# Patient Record
Sex: MALE | Race: WHITE | NOT HISPANIC OR LATINO | Employment: OTHER | URBAN - METROPOLITAN AREA
[De-identification: names, ages, dates, MRNs, and addresses within clinical notes are randomized per-mention and may not be internally consistent; named-entity substitution may affect disease eponyms.]

---

## 2017-06-24 ENCOUNTER — COMMUNICATION - HEALTHEAST (OUTPATIENT)
Dept: FAMILY MEDICINE | Facility: CLINIC | Age: 57
End: 2017-06-24

## 2017-06-24 DIAGNOSIS — I10 HYPERTENSION: ICD-10-CM

## 2017-07-13 ENCOUNTER — COMMUNICATION - HEALTHEAST (OUTPATIENT)
Dept: FAMILY MEDICINE | Facility: CLINIC | Age: 57
End: 2017-07-13

## 2017-07-13 DIAGNOSIS — E78.5 HYPERLIPIDEMIA: ICD-10-CM

## 2017-08-16 ENCOUNTER — COMMUNICATION - HEALTHEAST (OUTPATIENT)
Dept: FAMILY MEDICINE | Facility: CLINIC | Age: 57
End: 2017-08-16

## 2017-08-16 ENCOUNTER — OFFICE VISIT - HEALTHEAST (OUTPATIENT)
Dept: FAMILY MEDICINE | Facility: CLINIC | Age: 57
End: 2017-08-16

## 2017-08-16 DIAGNOSIS — I10 HYPERTENSION: ICD-10-CM

## 2017-08-16 DIAGNOSIS — E55.9 VITAMIN D DEFICIENCY: ICD-10-CM

## 2017-08-16 DIAGNOSIS — Z00.00 PHYSICAL EXAM: ICD-10-CM

## 2017-08-16 DIAGNOSIS — E78.5 HYPERLIPIDEMIA: ICD-10-CM

## 2017-08-16 DIAGNOSIS — E11.9 DIABETES (H): ICD-10-CM

## 2017-08-16 LAB
CHOLEST SERPL-MCNC: 195 MG/DL
FASTING STATUS PATIENT QL REPORTED: YES
HBA1C MFR BLD: 7 % (ref 3.5–6)
HDLC SERPL-MCNC: 32 MG/DL
LDLC SERPL CALC-MCNC: 129 MG/DL
TRIGL SERPL-MCNC: 172 MG/DL

## 2017-08-16 ASSESSMENT — MIFFLIN-ST. JEOR: SCORE: 2138.26

## 2018-02-13 ENCOUNTER — RECORDS - HEALTHEAST (OUTPATIENT)
Dept: ADMINISTRATIVE | Facility: OTHER | Age: 58
End: 2018-02-13

## 2018-03-10 ENCOUNTER — RECORDS - HEALTHEAST (OUTPATIENT)
Dept: ADMINISTRATIVE | Facility: OTHER | Age: 58
End: 2018-03-10

## 2018-08-21 ENCOUNTER — COMMUNICATION - HEALTHEAST (OUTPATIENT)
Dept: FAMILY MEDICINE | Facility: CLINIC | Age: 58
End: 2018-08-21

## 2018-08-21 DIAGNOSIS — E11.9 DIABETES (H): ICD-10-CM

## 2018-10-02 ENCOUNTER — COMMUNICATION - HEALTHEAST (OUTPATIENT)
Dept: FAMILY MEDICINE | Facility: CLINIC | Age: 58
End: 2018-10-02

## 2018-10-02 DIAGNOSIS — E78.5 HYPERLIPIDEMIA: ICD-10-CM

## 2018-10-03 ENCOUNTER — COMMUNICATION - HEALTHEAST (OUTPATIENT)
Dept: FAMILY MEDICINE | Facility: CLINIC | Age: 58
End: 2018-10-03

## 2018-10-03 DIAGNOSIS — I10 HYPERTENSION: ICD-10-CM

## 2018-10-09 ENCOUNTER — OFFICE VISIT - HEALTHEAST (OUTPATIENT)
Dept: FAMILY MEDICINE | Facility: CLINIC | Age: 58
End: 2018-10-09

## 2018-10-09 DIAGNOSIS — Z00.00 ENCOUNTER FOR ROUTINE ADULT HEALTH EXAMINATION WITHOUT ABNORMAL FINDINGS: ICD-10-CM

## 2018-10-09 DIAGNOSIS — E11.9 DIABETES (H): ICD-10-CM

## 2018-10-09 DIAGNOSIS — E55.9 VITAMIN D DEFICIENCY: ICD-10-CM

## 2018-10-09 DIAGNOSIS — Z23 NEED FOR IMMUNIZATION AGAINST INFLUENZA: ICD-10-CM

## 2018-10-09 LAB
ALBUMIN SERPL-MCNC: 4.3 G/DL (ref 3.5–5)
ALP SERPL-CCNC: 74 U/L (ref 45–120)
ALT SERPL W P-5'-P-CCNC: 26 U/L (ref 0–45)
ANION GAP SERPL CALCULATED.3IONS-SCNC: 12 MMOL/L (ref 5–18)
AST SERPL W P-5'-P-CCNC: 17 U/L (ref 0–40)
BILIRUB SERPL-MCNC: 0.5 MG/DL (ref 0–1)
BUN SERPL-MCNC: 15 MG/DL (ref 8–22)
CALCIUM SERPL-MCNC: 10.1 MG/DL (ref 8.5–10.5)
CHLORIDE BLD-SCNC: 104 MMOL/L (ref 98–107)
CHOLEST SERPL-MCNC: 200 MG/DL
CO2 SERPL-SCNC: 26 MMOL/L (ref 22–31)
CREAT SERPL-MCNC: 0.94 MG/DL (ref 0.7–1.3)
CREAT UR-MCNC: 179 MG/DL
FASTING STATUS PATIENT QL REPORTED: YES
GFR SERPL CREATININE-BSD FRML MDRD: >60 ML/MIN/1.73M2
GLUCOSE BLD-MCNC: 159 MG/DL (ref 70–125)
HBA1C MFR BLD: 7.9 % (ref 3.5–6)
HDLC SERPL-MCNC: 38 MG/DL
LDLC SERPL CALC-MCNC: 118 MG/DL
MICROALBUMIN UR-MCNC: 3.07 MG/DL (ref 0–1.99)
MICROALBUMIN/CREAT UR: 17.2 MG/G
POTASSIUM BLD-SCNC: 4.9 MMOL/L (ref 3.5–5)
PROT SERPL-MCNC: 7.2 G/DL (ref 6–8)
SODIUM SERPL-SCNC: 142 MMOL/L (ref 136–145)
TRIGL SERPL-MCNC: 222 MG/DL

## 2018-10-09 ASSESSMENT — MIFFLIN-ST. JEOR: SCORE: 2176.81

## 2018-10-10 ENCOUNTER — AMBULATORY - HEALTHEAST (OUTPATIENT)
Dept: FAMILY MEDICINE | Facility: CLINIC | Age: 58
End: 2018-10-10

## 2018-10-10 LAB — 25(OH)D3 SERPL-MCNC: 43.7 NG/ML (ref 30–80)

## 2018-10-29 ENCOUNTER — RECORDS - HEALTHEAST (OUTPATIENT)
Dept: ADMINISTRATIVE | Facility: OTHER | Age: 58
End: 2018-10-29

## 2019-02-06 ENCOUNTER — COMMUNICATION - HEALTHEAST (OUTPATIENT)
Dept: FAMILY MEDICINE | Facility: CLINIC | Age: 59
End: 2019-02-06

## 2019-02-06 DIAGNOSIS — R73.09 OTHER ABNORMAL GLUCOSE: ICD-10-CM

## 2019-02-11 ENCOUNTER — COMMUNICATION - HEALTHEAST (OUTPATIENT)
Dept: FAMILY MEDICINE | Facility: CLINIC | Age: 59
End: 2019-02-11

## 2019-05-06 ENCOUNTER — COMMUNICATION - HEALTHEAST (OUTPATIENT)
Dept: FAMILY MEDICINE | Facility: CLINIC | Age: 59
End: 2019-05-06

## 2019-06-13 ENCOUNTER — OFFICE VISIT - HEALTHEAST (OUTPATIENT)
Dept: FAMILY MEDICINE | Facility: CLINIC | Age: 59
End: 2019-06-13

## 2019-06-13 ENCOUNTER — COMMUNICATION - HEALTHEAST (OUTPATIENT)
Dept: FAMILY MEDICINE | Facility: CLINIC | Age: 59
End: 2019-06-13

## 2019-06-13 DIAGNOSIS — E11.9 TYPE 2 DIABETES MELLITUS WITHOUT COMPLICATION, WITHOUT LONG-TERM CURRENT USE OF INSULIN (H): ICD-10-CM

## 2019-06-13 DIAGNOSIS — I10 ESSENTIAL HYPERTENSION: ICD-10-CM

## 2019-06-13 DIAGNOSIS — E66.01 MORBID OBESITY (H): ICD-10-CM

## 2019-06-13 DIAGNOSIS — E78.00 HYPERCHOLESTEREMIA: ICD-10-CM

## 2019-06-13 LAB — HBA1C MFR BLD: 7.3 % (ref 3.5–6)

## 2019-06-13 ASSESSMENT — MIFFLIN-ST. JEOR: SCORE: 2146.2

## 2019-06-18 ENCOUNTER — COMMUNICATION - HEALTHEAST (OUTPATIENT)
Dept: FAMILY MEDICINE | Facility: CLINIC | Age: 59
End: 2019-06-18

## 2019-07-30 ENCOUNTER — RECORDS - HEALTHEAST (OUTPATIENT)
Dept: ADMINISTRATIVE | Facility: OTHER | Age: 59
End: 2019-07-30

## 2019-07-31 ENCOUNTER — RECORDS - HEALTHEAST (OUTPATIENT)
Dept: HEALTH INFORMATION MANAGEMENT | Facility: CLINIC | Age: 59
End: 2019-07-31

## 2019-09-20 ENCOUNTER — COMMUNICATION - HEALTHEAST (OUTPATIENT)
Dept: FAMILY MEDICINE | Facility: CLINIC | Age: 59
End: 2019-09-20

## 2019-09-20 DIAGNOSIS — I10 HYPERTENSION: ICD-10-CM

## 2019-10-24 ENCOUNTER — COMMUNICATION - HEALTHEAST (OUTPATIENT)
Dept: FAMILY MEDICINE | Facility: CLINIC | Age: 59
End: 2019-10-24

## 2019-10-24 DIAGNOSIS — R73.09 OTHER ABNORMAL GLUCOSE: ICD-10-CM

## 2019-11-04 ENCOUNTER — OFFICE VISIT - HEALTHEAST (OUTPATIENT)
Dept: FAMILY MEDICINE | Facility: CLINIC | Age: 59
End: 2019-11-04

## 2019-11-04 DIAGNOSIS — E78.00 HYPERCHOLESTEREMIA: ICD-10-CM

## 2019-11-04 DIAGNOSIS — Z00.00 ROUTINE GENERAL MEDICAL EXAMINATION AT A HEALTH CARE FACILITY: ICD-10-CM

## 2019-11-04 DIAGNOSIS — E11.9 DIABETES MELLITUS, TYPE 2 (H): ICD-10-CM

## 2019-11-04 LAB
ALT SERPL W P-5'-P-CCNC: 22 U/L (ref 0–45)
HBA1C MFR BLD: 7.2 % (ref 3.5–6)
HIV 1+2 AB+HIV1 P24 AG SERPL QL IA: NEGATIVE
LDLC SERPL CALC-MCNC: 121 MG/DL
PSA SERPL-MCNC: 1.3 NG/ML (ref 0–3.5)

## 2019-11-04 ASSESSMENT — MIFFLIN-ST. JEOR: SCORE: 2128.27

## 2019-11-05 ENCOUNTER — COMMUNICATION - HEALTHEAST (OUTPATIENT)
Dept: FAMILY MEDICINE | Facility: CLINIC | Age: 59
End: 2019-11-05

## 2019-11-05 LAB — HCV AB SERPL QL IA: NEGATIVE

## 2020-01-08 ENCOUNTER — COMMUNICATION - HEALTHEAST (OUTPATIENT)
Dept: FAMILY MEDICINE | Facility: CLINIC | Age: 60
End: 2020-01-08

## 2020-01-12 ENCOUNTER — COMMUNICATION - HEALTHEAST (OUTPATIENT)
Dept: FAMILY MEDICINE | Facility: CLINIC | Age: 60
End: 2020-01-12

## 2020-01-12 DIAGNOSIS — E11.9 DIABETES MELLITUS, TYPE 2 (H): ICD-10-CM

## 2020-01-15 ENCOUNTER — AMBULATORY - HEALTHEAST (OUTPATIENT)
Dept: NURSING | Facility: CLINIC | Age: 60
End: 2020-01-15

## 2020-01-15 ENCOUNTER — COMMUNICATION - HEALTHEAST (OUTPATIENT)
Dept: FAMILY MEDICINE | Facility: CLINIC | Age: 60
End: 2020-01-15

## 2020-01-28 ENCOUNTER — COMMUNICATION - HEALTHEAST (OUTPATIENT)
Dept: FAMILY MEDICINE | Facility: CLINIC | Age: 60
End: 2020-01-28

## 2020-01-28 DIAGNOSIS — E11.9 DIABETES MELLITUS, TYPE 2 (H): ICD-10-CM

## 2020-01-28 DIAGNOSIS — E66.01 MORBID OBESITY (H): ICD-10-CM

## 2020-03-07 ENCOUNTER — COMMUNICATION - HEALTHEAST (OUTPATIENT)
Dept: FAMILY MEDICINE | Facility: CLINIC | Age: 60
End: 2020-03-07

## 2020-03-07 DIAGNOSIS — E78.00 HYPERCHOLESTEREMIA: ICD-10-CM

## 2020-05-11 ENCOUNTER — OFFICE VISIT - HEALTHEAST (OUTPATIENT)
Dept: FAMILY MEDICINE | Facility: CLINIC | Age: 60
End: 2020-05-11

## 2020-05-11 DIAGNOSIS — E11.9 TYPE 2 DIABETES MELLITUS WITHOUT COMPLICATION, WITHOUT LONG-TERM CURRENT USE OF INSULIN (H): ICD-10-CM

## 2020-05-22 ENCOUNTER — COMMUNICATION - HEALTHEAST (OUTPATIENT)
Dept: FAMILY MEDICINE | Facility: CLINIC | Age: 60
End: 2020-05-22

## 2020-05-22 DIAGNOSIS — I10 HYPERTENSION: ICD-10-CM

## 2020-06-19 ENCOUNTER — RECORDS - HEALTHEAST (OUTPATIENT)
Dept: ADMINISTRATIVE | Facility: OTHER | Age: 60
End: 2020-06-19

## 2020-06-19 LAB — RETINOPATHY: NEGATIVE

## 2020-06-24 ENCOUNTER — RECORDS - HEALTHEAST (OUTPATIENT)
Dept: HEALTH INFORMATION MANAGEMENT | Facility: CLINIC | Age: 60
End: 2020-06-24

## 2020-09-10 ENCOUNTER — COMMUNICATION - HEALTHEAST (OUTPATIENT)
Dept: FAMILY MEDICINE | Facility: CLINIC | Age: 60
End: 2020-09-10

## 2020-09-10 DIAGNOSIS — E11.9 DIABETES MELLITUS, TYPE 2 (H): ICD-10-CM

## 2020-10-20 ENCOUNTER — COMMUNICATION - HEALTHEAST (OUTPATIENT)
Dept: FAMILY MEDICINE | Facility: CLINIC | Age: 60
End: 2020-10-20

## 2020-10-20 DIAGNOSIS — R73.09 OTHER ABNORMAL GLUCOSE: ICD-10-CM

## 2020-11-05 ENCOUNTER — COMMUNICATION - HEALTHEAST (OUTPATIENT)
Dept: ADMINISTRATIVE | Facility: CLINIC | Age: 60
End: 2020-11-05

## 2020-11-05 ENCOUNTER — OFFICE VISIT - HEALTHEAST (OUTPATIENT)
Dept: FAMILY MEDICINE | Facility: CLINIC | Age: 60
End: 2020-11-05

## 2020-11-05 DIAGNOSIS — Z00.00 ROUTINE GENERAL MEDICAL EXAMINATION AT A HEALTH CARE FACILITY: ICD-10-CM

## 2020-11-05 DIAGNOSIS — Z12.11 SCREEN FOR COLON CANCER: ICD-10-CM

## 2020-11-05 DIAGNOSIS — E11.9 TYPE 2 DIABETES MELLITUS WITHOUT COMPLICATION, WITHOUT LONG-TERM CURRENT USE OF INSULIN (H): ICD-10-CM

## 2020-11-05 LAB
ANION GAP SERPL CALCULATED.3IONS-SCNC: 11 MMOL/L (ref 5–18)
BUN SERPL-MCNC: 17 MG/DL (ref 8–22)
CALCIUM SERPL-MCNC: 9.9 MG/DL (ref 8.5–10.5)
CHLORIDE BLD-SCNC: 104 MMOL/L (ref 98–107)
CO2 SERPL-SCNC: 25 MMOL/L (ref 22–31)
CREAT SERPL-MCNC: 1.12 MG/DL (ref 0.7–1.3)
GFR SERPL CREATININE-BSD FRML MDRD: >60 ML/MIN/1.73M2
GLUCOSE BLD-MCNC: 217 MG/DL (ref 70–125)
HBA1C MFR BLD: 8.2 %
LDLC SERPL CALC-MCNC: 128 MG/DL
POTASSIUM BLD-SCNC: 5.1 MMOL/L (ref 3.5–5)
PSA SERPL-MCNC: 1.1 NG/ML (ref 0–4.5)
SODIUM SERPL-SCNC: 140 MMOL/L (ref 136–145)

## 2020-11-05 RX ORDER — OXYMETAZOLINE HYDROCHLORIDE 1 G/100G
CREAM TOPICAL DAILY
Status: SHIPPED | COMMUNITY
Start: 2020-11-05

## 2020-11-05 ASSESSMENT — MIFFLIN-ST. JEOR: SCORE: 2118.64

## 2020-11-06 ENCOUNTER — COMMUNICATION - HEALTHEAST (OUTPATIENT)
Dept: FAMILY MEDICINE | Facility: CLINIC | Age: 60
End: 2020-11-06

## 2020-11-06 DIAGNOSIS — E78.00 HYPERCHOLESTEREMIA: ICD-10-CM

## 2020-11-09 ENCOUNTER — AMBULATORY - HEALTHEAST (OUTPATIENT)
Dept: FAMILY MEDICINE | Facility: CLINIC | Age: 60
End: 2020-11-09

## 2020-11-09 DIAGNOSIS — E11.9 TYPE 2 DIABETES MELLITUS WITHOUT COMPLICATION, WITHOUT LONG-TERM CURRENT USE OF INSULIN (H): ICD-10-CM

## 2020-11-09 RX ORDER — ATORVASTATIN CALCIUM 40 MG/1
TABLET, FILM COATED ORAL
Qty: 90 TABLET | Refills: 3 | Status: SHIPPED | OUTPATIENT
Start: 2020-11-09 | End: 2021-10-22

## 2020-11-23 ENCOUNTER — COMMUNICATION - HEALTHEAST (OUTPATIENT)
Dept: FAMILY MEDICINE | Facility: CLINIC | Age: 60
End: 2020-11-23

## 2020-11-23 DIAGNOSIS — E11.9 DIABETES MELLITUS, TYPE 2 (H): ICD-10-CM

## 2020-11-24 RX ORDER — BLOOD SUGAR DIAGNOSTIC
STRIP MISCELLANEOUS
Qty: 200 STRIP | Refills: 3 | Status: SHIPPED | OUTPATIENT
Start: 2020-11-24 | End: 2021-11-15

## 2020-11-24 RX ORDER — LANCETS
EACH MISCELLANEOUS
Qty: 204 EACH | Refills: 3 | Status: SHIPPED | OUTPATIENT
Start: 2020-11-24 | End: 2021-10-31

## 2020-12-08 ENCOUNTER — RECORDS - HEALTHEAST (OUTPATIENT)
Dept: ADMINISTRATIVE | Facility: OTHER | Age: 60
End: 2020-12-08

## 2021-02-03 ENCOUNTER — COMMUNICATION - HEALTHEAST (OUTPATIENT)
Dept: FAMILY MEDICINE | Facility: CLINIC | Age: 61
End: 2021-02-03

## 2021-02-03 DIAGNOSIS — I10 HYPERTENSION: ICD-10-CM

## 2021-02-03 RX ORDER — LISINOPRIL 10 MG/1
TABLET ORAL
Qty: 90 TABLET | Refills: 2 | Status: SHIPPED | OUTPATIENT
Start: 2021-02-03 | End: 2021-10-20

## 2021-02-24 ENCOUNTER — COMMUNICATION - HEALTHEAST (OUTPATIENT)
Dept: FAMILY MEDICINE | Facility: CLINIC | Age: 61
End: 2021-02-24

## 2021-02-24 DIAGNOSIS — E11.9 DIABETES MELLITUS, TYPE 2 (H): ICD-10-CM

## 2021-05-03 ENCOUNTER — OFFICE VISIT - HEALTHEAST (OUTPATIENT)
Dept: FAMILY MEDICINE | Facility: CLINIC | Age: 61
End: 2021-05-03

## 2021-05-03 DIAGNOSIS — R73.09 OTHER ABNORMAL GLUCOSE: ICD-10-CM

## 2021-05-03 DIAGNOSIS — E11.9 DIABETES MELLITUS, TYPE 2 (H): ICD-10-CM

## 2021-05-03 LAB — HBA1C MFR BLD: 8.4 %

## 2021-05-03 ASSESSMENT — MIFFLIN-ST. JEOR: SCORE: 2109.56

## 2021-05-28 NOTE — TELEPHONE ENCOUNTER
Called and reminded pt for Diabetes check. Pt also had not establish care with New doctor Dr. Nuñez yet. He agreed to be seen and schedule for next opening appt with Dr. Nuñez.

## 2021-05-29 NOTE — TELEPHONE ENCOUNTER
Patient overdue for diabetic eye exam. Last on record 05/24/16.    Left voice message asking patient to call clinic.  Included clinic phone number and date of call in message.    When call is returned, please inquire about any recent or scheduled eye exams.    Thank you.

## 2021-05-29 NOTE — TELEPHONE ENCOUNTER
Patient Returning Call  Reason for call:  Returning call  Information relayed to patient:  Relayed below information to patient.  Patient has additional questions:  Yes  If YES, what are your questions/concerns:  Patient states he will make an appointment with [facility] very soon.  Patient states he usually goes to the [facility] or [facility].  Okay to leave a detailed message?: Yes

## 2021-05-29 NOTE — PROGRESS NOTES
ASSESMENT AND PLAN:  Diagnoses and all orders for this visit:    Hypercholesteremia  Reviewed previous LDL trend with the patient and discussed goals given his underlying multiple cardiac risk factors.  We reviewed the risks and benefits of the medication as prescribed below, we decided to discontinue simvastatin and replace with atorvastatin as detailed below.  Patient will return for routine follow-up in 6 months at which time we can plan to check LDL and ALT.  -     atorvastatin (LIPITOR) 40 MG tablet; Take 1 tablet (40 mg total) by mouth daily.  Dispense: 90 tablet; Refill: 3    Type 2 diabetes mellitus without complication, without long-term current use of insulin (H)  -     Glycosylated Hemoglobin A1c done today comes back at 7.3, improved from 7.9 prior to initiation of metformin.  He is going to continue metformin twice daily along with his diet and exercise lifestyle changes.    Essential hypertension  Well-controlled on recheck.  Patient had stressful times prior to coming into the clinic which I think explain the initially elevated blood pressure.  Continue current treatment plan recheck again in 6 months.    Morbid obesity (H)  The patient on diet, exercise, lifestyle.  Was congratulated on the overall long-term progress that he is made reducing his weight from 300 pounds to his current weight of 280.        SUBJECTIVE: 59-year-old male new to me, previously a patient of my recently retired partner Dr. Chacon.  Patient has a history of obesity, hypertension, type 2 diabetes, hypercholesterolemia.  Patient does not have a regular exercise program.  He does do a lot of walking at work.  He is been working on healthy eating and has been very conscious about his carbs intake.  Last visit his A1c had gone up to 7.9 so Dr. Chacon started him on metformin twice daily.  Patient acknowledges that he does not always remember his metformin twice per day.  He reports that it gives him some mild upset stomach.   However, it does not bother him enough that he wants to try to switching medications or switching to extended release format metformin.  Patient does not get any exertional chest pain.  He has noticed that his exercise tolerance is slowly decreased over the last few years.  No shortness of breath at rest.    No past medical history on file.  Patient Active Problem List   Diagnosis     Nephrolithiasis     Obesity     Allergic Rhinitis     Hyperglycemia     Hyperlipidemia     Essential Hypertension     Obesity (BMI 35.0-39.9) with comorbidity (H)     Current Outpatient Medications   Medication Sig Dispense Refill     ACCU-CHEK FASTCLIX Misc TEST TWICE DAILY 100 each 0     blood glucose test strips Use 1 each As Directed 2 (two) times a day at 7:30am and 4:30pm. Dispense brand per patient's insurance at pharmacy discretion. 200 strip 3     blood-glucose meter Misc Use 1 Device As Directed 2 (two) times a day at 9am and 6pm. 1 each 0     generic lancets (ACCU-CHEK SOFTCLIX LANCETS) Use 1 application As Directed 2 (two) times a day at 7:30am and 4:30pm. Dispense brand per patient's insurance at pharmacy discretion. 100 each 3     imiquimod (ALDARA) 5 % cream APPLY TOPICALLY D TO WARTS  3     lancets (ONETOUCH DELICA LANCETS) 33 gauge Misc Test once daily 100 each 3     lisinopril (PRINIVIL,ZESTRIL) 10 MG tablet TAKE 1 TABLET (10 MG TOTAL) BY MOUTH DAILY. 90 tablet 3     metFORMIN (GLUCOPHAGE) 500 MG tablet Take 1 tablet (500 mg total) by mouth 2 (two) times a day with meals. 180 tablet 2     metroNIDAZOLE (METROGEL) 0.75 % gel APPLY TO FACE D  10     MIRVASO 0.33 % GlwP APPLY 1 APPLICATION TO FACE D TOPICALLY  8     ONETOUCH ULTRA BLUE TEST STRIP strips TEST DAILY AS DIRECTED 100 strip 2     atorvastatin (LIPITOR) 40 MG tablet Take 1 tablet (40 mg total) by mouth daily. 90 tablet 3     No current facility-administered medications for this visit.      Social History     Tobacco Use   Smoking Status Never Smoker  "  Smokeless Tobacco Never Used       OBJECTICE: /86   Pulse 83   Temp 97.7  F (36.5  C) (Oral)   Resp 20   Ht 6' 1.7\" (1.872 m)   Wt (!) 280 lb 4 oz (127.1 kg)   SpO2 95%   BMI 36.28 kg/m       Recent Results (from the past 24 hour(s))   Glycosylated Hemoglobin A1c    Collection Time: 06/13/19  9:02 AM   Result Value Ref Range    Hemoglobin A1c 7.3 (H) 3.5 - 6.0 %        GEN-alert, appropriate, in no apparent distress   CV-regular rate and rhythm with no murmur   RESP-lungs clear to auscultation   ABDOMINAL-soft, nontender, no palpable hepatomegaly.   EXTREM-warm with no ankle edema   Foot exam-normal.  No foot ulcers.  Palpable pedal pulses bilaterally.      Catalino Nuñez          "

## 2021-05-31 VITALS — BODY MASS INDEX: 35.74 KG/M2 | WEIGHT: 278.5 LBS | HEIGHT: 74 IN

## 2021-06-01 ENCOUNTER — RECORDS - HEALTHEAST (OUTPATIENT)
Dept: ADMINISTRATIVE | Facility: CLINIC | Age: 61
End: 2021-06-01

## 2021-06-01 NOTE — TELEPHONE ENCOUNTER
Refill Approved    Rx renewed per Medication Renewal Policy. Medication was last renewed on 10/3/18.    Ksenia Arvizu, [facility] Triage/Med Refill 9/20/2019     Requested Prescriptions   Pending Prescriptions Disp Refills     lisinopril (PRINIVIL,ZESTRIL) 10 MG tablet 90 tablet 3     Sig: Take 1 tablet (10 mg total) by mouth daily.       Ace Inhibitors Refill Protocol Passed - 9/20/2019  5:10 PM        Passed - PCP or prescribing provider visit in past 12 months       Last office visit with prescriber/PCP: 6/13/2019 Catalino Nuñez MD OR same dept: 6/13/2019 Catalino Nuñez MD OR same specialty: 6/13/2019 Catalino Nuñez MD  Last physical: Visit date not found Last MTM visit: Visit date not found   Next visit within 3 mo: Visit date not found  Next physical within 3 mo: Visit date not found  Prescriber OR PCP: Catalino Nuñez MD  Last diagnosis associated with med order: 1. Hypertension  - lisinopril (PRINIVIL,ZESTRIL) 10 MG tablet; Take 1 tablet (10 mg total) by mouth daily.  Dispense: 90 tablet; Refill: 3    If protocol passes may refill for 12 months if within 3 months of last provider visit (or a total of 15 months).             Passed - Serum Potassium in past 12 months     Lab Results   Component Value Date    Potassium 4.9 10/09/2018             Passed - Blood pressure filed in past 12 months     BP Readings from Last 1 Encounters:   06/13/19 130/86             Passed - Serum Creatinine in past 12 months     Creatinine   Date Value Ref Range Status   10/09/2018 0.94 0.70 - 1.30 mg/dL Final

## 2021-06-02 VITALS — HEIGHT: 74 IN | WEIGHT: 287 LBS | BODY MASS INDEX: 36.83 KG/M2

## 2021-06-02 NOTE — TELEPHONE ENCOUNTER
RN cannot approve Refill Request    RN can NOT refill this medication Protocol failed and NO refill given.         Crys Joseph, [facility] Triage/Med Refill 10/24/2019    Requested Prescriptions   Pending Prescriptions Disp Refills     metFORMIN (GLUCOPHAGE) 500 MG tablet [Pharmacy Med Name: METFORMIN  MG TABLET] 180 tablet 3     Sig: TAKE 1 TABLET BY MOUTH TWICE A DAY WITH MEALS       Metformin Refill Protocol Failed - 10/24/2019  2:09 AM        Failed - LFT or AST or ALT in last 12 months     Albumin   Date Value Ref Range Status   10/09/2018 4.3 3.5 - 5.0 g/dL Final     Bilirubin, Total   Date Value Ref Range Status   10/09/2018 0.5 0.0 - 1.0 mg/dL Final     Bilirubin, Direct   Date Value Ref Range Status   01/14/2014 0.2 <0.6 mg/dL Final     Alkaline Phosphatase   Date Value Ref Range Status   10/09/2018 74 45 - 120 U/L Final     AST   Date Value Ref Range Status   10/09/2018 17 0 - 40 U/L Final     ALT   Date Value Ref Range Status   10/09/2018 26 0 - 45 U/L Final     Protein, Total   Date Value Ref Range Status   10/09/2018 7.2 6.0 - 8.0 g/dL Final                Failed - GFR or Serum Creatinine in last 6 months     GFR MDRD Non Af Amer   Date Value Ref Range Status   10/09/2018 >60 >60 mL/min/1.73m2 Final     GFR MDRD Af Amer   Date Value Ref Range Status   10/09/2018 >60 >60 mL/min/1.73m2 Final             Failed - Microalbumin in last year      Microalbumin, Random Urine   Date Value Ref Range Status   10/09/2018 3.07 (H) 0.00 - 1.99 mg/dL Final                  Passed - Blood pressure in last 12 months     BP Readings from Last 1 Encounters:   06/13/19 130/86             Passed - Visit with PCP or prescribing provider visit in last 6 months or next 3 months     Last office visit with prescriber/PCP: 6/13/2019 OR same dept: 6/13/2019 Catalino Nuñez MD OR same specialty: 6/13/2019 Catalino Nuñez MD Last physical: Visit date not found Last [facility] visit: Visit date not found         Next appt  within 3 mo: Visit date not found  Next physical within 3 mo: Visit date not found  Prescriber OR PCP: Catalino Nuñez MD  Last diagnosis associated with med order: 1. Hyperglycemia  - metFORMIN (GLUCOPHAGE) 500 MG tablet [Pharmacy Med Name: METFORMIN  MG TABLET]; TAKE 1 TABLET BY MOUTH TWICE A DAY WITH MEALS  Dispense: 180 tablet; Refill: 2     If protocol passes may refill for 12 months if within 3 months of last provider visit (or a total of 15 months).           Passed - A1C in last 6 months     Hemoglobin A1c   Date Value Ref Range Status   06/13/2019 7.3 (H) 3.5 - 6.0 % Final

## 2021-06-03 VITALS
RESPIRATION RATE: 20 BRPM | TEMPERATURE: 98.7 F | OXYGEN SATURATION: 98 % | SYSTOLIC BLOOD PRESSURE: 130 MMHG | HEIGHT: 74 IN | WEIGHT: 277 LBS | BODY MASS INDEX: 35.55 KG/M2 | HEART RATE: 72 BPM | DIASTOLIC BLOOD PRESSURE: 82 MMHG

## 2021-06-03 VITALS — WEIGHT: 280.25 LBS | BODY MASS INDEX: 35.97 KG/M2 | HEIGHT: 74 IN

## 2021-06-03 NOTE — PROGRESS NOTES
ASSESMENT AND PLAN:    Physical, Health Maitenence -   Reviewed healthy lifestyle, diet, exercise, vitamins, and follow-up plan today with patient.  Discussed indications for routine and emergent evaluation.  Reviewed age appropriate cancer and other screening recommendations.  Immunization review and update done.  Reviewed indicated lab tests, see lab orders.     -     Varicella Zoster, Recombinant Vaccine IM  -     Pneumococcal polysaccharide vaccine 23-valent 3 yo or older, subq/IM  -     HIV Antigen/Antibody Screening Cascade  -     Hepatitis C Antibody (Anti-HCV)  -     PSA (Prostatic-Specific Antigen), Annual Screen    Diabetes mellitus, type 2 (H)  Patient not currently on aspirin.  No history of GI bleeding.  We reviewed the risks and benefits of daily aspirin and added as prescribed below.  Follow-up if problems.  Otherwise routine diabetic follow-up in 6 months.  -     Glycosylated Hemoglobin A1c  -     Cancel: Microalbumin, Random Urine  -     [facility] RED  -     Pneumococcal polysaccharide vaccine 23-valent 3 yo or older, subq/IM  -     aspirin 81 MG EC tablet; Take 1 tablet (81 mg total) by mouth daily.  Dispense: 90 tablet; Refill: 3    Hypercholesteremia  -     ALT (SGPT)  -     LDL Cholesterol, Direct          HPI: 59-year-old male here for his physical.  He does not have any other main concerns.  He is due for follow-up on his cholesterol and diabetes, cholesterol medication had been changed recently from simvastatin to atorvastatin.  He has tolerated that medication change well, he has not noticed any side effects or problems.    ROS: No chest pain, he does get some shortness of breath with exertion but has not noticed any decreased exercise tolerance.  No blood in the urine or blood in the stool.  No skin lesions that have been changing.  Remainder of review of systems is as above or negative.    No past medical history on file.    Current Outpatient Medications   Medication Sig Dispense Refill      atorvastatin (LIPITOR) 40 MG tablet Take 1 tablet (40 mg total) by mouth daily. 90 tablet 3     lisinopril (PRINIVIL,ZESTRIL) 10 MG tablet Take 1 tablet (10 mg total) by mouth daily. 90 tablet 2     metFORMIN (GLUCOPHAGE) 500 MG tablet TAKE 1 TABLET BY MOUTH TWICE A DAY WITH MEALS 180 tablet 3     ACCU-CHEK FASTCLIX Misc TEST TWICE DAILY 100 each 0     aspirin 81 MG EC tablet Take 1 tablet (81 mg total) by mouth daily. 90 tablet 3     blood glucose test strips Use 1 each As Directed 2 (two) times a day at 7:30am and 4:30pm. Dispense brand per patient's insurance at pharmacy discretion. 200 strip 3     blood-glucose meter Misc Use 1 Device As Directed 2 (two) times a day at 9am and 6pm. 1 each 0     generic lancets (ACCU-CHEK SOFTCLIX LANCETS) Use 1 application As Directed 2 (two) times a day at 7:30am and 4:30pm. Dispense brand per patient's insurance at pharmacy discretion. 100 each 3     imiquimod (ALDARA) 5 % cream APPLY TOPICALLY D TO WARTS  3     lancets (ONETOUCH DELICA LANCETS) 33 gauge Misc Test once daily 100 each 3     metroNIDAZOLE (METROGEL) 0.75 % gel APPLY TO FACE D  10     MIRVASO 0.33 % GlwP APPLY 1 APPLICATION TO FACE D TOPICALLY  8     ONETOUCH ULTRA BLUE TEST STRIP strips TEST DAILY AS DIRECTED 100 strip 2     No current facility-administered medications for this visit.        Patient Active Problem List   Diagnosis     Nephrolithiasis     Obesity     Allergic Rhinitis     Hyperglycemia     Hyperlipidemia     Essential Hypertension     Obesity (BMI 35.0-39.9) with comorbidity (H)       Social History     Socioeconomic History     Marital status:      Spouse name: None     Number of children: None     Years of education: None     Highest education level: None   Occupational History     None   Social Needs     Financial resource strain: None     Food insecurity:     Worry: None     Inability: None     Transportation needs:     Medical: None     Non-medical: None   Tobacco Use     Smoking  "status: Never Smoker     Smokeless tobacco: Never Used   Substance and Sexual Activity     Alcohol use: No     Drug use: No     Sexual activity: Yes     Partners: Female   Lifestyle     Physical activity:     Days per week: None     Minutes per session: None     Stress: None   Relationships     Social connections:     Talks on phone: None     Gets together: None     Attends [employer] service: None     Active member of club or organization: None     Attends meetings of clubs or organizations: None     Relationship status: None     Intimate partner violence:     Fear of current or ex partner: None     Emotionally abused: None     Physically abused: None     Forced sexual activity: None   Other Topics Concern     None   Social History Narrative     None       Social History     Tobacco Use   Smoking Status Never Smoker   Smokeless Tobacco Never Used       OBJECTICE: /82   Pulse 72   Temp 98.7  F (37.1  C) (Oral)   Resp 20   Ht 6' 1.5\" (1.867 m)   Wt (!) 277 lb (125.6 kg)   SpO2 98%   BMI 36.05 kg/m        Gen - alert, orientated, NAD  Eyes - fundascopic exam limited by the undialated pupil but looks symmetric  ENT - oropharynx clear, TMs clear  Neck - supple, no palpable mass or lymphadenopathy  CV - RRR, no murmur  Resp - lungs CTA  Ab - soft, nontender, no palpable mass or organomegaly   - normal appearance to the external genetalia, normal testicular exam bilaterally, no hernia  Rectal - normal tone, no mass, enlarged prostate with no palpable nodules  Extrem - warm, no edema  Neuro - CN II-XII intact, strength, sensation, reflexes intact and symmetric  Skin - no rash, no atypical appearing lesions seen.       Catalino Nuñez   12:58 PM 11/4/2019               "

## 2021-06-05 VITALS
HEART RATE: 99 BPM | RESPIRATION RATE: 12 BRPM | WEIGHT: 274 LBS | HEIGHT: 74 IN | BODY MASS INDEX: 35.16 KG/M2 | OXYGEN SATURATION: 96 % | DIASTOLIC BLOOD PRESSURE: 76 MMHG | SYSTOLIC BLOOD PRESSURE: 126 MMHG | TEMPERATURE: 99 F

## 2021-06-05 VITALS
TEMPERATURE: 98.9 F | BODY MASS INDEX: 34.91 KG/M2 | OXYGEN SATURATION: 97 % | HEART RATE: 81 BPM | DIASTOLIC BLOOD PRESSURE: 86 MMHG | SYSTOLIC BLOOD PRESSURE: 138 MMHG | HEIGHT: 74 IN | WEIGHT: 272 LBS

## 2021-06-05 NOTE — TELEPHONE ENCOUNTER
Refill Approved    Rx renewed per Medication Renewal Policy. Medication was last renewed on 2/11/19.    Crys Joseph, [facility] Triage/Med Refill 1/28/2020     Requested Prescriptions   Pending Prescriptions Disp Refills     ACCU-CHEK GUIDE strips [Pharmacy Med Name: ACCU-CHEK GUIDE TEST STRIP] 200 strip 3     Sig: USE 1 EACH AS DIRECTED 2 (TWO) TIMES A DAY AT 7:30AM AND 4:30PM.       Diabetic Supplies Refill Protocol Passed - 1/28/2020  1:52 AM        Passed - Visit with PCP or prescribing provider visit in last 6 months     Last office visit with prescriber/PCP: 6/13/2019 Catalino Nuñez MD OR same dept: 6/13/2019 Catalino Nuñez MD OR same specialty: 6/13/2019 Catalino Nuñez MD  Last physical: 11/4/2019 Last MTM visit: Visit date not found   Next visit within 3 mo: Visit date not found  Next physical within 3 mo: Visit date not found  Prescriber OR PCP: Catalino Nuñez MD  Last diagnosis associated with med order: There are no diagnoses linked to this encounter.  If protocol passes may refill for 12 months if within 3 months of last provider visit (or a total of 15 months).             Passed - A1C in last 6 months     Hemoglobin A1c   Date Value Ref Range Status   11/04/2019 7.2 (H) 3.5 - 6.0 % Final

## 2021-06-05 NOTE — TELEPHONE ENCOUNTER
Patient scheduled to receive Shingrix #2 at 01/15/20 [facility] appointment.    #1 administered 11/04/19.    Will Provider place order if indicated?  Thank you.

## 2021-06-06 NOTE — TELEPHONE ENCOUNTER
Refill Approved    Rx renewed per Medication Renewal Policy. Medication was last renewed on 6/13/19.    Crys Joseph, [facility] Triage/Med Refill 3/10/2020     Requested Prescriptions   Pending Prescriptions Disp Refills     atorvastatin (LIPITOR) 40 MG tablet [Pharmacy Med Name: ATORVASTATIN 40 MG TABLET] 90 tablet 3     Sig: TAKE 1 TABLET BY MOUTH EVERY DAY       Statins Refill Protocol (Hmg CoA Reductase Inhibitors) Passed - 3/7/2020 10:49 AM        Passed - PCP or prescribing provider visit in past 12 months      Last office visit with prescriber/PCP: 6/13/2019 Catalino Nuñez MD OR same dept: 6/13/2019 Catalino Nuñez MD OR same specialty: 6/13/2019 Catalino Nuñez MD  Last physical: 11/4/2019 Last MTM visit: Visit date not found   Next visit within 3 mo: Visit date not found  Next physical within 3 mo: Visit date not found  Prescriber OR PCP: Catalino Nuñez MD  Last diagnosis associated with med order: 1. Hypercholesteremia  - atorvastatin (LIPITOR) 40 MG tablet [Pharmacy Med Name: ATORVASTATIN 40 MG TABLET]; TAKE 1 TABLET BY MOUTH EVERY DAY  Dispense: 90 tablet; Refill: 3    If protocol passes may refill for 12 months if within 3 months of last provider visit (or a total of 15 months).

## 2021-06-08 NOTE — PROGRESS NOTES
Kash Tavarez is a 59 y.o. male who is being evaluated via a billable video visit.      ASSESMENT AND PLAN:  Diagnoses and all orders for this visit:    Type 2 diabetes mellitus without complication, without long-term current use of insulin (H)  Medication review and counseling done with the patient, continue current medications, he has tolerated the addition of aspirin well.  Counseled on maintaining an exercise routine during the lockdown.  We discussed lab work, he is due for routine A1c, last A1c was 7.2.  He declines this, he prefers to err on the side of caution with COVID-19 exposure and will defer labs until his annual physical in November.      Reviewed the risks and benefits of the treatment plan with the patient and/or caregiver and we discussed indications for routine and emergent follow-up.        SUBJECTIVE: 59-year-old male with history of type 2 diabetes, last A1c 7.2.  He has been checking regular blood sugar monitoring and there has been no change, most of his readings are in the 140s.  No hypoglycemia.  Patient has been doing some physical activity but no regular exercise program.  He is taking all of his medications, and including the recently added aspirin, with no difficulties or side effects that he has noticed.  Patient has been very good about following the recommendations for precautions against exposure to COVID-19.  No new issues or concerns.    No past medical history on file.  Patient Active Problem List   Diagnosis     Nephrolithiasis     Obesity     Allergic Rhinitis     Hyperlipidemia     Essential Hypertension     Obesity (BMI 35.0-39.9) with comorbidity (H)     Type 2 diabetes mellitus without complication, without long-term current use of insulin (H)     Current Outpatient Medications   Medication Sig Dispense Refill     aspirin 81 MG EC tablet Take 1 tablet (81 mg total) by mouth daily. 90 tablet 3     atorvastatin (LIPITOR) 40 MG tablet TAKE 1 TABLET BY MOUTH EVERY DAY 90  "tablet 2     imiquimod (ALDARA) 5 % cream APPLY TOPICALLY D TO WARTS  3     lisinopril (PRINIVIL,ZESTRIL) 10 MG tablet Take 1 tablet (10 mg total) by mouth daily. 90 tablet 2     metFORMIN (GLUCOPHAGE) 500 MG tablet TAKE 1 TABLET BY MOUTH TWICE A DAY WITH MEALS 180 tablet 3     metroNIDAZOLE (METROGEL) 0.75 % gel APPLY TO FACE D  10     MIRVASO 0.33 % GlwP APPLY 1 APPLICATION TO FACE D TOPICALLY  8     ACCU-CHEK FASTCLIX LANCET DRUM USE 1 APPLICATION AS DIRECTED 2 (TWO) TIMES A DAY AT 7:30AM AND 4:30PM. 204 each 3     ACCU-CHEK FASTCLIX Misc TEST TWICE DAILY 100 each 0     ACCU-CHEK GUIDE strips USE 1 EACH AS DIRECTED 2 (TWO) TIMES A DAY AT 7:30AM AND 4:30PM. 200 strip 3     blood-glucose meter Misc Use 1 Device As Directed 2 (two) times a day at 9am and 6pm. 1 each 0     lancets (ONETOUCH DELICA LANCETS) 33 gauge Misc Test once daily 100 each 3     ONETOUCH ULTRA BLUE TEST STRIP strips TEST DAILY AS DIRECTED 100 strip 2     No current facility-administered medications for this visit.      Social History     Tobacco Use   Smoking Status Never Smoker   Smokeless Tobacco Never Used       OBJECTICE: There were no vitals taken for this visit.     No results found for this or any previous visit (from the past 24 hour(s)).     GEN-alert, appropriate, in no acute distress        Catalino Nuñez                      The patient has been notified of following:     \"This video visit will be conducted via a call between you and your physician/provider. We have found that certain health care needs can be provided without the need for an in-person physical exam.  This service lets us provide the care you need with a video conversation.  If a prescription is necessary we can send it directly to your pharmacy.  If lab work is needed we can place an order for that and you can then stop by our lab to have the test done at a later time.    Video visits are billed at different rates depending on your insurance coverage. Please reach out " "to your insurance provider with any questions.    If during the course of the call the physician/provider feels a video visit is not appropriate, you will not be charged for this service.\"    Patient has given verbal consent to a Video visit? Yes    Patient would like to receive their AVS by AVS Preference: Lakshmi.    Patient would like the video invitation sent by: Send to e-mail at: [email or web address]     Will anyone else be joining your video visit? No        Video Start Time: 8:27        Video-Visit Details    Type of service:  Video Visit    Video End Time (time video stopped): 8:40  Originating Location (pt. Location): Home    Distant Location (provider location):  [facility]      Platform used for Video Visit: Josué Nuñez MD  "

## 2021-06-08 NOTE — TELEPHONE ENCOUNTER
RN cannot approve Refill Request    RN can NOT refill this medication PCP messaged that patient is overdue for Labs. Last office visit: 6/13/2019 Catalino Nuñez MD Last Physical: 11/4/2019 Last MTM visit: Visit date not found Last visit same specialty: 6/13/2019 Catalino Nuñez MD.  Next visit within 3 mo: Visit date not found  Next physical within 3 mo: Visit date not found      Aggie Montemayor, [facility] Triage/Med Refill 5/24/2020    Requested Prescriptions   Pending Prescriptions Disp Refills     lisinopriL (PRINIVIL,ZESTRIL) 10 MG tablet [Pharmacy Med Name: LISINOPRIL 10 MG TABLET] 90 tablet 2     Sig: TAKE 1 TABLET BY MOUTH EVERY DAY       Ace Inhibitors Refill Protocol Failed - 5/22/2020 11:30 AM        Failed - Serum Potassium in past 12 months     No results found for: LN-POTASSIUM          Failed - Serum Creatinine in past 12 months     Creatinine   Date Value Ref Range Status   10/09/2018 0.94 0.70 - 1.30 mg/dL Final             Passed - PCP or prescribing provider visit in past 12 months       Last office visit with prescriber/PCP: 6/13/2019 Catalino Nuñez MD OR same dept: 6/13/2019 Catalino Nuñez MD OR same specialty: 6/13/2019 Catalino Nuñez MD  Last physical: 11/4/2019 Last MTM visit: Visit date not found   Next visit within 3 mo: Visit date not found  Next physical within 3 mo: Visit date not found  Prescriber OR PCP: Catalino Nuñez MD  Last diagnosis associated with med order: 1. Hypertension  - lisinopriL (PRINIVIL,ZESTRIL) 10 MG tablet [Pharmacy Med Name: LISINOPRIL 10 MG TABLET]; TAKE 1 TABLET BY MOUTH EVERY DAY  Dispense: 90 tablet; Refill: 2    If protocol passes may refill for 12 months if within 3 months of last provider visit (or a total of 15 months).             Passed - Blood pressure filed in past 12 months     BP Readings from Last 1 Encounters:   11/04/19 130/82

## 2021-06-11 NOTE — TELEPHONE ENCOUNTER
Refill Approved    Rx renewed per Medication Renewal Policy. Medication was last renewed on 11/4/19.    Crys Joseph, [facility] Connection Triage/Med Refill 9/12/2020     Requested Prescriptions   Pending Prescriptions Disp Refills     aspirin 81 MG EC tablet [Pharmacy Med Name: [facility] ASPIRIN EC 81 MG TABLET] 90 tablet 3     Sig: TAKE 1 TABLET BY MOUTH EVERY DAY       Aspirin/Dipyridamole Refill Protocol Passed - 9/10/2020 12:02 PM        Passed - PCP or prescribing provider visit in past 12 months       Last office visit with prescriber/PCP: 6/13/2019 Catalino Nuñez MD OR same dept: Visit date not found OR same specialty: 6/13/2019 Catalino Nuñez MD  Last physical: 11/4/2019 Last MTM visit: Visit date not found    Next appt within 3 mo: Visit date not found Next physical within 3 mo: Visit date not found  Prescriber OR PCP: Catalino Nuñez MD  Last diagnosis associated with med order: 1. Diabetes mellitus, type 2 (H)  - aspirin 81 MG EC tablet [Pharmacy Med Name: [facility] ASPIRIN EC 81 MG TABLET]; TAKE 1 TABLET BY MOUTH EVERY DAY  Dispense: 90 tablet; Refill: 3    If protocol passes may refill for 6 months if within 3 months of last provider visit (or a total of 9 months).

## 2021-06-12 NOTE — PROGRESS NOTES
ASSESMENT AND PLAN:    Physical, Health Maitenence -   Reviewed healthy lifestyle, diet, exercise, vitamins, and follow-up plan today with patient.  Reviewed age appropriate cancer and other screening recommendations.  Immunization review and update done.  Reviewed indicated lab tests, see lab orders.     -     PSA, Annual Screen (Prostatic-Specific Antigen)    Type 2 diabetes mellitus without complication, without long-term current use of insulin (H)  -     Basic Metabolic Panel  -     Glycosylated Hemoglobin A1c  -     LDL Cholesterol, Direct  -     Pneumococcal conjugate vaccine 13-valent 6wks-17yrs; >50yrs    Screen for colon cancer  -     Ambulatory referral for Colonoscopy              HPI: 60-year-old male here for his annual checkup and physical.  No other concerns, reports overall has been doing very well.  He did recently have a severance from his long-term employer and he is not sure if he is going to work again or retire at this point.  He feels like he is adjusting to it very well and seeing some of the benefits of not working as well but he may start to casually look for something in his field.    ROS:No exertional chest pain, no shortness of breath, no blood in the urine, no blood in the stool, no skin lesions that have been changing, remainder of review of systems is as above or negative.    No past medical history on file.    Current Outpatient Medications   Medication Sig Dispense Refill     ACCU-CHEK FASTCLIX LANCET DRUM USE 1 APPLICATION AS DIRECTED 2 (TWO) TIMES A DAY AT 7:30AM AND 4:30PM. 204 each 3     ACCU-CHEK FASTCLIX Misc TEST TWICE DAILY 100 each 0     ACCU-CHEK GUIDE strips USE 1 EACH AS DIRECTED 2 (TWO) TIMES A DAY AT 7:30AM AND 4:30PM. 200 strip 3     aspirin 81 MG EC tablet TAKE 1 TABLET BY MOUTH EVERY DAY 90 tablet 1     atorvastatin (LIPITOR) 40 MG tablet TAKE 1 TABLET BY MOUTH EVERY DAY 90 tablet 2     blood-glucose meter Misc Use 1 Device As Directed 2 (two) times a day at 9am  and 6pm. 1 each 0     imiquimod (ALDARA) 5 % cream APPLY TOPICALLY D TO WARTS  3     lancets (ONETOUCH DELICA LANCETS) 33 gauge Misc Test once daily 100 each 3     lisinopriL (PRINIVIL,ZESTRIL) 10 MG tablet TAKE 1 TABLET BY MOUTH EVERY DAY 90 tablet 2     metFORMIN (GLUCOPHAGE) 500 MG tablet TAKE 1 TABLET BY MOUTH TWICE A DAY WITH MEALS 180 tablet 3     metroNIDAZOLE (METROGEL) 0.75 % gel APPLY TO FACE D  10     ONETOUCH ULTRA BLUE TEST STRIP strips TEST DAILY AS DIRECTED 100 strip 2     oxymetazoline (RHOFADE) 1 % Crea Apply topically daily.       MIRVASO 0.33 % GlwP APPLY 1 APPLICATION TO FACE D TOPICALLY  8     No current facility-administered medications for this visit.        Patient Active Problem List   Diagnosis     Nephrolithiasis     Obesity     Allergic Rhinitis     Hyperlipidemia     Essential Hypertension     Obesity (BMI 35.0-39.9) with comorbidity (H)     Type 2 diabetes mellitus without complication, without long-term current use of insulin (H)       Social History     Socioeconomic History     Marital status:      Spouse name: None     Number of children: None     Years of education: None     Highest education level: None   Occupational History     None   Social Needs     Financial resource strain: None     Food insecurity     Worry: None     Inability: None     Transportation needs     Medical: None     Non-medical: None   Tobacco Use     Smoking status: Never Smoker     Smokeless tobacco: Never Used   Substance and Sexual Activity     Alcohol use: No     Drug use: No     Sexual activity: Yes     Partners: Female   Lifestyle     Physical activity     Days per week: None     Minutes per session: None     Stress: None   Relationships     Social connections     Talks on phone: None     Gets together: None     Attends Christianity service: None     Active member of club or organization: None     Attends meetings of clubs or organizations: None     Relationship status: None     Intimate partner  "violence     Fear of current or ex partner: None     Emotionally abused: None     Physically abused: None     Forced sexual activity: None   Other Topics Concern     None   Social History Narrative     None       Social History     Tobacco Use   Smoking Status Never Smoker   Smokeless Tobacco Never Used       OBJECTICE: /76 (Patient Site: Left Arm, Patient Position: Sitting, Cuff Size: Adult Large)   Pulse 99   Temp 99  F (37.2  C) (Oral)   Resp 12   Ht 6' 1.75\" (1.873 m)   Wt (!) 274 lb (124.3 kg)   SpO2 96%   BMI 35.42 kg/m        Gen - alert, orientated, NAD  Eyes - fundascopic exam limited by the undialated pupil but looks symmetric  ENT - oropharynx clear, TMs clear  Neck - supple, no palpable mass or lymphadenopathy  CV - RRR, no murmur  Resp - lungs CTA  Ab - soft, nontender, no palpable mass or organomegaly   - normal appearance to the external genetalia, normal testicular exam bilaterally, no hernia  Rectal -normal tone, no mass, prostate is enlarged but no palpable nodules.  Extrem - warm, no edema  Neuro - CN II-XII intact, strength, sensation, reflexes intact and symmetric  Skin - no rash, no atypical appearing lesions seen.         Catalino Nuñez   1:59 PM 11/5/2020             "

## 2021-06-12 NOTE — PROGRESS NOTES
Assessment:      Healthy male exam.   Diabetes mellitus type 2  Hyperlipidemia  Hypertension  History of nephrolithiasis  Obesity  History of allergic rhinitis     Plan:       Routine lab work will be done today and the patient will be called with abnormalities.  His diabetic labs and microalbumin will be updated as well.  He will stay aerobically active.  He will start a weight loss type diet.  He has gained about 20 pounds over the last year or so.  Colonoscopy is up-to-date.     Subjective:      Kash Tavarez is a 57 y.o. male who presents for an annual exam. The patient reports that there is not domestic violence in his life.  Overall, he is feeling well.  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  He stays fairly active with his children.  He recently saw a dermatologist and had a biopsy on his lip.  The biopsy came negative for any malignancy.  He has gained some weight and would like to start a weight loss type diet.  He is due to have his diabetes checked.  He denies any polyuria or polydipsia.    Healthy Habits:   Regular Exercise: Yes  Sunscreen Use: Yes  Healthy Diet: Yes  Dental Visits Regularly: Yes  Seat Belt: Yes  Sexually active: Yes  Monthly Self Testicular Exams:  No  Hemoccults: No  Flex Sig: No  Colonoscopy: Yes  Lipid Profile: Yes  Glucose Screen: Yes      Immunization History   Administered Date(s) Administered     Hep A, historic 03/16/2010, 03/21/2011     Influenza [id number], 01/19/2010     Td, historic 04/01/2005     Tdap 05/22/2013     ZOSTER 05/13/2015     Immunization status: up to date and documented.    No exam data present     Current Outpatient Prescriptions   Medication Sig Dispense Refill     ACCU-CHEK FASTCLIX Misc TEST TWICE DAILY 100 each 0     blood glucose test (ONETOUCH ULTRA TEST) strips TEST DAILY AS DIRECTED 100 strip 3     imiquimod (ALDARA) 5 % cream APPLY TOPICALLY D TO WARTS  3     lancets (ONETOUCH DELICA LANCETS) 33 gauge Misc Test once daily 100 each 3     lisinopril  (PRINIVIL,ZESTRIL) 10 MG tablet TAKE 1 TABLET (10 MG TOTAL) BY MOUTH DAILY. 90 tablet 3     metroNIDAZOLE (METROGEL) 0.75 % gel APPLY TO FACE D  10     MIRVASO 0.33 % GlwP APPLY 1 APPLICATION TO FACE D TOPICALLY  8     simvastatin (ZOCOR) 40 MG tablet TAKE 1 TABLET BY MOUTH AT BEDTIME 90 tablet 3     No current facility-administered medications for this visit.      No past medical history on file.  Past Surgical History:   Procedure Laterality Date     OH APPENDECTOMY      Description: Appendectomy;  Recorded: 03/04/2008;     Review of patient's allergies indicates no known allergies.  No family history on file.  Social History     Social History     Marital status:      Spouse name: N/A     Number of children: N/A     Years of education: N/A     Occupational History     Not on file.     Social History Main Topics     Smoking status: Never Smoker     Smokeless tobacco: Never Used     Alcohol use No     Drug use: No     Sexual activity: Yes     Partners: Female     Other Topics Concern     Not on file     Social History Narrative       Review of Systems  Review of Systems   Constitutional: Negative.  Negative for fatigue and fever.   HENT: Negative.  Negative for congestion.    Eyes: Negative.    Respiratory: Negative.  Negative for cough and shortness of breath.    Cardiovascular: Negative.  Negative for chest pain.   Gastrointestinal: Negative.  Negative for constipation and diarrhea.   Endocrine: Negative.  Negative for polydipsia and polyuria.   Genitourinary: Negative.  Negative for frequency.   Musculoskeletal: Negative.    Skin:        Recently had a lesion cut off of his lip that was pigmented.  The dermatologist perform the procedure.   Allergic/Immunologic: Negative.    Neurological: Negative.    Hematological: Negative.    Psychiatric/Behavioral: Negative.              Objective:     Vitals:    08/16/17 0827   BP: 142/90   Pulse: 62   Temp: 97.7  F (36.5  C)   TempSrc: Oral   SpO2: 99%   Weight: (!)  "278 lb 8 oz (126.3 kg)   Height: 6' 1.7\" (1.872 m)     Body mass index is 36.05 kg/(m^2).    Physical  Physical Exam   Constitutional: He is oriented to person, place, and time. He appears well-developed and well-nourished. No distress.   HENT:   Right Ear: External ear normal.   Left Ear: External ear normal.   Mouth/Throat: Oropharynx is clear and moist.   There is an ulceration noted on the left lower lip from recent skin biopsy.   Eyes: Conjunctivae and EOM are normal. Pupils are equal, round, and reactive to light.   Neck: Normal range of motion. Neck supple. No JVD present. No thyromegaly present.   Cardiovascular: Normal rate, regular rhythm and normal heart sounds.    No murmur heard.  Pulmonary/Chest: Effort normal and breath sounds normal. No respiratory distress.   Abdominal: Soft. Bowel sounds are normal. He exhibits no mass. There is no tenderness.   Genitourinary: Penis normal.   Genitourinary Comments: Testicles palpable low in scrotum.  Prostate is enlarged but symmetrically sized.  No tenderness noted.  No nodules noted.   Musculoskeletal: Normal range of motion. He exhibits no tenderness.   1+ edema noted in both pretibial areas and feet.  No skin breakdown present.  Sensory exam of the feet is normal.  Pulses are strong   Lymphadenopathy:     He has no cervical adenopathy.   Neurological: He is alert and oriented to person, place, and time. No cranial nerve deficit.   Skin: Skin is warm and dry.   Psychiatric: He has a normal mood and affect.              "

## 2021-06-12 NOTE — PROGRESS NOTES
Discussed lab results with the patient over the telephone, he declines an increase in his Metformin, he believes that he will be able to get the A1c back down with a increased effort on his dietary compliance.  He will come in for a lab only A1c in 3 months.

## 2021-06-12 NOTE — TELEPHONE ENCOUNTER
RN cannot approve Refill Request    RN can NOT refill this medication PCP messaged that patient is overdue for Labs. Last office visit: 6/13/2019 Catalino Nuñez MD Last Physical: 11/4/2019 Last MTM visit: Visit date not found Last visit same specialty: 6/13/2019 Catalino Nuñez MD.  Next visit within 3 mo: Visit date not found  Next physical within 3 mo: Visit date not found      Myrtle Garnett, [facility] Triage/Med Refill 10/22/2020    Requested Prescriptions   Pending Prescriptions Disp Refills     metFORMIN (GLUCOPHAGE) 500 MG tablet [Pharmacy Med Name: METFORMIN  MG TABLET] 180 tablet 3     Sig: TAKE 1 TABLET BY MOUTH TWICE A DAY WITH MEALS       Metformin Refill Protocol Failed - 10/20/2020 12:29 AM        Failed - GFR or Serum Creatinine in last 6 months     GFR MDRD Non Af Amer   Date Value Ref Range Status   10/09/2018 >60 >60 mL/min/1.73m2 Final     GFR MDRD Af Amer   Date Value Ref Range Status   10/09/2018 >60 >60 mL/min/1.73m2 Final             Failed - A1C in last 6 months     Hemoglobin A1c   Date Value Ref Range Status   11/04/2019 7.2 (H) 3.5 - 6.0 % Final               Failed - Microalbumin in last year      Microalbumin, Random Urine   Date Value Ref Range Status   10/09/2018 3.07 (H) 0.00 - 1.99 mg/dL Final                  Passed - Blood pressure in last 12 months     BP Readings from Last 1 Encounters:   11/04/19 130/82             Passed - LFT or AST or ALT in last 12 months     Albumin   Date Value Ref Range Status   10/09/2018 4.3 3.5 - 5.0 g/dL Final     Bilirubin, Total   Date Value Ref Range Status   10/09/2018 0.5 0.0 - 1.0 mg/dL Final     Bilirubin, Direct   Date Value Ref Range Status   01/14/2014 0.2 <0.6 mg/dL Final     Alkaline Phosphatase   Date Value Ref Range Status   10/09/2018 74 45 - 120 U/L Final     AST   Date Value Ref Range Status   10/09/2018 17 0 - 40 U/L Final     ALT   Date Value Ref Range Status   11/04/2019 22 0 - 45 U/L Final     Protein, Total    Date Value Ref Range Status   10/09/2018 7.2 6.0 - 8.0 g/dL Final                Passed - Visit with PCP or prescribing provider visit in last 6 months or next 3 months     Last office visit with prescriber/PCP: Visit date not found OR same dept: Visit date not found OR same specialty: 6/13/2019 Catalino Nuñez MD Last physical: Visit date not found Last MTM visit: Visit date not found         Next appt within 3 mo: Visit date not found  Next physical within 3 mo: Visit date not found  Prescriber OR PCP: Catalino Nuñez MD  Last diagnosis associated with med order: 1. Hyperglycemia  - metFORMIN (GLUCOPHAGE) 500 MG tablet [Pharmacy Med Name: METFORMIN  MG TABLET]; TAKE 1 TABLET BY MOUTH TWICE A DAY WITH MEALS  Dispense: 180 tablet; Refill: 3     If protocol passes may refill for 12 months if within 3 months of last provider visit (or a total of 15 months).

## 2021-06-12 NOTE — TELEPHONE ENCOUNTER
Refill Approved    Rx renewed per Medication Renewal Policy. Medication was last renewed on 3/10/20.    Crys Joseph, [facility] Connection Triage/Med Refill 11/9/2020     Requested Prescriptions   Pending Prescriptions Disp Refills     atorvastatin (LIPITOR) 40 MG tablet [Pharmacy Med Name: ATORVASTATIN 40 MG TABLET] 90 tablet 2     Sig: TAKE 1 TABLET BY MOUTH EVERY DAY       Statins Refill Protocol (Hmg CoA Reductase Inhibitors) Passed - 11/6/2020 12:50 AM        Passed - PCP or prescribing provider visit in past 12 months      Last office visit with prescriber/PCP: 6/13/2019 Catalino Nuñez MD OR same dept: Visit date not found OR same specialty: 6/13/2019 Catalino Nuñez MD  Last physical: 11/5/2020 Last MTM visit: Visit date not found   Next visit within 3 mo: Visit date not found  Next physical within 3 mo: Visit date not found  Prescriber OR PCP: Catalino Nuñez MD  Last diagnosis associated with med order: 1. Hypercholesteremia  - atorvastatin (LIPITOR) 40 MG tablet [Pharmacy Med Name: ATORVASTATIN 40 MG TABLET]; TAKE 1 TABLET BY MOUTH EVERY DAY  Dispense: 90 tablet; Refill: 2    If protocol passes may refill for 12 months if within 3 months of last provider visit (or a total of 15 months).

## 2021-06-13 NOTE — TELEPHONE ENCOUNTER
Refill Approved    Rx renewed per Medication Renewal Policy. Medication was last renewed on 1/14/20.    Crys Joseph, [facility] Triage/Med Refill 11/24/2020     Requested Prescriptions   Pending Prescriptions Disp Refills     ACCU-CHEK FASTCLIX LANCET DRUM 204 each 3     Sig: USE TWICE A DAY (AT 7:30 AM & 4:30 PM) AS DIRECTED       Diabetic Supplies Refill Protocol Passed - 11/23/2020 12:51 PM        Passed - Visit with PCP or prescribing provider visit in last 6 months     Last office visit with prescriber/PCP: 6/13/2019 Catalino Nuñez MD OR same dept: Visit date not found OR same specialty: 6/13/2019 Catalino Nuñez MD  Last physical: 11/5/2020 Last MTM visit: Visit date not found   Next visit within 3 mo: Visit date not found  Next physical within 3 mo: Visit date not found  Prescriber OR PCP: Catalino Nuñez MD  Last diagnosis associated with med order: 1. Diabetes mellitus, type 2 (H)  - ACCU-CHEK FASTCLIX LANCET DRUM; USE TWICE A DAY (AT 7:30 AM & 4:30 PM) AS DIRECTED  Dispense: 204 each; Refill: 3  - ACCU-CHEK GUIDE TEST STRIPS strips [Pharmacy Med Name: ACCU-CHEK GUIDE TEST STRIP]; USE 1 EACH AS DIRECTED 2 (TWO) TIMES A DAY AT 7:30AM AND 4:30PM.  Dispense: 200 strip; Refill: 3    If protocol passes may refill for 12 months if within 3 months of last provider visit (or a total of 15 months).             Passed - A1C in last 6 months     Hemoglobin A1c   Date Value Ref Range Status   11/05/2020 8.2 (H) <=5.6 % Final     Comment:     Normal <5.7% Prediabete 5.7-6.4% Diabletes 6.5% or higher - adopted from ADA consensus guidelines                  ACCU-CHEK GUIDE TEST STRIPS strips [Pharmacy Med Name: ACCU-CHEK GUIDE TEST STRIP] 200 strip 3     Sig: USE 1 EACH AS DIRECTED 2 (TWO) TIMES A DAY AT 7:30AM AND 4:30PM.       Diabetic Supplies Refill Protocol Passed - 11/23/2020 12:51 PM        Passed - Visit with PCP or prescribing provider visit in last 6 months     Last office visit with prescriber/PCP:  6/13/2019 Catalino Nuñez MD OR same dept: Visit date not found OR same specialty: 6/13/2019 Catalino Nuñez MD  Last physical: 11/5/2020 Last MTM visit: Visit date not found   Next visit within 3 mo: Visit date not found  Next physical within 3 mo: Visit date not found  Prescriber OR PCP: Catalino Nuñez MD  Last diagnosis associated with med order: 1. Diabetes mellitus, type 2 (H)  - ACCU-CHEK FASTCLIX LANCET DRUM; USE TWICE A DAY (AT 7:30 AM & 4:30 PM) AS DIRECTED  Dispense: 204 each; Refill: 3  - ACCU-CHEK GUIDE TEST STRIPS strips [Pharmacy Med Name: ACCU-CHEK GUIDE TEST STRIP]; USE 1 EACH AS DIRECTED 2 (TWO) TIMES A DAY AT 7:30AM AND 4:30PM.  Dispense: 200 strip; Refill: 3    If protocol passes may refill for 12 months if within 3 months of last provider visit (or a total of 15 months).             Passed - A1C in last 6 months     Hemoglobin A1c   Date Value Ref Range Status   11/05/2020 8.2 (H) <=5.6 % Final     Comment:     Normal <5.7% Prediabete 5.7-6.4% Diabletes 6.5% or higher - adopted from ADA consensus guidelines

## 2021-06-14 NOTE — TELEPHONE ENCOUNTER
Refill Approved    Rx renewed per Medication Renewal Policy. Medication was last renewed on 5/26/20.    Crys Joseph, [facility] Triage/Med Refill 2/3/2021     Requested Prescriptions   Pending Prescriptions Disp Refills     lisinopriL (PRINIVIL,ZESTRIL) 10 MG tablet [Pharmacy Med Name: LISINOPRIL 10 MG TABLET] 90 tablet 2     Sig: TAKE 1 TABLET BY MOUTH EVERY DAY       Ace Inhibitors Refill Protocol Passed - 2/3/2021 12:27 AM        Passed - PCP or prescribing provider visit in past 12 months       Last office visit with prescriber/PCP: 6/13/2019 Catalino Nuñez MD OR same dept: Visit date not found OR same specialty: 6/13/2019 Catalino Nuñez MD  Last physical: 11/5/2020 Last MTM visit: Visit date not found   Next visit within 3 mo: Visit date not found  Next physical within 3 mo: Visit date not found  Prescriber OR PCP: Catalino Nuñez MD  Last diagnosis associated with med order: 1. Hypertension  - lisinopriL (PRINIVIL,ZESTRIL) 10 MG tablet [Pharmacy Med Name: LISINOPRIL 10 MG TABLET]; TAKE 1 TABLET BY MOUTH EVERY DAY  Dispense: 90 tablet; Refill: 2    If protocol passes may refill for 12 months if within 3 months of last provider visit (or a total of 15 months).             Passed - Serum Potassium in past 12 months     Lab Results   Component Value Date    Potassium 5.1 (H) 11/05/2020             Passed - Blood pressure filed in past 12 months     BP Readings from Last 1 Encounters:   11/05/20 126/76             Passed - Serum Creatinine in past 12 months     Creatinine   Date Value Ref Range Status   11/05/2020 1.12 0.70 - 1.30 mg/dL Final

## 2021-06-15 NOTE — TELEPHONE ENCOUNTER
Refill Approved    Rx renewed per Medication Renewal Policy. Medication was last renewed on 9/12/20.    Rd Cole, [facility] Connection Triage/Med Refill 2/25/2021     Requested Prescriptions   Pending Prescriptions Disp Refills     aspirin 81 MG EC tablet [Pharmacy Med Name: [facility] ASPIRIN EC 81 MG TABLET] 90 tablet 1     Sig: TAKE 1 TABLET BY MOUTH EVERY DAY       Aspirin/Dipyridamole Refill Protocol Passed - 2/24/2021  5:38 PM        Passed - PCP or prescribing provider visit in past 12 months       Last office visit with prescriber/PCP: 6/13/2019 Catalino Nuñez MD OR same dept: Visit date not found OR same specialty: 6/13/2019 Catalino Nuñez MD  Last physical: 11/5/2020 Last MTM visit: Visit date not found    Next appt within 3 mo: Visit date not found Next physical within 3 mo: Visit date not found  Prescriber OR PCP: Catalino Nuñez MD  Last diagnosis associated with med order: 1. Diabetes mellitus, type 2 (H)  - aspirin 81 MG EC tablet [Pharmacy Med Name: [facility] ASPIRIN EC 81 MG TABLET]; TAKE 1 TABLET BY MOUTH EVERY DAY  Dispense: 90 tablet; Refill: 1    If protocol passes may refill for 6 months if within 3 months of last provider visit (or a total of 9 months).

## 2021-06-16 PROBLEM — E66.01 MORBID OBESITY (H): Status: ACTIVE | Noted: 2019-06-13

## 2021-06-16 PROBLEM — E11.9 TYPE 2 DIABETES MELLITUS WITHOUT COMPLICATION, WITHOUT LONG-TERM CURRENT USE OF INSULIN (H): Status: ACTIVE | Noted: 2020-05-11

## 2021-06-17 NOTE — PROGRESS NOTES
ASSESMENT AND PLAN:  Diagnoses and all orders for this visit:    Diabetes mellitus, type 2 (H)  -     Glycosylated Hemoglobin A1c done today shows a slight worsening trend to 8.4.  Discussed options with the patient.  He is going to initiate a walking program and will increase Metformin as prescribed below.  -     JIC RED  -     metFORMIN (GLUCOPHAGE) 500 MG tablet; Take 1 tablet (500 mg total) by mouth 3 (three) times a day.  Dispense: 270 tablet; Refill: 3          Reviewed the risks and benefits of the treatment plan with the patient and/or caregiver and we discussed indications for routine and emergent follow-up.        SUBJECTIVE: 60-year-old male with history of type 2 diabetes.  Acknowledges he has not been doing much for daily physical activity but has been working hard on his healthy eating.  He currently just takes Metformin 500 mg twice daily and tolerates it well.  No other issues or concerns.    No past medical history on file.  Patient Active Problem List   Diagnosis     Nephrolithiasis     Obesity     Allergic Rhinitis     Hyperlipidemia     Essential Hypertension     Obesity (BMI 35.0-39.9) with comorbidity (H)     Type 2 diabetes mellitus without complication, without long-term current use of insulin (H)     Current Outpatient Medications   Medication Sig Dispense Refill     ACCU-CHEK FASTCLIX LANCET DRUM USE TWICE A DAY (AT 7:30 AM & 4:30 PM) AS DIRECTED 204 each 3     ACCU-CHEK FASTCLIX Misc TEST TWICE DAILY 100 each 0     ACCU-CHEK GUIDE TEST STRIPS strips USE 1 EACH AS DIRECTED 2 (TWO) TIMES A DAY AT 7:30AM AND 4:30PM. 200 strip 3     aspirin 81 MG EC tablet TAKE 1 TABLET BY MOUTH EVERY DAY 90 tablet 1     atorvastatin (LIPITOR) 40 MG tablet TAKE 1 TABLET BY MOUTH EVERY DAY 90 tablet 3     blood-glucose meter Misc Use 1 Device As Directed 2 (two) times a day at 9am and 6pm. 1 each 0     imiquimod (ALDARA) 5 % cream APPLY TOPICALLY D TO WARTS  3     lisinopriL (PRINIVIL,ZESTRIL) 10 MG tablet TAKE  "1 TABLET BY MOUTH EVERY DAY 90 tablet 2     metFORMIN (GLUCOPHAGE) 500 MG tablet Take 1 tablet (500 mg total) by mouth 3 (three) times a day. 270 tablet 3     metroNIDAZOLE (METROGEL) 0.75 % gel APPLY TO FACE D  10     oxymetazoline (RHOFADE) 1 % Crea Apply topically daily.       lancets (ONETOUCH DELICA LANCETS) 33 gauge Misc Test once daily 100 each 3     MIRVASO 0.33 % GlwP APPLY 1 APPLICATION TO FACE D TOPICALLY  8     ONETOUCH ULTRA BLUE TEST STRIP strips TEST DAILY AS DIRECTED 100 strip 2     No current facility-administered medications for this visit.      Social History     Tobacco Use   Smoking Status Never Smoker   Smokeless Tobacco Never Used       OBJECTICE: /86 (Patient Site: Right Arm, Patient Position: Sitting, Cuff Size: Adult Large)   Pulse 81   Temp 98.9  F (37.2  C) (Oral)   Ht 6' 1.75\" (1.873 m)   Wt (!) 272 lb (123.4 kg)   SpO2 97%   BMI 35.16 kg/m       Recent Results (from the past 24 hour(s))   Glycosylated Hemoglobin A1c    Collection Time: 05/03/21 10:06 AM   Result Value Ref Range    Hemoglobin A1c 8.4 (H) <=5.6 %        CV-regular rate and rhythm with no murmur   RESP-lungs clear to auscultation   Foot exam-normal.  Palpable pedal pulses bilaterally.  No ulcers.  Monofilament sensation is intact at all points tested on both feet.    Catalino Nuñez          "

## 2021-06-19 NOTE — LETTER
Letter by Catalino Nuñez MD at      Author: Catalino Nuñez MD Service: -- Author Type: --    Filed:  Encounter Date: 11/5/2019 Status: Signed               [address]  [place] [address]   PHONE [phone]  [phone]     November 5, 2019  Kash Tavarez  [address] [facility] [address]    Dear Kash:    Below are the results from your recent visit.  Your results are normal.    Resulted Orders   Glycosylated Hemoglobin A1c   Result Value Ref Range    Hemoglobin A1c 7.2 (H) 3.5 - 6.0 %   ALT (SGPT)   Result Value Ref Range    ALT 22 0 - 45 U/L   LDL Cholesterol, Direct   Result Value Ref Range    Direct  <=129 mg/dl   HIV Antigen/Antibody Screening Cascade   Result Value Ref Range    HIV Antigen / Antibody Negative Negative    Narrative    Method is Abbott HIV Ag/Ab for the detection of HIV p24 antigen, HIV-1 antibodies and HIV-2 antibodies.   Hepatitis C Antibody (Anti-HCV)   Result Value Ref Range    Hepatitis C Ab Negative Negative   PSA (Prostatic-Specific Antigen), Annual Screen   Result Value Ref Range    PSA 1.3 0.0 - 3.5 ng/mL    Narrative    Method is Abbott Prostate-Specific Antigen (PSA)  Standard-WHO 1st International (90:10)     If you have any questions or concerns, please do not hesitate to call.    Sincerely,  Catalino Nuñez MD  November 5, 2019

## 2021-06-19 NOTE — LETTER
Letter by Catalino Nuñez MD at      Author: Catalino Nuñez MD Service: -- Author Type: --    Filed:  Encounter Date: 6/13/2019 Status: (Other)               [address]  [place] [address]   PHONE [phone]  [phone]     June 13, 2019  Kash Tavarez  [address] [facility] [address]    Dear Kash:    Below are the results from your recent visit.  Your A1c shows a good improvement trend compared to 7.9 last time.  Let me know if there are any problems in the meantime, otherwise I will see you at your physical in November.    Resulted Orders   Glycosylated Hemoglobin A1c   Result Value Ref Range    Hemoglobin A1c 7.3 (H) 3.5 - 6.0 %         If you have any questions or concerns, please do not hesitate to call.    Sincerely,      Catalino Nuñez MD  June 13, 2019

## 2021-06-20 NOTE — PROGRESS NOTES
Assessment:      Healthy male exam.   Hypertension  Hyperlipidemia  Type 2 diabetes mellitus diet controlled  History of nephrolithiasis  Obesity     Plan:       Routine lab work will be done today.  Patient will be called with abnormalities.  Influenza vaccination is updated today.  He will try to become more aerobically active and continue on a weight loss diet.  He will continue diet therapy for his diabetes if his glycosylated hemoglobin continues to be controlled.  Diabetic labs are updated today.  Patient will continue to drink a lot of fluids to prevent nephrolithiasis recurrence.  Continue lisinopril for blood pressure control.  Colonoscopy is up-to-date.     Subjective:      Kash Tavarez is a 58 y.o. male who presents for an annual exam. The patient reports that there is not domestic violence in his life.  Overall, he is feeling well.  He denies any new health issues or concerns.  He continues to control his diabetes with diet therapy only.  He takes lisinopril for blood pressure control and simvastatin for cholesterol control.  He is due to have his diabetes checked.  He denies any polyuria or polydipsia.    Healthy Habits:   Regular Exercise: Yes  Sunscreen Use: Yes  Healthy Diet: Yes  Dental Visits Regularly: Yes  Seat Belt: Yes  Sexually active: Yes  Monthly Self Testicular Exams:  Yes  Hemoccults: No  Flex Sig: No  Colonoscopy: Yes  Lipid Profile: Yes  Glucose Screen: Yes      Immunization History   Administered Date(s) Administered     Hep A, historic 03/16/2010, 03/21/2011     Influenza [id number], 01/19/2010     Td,adult,historic,unspecified 04/01/2005     Tdap 05/22/2013     ZOSTER, LIVE 05/13/2015     Immunization status: up to date and documented.    No exam data present     Current Outpatient Prescriptions   Medication Sig Dispense Refill     ACCU-CHEK FASTCLIX Misc TEST TWICE DAILY 100 each 0     imiquimod (ALDARA) 5 % cream APPLY TOPICALLY D TO WARTS  3     lancets (ONETOUCH DELICA LANCETS)  33 gauge Misc Test once daily 100 each 3     lisinopril (PRINIVIL,ZESTRIL) 10 MG tablet TAKE 1 TABLET (10 MG TOTAL) BY MOUTH DAILY. 90 tablet 3     metroNIDAZOLE (METROGEL) 0.75 % gel APPLY TO FACE D  10     MIRVASO 0.33 % GlwP APPLY 1 APPLICATION TO FACE D TOPICALLY  8     ONETOUCH ULTRA BLUE TEST STRIP strips TEST DAILY AS DIRECTED 100 strip 2     simvastatin (ZOCOR) 40 MG tablet TAKE 1 TABLET BY MOUTH AT BEDTIME 90 tablet 3     No current facility-administered medications for this visit.      No past medical history on file.  Past Surgical History:   Procedure Laterality Date     MS APPENDECTOMY      Description: Appendectomy;  Recorded: 03/04/2008;     Review of patient's allergies indicates no known allergies.  No family history on file.  Social History     Social History     Marital status:      Spouse name: N/A     Number of children: N/A     Years of education: N/A     Occupational History     Not on file.     Social History Main Topics     Smoking status: Never Smoker     Smokeless tobacco: Never Used     Alcohol use No     Drug use: No     Sexual activity: Yes     Partners: Female     Other Topics Concern     Not on file     Social History Narrative       Review of Systems  Review of Systems   Constitutional: Negative.  Negative for fatigue and fever.   HENT: Negative.  Negative for congestion.    Eyes: Negative.    Respiratory: Negative.  Negative for cough and shortness of breath.    Cardiovascular: Negative.  Negative for chest pain.   Gastrointestinal: Negative.  Negative for constipation and diarrhea.   Endocrine: Negative.  Negative for polydipsia and polyuria.   Genitourinary: Negative.    Musculoskeletal: Negative.    Skin: Negative.    Allergic/Immunologic: Negative.    Hematological: Negative.    Psychiatric/Behavioral: Negative.              Objective:     Vitals:    10/09/18 0840   BP: 130/72   Pulse: 83   Resp: 16   Temp: 98.5  F (36.9  C)   TempSrc: Oral   SpO2: 98%   Weight: (!) 287  "lb (130.2 kg)   Height: 6' 1.7\" (1.872 m)     Body mass index is 37.15 kg/(m^2).    Physical  Physical Exam   Constitutional: He is oriented to person, place, and time. He appears well-developed and well-nourished. No distress.   HENT:   Right Ear: External ear normal.   Mouth/Throat: Oropharynx is clear and moist.   Eyes: Conjunctivae are normal. Pupils are equal, round, and reactive to light.   Neck: Normal range of motion. Neck supple. No JVD present. No thyromegaly present.   Cardiovascular: Normal rate, regular rhythm and normal heart sounds.    No murmur heard.  Pulmonary/Chest: Effort normal and breath sounds normal. No respiratory distress.   Abdominal: Bowel sounds are normal. There is no tenderness.   Genitourinary: Penis normal.   Genitourinary Comments: Prostate is enlarged.  It is symmetrically sized.  No nodularity or tenderness noted.   Musculoskeletal: Normal range of motion. He exhibits no edema or tenderness.   Lymphadenopathy:     He has no cervical adenopathy.   Neurological: He is alert and oriented to person, place, and time. He has normal reflexes. No cranial nerve deficit.   Skin: Skin is warm and dry. No rash noted.   Psychiatric: He has a normal mood and affect.              "

## 2021-06-23 NOTE — TELEPHONE ENCOUNTER
Refill Request  Did you contact pharmacy: Yes  Medication name:   Requested Prescriptions     Pending Prescriptions Disp Refills     metFORMIN (GLUCOPHAGE) 500 MG tablet 60 tablet 11     Sig: Take 1 tablet (500 mg total) by mouth 2 (two) times a day with meals.     Pharmacy Name and Location: [facility] .      Patient states needs this to be a 90 day supply so insurance will pay for them.        Is patient out of medication: No.  1 days left  Patient notified refills processed in 72 hours:  yes  Okay to leave a detailed message: yes

## 2021-06-23 NOTE — TELEPHONE ENCOUNTER
Medication Question or Clarification  Who is calling: Pharmacy: [facility]  What medication are you calling about?: One Touch Ultra Blue test strip  What dose do you take?: 1 each  How often are you taking the medication?: daily as directed  Who prescribed the medication?: Catalino Nuñez MD    What is your question/concern?: Fax received from pharmacy, the above brand of diabetic supplies are not covered by the insurance plan.    Please send new RX for Meter, Test Strips, and Lancets for one of the covered brands    C[facility] Br[facility]: [facility]      Pharmacy: [facility]  Okay to leave a detailed message?: No-speak to pharmacy staff  Site CMT - Please call the pharmacy to obtain any additional needed information.

## 2021-06-23 NOTE — TELEPHONE ENCOUNTER
Please call the pharmacy and give him the verbal orders to switch to the recovery to brand with the same instructions.  Thank you.

## 2021-06-24 ENCOUNTER — RECORDS - HEALTHEAST (OUTPATIENT)
Dept: ADMINISTRATIVE | Facility: OTHER | Age: 61
End: 2021-06-24

## 2021-06-24 LAB — RETINOPATHY: NEGATIVE

## 2021-06-27 ENCOUNTER — HEALTH MAINTENANCE LETTER (OUTPATIENT)
Age: 61
End: 2021-06-27

## 2021-06-29 ENCOUNTER — RECORDS - HEALTHEAST (OUTPATIENT)
Dept: HEALTH INFORMATION MANAGEMENT | Facility: CLINIC | Age: 61
End: 2021-06-29

## 2021-09-16 ENCOUNTER — TRANSFERRED RECORDS (OUTPATIENT)
Dept: HEALTH INFORMATION MANAGEMENT | Facility: CLINIC | Age: 61
End: 2021-09-16

## 2021-10-17 ENCOUNTER — HEALTH MAINTENANCE LETTER (OUTPATIENT)
Age: 61
End: 2021-10-17

## 2021-10-20 DIAGNOSIS — E11.9 DIABETES MELLITUS, TYPE 2 (H): ICD-10-CM

## 2021-10-20 DIAGNOSIS — I10 HYPERTENSION: ICD-10-CM

## 2021-10-20 DIAGNOSIS — Z76.0 ENCOUNTER FOR MEDICATION REFILL: Primary | ICD-10-CM

## 2021-10-20 RX ORDER — LISINOPRIL 10 MG/1
TABLET ORAL
Qty: 90 TABLET | Refills: 2 | Status: SHIPPED | OUTPATIENT
Start: 2021-10-20 | End: 2022-05-05

## 2021-10-22 DIAGNOSIS — E78.00 HYPERCHOLESTEREMIA: ICD-10-CM

## 2021-10-22 DIAGNOSIS — Z76.0 ENCOUNTER FOR MEDICATION REFILL: Primary | ICD-10-CM

## 2021-10-22 RX ORDER — ATORVASTATIN CALCIUM 40 MG/1
TABLET, FILM COATED ORAL
Qty: 90 TABLET | Refills: 3 | Status: SHIPPED | OUTPATIENT
Start: 2021-10-22 | End: 2022-10-06

## 2021-10-29 DIAGNOSIS — Z76.0 ENCOUNTER FOR MEDICATION REFILL: Primary | ICD-10-CM

## 2021-10-29 DIAGNOSIS — E11.9 DIABETES MELLITUS, TYPE 2 (H): ICD-10-CM

## 2021-10-31 RX ORDER — LANCETS
EACH MISCELLANEOUS
Qty: 204 EACH | Refills: 3 | Status: SHIPPED | OUTPATIENT
Start: 2021-10-31

## 2021-11-11 ENCOUNTER — OFFICE VISIT (OUTPATIENT)
Dept: FAMILY MEDICINE | Facility: CLINIC | Age: 61
End: 2021-11-11
Payer: COMMERCIAL

## 2021-11-11 VITALS
HEIGHT: 73 IN | SYSTOLIC BLOOD PRESSURE: 132 MMHG | BODY MASS INDEX: 34.19 KG/M2 | OXYGEN SATURATION: 97 % | DIASTOLIC BLOOD PRESSURE: 82 MMHG | TEMPERATURE: 98.5 F | HEART RATE: 78 BPM | WEIGHT: 258 LBS

## 2021-11-11 DIAGNOSIS — Z13.220 SCREENING FOR HYPERLIPIDEMIA: ICD-10-CM

## 2021-11-11 DIAGNOSIS — L30.9 DERMATITIS: ICD-10-CM

## 2021-11-11 DIAGNOSIS — Z76.0 ENCOUNTER FOR MEDICATION REFILL: Primary | ICD-10-CM

## 2021-11-11 DIAGNOSIS — E66.01 MORBID OBESITY (H): ICD-10-CM

## 2021-11-11 DIAGNOSIS — Z00.00 ROUTINE GENERAL MEDICAL EXAMINATION AT HEALTH CARE FACILITY: Primary | ICD-10-CM

## 2021-11-11 DIAGNOSIS — E11.9 DIABETES MELLITUS, TYPE 2 (H): ICD-10-CM

## 2021-11-11 DIAGNOSIS — E11.9 TYPE 2 DIABETES MELLITUS WITHOUT COMPLICATION, WITHOUT LONG-TERM CURRENT USE OF INSULIN (H): ICD-10-CM

## 2021-11-11 LAB
ANION GAP SERPL CALCULATED.3IONS-SCNC: 9 MMOL/L (ref 5–18)
BUN SERPL-MCNC: 15 MG/DL (ref 8–22)
CALCIUM SERPL-MCNC: 10.2 MG/DL (ref 8.5–10.5)
CHLORIDE BLD-SCNC: 101 MMOL/L (ref 98–107)
CHOLEST SERPL-MCNC: 175 MG/DL
CO2 SERPL-SCNC: 26 MMOL/L (ref 22–31)
CREAT SERPL-MCNC: 0.97 MG/DL (ref 0.7–1.3)
FASTING STATUS PATIENT QL REPORTED: YES
GFR SERPL CREATININE-BSD FRML MDRD: 84 ML/MIN/1.73M2
GLUCOSE BLD-MCNC: 184 MG/DL (ref 70–125)
HBA1C MFR BLD: 7.9 % (ref 0–5.6)
HDLC SERPL-MCNC: 33 MG/DL
HOLD SPECIMEN: NORMAL
HOLD SPECIMEN: NORMAL
LDLC SERPL CALC-MCNC: 110 MG/DL
POTASSIUM BLD-SCNC: 4.8 MMOL/L (ref 3.5–5)
PSA SERPL-MCNC: 3.11 UG/L (ref 0–4.5)
SODIUM SERPL-SCNC: 136 MMOL/L (ref 136–145)
TRIGL SERPL-MCNC: 159 MG/DL

## 2021-11-11 PROCEDURE — 83036 HEMOGLOBIN GLYCOSYLATED A1C: CPT | Performed by: FAMILY MEDICINE

## 2021-11-11 PROCEDURE — 80048 BASIC METABOLIC PNL TOTAL CA: CPT | Performed by: FAMILY MEDICINE

## 2021-11-11 PROCEDURE — G0103 PSA SCREENING: HCPCS | Performed by: FAMILY MEDICINE

## 2021-11-11 PROCEDURE — 80061 LIPID PANEL: CPT | Performed by: FAMILY MEDICINE

## 2021-11-11 PROCEDURE — 36415 COLL VENOUS BLD VENIPUNCTURE: CPT | Performed by: FAMILY MEDICINE

## 2021-11-11 PROCEDURE — 99396 PREV VISIT EST AGE 40-64: CPT | Performed by: FAMILY MEDICINE

## 2021-11-11 RX ORDER — TRIAMCINOLONE ACETONIDE 1 MG/G
CREAM TOPICAL 2 TIMES DAILY
Qty: 80 G | Refills: 0
Start: 2021-11-11

## 2021-11-11 ASSESSMENT — MIFFLIN-ST. JEOR: SCORE: 2026.54

## 2021-11-11 NOTE — LETTER
November 15, 2021      Kash Tavarez  [address] [facility] [address]        Dear ,    We are writing to inform you of your test results.  Things look good except for a larger than expected increase in the PSA prostate cancer screening test.  It is still within the normal range, but has increased significantly compared to last time.  Therefore, I would like to have it rechecked sooner than the usual 1 year follow-up.  Lets plan to recheck it in 6 months when you are here in May for your diabetes follow-up.    Resulted Orders   Hemoglobin A1c   Result Value Ref Range    Hemoglobin A1C 7.9 (H) 0.0 - 5.6 %      Comment:      Normal <5.7%   Prediabetes 5.7-6.4%    Diabetes 6.5% or higher     Note: Adopted from ADA consensus guidelines.   Lipid panel reflex to direct LDL Fasting   Result Value Ref Range    Cholesterol 175 <=199 mg/dL    Triglycerides 159 (H) <=149 mg/dL    Direct Measure HDL 33 (L) >=40 mg/dL      Comment:      HDL Cholesterol Reference Range:     0-2 years:   No reference ranges established for patients under 2 years old  at [facility] for lipid analytes.    2-8 years:  Greater than 45 mg/dL     18 years and older:   Female: Greater than or equal to 50 mg/dL   Male:   Greater than or equal to 40 mg/dL    LDL Cholesterol Calculated 110 <=129 mg/dL    Patient Fasting > 8hrs? Yes    BASIC METABOLIC PANEL   Result Value Ref Range    Sodium 136 136 - 145 mmol/L    Potassium 4.8 3.5 - 5.0 mmol/L    Chloride 101 98 - 107 mmol/L    Carbon Dioxide (CO2) 26 22 - 31 mmol/L    Anion Gap 9 5 - 18 mmol/L    Urea Nitrogen 15 8 - 22 mg/dL    Creatinine 0.97 0.70 - 1.30 mg/dL    Calcium 10.2 8.5 - 10.5 mg/dL    Glucose 184 (H) 70 - 125 mg/dL    GFR Estimate 84 >60 mL/min/1.73m2      Comment:      As of July 11, 2021, eGFR is calculated by the CKD-EPI creatinine equation, without race adjustment. eGFR can be influenced by muscle mass, exercise, and diet. The reported eGFR is an  estimation only and is only applicable if the renal function is stable.   PSA, screen   Result Value Ref Range    Prostate Specific Antigen Screen 3.11 0.00 - 4.50 ug/L    Narrative    Assay Method is Abbott Prostate-Specific Antigen (PSA)  Standard-WHO 1st International (90:10)       If you have any questions or concerns, please call the clinic at the number listed above.       Sincerely,      Catalino Nuñez MD

## 2021-11-11 NOTE — PROGRESS NOTES
ASSESMENT AND PLAN:    Physical, Health Maitenence -   Reviewed healthy lifestyle, diet, exercise, vitamins, and follow-up plan today with patient.  Reviewed age appropriate cancer and other screening recommendations.  Immunization review and update done.  Reviewed indicated lab tests, see lab orders.   -     PSA, screen; Future  Type 2 diabetes mellitus without complication, without long-term current use of insulin (H)  -     Extra Tube; Future  -     Hemoglobin A1c  -     Lipid panel reflex to direct LDL Fasting; Future  -     BASIC METABOLIC PANEL; Future  Morbid obesity (H)  Congratulated on his weight loss.  Continue current plan with increased walking and healthy eating.  Dermatitis  Updated medication list prescribed by dermatology  -     triamcinolone (KENALOG) 0.1 % external cream; Apply topically 2 times daily        HPI: 61-year-old male here for his physical.  He also has a history of type 2 diabetes.  Since last visit he has been taking his increased dose of Metformin and has been working harder on healthy eating and regular walking.  The patient's younger brother recently passed away, perhaps of complications of diabetes and sleep apnea but it is unclear.  The patient himself does not have any apnea spells noticed by his spouse and does not have problems with excessive fatigue or tiredness or nonrestorative sleep.    ROS:No exertional chest pain.  He does get some shortness of breath with heavier exertion but expects this.  No blood in the urine or blood in the stool.  Patient reports that his skin is monitored regularly by his dermatologist.  Remainder of review of systems is as above or negative.    No past medical history on file.    Current Outpatient Medications   Medication Sig Dispense Refill     ACCU-CHEK FASTCLIX Misc [ACCU-CHEK FASTCLIX MISC] TEST TWICE DAILY 100 each 0     ACCU-CHEK GUIDE TEST STRIPS strips [ACCU-CHEK GUIDE TEST STRIPS STRIPS] USE 1 EACH AS DIRECTED 2 (TWO) TIMES A DAY AT  7:30AM AND 4:30PM. 200 strip 3     aspirin (ASA) 81 MG EC tablet TAKE 1 TABLET BY MOUTH EVERY DAY 90 tablet 1     atorvastatin (LIPITOR) 40 MG tablet TAKE 1 TABLET BY MOUTH EVERY DAY 90 tablet 3     blood glucose monitoring (ACCU-CHEK FASTCLIX) lancets USE TWICE A DAY (AT 7:30 AM & 4:30 PM) AS DIRECTED 204 each 3     blood-glucose meter Misc [BLOOD-GLUCOSE METER MISC] Use 1 Device As Directed 2 (two) times a day at 9am and 6pm. 1 each 0     imiquimod (ALDARA) 5 % cream [IMIQUIMOD (ALDARA) 5 % CREAM] APPLY TOPICALLY D TO WARTS  3     lisinopril (ZESTRIL) 10 MG tablet TAKE 1 TABLET BY MOUTH EVERY DAY 90 tablet 2     metFORMIN (GLUCOPHAGE) 500 MG tablet TAKE 1 TABLET BY MOUTH TWICE A DAY WITH MEALS 180 tablet 3     metroNIDAZOLE (METROGEL) 0.75 % gel [METRONIDAZOLE (METROGEL) 0.75 % GEL] APPLY TO FACE D  10     oxymetazoline (RHOFADE) 1 % Crea [OXYMETAZOLINE (RHOFADE) 1 % CREA] Apply topically daily.       triamcinolone (KENALOG) 0.1 % external cream Apply topically 2 times daily 80 g 0       Patient Active Problem List   Diagnosis     Nephrolithiasis     Obesity     Allergic Rhinitis     Hyperlipidemia     Essential Hypertension     Obesity (BMI 35.0-39.9) with comorbidity (H)     Type 2 diabetes mellitus without complication, without long-term current use of insulin (H)       Social History     Socioeconomic History     Marital status:      Spouse name: Not on file     Number of children: Not on file     Years of education: Not on file     Highest education level: Not on file   Occupational History     Not on file   Tobacco Use     Smoking status: Never Smoker     Smokeless tobacco: Never Used   Substance and Sexual Activity     Alcohol use: No     Drug use: No     Sexual activity: Yes     Partners: Female   Other Topics Concern     Not on file   Social History Narrative     Not on file     Social Determinants of Health     Financial Resource Strain: Not on file   Food Insecurity: Not on file   Transportation  "Needs: Not on file   Physical Activity: Not on file   Stress: Not on file   Social Connections: Not on file   Intimate Partner Violence: Not on file   Housing Stability: Not on file       History   Smoking Status     Never Smoker   Smokeless Tobacco     Never Used       OBJECTICE: /82 (BP Location: Right arm, Cuff Size: Adult Regular)   Pulse 78   Temp 98.5  F (36.9  C) (Oral)   Ht 1.85 m (6' 0.84\")   Wt 117 kg (258 lb)   SpO2 97%   BMI 34.19 kg/m        Gen - alert, orientated, NAD  Eyes - fundascopic exam limited by the undialated pupil but looks symmetric  ENT - oropharynx clear, TMs clear  Neck - supple, no palpable mass or lymphadenopathy  CV - RRR, no murmur  Resp - lungs CTA  Ab - soft, nontender, no palpable mass or organomegaly  Extrem - warm, no edema  Neuro - CN II-XII intact, strength, sensation, reflexes intact and symmetric  Skin - no rash, no atypical appearing lesions seen.   Foot exam-normal.  Palpable pedal pulses bilaterally.  No ulcers.        Catalino Nuñez MD   9:10 AM 11/11/2021               "

## 2021-11-11 NOTE — PROGRESS NOTES
Today's PHQ-2 Score:   PHQ-2 ( 1999 Pfizer) 11/11/2021   Q1: Little interest or pleasure in doing things 0   Q2: Feeling down, depressed or hopeless 0   PHQ-2 Score 0   Q1: Little interest or pleasure in doing things Not at all   Q2: Feeling down, depressed or hopeless Not at all   PHQ-2 Score 0

## 2021-11-15 RX ORDER — BLOOD SUGAR DIAGNOSTIC
STRIP MISCELLANEOUS
Qty: 200 STRIP | Refills: 3 | Status: SHIPPED | OUTPATIENT
Start: 2021-11-15 | End: 2022-11-09

## 2022-05-05 ENCOUNTER — OFFICE VISIT (OUTPATIENT)
Dept: FAMILY MEDICINE | Facility: CLINIC | Age: 62
End: 2022-05-05
Payer: COMMERCIAL

## 2022-05-05 VITALS
OXYGEN SATURATION: 97 % | BODY MASS INDEX: 34.66 KG/M2 | DIASTOLIC BLOOD PRESSURE: 84 MMHG | WEIGHT: 261.5 LBS | SYSTOLIC BLOOD PRESSURE: 144 MMHG | HEART RATE: 80 BPM | TEMPERATURE: 98.8 F

## 2022-05-05 DIAGNOSIS — Z23 IMMUNIZATION DUE: ICD-10-CM

## 2022-05-05 DIAGNOSIS — I10 ESSENTIAL HYPERTENSION: ICD-10-CM

## 2022-05-05 DIAGNOSIS — Z23 HIGH PRIORITY FOR 2019 NOVEL CORONAVIRUS VACCINATION: ICD-10-CM

## 2022-05-05 DIAGNOSIS — Z76.0 ENCOUNTER FOR MEDICATION REFILL: ICD-10-CM

## 2022-05-05 DIAGNOSIS — E11.9 TYPE 2 DIABETES MELLITUS WITHOUT COMPLICATION, WITHOUT LONG-TERM CURRENT USE OF INSULIN (H): Primary | ICD-10-CM

## 2022-05-05 LAB — HBA1C MFR BLD: 7.8 % (ref 0–5.6)

## 2022-05-05 PROCEDURE — 0054A COVID-19,PF,PFIZER (12+ YRS): CPT | Performed by: FAMILY MEDICINE

## 2022-05-05 PROCEDURE — 99214 OFFICE O/P EST MOD 30 MIN: CPT | Mod: 25 | Performed by: FAMILY MEDICINE

## 2022-05-05 PROCEDURE — 91305 COVID-19,PF,PFIZER (12+ YRS): CPT | Performed by: FAMILY MEDICINE

## 2022-05-05 PROCEDURE — 83036 HEMOGLOBIN GLYCOSYLATED A1C: CPT | Performed by: FAMILY MEDICINE

## 2022-05-05 PROCEDURE — 36415 COLL VENOUS BLD VENIPUNCTURE: CPT | Performed by: FAMILY MEDICINE

## 2022-05-05 RX ORDER — LISINOPRIL 20 MG/1
20 TABLET ORAL DAILY
Qty: 90 TABLET | Refills: 3 | Status: ON HOLD | OUTPATIENT
Start: 2022-05-05 | End: 2023-01-17

## 2022-05-05 NOTE — PROGRESS NOTES
ASSESMENT AND PLAN:  Diagnoses and all orders for this visit:  Type 2 diabetes mellitus without complication, without long-term current use of insulin (H)  -     HEMOGLOBIN A1C; Future  -     metFORMIN (GLUCOPHAGE) 500 MG tablet; Take 1 tablet (500 mg) by mouth 3 times daily (with meals)  High priority for 2019 novel coronavirus vaccination  -     COVID-19,PF,PFIZER (12+ Yrs GRAY LABEL)  Essential hypertension  Not under ideal control.  Were going to increase lisinopril to 20 mg/day.  -     lisinopril (ZESTRIL) 20 MG tablet; Take 1 tablet (20 mg) by mouth daily      Reviewed the risks and benefits of the treatment plan with the patient and/or caregiver and we discussed indications for routine and emergent follow-up.        SUBJECTIVE: 61-year-old male in for follow-up on his diabetes and hypertension.  He does not have any new problems or concerns.  No chest pain or shortness of breath.    No past medical history on file.  Patient Active Problem List   Diagnosis     Nephrolithiasis     Obesity     Allergic Rhinitis     Hyperlipidemia     Essential Hypertension     Obesity (BMI 35.0-39.9) with comorbidity (H)     Type 2 diabetes mellitus without complication, without long-term current use of insulin (H)     Current Outpatient Medications   Medication Sig Dispense Refill     ACCU-CHEK FASTCLIX Misc [ACCU-CHEK FASTCLIX MISC] TEST TWICE DAILY 100 each 0     ACCU-CHEK GUIDE test strip USE 1 EACH AS DIRECTED 2 (TWO) TIMES A DAY AT 7:30AM AND 4:30PM. 200 strip 3     aspirin (ASA) 81 MG EC tablet TAKE 1 TABLET BY MOUTH EVERY DAY 90 tablet 3     atorvastatin (LIPITOR) 40 MG tablet TAKE 1 TABLET BY MOUTH EVERY DAY 90 tablet 3     blood glucose monitoring (ACCU-CHEK FASTCLIX) lancets USE TWICE A DAY (AT 7:30 AM & 4:30 PM) AS DIRECTED 204 each 3     blood-glucose meter Misc [BLOOD-GLUCOSE METER MISC] Use 1 Device As Directed 2 (two) times a day at 9am and 6pm. 1 each 0     imiquimod (ALDARA) 5 % cream [IMIQUIMOD (ALDARA) 5 %  CREAM] APPLY TOPICALLY D TO WARTS  3     lisinopril (ZESTRIL) 20 MG tablet Take 1 tablet (20 mg) by mouth daily 90 tablet 3     metFORMIN (GLUCOPHAGE) 500 MG tablet Take 1 tablet (500 mg) by mouth 3 times daily (with meals) 270 tablet 3     metroNIDAZOLE (METROGEL) 0.75 % gel [METRONIDAZOLE (METROGEL) 0.75 % GEL] APPLY TO FACE D  10     oxymetazoline (RHOFADE) 1 % Crea [OXYMETAZOLINE (RHOFADE) 1 % CREA] Apply topically daily.       triamcinolone (KENALOG) 0.1 % external cream Apply topically 2 times daily 80 g 0     History   Smoking Status     Never Smoker   Smokeless Tobacco     Never Used       OBJECTICE: BP (!) 144/84 (Cuff Size: Adult Regular)   Pulse 80   Temp 98.8  F (37.1  C) (Oral)   Wt 118.6 kg (261 lb 8 oz)   SpO2 97%   BMI 34.66 kg/m       Recent Results (from the past 24 hour(s))   HEMOGLOBIN A1C    Collection Time: 05/05/22  8:24 AM   Result Value Ref Range    Hemoglobin A1C 7.8 (H) 0.0 - 5.6 %        CV-regular rate and rhythm   RESP-lungs clear to auscultation   Foot exam-normal.  Palpable pedal pulses bilaterally.  No ulcers.    Catalino Nuñez MD

## 2022-05-09 ENCOUNTER — APPOINTMENT (OUTPATIENT)
Dept: CT IMAGING | Facility: HOSPITAL | Age: 62
End: 2022-05-09
Attending: PHYSICIAN ASSISTANT
Payer: COMMERCIAL

## 2022-05-09 ENCOUNTER — NURSE TRIAGE (OUTPATIENT)
Dept: NURSING | Facility: CLINIC | Age: 62
End: 2022-05-09
Payer: COMMERCIAL

## 2022-05-09 ENCOUNTER — HOSPITAL ENCOUNTER (EMERGENCY)
Facility: HOSPITAL | Age: 62
Discharge: HOME | End: 2022-05-09
Attending: EMERGENCY MEDICINE | Admitting: EMERGENCY MEDICINE
Payer: COMMERCIAL

## 2022-05-09 VITALS
OXYGEN SATURATION: 96 % | TEMPERATURE: 97.5 F | BODY MASS INDEX: 33.37 KG/M2 | HEIGHT: 74 IN | SYSTOLIC BLOOD PRESSURE: 198 MMHG | RESPIRATION RATE: 16 BRPM | DIASTOLIC BLOOD PRESSURE: 100 MMHG | WEIGHT: 260 LBS | HEART RATE: 70 BPM

## 2022-05-09 DIAGNOSIS — N20.1 URETEROLITHIASIS: ICD-10-CM

## 2022-05-09 DIAGNOSIS — N20.0 NEPHROLITHIASIS: ICD-10-CM

## 2022-05-09 LAB
ALBUMIN SERPL-MCNC: 3.8 G/DL (ref 3.5–5)
ALBUMIN UR-MCNC: 50 MG/DL
ALP SERPL-CCNC: 61 U/L (ref 45–120)
ALT SERPL W P-5'-P-CCNC: 28 U/L (ref 0–45)
AMORPH CRY #/AREA URNS HPF: ABNORMAL /HPF
ANION GAP SERPL CALCULATED.3IONS-SCNC: 9 MMOL/L (ref 5–18)
APPEARANCE UR: ABNORMAL
AST SERPL W P-5'-P-CCNC: 17 U/L (ref 0–40)
BASOPHILS # BLD AUTO: 0 10E3/UL (ref 0–0.2)
BASOPHILS NFR BLD AUTO: 0 %
BILIRUB DIRECT SERPL-MCNC: 0.2 MG/DL
BILIRUB SERPL-MCNC: 0.6 MG/DL (ref 0–1)
BILIRUB UR QL STRIP: NEGATIVE
BUN SERPL-MCNC: 19 MG/DL (ref 8–22)
C REACTIVE PROTEIN LHE: 1.6 MG/DL (ref 0–0.8)
CALCIUM SERPL-MCNC: 9 MG/DL (ref 8.5–10.5)
CHLORIDE BLD-SCNC: 104 MMOL/L (ref 98–107)
CO2 SERPL-SCNC: 23 MMOL/L (ref 22–31)
COLOR UR AUTO: YELLOW
CREAT SERPL-MCNC: 1.17 MG/DL (ref 0.7–1.3)
EOSINOPHIL # BLD AUTO: 0 10E3/UL (ref 0–0.7)
EOSINOPHIL NFR BLD AUTO: 0 %
ERYTHROCYTE [DISTWIDTH] IN BLOOD BY AUTOMATED COUNT: 12.4 % (ref 10–15)
GFR SERPL CREATININE-BSD FRML MDRD: 71 ML/MIN/1.73M2
GLUCOSE BLD-MCNC: 241 MG/DL (ref 70–125)
GLUCOSE UR STRIP-MCNC: 500 MG/DL
HCT VFR BLD AUTO: 39 % (ref 40–53)
HGB BLD-MCNC: 13.2 G/DL (ref 13.3–17.7)
HGB UR QL STRIP: ABNORMAL
IMM GRANULOCYTES # BLD: 0.1 10E3/UL
IMM GRANULOCYTES NFR BLD: 1 %
KETONES UR STRIP-MCNC: 40 MG/DL
LEUKOCYTE ESTERASE UR QL STRIP: NEGATIVE
LIPASE SERPL-CCNC: 32 U/L (ref 0–52)
LYMPHOCYTES # BLD AUTO: 0.5 10E3/UL (ref 0.8–5.3)
LYMPHOCYTES NFR BLD AUTO: 4 %
MCH RBC QN AUTO: 28.9 PG (ref 26.5–33)
MCHC RBC AUTO-ENTMCNC: 33.8 G/DL (ref 31.5–36.5)
MCV RBC AUTO: 85 FL (ref 78–100)
MONOCYTES # BLD AUTO: 0.5 10E3/UL (ref 0–1.3)
MONOCYTES NFR BLD AUTO: 5 %
MUCOUS THREADS #/AREA URNS LPF: PRESENT /LPF
NEUTROPHILS # BLD AUTO: 10.1 10E3/UL (ref 1.6–8.3)
NEUTROPHILS NFR BLD AUTO: 90 %
NITRATE UR QL: NEGATIVE
NRBC # BLD AUTO: 0 10E3/UL
NRBC BLD AUTO-RTO: 0 /100
PH UR STRIP: 5.5 [PH] (ref 5–7)
PLATELET # BLD AUTO: 164 10E3/UL (ref 150–450)
POTASSIUM BLD-SCNC: 4.6 MMOL/L (ref 3.5–5)
PROT SERPL-MCNC: 6.6 G/DL (ref 6–8)
RBC # BLD AUTO: 4.57 10E6/UL (ref 4.4–5.9)
RBC URINE: 0 /HPF
SODIUM SERPL-SCNC: 136 MMOL/L (ref 136–145)
SP GR UR STRIP: 1.03 (ref 1–1.03)
UROBILINOGEN UR STRIP-MCNC: <2 MG/DL
WBC # BLD AUTO: 11.1 10E3/UL (ref 4–11)
WBC URINE: 0 /HPF

## 2022-05-09 PROCEDURE — 74176 CT ABD & PELVIS W/O CONTRAST: CPT

## 2022-05-09 PROCEDURE — 96375 TX/PRO/DX INJ NEW DRUG ADDON: CPT

## 2022-05-09 PROCEDURE — 258N000003 HC RX IP 258 OP 636: Performed by: PHYSICIAN ASSISTANT

## 2022-05-09 PROCEDURE — 83690 ASSAY OF LIPASE: CPT | Performed by: PHYSICIAN ASSISTANT

## 2022-05-09 PROCEDURE — 36415 COLL VENOUS BLD VENIPUNCTURE: CPT | Performed by: EMERGENCY MEDICINE

## 2022-05-09 PROCEDURE — 82248 BILIRUBIN DIRECT: CPT | Performed by: PHYSICIAN ASSISTANT

## 2022-05-09 PROCEDURE — 99285 EMERGENCY DEPT VISIT HI MDM: CPT | Mod: 25

## 2022-05-09 PROCEDURE — 250N000013 HC RX MED GY IP 250 OP 250 PS 637: Performed by: EMERGENCY MEDICINE

## 2022-05-09 PROCEDURE — 85025 COMPLETE CBC W/AUTO DIFF WBC: CPT | Performed by: PHYSICIAN ASSISTANT

## 2022-05-09 PROCEDURE — 96361 HYDRATE IV INFUSION ADD-ON: CPT

## 2022-05-09 PROCEDURE — 250N000011 HC RX IP 250 OP 636: Performed by: PHYSICIAN ASSISTANT

## 2022-05-09 PROCEDURE — 36415 COLL VENOUS BLD VENIPUNCTURE: CPT | Performed by: PHYSICIAN ASSISTANT

## 2022-05-09 PROCEDURE — 250N000013 HC RX MED GY IP 250 OP 250 PS 637: Performed by: PHYSICIAN ASSISTANT

## 2022-05-09 PROCEDURE — 96376 TX/PRO/DX INJ SAME DRUG ADON: CPT

## 2022-05-09 PROCEDURE — 86140 C-REACTIVE PROTEIN: CPT | Performed by: EMERGENCY MEDICINE

## 2022-05-09 PROCEDURE — 81001 URINALYSIS AUTO W/SCOPE: CPT | Performed by: PHYSICIAN ASSISTANT

## 2022-05-09 PROCEDURE — 96374 THER/PROPH/DIAG INJ IV PUSH: CPT

## 2022-05-09 RX ORDER — ONDANSETRON 2 MG/ML
4 INJECTION INTRAMUSCULAR; INTRAVENOUS
Status: COMPLETED | OUTPATIENT
Start: 2022-05-09 | End: 2022-05-09

## 2022-05-09 RX ORDER — KETOROLAC TROMETHAMINE 30 MG/ML
15 INJECTION, SOLUTION INTRAMUSCULAR; INTRAVENOUS ONCE
Status: COMPLETED | OUTPATIENT
Start: 2022-05-09 | End: 2022-05-09

## 2022-05-09 RX ORDER — ONDANSETRON 4 MG/1
4 TABLET, ORALLY DISINTEGRATING ORAL EVERY 8 HOURS PRN
Qty: 12 TABLET | Refills: 0 | Status: SHIPPED | OUTPATIENT
Start: 2022-05-09 | End: 2022-05-12

## 2022-05-09 RX ORDER — DIMENHYDRINATE 50 MG
50 TABLET ORAL AT BEDTIME
Qty: 7 TABLET | Refills: 0 | Status: SHIPPED | OUTPATIENT
Start: 2022-05-09 | End: 2022-05-16

## 2022-05-09 RX ORDER — IBUPROFEN 200 MG
400 TABLET ORAL EVERY 6 HOURS
Qty: 56 TABLET | Refills: 0 | Status: SHIPPED | OUTPATIENT
Start: 2022-05-09 | End: 2022-05-16

## 2022-05-09 RX ORDER — OXYCODONE HYDROCHLORIDE 5 MG/1
5 TABLET ORAL EVERY 4 HOURS PRN
Qty: 12 TABLET | Refills: 0 | Status: SHIPPED | OUTPATIENT
Start: 2022-05-09 | End: 2022-05-13

## 2022-05-09 RX ORDER — ACETAMINOPHEN 500 MG
1000 TABLET ORAL EVERY 6 HOURS
Qty: 56 TABLET | Refills: 0 | Status: SHIPPED | OUTPATIENT
Start: 2022-05-09 | End: 2022-05-16

## 2022-05-09 RX ORDER — ONDANSETRON 2 MG/ML
4 INJECTION INTRAMUSCULAR; INTRAVENOUS ONCE
Status: COMPLETED | OUTPATIENT
Start: 2022-05-09 | End: 2022-05-09

## 2022-05-09 RX ORDER — MORPHINE SULFATE 2 MG/ML
2 INJECTION, SOLUTION INTRAMUSCULAR; INTRAVENOUS ONCE
Status: COMPLETED | OUTPATIENT
Start: 2022-05-09 | End: 2022-05-09

## 2022-05-09 RX ORDER — OXYCODONE HYDROCHLORIDE 5 MG/1
5 TABLET ORAL ONCE
Status: COMPLETED | OUTPATIENT
Start: 2022-05-09 | End: 2022-05-09

## 2022-05-09 RX ORDER — LISINOPRIL 20 MG/1
20 TABLET ORAL DAILY
Status: DISCONTINUED | OUTPATIENT
Start: 2022-05-09 | End: 2022-05-09 | Stop reason: HOSPADM

## 2022-05-09 RX ADMIN — OXYCODONE HYDROCHLORIDE 5 MG: 5 TABLET ORAL at 13:44

## 2022-05-09 RX ADMIN — MORPHINE SULFATE 2 MG: 2 INJECTION, SOLUTION INTRAMUSCULAR; INTRAVENOUS at 11:59

## 2022-05-09 RX ADMIN — ONDANSETRON 4 MG: 2 INJECTION INTRAMUSCULAR; INTRAVENOUS at 11:55

## 2022-05-09 RX ADMIN — SODIUM CHLORIDE 1000 ML: 9 INJECTION, SOLUTION INTRAVENOUS at 13:08

## 2022-05-09 RX ADMIN — ONDANSETRON 4 MG: 2 INJECTION INTRAMUSCULAR; INTRAVENOUS at 09:09

## 2022-05-09 RX ADMIN — LISINOPRIL 20 MG: 20 TABLET ORAL at 10:39

## 2022-05-09 RX ADMIN — KETOROLAC TROMETHAMINE 15 MG: 30 INJECTION, SOLUTION INTRAMUSCULAR at 09:03

## 2022-05-09 ASSESSMENT — ENCOUNTER SYMPTOMS
BLOOD IN STOOL: 0
DIARRHEA: 0
DYSURIA: 0
ABDOMINAL PAIN: 0
CONSTIPATION: 0
NAUSEA: 1
HEMATURIA: 0
VOMITING: 1
FEVER: 0
FLANK PAIN: 1

## 2022-05-09 NOTE — ED NOTES
Patient feeling better after fluids. Able to keep oral tablet down without emesis. Ready for discharge.

## 2022-05-09 NOTE — ED TRIAGE NOTES
Patient presents to ED for evaluation of right sided flank pain since this AM, pain 8/10. History of kidney stones. Has not taken BP meds this AM and BP is high in triage. Emesis also noted.     Triage Assessment     Row Name 05/09/22 0808       Triage Assessment (Adult)    Airway WDL WDL       Respiratory WDL    Respiratory WDL WDL       Skin Circulation/Temperature WDL    Skin Circulation/Temperature WDL WDL       Cardiac WDL    Cardiac WDL WDL       Peripheral/Neurovascular WDL    Peripheral Neurovascular WDL WDL       Cognitive/Neuro/Behavioral WDL    Cognitive/Neuro/Behavioral WDL WDL

## 2022-05-09 NOTE — DISCHARGE INSTRUCTIONS
You were seen here in the ED for evaluation of nephrolithiasis.  You do have some kidney stones that are located on her right side.  None of these are obstructing.  No evidence of urinary tract infection.  Remainder of your lab work, and your CT scan is unremarkable.  I have put in a consult for kidney stone [place], and should call you tomorrow to schedule an outpatient appointment.  Additionally we will send you home with some oxycodone tablets for pain, some Zofran, as well as ibuprofen.  Additionally there will be some Dramamine at bedtime for sleep aid, as well as some Tylenol.    Please continue to strain your urine, stay hydrated at home.  Please resume taking your at home medications, I believe that your hypertension today is due to not taking her medications, and partially due to pain.    Please return to the ED if develop worsening symptoms including fever, worsening pain with urination, inability to urinate.    Case I will contact you to schedule a follow-up appointment, please follow-up with them as an outpatient.

## 2022-05-09 NOTE — ED PROVIDER NOTES
EMERGENCY DEPARTMENT ENCOUNTER      NAME: Kash Tavarez  AGE: 61 year old male  YOB: 1960  MRN: [id number]  EVALUATION DATE & TIME: 5/9/2022  8:09 AM    PCP: Catalino Nuñez    ED PROVIDER: Leonard Brown PA-C      Chief Complaint   Patient presents with     Flank Pain         FINAL IMPRESSION:  1. Nephrolithiasis    2. Ureterolithiasis      ED COURSE & MEDICAL DECISION MAKING:    Pertinent Labs & Imaging studies reviewed. (See chart for details)  8:15 AM I met the patient and performed my initial interview and exam.   8:42 AM Staffed with Dr. Thomas.  10:55 AM Patient seen, updated on laboratory results and disposition plan. Increased pain and nausea, will give another dose of zofran and morphine, plan for discharge.   2:16 PM I rechecked and updated the patient with results and plan for discharge.     61 year old male presents to the Emergency Department for evaluation of right sided flank pain.     ED Course as of 05/09/22 1416   Mon May 09, 2022   0820 Patient is a 61-year-old male with past medical history of Nephrolithiasis, hypertension, obesity, type 2 diabetes who presents ED for evaluation of right-sided flank pain started approximately 2 AM this morning.  Patient notes he has a previous history of kidney stones.  Has not had one for approximately 10 years.  On examination patient has some right-sided CVA tenderness, no tenderness across the remainder of the abdomen.  Denies any fevers at home.  Denies any hematuria or dysuria.  Has not take anything at home for pain.  Denies any chest pain or shortness of breath.  Lung sounds are clear, heart sounds are within normal limits.  No swelling in the legs.  Does note that he feels somewhat nauseous, has had 1 episode of emesis.  Denies any blood in the emesis.  Remainder of the examination is grossly normal.  Differential includes gastritis, acute viral gastroenteritis, nephrolithiasis, acute cystitis.  Less likely to be bowel  obstruction, or any other intra-abdominal pathology at this time.  Plan will be to obtain basic laboratory studies, CT, patient will get some fluids, Toradol, and Zofran.   0944 Called lab, report that they currently do not have labs at this time. Will follow up with nursing.    0945 CT Abdomen Pelvis w/o Contrast  1.  Mild right hydronephrosis and hydroureter extending to the mid right ureter, where there is a 4 mm x 4 mm x 2 mm calculus.  2.  Nonobstructing right and left renal calculi.  3.  Mildly enlarged prostate gland.   4.  Minimal reticular interstitial opacities in the medial left lung base could be an infectious or inflammatory process.   5.  Hepatic steatosis.   0954 Patient notably hypertensive here in the ED, notes he has not taken his lisinopril today. No other hypertensive symptoms at this time.    1053 CRP mildly elevated, UA does not show evidence of infection. WBC mildly elevated, however this seems to be likely reactive due to inflammation. Remainder of labwork unremarkable.    1058 Discussed laboratory results with the patient who expressed understanding.  Patient will be put in to follow-up with [facility]. Patient has previously seen Dr. Mosley through [facility]. He expresses understanding of the plan. [facility] should call him in the next day to schedule outpatient follow up. Additionally, patient will continue to strain his urine at home. Diagnosis consistent with nephrolithiasis, no other abnormalities on his CT scan, or evidence of abnormalities on labs.  Negative for acute cystitis.   1304 Patient continuing to have some mild nausea, which I think is likely secondary to dehydration.  We will give him some fluids here in the ED.  If he is able to tolerate some p.o. intake following IV fluids, plan will be to proceed with the plan to discharge him home with follow-up with [facility].    Following IV fluids patient was able to tolerate some p.o. medicine, we will give him an additional dose of oxycodone for pain  management here, and that he can be discharged home.  Patient expresses understanding with this plan.  We will plan to follow-up with [facility].  Consult has been placed, they will call him in the morning to schedule appointment.  Diagnosis seems consistent with nonobstructing right-sided kidney stones, history of nephrolithiasis on the right side.  Patient will be discharged home with oxycodone, Dramamine, Zofran, and the recommendation that he take ibuprofen and Tylenol for pain.  Discussed return precautions, patient expresses understanding.  Plan for discharge at this time.          At the conclusion of the encounter I discussed the results of all of the tests and the disposition. The questions were answered. The patient or family acknowledged understanding and was agreeable with the care plan.     PPE worn: n95 mask, goggles      0 minutes of critical care time     MEDICATIONS GIVEN IN THE EMERGENCY:  Medications   lisinopril (ZESTRIL) tablet 20 mg (20 mg Oral Given 5/9/22 1039)   ketorolac (TORADOL) injection 15 mg (15 mg Intravenous Given 5/9/22 0903)   ondansetron (ZOFRAN) injection 4 mg (4 mg Intravenous Given 5/9/22 0909)   ondansetron (ZOFRAN) injection 4 mg (4 mg Intravenous Given 5/9/22 1155)   morphine (PF) injection 2 mg (2 mg Intravenous Given 5/9/22 1159)   0.9% sodium chloride BOLUS (1,000 mLs Intravenous New Bag 5/9/22 1308)   oxyCODONE (ROXICODONE) tablet 5 mg (5 mg Oral Given 5/9/22 1344)       NEW PRESCRIPTIONS STARTED AT TODAY'S ER VISIT  New Prescriptions    ACETAMINOPHEN (TYLENOL) 500 MG TABLET    Take 2 tablets (1,000 mg) by mouth every 6 hours for 7 days    DIMENHYDRINATE (DRAMAMINE) 50 MG TABLET    Take 1 tablet (50 mg) by mouth At Bedtime for 7 days    IBUPROFEN (ADVIL/MOTRIN) 200 MG TABLET    Take 2 tablets (400 mg) by mouth every 6 hours for 7 days    ONDANSETRON (ZOFRAN ODT) 4 MG ODT TAB    Take 1 tablet (4 mg) by mouth every 8 hours as needed for nausea    OXYCODONE (ROXICODONE) 5 MG  TABLET    Take 1 tablet (5 mg) by mouth every 4 hours as needed for severe pain If pain is not improved with acetaminophen and ibuprofen.     =================================================================    HPI    Patient information was obtained from: patient     Use of : N/A       Kash Tavarez is a 61 year old male with a pertinent history of nephrolithiasis, s/p appendectomy, HTN, HLD, DM2 who presents to this ED via walk-in for evaluation of flank pain.     Patient reports right sided flank pain which began around 2 AM today. His pain started out mild but woke him up early this morning. Currently rates his pain as an 8/10 in severity. His pain is constant and non-radiating. No abdominal pain. Patient has been drinking plenty of fluids recently but had an episode of vomiting right after drinking water this morning. Continues to feel nauseous. He did not take his morning meds today, including lisinopril, because of his nausea. Patient is still able to urinate. No dysuria or hematuria. Had a normal bowel movement this morning. No diarrhea. Denies bloody stools, fever, or leg swelling. Reports he had a kidney stone requiring surgical intervention with Dr. Mosley ~15 years ago. Patient states his wife was sick with the stomach flu ~2 weeks ago. He received his 4th Pfizer vaccine on 5/5. No other complaints or concerns expressed at this time.    REVIEW OF SYSTEMS   Review of Systems   Constitutional: Negative for fever.   Gastrointestinal: Positive for nausea and vomiting. Negative for abdominal pain, blood in stool, constipation and diarrhea.   Genitourinary: Positive for flank pain. Negative for dysuria and hematuria.   All other systems reviewed and are negative.     PAST MEDICAL HISTORY:  Past Medical History:   Diagnosis Date     Hypertension      Nephrolithiasis      Type 2 diabetes mellitus (H)        PAST SURGICAL HISTORY:  Past Surgical History:   Procedure Laterality Date     [facility]  "APPENDECTOMY      Description: Appendectomy;  Recorded: 03/04/2008;           CURRENT MEDICATIONS:    acetaminophen (TYLENOL) 500 MG tablet  dimenhyDRINATE (DRAMAMINE) 50 MG tablet  ibuprofen (ADVIL/MOTRIN) 200 MG tablet  ondansetron (ZOFRAN ODT) 4 MG ODT tab  oxyCODONE (ROXICODONE) 5 MG tablet  ACCU-CHEK FASTCLIX Misc  ACCU-CHEK GUIDE test strip  aspirin (ASA) 81 MG EC tablet  atorvastatin (LIPITOR) 40 MG tablet  blood glucose monitoring (ACCU-CHEK FASTCLIX) lancets  blood-glucose meter Misc  imiquimod (ALDARA) 5 % cream  lisinopril (ZESTRIL) 20 MG tablet  metFORMIN (GLUCOPHAGE) 500 MG tablet  metroNIDAZOLE (METROGEL) 0.75 % gel  oxymetazoline (RHOFADE) 1 % Crea  triamcinolone (KENALOG) 0.1 % external cream         ALLERGIES:  No Known Allergies    FAMILY HISTORY:  History reviewed. No pertinent family history.    SOCIAL HISTORY:   Social History     Socioeconomic History     Marital status:    Tobacco Use     Smoking status: Never Smoker     Smokeless tobacco: Never Used   Substance and Sexual Activity     Alcohol use: No     Drug use: No     Sexual activity: Yes     Partners: Female       VITALS:  BP (!) 196/95   Pulse 62   Temp 97.5  F (36.4  C) (Temporal)   Resp 16   Ht 1.88 m (6' 2\")   Wt 117.9 kg (260 lb)   SpO2 96%   BMI 33.38 kg/m      PHYSICAL EXAM    Physical Exam  Constitutional:       General: He is not in acute distress.     Appearance: He is obese. He is not toxic-appearing or diaphoretic.   HENT:      Nose: No congestion or rhinorrhea.      Mouth/Throat:      Mouth: Mucous membranes are moist.   Cardiovascular:      Rate and Rhythm: Normal rate.      Heart sounds: Normal heart sounds.   Pulmonary:      Effort: Pulmonary effort is normal.      Breath sounds: Normal breath sounds.   Abdominal:      General: Abdomen is flat. There is no distension.      Palpations: Abdomen is soft.      Tenderness: There is no abdominal tenderness. There is right CVA tenderness. There is no left CVA " tenderness or guarding.   Musculoskeletal:         General: No swelling or tenderness.   Neurological:      General: No focal deficit present.      Mental Status: He is alert.        LAB:  All pertinent labs reviewed and interpreted.  Labs Ordered and Resulted from Time of ED Arrival to Time of ED Departure   BASIC METABOLIC PANEL - Abnormal       Result Value    Sodium 136      Potassium 4.6      Chloride 104      Carbon Dioxide (CO2) 23      Anion Gap 9      Urea Nitrogen 19      Creatinine 1.17      Calcium 9.0      Glucose 241 (*)     GFR Estimate 71     ROUTINE UA WITH MICROSCOPIC REFLEX TO CULTURE - Abnormal    Color Urine Yellow      Appearance Urine Cloudy (*)     Glucose Urine 500  (*)     Bilirubin Urine Negative      Ketones Urine 40  (*)     Specific Gravity Urine 1.029      Blood Urine >1.0 mg/dL (*)     pH Urine 5.5      Protein Albumin Urine 50  (*)     Urobilinogen Urine <2.0      Nitrite Urine Negative      Leukocyte Esterase Urine Negative      Mucus Urine Present (*)     Amorphous Crystals Urine Moderate (*)     RBC Urine 0      WBC Urine 0     CBC WITH PLATELETS AND DIFFERENTIAL - Abnormal    WBC Count 11.1 (*)     RBC Count 4.57      Hemoglobin 13.2 (*)     Hematocrit 39.0 (*)     MCV 85      MCH 28.9      MCHC 33.8      RDW 12.4      Platelet Count 164      % Neutrophils 90      % Lymphocytes 4      % Monocytes 5      % Eosinophils 0      % Basophils 0      % Immature Granulocytes 1      NRBCs per 100 WBC 0      Absolute Neutrophils 10.1 (*)     Absolute Lymphocytes 0.5 (*)     Absolute Monocytes 0.5      Absolute Eosinophils 0.0      Absolute Basophils 0.0      Absolute Immature Granulocytes 0.1      Absolute NRBCs 0.0     CRP INFLAMMATION - Abnormal    CRP 1.6 (*)    HEPATIC FUNCTION PANEL - Normal    Bilirubin Total 0.6      Bilirubin Direct 0.2      Protein Total 6.6      Albumin 3.8      Alkaline Phosphatase 61      AST 17      ALT 28     LIPASE - Normal    Lipase 32          RADIOLOGY:  Reviewed all pertinent imaging. Please see official radiology report.  CT Abdomen Pelvis w/o Contrast   Final Result   IMPRESSION:    1.  Mild right hydronephrosis and hydroureter extending to the mid right ureter, where there is a 4 mm x 4 mm x 2 mm calculus.   2.  Nonobstructing right and left renal calculi.   3.  Mildly enlarged prostate gland.    4.  Minimal reticular interstitial opacities in the medial left lung base could be an infectious or inflammatory process.    5.  Hepatic steatosis.             I, Ceasar Walker, am serving as a scribe to document services personally performed by Leonard Brown PA-C, based on my observation and the provider's statements to me. I, Leonard Brown PA-C, attest that Ceasar Walker is acting in a scribe capacity, has observed my performance of the services and has documented them in accordance with my direction.    Leonard Brown PA-C  Emergency Medicine  [facility] EMERGENCY DEPARTMENT  [facility] [facility] [address]  [phone]  Dept: [phone]     Leonard Brown PA-C  05/09/22 1849

## 2022-05-09 NOTE — ED NOTES
Patient reports morphine as less effective than toradol. Zofran did help, but still feels some nausea. Also reporting some dizziness, and concern about this when going home. Provider notified and going to speak with patient.

## 2022-05-09 NOTE — ED NOTES
"Emergency Department Midlevel Supervisory Note     I personally saw the patient and performed a substantive portion of the visit including all aspects of the medical decision making.    ED Course:  8:42 AM Leonard Brown PA-C staffed patient with me. I agree with their assessment and plan of management, and I will see the patient.  9:03 AM I met with the patient to introduce myself, gather additional history, perform my initial exam, and discuss the plan.     Brief HPI:     Kash Tavarez is a 61 year old male who presents for evaluation of flank pain.    Patient reports right sided flank pain which began around 2 AM today. His pain started out mild but woke him up early this morning. Currently rates his pain as an 8/10 in severity. His pain is constant and non-radiating. No abdominal pain. Patient has been drinking plenty of fluids recently but had an episode of vomiting right after drinking water this morning. Continues to feel nauseous. He did not take his morning meds today, including lisinopril, because of his nausea. Patient is still able to urinate. No dysuria or hematuria. Had a normal bowel movement this morning. No diarrhea. Denies bloody stools, fever, or leg swelling. Reports he had a kidney stone requiring surgical intervention with Dr. Mosley ~15 years ago. Patient states his wife was sick with the stomach flu ~2 weeks ago. He received his 4th Pfizer vaccine on 5/5. No other complaints or concerns expressed at this time.    I, Benigno Garcia, am serving as a scribe to document services personally performed by April Thomas MD, based on my observations and the provider's statements to me.   I, April Thomas MD attest that Benigno Garcia was acting in a scribe capacity, has observed my performance of the services and has documented them in accordance with my direction.    Brief Physical Exam: BP (!) 206/100   Pulse 78   Temp 97.5  F (36.4  C) (Temporal)   Resp 16   Ht 1.88 m (6' 2\")  "  Wt 117.9 kg (260 lb)   SpO2 99%   BMI 33.38 kg/m    Constitutional:  Alert, in no acute distress  EYES: Conjunctivae clear  HENT:  Atraumatic, normocephalic  Respiratory:  Respirations even, unlabored, in no acute respiratory distress  Cardiovascular:  Regular rate and rhythm, good peripheral perfusion  GI: Soft, nondistended, nontender, no palpable masses, no rebound, no guarding   Musculoskeletal:  Right CVA tenderness. No edema. No cyanosis. Range of motion major extremities intact.    Integument: Warm, Dry, No erythema, No rash.   Neurologic:  Alert & oriented, no focal deficits noted  Psych: Normal mood and affect     MDM:    Patient with development of right-sided flank pain that is concerning for a right ureteral stone.  CT scan of the abdomen pelvis is consistent with a ureteral stone with some mild right hydronephrosis.  Plan for laboratory studies to further evaluate for any associated infection.  If this is unremarkable and the patient's pain is controlled, the patient will be discharged home with follow-up with [facility].    ED Course as of 06/14/22 1236   Mon May 09, 2022   0820 Patient is a 61-year-old male with past medical history of Nephrolithiasis, hypertension, obesity, type 2 diabetes who presents ED for evaluation of right-sided flank pain started approximately 2 AM this morning.  Patient notes he has a previous history of kidney stones.  Has not had one for approximately 10 years.  On examination patient has some right-sided CVA tenderness, no tenderness across the remainder of the abdomen.  Denies any fevers at home.  Denies any hematuria or dysuria.  Has not take anything at home for pain.  Denies any chest pain or shortness of breath.  Lung sounds are clear, heart sounds are within normal limits.  No swelling in the legs.  Does note that he feels somewhat nauseous, has had 1 episode of emesis.  Denies any blood in the emesis.  Remainder of the examination is grossly normal.  Differential  includes gastritis, acute viral gastroenteritis, nephrolithiasis, acute cystitis.  Less likely to be bowel obstruction, or any other intra-abdominal pathology at this time.  Plan will be to obtain basic laboratory studies, CT, patient will get some fluids, Toradol, and Zofran.   0944 Called lab, report that they currently do not have labs at this time. Will follow up with nursing.    0945 CT Abdomen Pelvis w/o Contrast  1.  Mild right hydronephrosis and hydroureter extending to the mid right ureter, where there is a 4 mm x 4 mm x 2 mm calculus.  2.  Nonobstructing right and left renal calculi.  3.  Mildly enlarged prostate gland.   4.  Minimal reticular interstitial opacities in the medial left lung base could be an infectious or inflammatory process.   5.  Hepatic steatosis.   0954 Patient notably hypertensive here in the ED, notes he has not taken his lisinopril today. No other hypertensive symptoms at this time.    1053 CRP mildly elevated, UA does not show evidence of infection. WBC mildly elevated, however this seems to be likely reactive due to inflammation. Remainder of labwork unremarkable.    1058 Discussed laboratory results with the patient who expressed understanding.  Patient will be put in to follow-up with [facility]. Patient has previously seen Dr. Mosley through [facility]. He expresses understanding of the plan. [facility] should call him in the next day to schedule outpatient follow up. Additionally, patient will continue to strain his urine at home. Diagnosis consistent with nephrolithiasis, no other abnormalities on his CT scan, or evidence of abnormalities on labs.  Negative for acute cystitis.   1304 Patient continuing to have some mild nausea, which I think is likely secondary to dehydration.  We will give him some fluids here in the ED.  If he is able to tolerate some p.o. intake following IV fluids, plan will be to proceed with the plan to discharge him home with follow-up with [facility].       1. Nephrolithiasis         Labs and Imaging:     I have reviewed the relevant laboratory and radiology studies    April Thomas MD  [facility] EMERGENCY DEPARTMENT  [facility] [facility] [id number] [phone]       April Thomas MD  05/09/22 120       April Thomas MD  06/14/22 1927

## 2022-05-09 NOTE — TELEPHONE ENCOUNTER
Pt calling with right sided flank pain.     He states that the pain started last night. He has had similar pain in the past with kidney stones. He tried drinking lots of water, but threw up. Was able to urinate, but not as much as usual. Took advil, but did not help.     This morning, rates his pain 8/10.    Protocol recommends going to the ER. Pt verbalized understanding and will have his wife drive him to either [facility] or [facility].    Linda Marcus RN, BSN  [facility]   Triage Nurse Advisor      Reason for Disposition    SEVERE pain (e.g., excruciating, scale 8-10) and present > 1 hour    Additional Information    Negative: Passed out (i.e., lost consciousness, collapsed and was not responding)    Negative: Shock suspected (e.g., cold/pale/clammy skin, too weak to stand, low BP, rapid pulse)    Negative: Sounds like a life-threatening emergency to the triager    Negative: Followed a major injury to the back (e.g., MVA, fall > 10 feet or 3 meters, penetrating injury, etc.)    Negative: Upper, mid or lower back pain that occurs mainly in the midline    Protocols used: FLANK PAIN-A-[place]

## 2022-05-10 ENCOUNTER — LAB (OUTPATIENT)
Dept: FAMILY MEDICINE | Facility: CLINIC | Age: 62
End: 2022-05-10
Attending: PHYSICIAN ASSISTANT
Payer: COMMERCIAL

## 2022-05-10 ENCOUNTER — VIRTUAL VISIT (OUTPATIENT)
Dept: UROLOGY | Facility: CLINIC | Age: 62
End: 2022-05-10
Payer: COMMERCIAL

## 2022-05-10 DIAGNOSIS — N20.1 URETEROLITHIASIS: ICD-10-CM

## 2022-05-10 DIAGNOSIS — N13.2 HYDRONEPHROSIS WITH URINARY OBSTRUCTION DUE TO URETERAL CALCULUS: ICD-10-CM

## 2022-05-10 DIAGNOSIS — R10.9 ACUTE RIGHT FLANK PAIN: ICD-10-CM

## 2022-05-10 DIAGNOSIS — N20.1 CALCULUS OF URETER: Primary | ICD-10-CM

## 2022-05-10 DIAGNOSIS — N20.1 CALCULUS OF URETER: ICD-10-CM

## 2022-05-10 DIAGNOSIS — Z11.59 ENCOUNTER FOR SCREENING FOR OTHER VIRAL DISEASES: Primary | ICD-10-CM

## 2022-05-10 DIAGNOSIS — Z11.59 ENCOUNTER FOR SCREENING FOR OTHER VIRAL DISEASES: ICD-10-CM

## 2022-05-10 DIAGNOSIS — N20.0 CALCULUS OF KIDNEY: ICD-10-CM

## 2022-05-10 PROCEDURE — U0003 INFECTIOUS AGENT DETECTION BY NUCLEIC ACID (DNA OR RNA); SEVERE ACUTE RESPIRATORY SYNDROME CORONAVIRUS 2 (SARS-COV-2) (CORONAVIRUS DISEASE [COVID-19]), AMPLIFIED PROBE TECHNIQUE, MAKING USE OF HIGH THROUGHPUT TECHNOLOGIES AS DESCRIBED BY CMS-2020-01-R: HCPCS

## 2022-05-10 PROCEDURE — U0005 INFEC AGEN DETEC AMPLI PROBE: HCPCS

## 2022-05-10 PROCEDURE — 99203 OFFICE O/P NEW LOW 30 MIN: CPT | Mod: 95 | Performed by: PHYSICIAN ASSISTANT

## 2022-05-10 RX ORDER — ACETAMINOPHEN 500 MG
1000 TABLET ORAL
Status: CANCELLED | OUTPATIENT
Start: 2022-05-10

## 2022-05-10 RX ORDER — KETOROLAC TROMETHAMINE 30 MG/ML
15 INJECTION, SOLUTION INTRAMUSCULAR; INTRAVENOUS
Status: CANCELLED | OUTPATIENT
Start: 2022-05-10

## 2022-05-10 RX ORDER — TAMSULOSIN HYDROCHLORIDE 0.4 MG/1
0.4 CAPSULE ORAL DAILY
Qty: 14 CAPSULE | Refills: 1 | Status: SHIPPED | OUTPATIENT
Start: 2022-05-10 | End: 2022-05-24

## 2022-05-10 RX ORDER — GABAPENTIN 100 MG/1
300 CAPSULE ORAL
Status: CANCELLED | OUTPATIENT
Start: 2022-05-10

## 2022-05-10 ASSESSMENT — PAIN SCALES - GENERAL: PAINLEVEL: NO PAIN (1)

## 2022-05-10 NOTE — PROGRESS NOTES
Patient states that they are in [place] at the time of their visit.  Patient is aware telehealth visit is billable and agrees to proceed.  Jenifer Garcia RN

## 2022-05-10 NOTE — PATIENT INSTRUCTIONS
Patient Stated Goal: Know what to expect after surgery  Ureteroscopy    Ureteroscopy is a procedure which is done for clearance of stones from the ureter, kidney or both. There are no incisions involved. The procedure involves your surgeon placing a small scope into your urethra. This is the opening where urine leaves your body.  The surgeon watches as they carefully guide the scope to the stone(s).  Modern flexible ureteroscopes can be used to reach virtually any location within the urinary tract.     The size, shape and location of the stone determines how best to treat the stone(s).  Whenever possible, stones are removed in one piece.  Larger stones need to be broken using a laser before removing in smaller pieces.  The goal is to remove all stones and stone fragments from that side of the body in a single treatment.  Complete stone clearance is an important step to prevent future kidney stone episodes.    Surgery:    Same day outpatient procedure    30-60 minutes    Procedure done in hospital surgical suite    General anesthesia (you will be asleep during the procedure)     Antibiotic prior to surgery to prevent infection    Physician will visit with you and respond to any questions or concerns and consent will be signed prior to going to the operating room    Risks:    Infection - Preoperative antibiotics should prevent new infections but it is possible that unanticipated bacteria may be introduced at time of surgery or that the stones were actually chronically infected before surgery      Injury - The ureter may be injured during this procedure.  This is most likely to happen if the ureter was very inflamed before surgery or if a stone is very tightly impacted.  The surgeon will not aggressively treat a stone if this creates a risk of injury.        Inaccessible Stones -A single procedure is effective in 95% of cases, but if your ureter is very narrow or your kidney stone is very impacted, a stent will be  placed and the procedure stopped.  In 1-2 weeks after the ureter has relaxed, the patient will be brought back to surgery and the procedure can be safely performed.      Incomplete stone clearance -Occasionally stone or stone fragments may not be completely cleared.  These may pass on their own, which may cause discomfort.  Our goal is to remove all possible stones and fragments.    Stent:      An internal soft tube will be placed between the kidney and the bladder while in surgery (after the stone is cleared). The stent will keep the kidney draining.    What should I expect?     It is common for a stent to cause some irritation and discomfort.   You may have:      The need to urinate suddenly     The need to urinate often     Pain during urination     A dull backache, which may get worse during urination     Blood stained urine (like fruit punch) and occasional small clots    It s important to remember the stent is necessary and only temporary. To feel more comfortable:      Drink more than you normally would but you do not have to constantly  flush your kidneys     Limiting your activity may decrease irritation or bleeding    Ibuprofen - 2 tablets every 6-8 hours     Use pain medications as directed.    When is the stent removed?    Most stents are removed within 5 days to 2 weeks after a procedure.     How is the stent removed?     Your stent will be removed in [facility] with a small telescope and a grasping tool.  It usually takes less than 1 minute to remove the stent.    What should I expect after the stent is removed?     You should feel normal by the next day    Some patients find:    An increase in back pain about an hour after the stent is removed as the kidney fills up with urine before it starts to empty.  It can be as uncomfortable as your initial stone episode.  Taking pain medications before stent removal may be helpful, but you would need someone else to drive you to and from your  appointment.    Bladder symptoms usually disappear by the next morning.    Small amounts of blood in the urine may be seen occasionally for up to a week.    Diet:      After surgery, there are no dietary restrictions - Drink to thirst, there is no need to increase intake of fluids, as this may increase nausea symptoms. Try to eat smaller, more frequent snacks, instead of large meals.    Activity:    Many people return to work within 1-2 days. Fatigue is normal for a couple of weeks following surgery. With increased activity you may experience more discomfort and you may notice more blood in your urine.      Post-Operative Symptom Control    While you recover from your procedure, you can take steps to ease your recovery.    Medications that prevent further episodes of severe pain and help stones pass: Take these as prescribed on a regular basis even if you are NOT in pain      Ibuprofen (Advil or Motrin) - Is available over the counter Take 2 (200mg) tablets every 6 hours until the stone passes.  o prevents spasm of the ureter.    o Decreases pain      Dramamine - (drowsy version, non-generic formulation) Is available over the counter and decreases spasm of the ureter.  Take 50mg at bedtime every night until the stone passes. In addition, take every 6 hours as needed.  Dramamine:  o Decreases nausea  o Decreases acute pain  o Decreases recurrence of pain for next 24 hours  o Will help you sleep        *This medication will cause increased drowsiness, do not drive or operate machinery for 6 hours      Flomax- Studies show that Flomax decreases irritation from stents.   o Take every day with food until stone passes even if you do not have pain  o Flomax does not relieve pain.        *This medication may cause nasal congestion or light-headedness      Detrol ( Tolterodine) - After surgery Detrol may decrease stent irritation and pelvic pain  o Take as prescribed     *This medication may cause dry mouth, constipation or  blurry vision. Stop medication if unable to urinate.    Medication that are taken as needed to manage break through symptoms: Take these ONLY as required and hopefully not at all      Narcotics (Percocet, Vicodin, Dilaudid)- take as prescribed for severe pain unrelieved by ibuprofen and dramamine  o Take as prescribed for severe pain  o Narcotics have significant side effects and only  cover-up  pain. They have no effect on cause of pain.  o Common side effects:  - Confusion, disorientation and sedation - DO NOT DRIVE OR OPERATE MACHINERY WITHIN 24 HOURS  - Nausea - take Dramamine or Zofran  or Haldol to help control  - Constipation  - Sleep disturbances      Ondansetron (Zofran)-  o Take as prescribed  o Reserve for severe nausea  o May cause constipation, start over the counter Miralax if needed to treat this    Haldol-  o Take as prescribed  o Reserve for severe nausea    Warning Signs/Symptoms - Please call the [facility] 24 hours a day at [phone] IMMEDIATELY if you experience any of these:    Fever greater than 100.1     Chills    Pain NOT CONTROLLED by pain medications    Heavy bleeding or large clots in urine (small clots can be normal)    Persistent nausea and/or vomiting    Post-Operative Follow up:    The stone(s) will be sent from surgery to a lab for composition analysis.  These results are usually available before a one month post-operative visit.  If you had laser treatment to break up your stone, you will usually be scheduled for a low dose CT scan prior to your one month appointment.  This scan allows your surgeon to confirm that all stone fragments were cleared at time of surgery and that there have been no complications.  These results along with possible labs and urine studies will help us develop an individualized plan to prevent new stones from forming and keep existing stones from enlarging.  This visit is usually scheduled about 1 month after your original surgery.    The  [facility] can respond to your questions or concerns 24 hours a day at [phone].

## 2022-05-10 NOTE — H&P (VIEW-ONLY)
Assessment/Plan:    Assessment & Plan   Kash was seen today for new patient.    Diagnoses and all orders for this visit:    Calculus of ureter  -     Patient Stated Goal: Know what to expect after surgery  -     Case Request: CYSTOURETEROSCOPY, RETROGRADE PYELOGRAM, LASER LITHOTRIPSY WITH CALCULUS REMOVAL AND URETERAL STENT INSERTION; Standing  -     Case Request: CYSTOURETEROSCOPY, RETROGRADE PYELOGRAM, LASER LITHOTRIPSY WITH CALCULUS REMOVAL AND URETERAL STENT INSERTION  -     Asymptomatic COVID-19 Virus (Coronavirus) by PCR; Future    Ureterolithiasis  -     Cancel: Adult Urology Referral  -     tamsulosin (FLOMAX) 0.4 MG capsule; Take 1 capsule (0.4 mg) by mouth daily for 14 days    Hydronephrosis with urinary obstruction due to ureteral calculus  -     Case Request: CYSTOURETEROSCOPY, RETROGRADE PYELOGRAM, LASER LITHOTRIPSY WITH CALCULUS REMOVAL AND URETERAL STENT INSERTION; Standing  -     Case Request: CYSTOURETEROSCOPY, RETROGRADE PYELOGRAM, LASER LITHOTRIPSY WITH CALCULUS REMOVAL AND URETERAL STENT INSERTION    Acute right flank pain    Calculus of kidney  -     Case Request: CYSTOURETEROSCOPY, RETROGRADE PYELOGRAM, LASER LITHOTRIPSY WITH CALCULUS REMOVAL AND URETERAL STENT INSERTION; Standing  -     Case Request: CYSTOURETEROSCOPY, RETROGRADE PYELOGRAM, LASER LITHOTRIPSY WITH CALCULUS REMOVAL AND URETERAL STENT INSERTION    Other orders  -     Notify Provider - Anticoagulants and Antiplatelets; Standing  -     Glucose monitor nursing POCT; Standing  -     NPO per Anesthesia Guidelines for Procedure/Surgery Except for: Meds; Standing  -     Apply Pneumatic Compression Device (PCD); Standing  -     Pneumatic Compression Device (PCD) (Equipment); Standing  -     ceFAZolin (ANCEF) 2 g in sodium chloride 0.9 % 100 mL intermittent infusion  -     ketorolac (TORADOL) injection 15 mg  -     acetaminophen (TYLENOL) tablet 1,000 mg  -     gabapentin (NEURONTIN) capsule 300 mg  -     gentamicin (GARAMYCIN) 80 mg in  sterile water irrigation (bag) 3,000 mL irrigation  -     XR Surgery NICOLAS Fluoro L/T 5 Min; Standing  -     XR Surgery NICOLAS Fluoro L/T 5 Min        Stone Management Plan  Stone Management 5/10/2022   Urinary Tract Infection No suspicion of infection   Renal Colic Well controlled symptoms   Renal Failure No suspicion of renal failure   Current CT date 5/9/2022   Right sided stones? Yes   R Number of ureteral stones 1   R GSD of ureteral stones 5   R Location of ureteral stone Mid   R Number of kidney stones  3   R GSD of kidney stones 4 - 10   R Hydronephrosis Mild   R Stone Event New event   Diagnosis date 5/9/2022   Initial location of primary symptomatic stone Mid   Initial GSD of primary symptomatic stone 5   R Current Plan Clear   Clear rationale Optimally managed electively   Left sided stones? Yes   L Number of ureteral stones No ureteral stones   L Number of kidney stones  4   L GSD of kidney stones < 2   L Hydronephrosis None   L Stone Event No current event   L Current Plan Observe   Observe rationale Limited stone burden with good prognosis for spontaneous passage         PLAN    62 yo pleasant DM male with remote history of kidney stone, previous surgical stone intervention. Recent ER visit for obstructing right mid ureteral stone, symptoms improved. Multiple, bilateral, nonobstructing renal stones, right > left.    Will proceed with ureterscopic stone clearance this week. Risks and benefits were detailed of ureteroscopic stone clearance including potential issues of urinary or systemic infection, ureteral injury, inaccessible stone, incomplete stone clearance, multiple surgeries, and stent related symptoms of urgency, frequency and hematuria Patient verbalized understanding. Patient agrees with plan as discussed. Preoperative evaluation with primary care has been requested.    For symptom control, he was prescribed oxycodone, ondansetron and Flomax. Over the counter symptom control medications of  ibuprofen, Dramamine and Tylenol were recommended.    Video call duration: 22 minutes  29 minutes spent on the date of the encounter doing chart review, history and exam, documentation and further activities per the note    Flor Elias PA-C  [facility]    Subjective:     HPI  Mr. Kash Tavarez is a 61 year old  male who is being evaluated via a billable video visit by [facility] following [facility] ER visit for urolithiasis.    He is a remotely recurrent unidentified composition stone former who has required stone clearance procedures. He has not previously participated in stone risk evaluation. He has identified modifiable stone risks including:  type II diabetes. He has identified non-modifiable stone risks including:  multiple stones at presentation and bilateral stones.    He was seen in ER yesterday for acute onset right flank pain, starting overnight. The pain was initially mild but reached 8/10 while in ER. It was constant, sharp and nonradiating. He had associated nausea and vomiting. He reported remote kidney stone that required surgery. Workup was notable for CT reporting an obstructing right ureteral stone and bilateral nonobstructing renal stones. Labs showed no overt signs of infection and he was sent home with zofran and oxycodone.    He is feeling improved with minimal pain. Pertinent negative current symptoms include:  fever, chills, left flank pain, nausea, vomiting, hematuria, urinary frequency and dysuria.     CT scan from 5/9/22 is personally reviewed and demonstrates a 5 mm right mid ureteral stone with mild hydronephrosis. Additional nonobstructing right renal stones, largest ~9 mm. There are few nonobstucting left renal stone, all < 2 mm.    Significant labs from presentation include mild hematuria, no pyuria, negative nitrite, slightly elevated WBC, slightly elevated C reactive protein, normal creatinine and normal  potassium.    ROS   A 12 point comprehensive review of systems is negative except for HPI    Past Medical History:   Diagnosis Date     Hypertension      Nephrolithiasis      Type 2 diabetes mellitus (H)      Past Surgical History:   Procedure Laterality Date     [facility] APPENDECTOMY      Description: Appendectomy;  Recorded: 03/04/2008;       Current Outpatient Medications   Medication Sig Dispense Refill     ACCU-CHEK FASTCLIX Misc [ACCU-CHEK FASTCLIX MISC] TEST TWICE DAILY 100 each 0     ACCU-CHEK GUIDE test strip USE 1 EACH AS DIRECTED 2 (TWO) TIMES A DAY AT 7:30AM AND 4:30PM. 200 strip 3     acetaminophen (TYLENOL) 500 MG tablet Take 2 tablets (1,000 mg) by mouth every 6 hours for 7 days 56 tablet 0     aspirin (ASA) 81 MG EC tablet TAKE 1 TABLET BY MOUTH EVERY DAY 90 tablet 3     atorvastatin (LIPITOR) 40 MG tablet TAKE 1 TABLET BY MOUTH EVERY DAY 90 tablet 3     blood glucose monitoring (ACCU-CHEK FASTCLIX) lancets USE TWICE A DAY (AT 7:30 AM & 4:30 PM) AS DIRECTED 204 each 3     blood-glucose meter Misc [BLOOD-GLUCOSE METER MISC] Use 1 Device As Directed 2 (two) times a day at 9am and 6pm. 1 each 0     dimenhyDRINATE (DRAMAMINE) 50 MG tablet Take 1 tablet (50 mg) by mouth At Bedtime for 7 days 7 tablet 0     ibuprofen (ADVIL/MOTRIN) 200 MG tablet Take 2 tablets (400 mg) by mouth every 6 hours for 7 days 56 tablet 0     imiquimod (ALDARA) 5 % cream [IMIQUIMOD (ALDARA) 5 % CREAM] APPLY TOPICALLY D TO WARTS  3     lisinopril (ZESTRIL) 20 MG tablet Take 1 tablet (20 mg) by mouth daily 90 tablet 3     metFORMIN (GLUCOPHAGE) 500 MG tablet Take 1 tablet (500 mg) by mouth 3 times daily (with meals) 270 tablet 3     metroNIDAZOLE (METROGEL) 0.75 % gel [METRONIDAZOLE (METROGEL) 0.75 % GEL] APPLY TO FACE D  10     ondansetron (ZOFRAN ODT) 4 MG ODT tab Take 1 tablet (4 mg) by mouth every 8 hours as needed for nausea 12 tablet 0     oxyCODONE (ROXICODONE) 5 MG tablet Take 1 tablet (5 mg) by mouth every 4 hours as needed for  severe pain If pain is not improved with acetaminophen and ibuprofen. 12 tablet 0     oxymetazoline (RHOFADE) 1 % Crea [OXYMETAZOLINE (RHOFADE) 1 % CREA] Apply topically daily.       tamsulosin (FLOMAX) 0.4 MG capsule Take 1 capsule (0.4 mg) by mouth daily for 14 days 14 capsule 1     triamcinolone (KENALOG) 0.1 % external cream Apply topically 2 times daily 80 g 0       No Known Allergies    Social History     Socioeconomic History     Marital status:      Spouse name: Not on file     Number of children: Not on file     Years of education: Not on file     Highest education level: Not on file   Occupational History     Not on file   Tobacco Use     Smoking status: Never Smoker     Smokeless tobacco: Never Used   Substance and Sexual Activity     Alcohol use: No     Drug use: No     Sexual activity: Yes     Partners: Female   Other Topics Concern     Not on file   Social History Narrative     Not on file     Social Determinants of Health     Financial Resource Strain: Not on file   Food Insecurity: Not on file   Transportation Needs: Not on file   Physical Activity: Not on file   Stress: Not on file   Social Connections: Not on file   Intimate Partner Violence: Not on file   Housing Stability: Not on file       No family history on file.    Objective:     No vitals or physical exam obtained due to virtual visit    LABS  Most Recent 3 CBC's:  Recent Labs   Lab Test 05/09/22  0900   WBC 11.1*   HGB 13.2*   MCV 85        Most Recent 3 BMP's:  Recent Labs   Lab Test 05/09/22  0900 11/11/21  0849 11/05/20  0855    136 140   POTASSIUM 4.6 4.8 5.1*   CHLORIDE 104 101 104   CO2 23 26 25   BUN 19 15 17   CR 1.17 0.97 1.12   ANIONGAP 9 9 11   RIOS 9.0 10.2 9.9   * 184* 217*     Most Recent Urinalysis:  Recent Labs   Lab Test 05/09/22  0906   COLOR Yellow   APPEARANCE Cloudy*   URINEGLC 500 *   URINEBILI Negative   URINEKETONE 40 *   SG 1.029   UBLD >1.0 mg/dL*   URINEPH 5.5   PROTEIN 50 *   NITRITE  Negative   LEUKEST Negative   RBCU 0   WBCU 0

## 2022-05-10 NOTE — PROGRESS NOTES
Assessment/Plan:    Assessment & Plan   Kash was seen today for new patient.    Diagnoses and all orders for this visit:    Calculus of ureter  -     Patient Stated Goal: Know what to expect after surgery  -     Case Request: CYSTOURETEROSCOPY, RETROGRADE PYELOGRAM, LASER LITHOTRIPSY WITH CALCULUS REMOVAL AND URETERAL STENT INSERTION; Standing  -     Case Request: CYSTOURETEROSCOPY, RETROGRADE PYELOGRAM, LASER LITHOTRIPSY WITH CALCULUS REMOVAL AND URETERAL STENT INSERTION  -     Asymptomatic COVID-19 Virus (Coronavirus) by PCR; Future    Ureterolithiasis  -     Cancel: Adult Urology Referral  -     tamsulosin (FLOMAX) 0.4 MG capsule; Take 1 capsule (0.4 mg) by mouth daily for 14 days    Hydronephrosis with urinary obstruction due to ureteral calculus  -     Case Request: CYSTOURETEROSCOPY, RETROGRADE PYELOGRAM, LASER LITHOTRIPSY WITH CALCULUS REMOVAL AND URETERAL STENT INSERTION; Standing  -     Case Request: CYSTOURETEROSCOPY, RETROGRADE PYELOGRAM, LASER LITHOTRIPSY WITH CALCULUS REMOVAL AND URETERAL STENT INSERTION    Acute right flank pain    Calculus of kidney  -     Case Request: CYSTOURETEROSCOPY, RETROGRADE PYELOGRAM, LASER LITHOTRIPSY WITH CALCULUS REMOVAL AND URETERAL STENT INSERTION; Standing  -     Case Request: CYSTOURETEROSCOPY, RETROGRADE PYELOGRAM, LASER LITHOTRIPSY WITH CALCULUS REMOVAL AND URETERAL STENT INSERTION    Other orders  -     Notify Provider - Anticoagulants and Antiplatelets; Standing  -     Glucose monitor nursing POCT; Standing  -     NPO per Anesthesia Guidelines for Procedure/Surgery Except for: Meds; Standing  -     Apply Pneumatic Compression Device (PCD); Standing  -     Pneumatic Compression Device (PCD) (Equipment); Standing  -     ceFAZolin (ANCEF) 2 g in sodium chloride 0.9 % 100 mL intermittent infusion  -     ketorolac (TORADOL) injection 15 mg  -     acetaminophen (TYLENOL) tablet 1,000 mg  -     gabapentin (NEURONTIN) capsule 300 mg  -     gentamicin (GARAMYCIN) 80 mg in  sterile water irrigation (bag) 3,000 mL irrigation  -     XR Surgery NICOLAS Fluoro L/T 5 Min; Standing  -     XR Surgery NICOLAS Fluoro L/T 5 Min        Stone Management Plan  Stone Management 5/10/2022   Urinary Tract Infection No suspicion of infection   Renal Colic Well controlled symptoms   Renal Failure No suspicion of renal failure   Current CT date 5/9/2022   Right sided stones? Yes   R Number of ureteral stones 1   R GSD of ureteral stones 5   R Location of ureteral stone Mid   R Number of kidney stones  3   R GSD of kidney stones 4 - 10   R Hydronephrosis Mild   R Stone Event New event   Diagnosis date 5/9/2022   Initial location of primary symptomatic stone Mid   Initial GSD of primary symptomatic stone 5   R Current Plan Clear   Clear rationale Optimally managed electively   Left sided stones? Yes   L Number of ureteral stones No ureteral stones   L Number of kidney stones  4   L GSD of kidney stones < 2   L Hydronephrosis None   L Stone Event No current event   L Current Plan Observe   Observe rationale Limited stone burden with good prognosis for spontaneous passage         PLAN    62 yo pleasant DM male with remote history of kidney stone, previous surgical stone intervention. Recent ER visit for obstructing right mid ureteral stone, symptoms improved. Multiple, bilateral, nonobstructing renal stones, right > left.    Will proceed with ureterscopic stone clearance this week. Risks and benefits were detailed of ureteroscopic stone clearance including potential issues of urinary or systemic infection, ureteral injury, inaccessible stone, incomplete stone clearance, multiple surgeries, and stent related symptoms of urgency, frequency and hematuria Patient verbalized understanding. Patient agrees with plan as discussed. Preoperative evaluation with primary care has been requested.    For symptom control, he was prescribed oxycodone, ondansetron and Flomax. Over the counter symptom control medications of  ibuprofen, Dramamine and Tylenol were recommended.    Video call duration: 22 minutes  29 minutes spent on the date of the encounter doing chart review, history and exam, documentation and further activities per the note    Flor Elias PA-C  [facility]    Subjective:     HPI  Mr. Kash Tavarez is a 61 year old  male who is being evaluated via a billable video visit by [facility] [place] following [facility] ER visit for urolithiasis.    He is a remotely recurrent unidentified composition stone former who has required stone clearance procedures. He has not previously participated in stone risk evaluation. He has identified modifiable stone risks including:  type II diabetes. He has identified non-modifiable stone risks including:  multiple stones at presentation and bilateral stones.    He was seen in ER yesterday for acute onset right flank pain, starting overnight. The pain was initially mild but reached 8/10 while in ER. It was constant, sharp and nonradiating. He had associated nausea and vomiting. He reported remote kidney stone that required surgery. Workup was notable for CT reporting an obstructing right ureteral stone and bilateral nonobstructing renal stones. Labs showed no overt signs of infection and he was sent home with zofran and oxycodone.    He is feeling improved with minimal pain. Pertinent negative current symptoms include:  fever, chills, left flank pain, nausea, vomiting, hematuria, urinary frequency and dysuria.     CT scan from 5/9/22 is personally reviewed and demonstrates a 5 mm right mid ureteral stone with mild hydronephrosis. Additional nonobstructing right renal stones, largest ~9 mm. There are few nonobstucting left renal stone, all < 2 mm.    Significant labs from presentation include mild hematuria, no pyuria, negative nitrite, slightly elevated WBC, slightly elevated C reactive protein, normal creatinine and normal  potassium.    ROS   A 12 point comprehensive review of systems is negative except for HPI    Past Medical History:   Diagnosis Date     Hypertension      Nephrolithiasis      Type 2 diabetes mellitus (H)      Past Surgical History:   Procedure Laterality Date     [facility] APPENDECTOMY      Description: Appendectomy;  Recorded: 03/04/2008;       Current Outpatient Medications   Medication Sig Dispense Refill     ACCU-CHEK FASTCLIX Misc [ACCU-CHEK FASTCLIX MISC] TEST TWICE DAILY 100 each 0     ACCU-CHEK GUIDE test strip USE 1 EACH AS DIRECTED 2 (TWO) TIMES A DAY AT 7:30AM AND 4:30PM. 200 strip 3     acetaminophen (TYLENOL) 500 MG tablet Take 2 tablets (1,000 mg) by mouth every 6 hours for 7 days 56 tablet 0     aspirin (ASA) 81 MG EC tablet TAKE 1 TABLET BY MOUTH EVERY DAY 90 tablet 3     atorvastatin (LIPITOR) 40 MG tablet TAKE 1 TABLET BY MOUTH EVERY DAY 90 tablet 3     blood glucose monitoring (ACCU-CHEK FASTCLIX) lancets USE TWICE A DAY (AT 7:30 AM & 4:30 PM) AS DIRECTED 204 each 3     blood-glucose meter Misc [BLOOD-GLUCOSE METER MISC] Use 1 Device As Directed 2 (two) times a day at 9am and 6pm. 1 each 0     dimenhyDRINATE (DRAMAMINE) 50 MG tablet Take 1 tablet (50 mg) by mouth At Bedtime for 7 days 7 tablet 0     ibuprofen (ADVIL/MOTRIN) 200 MG tablet Take 2 tablets (400 mg) by mouth every 6 hours for 7 days 56 tablet 0     imiquimod (ALDARA) 5 % cream [IMIQUIMOD (ALDARA) 5 % CREAM] APPLY TOPICALLY D TO WARTS  3     lisinopril (ZESTRIL) 20 MG tablet Take 1 tablet (20 mg) by mouth daily 90 tablet 3     metFORMIN (GLUCOPHAGE) 500 MG tablet Take 1 tablet (500 mg) by mouth 3 times daily (with meals) 270 tablet 3     metroNIDAZOLE (METROGEL) 0.75 % gel [METRONIDAZOLE (METROGEL) 0.75 % GEL] APPLY TO FACE D  10     ondansetron (ZOFRAN ODT) 4 MG ODT tab Take 1 tablet (4 mg) by mouth every 8 hours as needed for nausea 12 tablet 0     oxyCODONE (ROXICODONE) 5 MG tablet Take 1 tablet (5 mg) by mouth every 4 hours as needed for  severe pain If pain is not improved with acetaminophen and ibuprofen. 12 tablet 0     oxymetazoline (RHOFADE) 1 % Crea [OXYMETAZOLINE (RHOFADE) 1 % CREA] Apply topically daily.       tamsulosin (FLOMAX) 0.4 MG capsule Take 1 capsule (0.4 mg) by mouth daily for 14 days 14 capsule 1     triamcinolone (KENALOG) 0.1 % external cream Apply topically 2 times daily 80 g 0       No Known Allergies    Social History     Socioeconomic History     Marital status:      Spouse name: Not on file     Number of children: Not on file     Years of education: Not on file     Highest education level: Not on file   Occupational History     Not on file   Tobacco Use     Smoking status: Never Smoker     Smokeless tobacco: Never Used   Substance and Sexual Activity     Alcohol use: No     Drug use: No     Sexual activity: Yes     Partners: Female   Other Topics Concern     Not on file   Social History Narrative     Not on file     Social Determinants of Health     Financial Resource Strain: Not on file   Food Insecurity: Not on file   Transportation Needs: Not on file   Physical Activity: Not on file   Stress: Not on file   Social Connections: Not on file   Intimate Partner Violence: Not on file   Housing Stability: Not on file       No family history on file.    Objective:     No vitals or physical exam obtained due to virtual visit    LABS  Most Recent 3 CBC's:  Recent Labs   Lab Test 05/09/22  0900   WBC 11.1*   HGB 13.2*   MCV 85        Most Recent 3 BMP's:  Recent Labs   Lab Test 05/09/22  0900 11/11/21  0849 11/05/20  0855    136 140   POTASSIUM 4.6 4.8 5.1*   CHLORIDE 104 101 104   CO2 23 26 25   BUN 19 15 17   CR 1.17 0.97 1.12   ANIONGAP 9 9 11   RIOS 9.0 10.2 9.9   * 184* 217*     Most Recent Urinalysis:  Recent Labs   Lab Test 05/09/22  0906   COLOR Yellow   APPEARANCE Cloudy*   URINEGLC 500 *   URINEBILI Negative   URINEKETONE 40 *   SG 1.029   UBLD >1.0 mg/dL*   URINEPH 5.5   PROTEIN 50 *   NITRITE  Negative   LEUKEST Negative   RBCU 0   WBCU 0

## 2022-05-11 ENCOUNTER — ANESTHESIA EVENT (OUTPATIENT)
Dept: SURGERY | Facility: AMBULATORY SURGERY CENTER | Age: 62
End: 2022-05-11
Payer: COMMERCIAL

## 2022-05-11 LAB — SARS-COV-2 RNA RESP QL NAA+PROBE: NEGATIVE

## 2022-05-12 ENCOUNTER — HOSPITAL ENCOUNTER (OUTPATIENT)
Facility: AMBULATORY SURGERY CENTER | Age: 62
Discharge: HOME | End: 2022-05-12
Attending: UROLOGY
Payer: COMMERCIAL

## 2022-05-12 ENCOUNTER — ANESTHESIA (OUTPATIENT)
Dept: SURGERY | Facility: AMBULATORY SURGERY CENTER | Age: 62
End: 2022-05-12
Payer: COMMERCIAL

## 2022-05-12 VITALS
RESPIRATION RATE: 16 BRPM | HEIGHT: 74 IN | SYSTOLIC BLOOD PRESSURE: 160 MMHG | HEART RATE: 68 BPM | DIASTOLIC BLOOD PRESSURE: 88 MMHG | BODY MASS INDEX: 33.37 KG/M2 | WEIGHT: 260 LBS | OXYGEN SATURATION: 98 % | TEMPERATURE: 97 F

## 2022-05-12 DIAGNOSIS — N13.2 HYDRONEPHROSIS WITH URINARY OBSTRUCTION DUE TO URETERAL CALCULUS: ICD-10-CM

## 2022-05-12 DIAGNOSIS — N20.1 CALCULUS OF URETER: ICD-10-CM

## 2022-05-12 DIAGNOSIS — N20.0 CALCULUS OF KIDNEY: ICD-10-CM

## 2022-05-12 LAB
GLUCOSE POCT: 178 MG/DL (ref 70–99)
GLUCOSE POCT: 181 MG/DL (ref 70–99)

## 2022-05-12 PROCEDURE — 52356 CYSTO/URETERO W/LITHOTRIPSY: CPT | Mod: RT | Performed by: UROLOGY

## 2022-05-12 PROCEDURE — 82365 CALCULUS SPECTROSCOPY: CPT | Performed by: UROLOGY

## 2022-05-12 PROCEDURE — 99000 SPECIMEN HANDLING OFFICE-LAB: CPT | Performed by: UROLOGY

## 2022-05-12 DEVICE — IMPLANTABLE DEVICE: Type: IMPLANTABLE DEVICE | Site: URETER | Status: FUNCTIONAL

## 2022-05-12 RX ORDER — HYDROMORPHONE HCL IN WATER/PF 6 MG/30 ML
0.2 PATIENT CONTROLLED ANALGESIA SYRINGE INTRAVENOUS EVERY 5 MIN PRN
Status: DISCONTINUED | OUTPATIENT
Start: 2022-05-12 | End: 2022-05-13 | Stop reason: HOSPADM

## 2022-05-12 RX ORDER — MEPERIDINE HYDROCHLORIDE 25 MG/ML
12.5 INJECTION INTRAMUSCULAR; INTRAVENOUS; SUBCUTANEOUS
Status: DISCONTINUED | OUTPATIENT
Start: 2022-05-12 | End: 2022-05-13 | Stop reason: HOSPADM

## 2022-05-12 RX ORDER — ACETAMINOPHEN 325 MG/1
975 TABLET ORAL ONCE
Status: DISCONTINUED | OUTPATIENT
Start: 2022-05-12 | End: 2022-05-12

## 2022-05-12 RX ORDER — PROPOFOL 10 MG/ML
INJECTION, EMULSION INTRAVENOUS PRN
Status: DISCONTINUED | OUTPATIENT
Start: 2022-05-12 | End: 2022-05-12

## 2022-05-12 RX ORDER — LIDOCAINE HYDROCHLORIDE 20 MG/ML
INJECTION, SOLUTION INFILTRATION; PERINEURAL PRN
Status: DISCONTINUED | OUTPATIENT
Start: 2022-05-12 | End: 2022-05-12

## 2022-05-12 RX ORDER — SODIUM CHLORIDE, SODIUM LACTATE, POTASSIUM CHLORIDE, CALCIUM CHLORIDE 600; 310; 30; 20 MG/100ML; MG/100ML; MG/100ML; MG/100ML
INJECTION, SOLUTION INTRAVENOUS CONTINUOUS
Status: DISCONTINUED | OUTPATIENT
Start: 2022-05-12 | End: 2022-05-13 | Stop reason: HOSPADM

## 2022-05-12 RX ORDER — ACETAMINOPHEN 500 MG
1000 TABLET ORAL
Status: COMPLETED | OUTPATIENT
Start: 2022-05-12 | End: 2022-05-12

## 2022-05-12 RX ORDER — FENTANYL CITRATE 50 UG/ML
INJECTION, SOLUTION INTRAMUSCULAR; INTRAVENOUS PRN
Status: DISCONTINUED | OUTPATIENT
Start: 2022-05-12 | End: 2022-05-12

## 2022-05-12 RX ORDER — DEXAMETHASONE SODIUM PHOSPHATE 4 MG/ML
INJECTION, SOLUTION INTRA-ARTICULAR; INTRALESIONAL; INTRAMUSCULAR; INTRAVENOUS; SOFT TISSUE PRN
Status: DISCONTINUED | OUTPATIENT
Start: 2022-05-12 | End: 2022-05-12

## 2022-05-12 RX ORDER — FENTANYL CITRATE 0.05 MG/ML
25 INJECTION, SOLUTION INTRAMUSCULAR; INTRAVENOUS
Status: DISCONTINUED | OUTPATIENT
Start: 2022-05-12 | End: 2022-05-13 | Stop reason: HOSPADM

## 2022-05-12 RX ORDER — GABAPENTIN 300 MG/1
300 CAPSULE ORAL
Status: COMPLETED | OUTPATIENT
Start: 2022-05-12 | End: 2022-05-12

## 2022-05-12 RX ORDER — OXYCODONE HYDROCHLORIDE 5 MG/1
5 TABLET ORAL EVERY 4 HOURS PRN
Status: DISCONTINUED | OUTPATIENT
Start: 2022-05-12 | End: 2022-05-13 | Stop reason: HOSPADM

## 2022-05-12 RX ORDER — LIDOCAINE 40 MG/G
CREAM TOPICAL
Status: DISCONTINUED | OUTPATIENT
Start: 2022-05-12 | End: 2022-05-13 | Stop reason: HOSPADM

## 2022-05-12 RX ORDER — ONDANSETRON 2 MG/ML
INJECTION INTRAMUSCULAR; INTRAVENOUS PRN
Status: DISCONTINUED | OUTPATIENT
Start: 2022-05-12 | End: 2022-05-12

## 2022-05-12 RX ORDER — CEFAZOLIN SODIUM 2 G/100ML
2 INJECTION, SOLUTION INTRAVENOUS
Status: COMPLETED | OUTPATIENT
Start: 2022-05-12 | End: 2022-05-12

## 2022-05-12 RX ORDER — KETOROLAC TROMETHAMINE 15 MG/ML
15 INJECTION, SOLUTION INTRAMUSCULAR; INTRAVENOUS
Status: DISCONTINUED | OUTPATIENT
Start: 2022-05-12 | End: 2022-05-13 | Stop reason: HOSPADM

## 2022-05-12 RX ORDER — FENTANYL CITRATE 0.05 MG/ML
25 INJECTION, SOLUTION INTRAMUSCULAR; INTRAVENOUS EVERY 5 MIN PRN
Status: DISCONTINUED | OUTPATIENT
Start: 2022-05-12 | End: 2022-05-13 | Stop reason: HOSPADM

## 2022-05-12 RX ORDER — ONDANSETRON 2 MG/ML
4 INJECTION INTRAMUSCULAR; INTRAVENOUS EVERY 30 MIN PRN
Status: DISCONTINUED | OUTPATIENT
Start: 2022-05-12 | End: 2022-05-13 | Stop reason: HOSPADM

## 2022-05-12 RX ORDER — ONDANSETRON 4 MG/1
4 TABLET, ORALLY DISINTEGRATING ORAL EVERY 30 MIN PRN
Status: DISCONTINUED | OUTPATIENT
Start: 2022-05-12 | End: 2022-05-13 | Stop reason: HOSPADM

## 2022-05-12 RX ADMIN — PROPOFOL 200 MG: 10 INJECTION, EMULSION INTRAVENOUS at 07:52

## 2022-05-12 RX ADMIN — Medication 1000 MG: at 06:59

## 2022-05-12 RX ADMIN — CEFAZOLIN SODIUM 2 G: 2 INJECTION, SOLUTION INTRAVENOUS at 07:44

## 2022-05-12 RX ADMIN — KETOROLAC TROMETHAMINE 15 MG: 15 INJECTION, SOLUTION INTRAMUSCULAR; INTRAVENOUS at 07:02

## 2022-05-12 RX ADMIN — DEXAMETHASONE SODIUM PHOSPHATE 4 MG: 4 INJECTION, SOLUTION INTRA-ARTICULAR; INTRALESIONAL; INTRAMUSCULAR; INTRAVENOUS; SOFT TISSUE at 07:57

## 2022-05-12 RX ADMIN — LIDOCAINE HYDROCHLORIDE 3 ML: 20 INJECTION, SOLUTION INFILTRATION; PERINEURAL at 07:52

## 2022-05-12 RX ADMIN — FENTANYL CITRATE 50 MCG: 50 INJECTION, SOLUTION INTRAMUSCULAR; INTRAVENOUS at 07:52

## 2022-05-12 RX ADMIN — FENTANYL CITRATE 50 MCG: 50 INJECTION, SOLUTION INTRAMUSCULAR; INTRAVENOUS at 08:00

## 2022-05-12 RX ADMIN — ONDANSETRON 4 MG: 2 INJECTION INTRAMUSCULAR; INTRAVENOUS at 07:57

## 2022-05-12 RX ADMIN — SODIUM CHLORIDE, SODIUM LACTATE, POTASSIUM CHLORIDE, CALCIUM CHLORIDE: 600; 310; 30; 20 INJECTION, SOLUTION INTRAVENOUS at 06:59

## 2022-05-12 RX ADMIN — GABAPENTIN 300 MG: 300 CAPSULE ORAL at 06:59

## 2022-05-12 NOTE — ANESTHESIA PROCEDURE NOTES
Airway       Patient location during procedure: OR       Procedure Start/Stop Times: 5/12/2022 7:54 AM  Staff -        Anesthesiologist:  Suzy Gonsalez MD       CRNA: Sebastian Ross APRN CRNA       Performed By: CRNA  Consent for Airway        Urgency: elective  Indications and Patient Condition       Indications for airway management: mainor-procedural       Induction type:intravenous       Mask difficulty assessment: 0 - not attempted    Final Airway Details       Final airway type: supraglottic airway    Supraglottic Airway Details        Type: LMA       LMA size: 5    Post intubation assessment        Placement verified by: capnometry, equal breath sounds and chest rise        Number of attempts at approach: 1       Secured with: silk tape       Ease of procedure: easy       Dentition: Intact and Unchanged    Medication(s) Administered   Medication Administration Time: 5/12/2022 7:54 AM

## 2022-05-12 NOTE — OP NOTE
[facility]  [facility] [place] Operative Note    Kash Tavarez   May 12, 2022   5/12/2022   Catalino Nuñez     Procedure Performed  Procedure(s):  CYSTOURETEROSCOPY, RETROGRADE PYELOGRAM, LASER LITHOTRIPSY WITH CALCULUS REMOVAL AND URETERAL STENT INSERTION  1. Cystoscopy  2. Retrograde Pyelography - Right  3. Ureteroscopic Stone Extraction - Right Ureter Distal  4. Ureteroscopic Laser Lithotripsy - Right Kidney    5. Ureteral Stent Insertion - Right      Pre-operative Diagnosis  Calculus of ureter [N20.1]  Hydronephrosis with urinary obstruction due to ureteral calculus [N13.2]  Calculus of kidney [N20.0]    Post-operative Diagnosis  1. Stone Right Ureter Distal  2. Stone Right Kidney        Surgeon(s) and Role:     * James Mosley MD - Primary    Anesthesia Type  Not documented     Procedural Summary    Estimation of stone clearance: <2 mm residual  Subjective stone composition: calcium  Renal papillae assessment: plaques mild  Unanticipated event/findings: none  Post-operative plan: remove own stent in 6 days with extraction string return to clinic in 1 month without CT scan    Narrative    Successful clearance of high distal ureteral stone and ~4 renal calculi.    Procedural Details    Patient is brought to the surgical suite where anesthesia is induced. he is prepped and draped in standard fashion in combined Trendelenberg and lithotomy position.    Cystoscopy: Rigid cystoscopy is performed. Anterior and posterior urethra are normal. Prostate demonstrates moderate adenopathy. Bladder is normal..     Retrograde Pyelography:  imaging fails to demonstrate radio-opaque mid ureteric stone consistent with pre-operative imaging. Right retrograde pyelography demonstrates radiolucent distal ureteric stone with obstruction.     Ureteral Access: Right ureteral access is initiated with Sensor wire. Guide wire access to the kidney is assured. 8F dilator is inserted with minimal resistance. 10F dilator  is inserted with minimal resistance.      Rigid Ureteroscopy: Rigid ureteroscope is inserted in right ureter. Stone is mildly impacted. Distal ureteric stone is identified and removed intact.      Ureteral Access: 12F ureteral access sheath obturator is inserted with minimal resistance. 14F 36 cm ureteral access sheath is inserted with minimal resistance.      Flexible Ureteroscopy: Flexible ureteroscope is passed to kidney. Right renal pelvis, lower pole and mid pole stones are identified. Stones are mobile. Smaller stones are extracted intact. Larger stones are translocated to dependent renal upper pole calyx where laser lithotripsy is performed with mild generation of fine debris. All fragments are removed from the kidney with stone basket     Ureteral Stent Insertion: Right 7F 28 cm stent is inserted with good coil in kidney and bladder under fluoroscopic guidance. Stent extraction string left dangling from urethra.      The patient was then taken to the recovery room in good condition.     Past Medical History:   Diagnosis Date     Hypertension      Nephrolithiasis      Type 2 diabetes mellitus (H)         Patient Active Problem List   Diagnosis     Nephrolithiasis     Obesity     Allergic Rhinitis     Hyperlipidemia     Essential Hypertension     Obesity (BMI 35.0-39.9) with comorbidity (H)     Type 2 diabetes mellitus without complication, without long-term current use of insulin (H)     Calculus of ureter     Hydronephrosis with urinary obstruction due to ureteral calculus        Estimated Blood Loss  .* No values recorded between 5/12/2022  8:01 AM and 5/12/2022  8:26 AM *     ID Type Source Tests Collected by Time Destination   A :  Calculus/Stone Ureter, Right STONE ANALYSIS James Mosley MD 5/12/2022  8:15 AM

## 2022-05-12 NOTE — PROGRESS NOTES
Pt and family verbalize good understanding of discharge teach and follow up with MD.   VSS,Surgical incision CDI. D/C criteria met. Pt verbalizes readiness to go home.   Pt ambulated and voided prior to discharge.   Stent removal instructions reviewed with pt and wife.     @ME2@ 5/12/2022 9:27 AM

## 2022-05-12 NOTE — ANESTHESIA PREPROCEDURE EVALUATION
Anesthesia Pre-Procedure Evaluation    Patient: Kash Tavarez   MRN: [id number] : 1960        Procedure : Procedure(s):  CYSTOURETEROSCOPY, RETROGRADE PYELOGRAM, LASER LITHOTRIPSY WITH CALCULUS REMOVAL AND URETERAL STENT INSERTION          Past Medical History:   Diagnosis Date     Hypertension      Nephrolithiasis      Type 2 diabetes mellitus (H)       Past Surgical History:   Procedure Laterality Date     COLONOSCOPY       KIDNEY STONE SURGERY       [facility] APPENDECTOMY      Description: Appendectomy;  Recorded: 2008;      No Known Allergies   Social History     Tobacco Use     Smoking status: Never Smoker     Smokeless tobacco: Never Used   Substance Use Topics     Alcohol use: No      Wt Readings from Last 1 Encounters:   22 117.9 kg (260 lb)        Anesthesia Evaluation   Pt has had prior anesthetic.         ROS/MED HX  ENT/Pulmonary:       Neurologic:       Cardiovascular:     (+) hypertension-----    METS/Exercise Tolerance:     Hematologic:       Musculoskeletal:       GI/Hepatic:       Renal/Genitourinary:     (+) renal disease,     Endo:     (+) type I DM, Obesity,     Psychiatric/Substance Use:       Infectious Disease:       Malignancy:       Other:            Physical Exam    Airway        Mallampati: II    Neck ROM: full     Respiratory Devices and Support         Dental  no notable dental history         Cardiovascular   cardiovascular exam normal          Pulmonary   pulmonary exam normal                OUTSIDE LABS:  CBC:   Lab Results   Component Value Date    WBC 11.1 (H) 2022    HGB 13.2 (L) 2022    HCT 39.0 (L) 2022     2022     BMP:   Lab Results   Component Value Date     2022     2021    POTASSIUM 4.6 2022    POTASSIUM 4.8 2021    CHLORIDE 104 2022    CHLORIDE 101 2021    CO2 23 2022    CO2 26 2021    BUN 19 2022    BUN 15 2021    CR 1.17 2022    CR 0.97 2021      (H) 05/09/2022     (H) 11/11/2021     COAGS: No results found for: PTT, INR, FIBR  POC: No results found for: BGM, HCG, HCGS  HEPATIC:   Lab Results   Component Value Date    ALBUMIN 3.8 05/09/2022    PROTTOTAL 6.6 05/09/2022    ALT 28 05/09/2022    AST 17 05/09/2022    ALKPHOS 61 05/09/2022    BILITOTAL 0.6 05/09/2022     OTHER:   Lab Results   Component Value Date    A1C 7.8 (H) 05/05/2022    RIOS 9.0 05/09/2022    LIPASE 32 05/09/2022    CRP 1.6 (H) 05/09/2022       Anesthesia Plan    ASA Status:  2      Anesthesia Type: General.     - Airway: LMA              Consents    Anesthesia Plan(s) and associated risks, benefits, and realistic alternatives discussed. Questions answered and patient/representative(s) expressed understanding.     - Discussed: Risks, Benefits and Alternatives for BOTH SEDATION and the PROCEDURE were discussed     - Discussed with:  Patient      - Extended Intubation/Ventilatory Support Discussed: No.      - Patient is DNR/DNI Status: No    Use of blood products discussed: No .     Postoperative Care    Pain management: Multi-modal analgesia.        Comments:                Suzy Gonsalez MD

## 2022-05-12 NOTE — ANESTHESIA CARE TRANSFER NOTE
Patient: Kash Tavarez    Procedure: Procedure(s):  CYSTOURETEROSCOPY, RETROGRADE PYELOGRAM, LASER LITHOTRIPSY WITH CALCULUS REMOVAL AND URETERAL STENT INSERTION       Diagnosis: Calculus of ureter [N20.1]  Hydronephrosis with urinary obstruction due to ureteral calculus [N13.2]  Calculus of kidney [N20.0]  Diagnosis Additional Information: No value filed.    Anesthesia Type:   General     Note:    Oropharynx: oropharynx clear of all foreign objects and spontaneously breathing  Level of Consciousness: awake  Oxygen Supplementation: face mask  Level of Supplemental Oxygen (L/min / FiO2): 8  Independent Airway: airway patency satisfactory and stable  Dentition: dentition unchanged  Vital Signs Stable: post-procedure vital signs reviewed and stable  Report to RN Given: handoff report given  Patient transferred to: PACU    Handoff Report: Identifed the Patient, Identified the Reponsible Provider, Reviewed the pertinent medical history, Discussed the surgical course, Reviewed Intra-OP anesthesia mangement and issues during anesthesia, Set expectations for post-procedure period and Allowed opportunity for questions and acknowledgement of understanding      Vitals:  Vitals Value Taken Time   /83 05/12/22 0828   Temp 36.1  C (97  F) 05/12/22 0828   Pulse 71 05/12/22 0828   Resp 16 05/12/22 0828   SpO2 98 % 05/12/22 0828   Vitals shown include unvalidated device data.    Electronically Signed By: ADITI Peters CRNA  May 12, 2022  8:32 AM

## 2022-05-12 NOTE — ANESTHESIA POSTPROCEDURE EVALUATION
Patient: Kash Tavarez    Procedure: Procedure(s):  CYSTOURETEROSCOPY, RETROGRADE PYELOGRAM, LASER LITHOTRIPSY WITH CALCULUS REMOVAL AND URETERAL STENT INSERTION       Anesthesia Type:  General    Note:  Disposition: Outpatient   Postop Pain Control: Uneventful            Sign Out: Well controlled pain   PONV: No   Neuro/Psych: Uneventful            Sign Out: Acceptable/Baseline neuro status   Airway/Respiratory: Uneventful            Sign Out: Acceptable/Baseline resp. status   CV/Hemodynamics: Uneventful            Sign Out: Acceptable CV status; No obvious hypovolemia; No obvious fluid overload   Other NRE: NONE   DID A NON-ROUTINE EVENT OCCUR? No           Last vitals:  Vitals Value Taken Time   /83 05/12/22 0830   Temp 97  F (36.1  C) 05/12/22 0828   Pulse 74 05/12/22 0834   Resp 16 05/12/22 0828   SpO2 93 % 05/12/22 0834   Vitals shown include unvalidated device data.    Electronically Signed By: Suzy Gonsalez MD  May 12, 2022  12:00 PM

## 2022-05-12 NOTE — DISCHARGE INSTRUCTIONS
If you have any questions or concerns regarding your procedure, please contact Dr. Mosley, his office number is [phone].    You received 975 mg of acetaminophen (Tylenol) at 6:59 AM. Please do not take an additional dose of Tylenol until after 12:59 PM.    Do not exceed 4,000 mg of acetaminophen during a 24 hour period and keep in mind that acetaminophen can also be found in many over-the-counter cold medications as well as narcotics that may be given for pain.    You received a medication called Toradol (a stronger IV ibuprofen) at 7:02 AM. Do NOT take any Ibuprofen / Advil / Motrin / Aleve / Naproxen or products containing Ibuprofen until 1:02 PM or later.    Going Home With a Ureteral Stent      What is it?   A stent is a soft, plastic tube that helps urine (pee) drain into the bladder. During the surgery, it is placed in the ureter the tube that connects the kidney to the bladder. A thin curl at each end of the stent keeps one end in your kidney and the other in your bladder. The stent can not be seen from outside of the body.    Why Do I Have It?   Some sort of blockage is not letting pee drain into your bladder.  This could be from a stone, certain surgeries or kidney infection.    What Should I Expect?   Stents often cause some discomfort. You may have:   -The need to pee suddenly   -Pain when you pee   -A dull backache, which may get worse when you pee  -Blood in your pee (color of fruit punch) and some clots, which may increase with physical activity.     What Can I Do To Feel Better?  -Drink a little more fluids than usual.  You can eat your normal diet.  -Enjoy a warm bath.  -Decrease your activity, some people find bending or twisting movement cause discomfort or increased blood in the urine. Even so, you will not harm yourself.    Stent Removal With String    -Remove the stent in the morning on the specific day as directed by your doctor, usually 6 days (5/18/2022).  -Take Tylenol or Ibuprofen and  "drink fluids 1 hour prior to removing your stent at home.  -Gently pull (slow but purposeful) on the string in the shower while urinating until the stent is completely removed.   -Call the [facility] [place] office if you have questions or concerns at [phone].    What To Expect After Your Stent Is Removed    After stent removal, urine may be bloody and possibly have some bloody clots.  You may experience an \"achy\" pain due to urethral spasms.  This generally only lasts a few hours, but should resolve over the next 2-3 days.    When to call your healthcare provider    Call your healthcare provider if:    Fever of 101.5 F (38.6 C) or higher  Bleeding from the that doesn't stop with moderate pressure  Persistent pain or a sudden increase in pain  Nausea with vomiting that does not ease after a few hours.  Abdominal pain or bloating  Fainting  You still have intolerable pain an hour after taking medicine. The medicine may not be strong enough.   You feel too sleepy, dizzy, or groggy. The medicine may be too strong.   You have side effects such as nausea or vomiting, or skin changes such as rash, itching, or hives. Your healthcare provider may suggest other medicines to control side effects.  Rash, itching, or hives may mean you have an allergic reaction. Report this right away. If you have trouble breathing or facial swelling, call 911 right away.    Coping with pain    *Pain after surgery is normal and expected*    If you have pain after surgery, pain medicine will help you feel better. Take it as told, before pain becomes severe. Also, ask your healthcare provider or pharmacist about other ways to control pain. This might be with heat, ice, or relaxation. And follow any other instructions your surgeon or nurse gives you.    Tips for taking pain medicine    To get the best relief possible, remember these points:    Pain medicines can upset your stomach. Taking them with a little food may help.  Most pain " relievers taken by mouth need at least 20 to 30 minutes to start to work.  Don't wait till your pain becomes severe before you take your medicine. Try to time your medicine so that you can take it before starting an activity. This might be before you get dressed, go for a walk, or sit down for dinner.  Constipation is a common side effect of pain medicines. You can take medicines such as laxatives (Miralax) or stool softeners to help ease constipation. Drinking lots of fluids and eating foods such as fruits and vegetables that are high in fiber can also help.  Drinking alcohol and taking pain medicine can cause dizziness and slow your breathing. It can even be deadly. Don't drink alcohol while taking pain medicine.  Pain medicine can make you react more slowly to things. Don't drive or run machinery while taking pain medicine.  Your healthcare provider may tell you to take acetaminophen to help ease your pain. Ask him or her how much you are supposed to take each day. Acetaminophen or other pain relievers may interact with your prescription medicines or other over-the-counter (OTC) medicines. Some prescription medicines have acetaminophen and other ingredients. Using both prescription and OTC acetaminophen for pain can cause you to overdose. Read the labels on your OTC medicines with care. This will help you to clearly know the list of ingredients, how much to take, and any warnings. It may also help you not take too much acetaminophen. If you have questions or don't understand the information, ask your pharmacist or healthcare provider to explain it to you before you take the OTC medicine.    Discharge Instructions: After Your Surgery, regarding Anesthesia    You ve just had surgery. During surgery, you were given medicine called anesthesia to keep you relaxed and free of pain. After surgery, you may have some pain or nausea. This is common. Here are some tips for feeling better and getting well after  surgery.    Going home    Your healthcare provider will show you how to take care of yourself when you go home. He or she will also answer your questions. Have an adult family member or friend drive you home. For the first 24 hours after your surgery:    Don't drive or use heavy equipment.  Don't make important decisions or sign legal papers.  Don't drink alcohol.  Have someone stay with you for the next 24 hours. He or she can watch for problems and help keep you safe.  Be sure to go to all follow-up visits with your healthcare provider. And rest after your surgery for as long as your healthcare provider tells you to.    Managing nausea    Some people have an upset stomach after surgery. This is often because of anesthesia, pain, or pain medicine, or the stress of surgery. These tips will help you handle nausea and eat healthy foods as you get better. If you were on a special food plan before surgery, ask your healthcare provider if you should follow it while you get better. These tips may help:    Don't push yourself to eat. Your body will tell you when to eat and how much.  Start off with clear liquids and soup. They are easier to digest.  Next try semi-solid foods, such as mashed potatoes, applesauce, and gelatin, as you feel ready.  Slowly move to solid foods. Don t eat fatty, rich, or spicy foods at first.  Don't force yourself to have 3 large meals a day. Instead eat smaller amounts more often.  Take pain medicines with a small amount of solid food, such as crackers or toast, to prevent nausea.    If you have obstructive sleep apnea    You were given anesthesia medicine during surgery to keep you comfortable and free of pain. After surgery, you may have more apnea spells because of this medicine and other medicines you were given. The spells may last longer than usual.   At home:    Keep using the continuous positive airway pressure (CPAP) device when you sleep. Unless your healthcare provider tells you not  to, use it when you sleep, day or night. CPAP is a common device used to treat obstructive sleep apnea.  Talk with your provider before taking any pain medicine, muscle relaxants, or sedatives. Your provider will tell you about the possible dangers of taking these medicines.

## 2022-05-13 ENCOUNTER — TELEPHONE (OUTPATIENT)
Dept: UROLOGY | Facility: CLINIC | Age: 62
End: 2022-05-13
Payer: COMMERCIAL

## 2022-05-14 LAB
APPEARANCE STONE: NORMAL
COMPN STONE: NORMAL
SPECIMEN WT: 89 MG

## 2022-05-18 ENCOUNTER — ALLIED HEALTH/NURSE VISIT (OUTPATIENT)
Dept: UROLOGY | Facility: CLINIC | Age: 62
End: 2022-05-18
Payer: COMMERCIAL

## 2022-05-18 DIAGNOSIS — N20.1 CALCULUS OF URETER: Primary | ICD-10-CM

## 2022-05-18 PROCEDURE — 99207 PR NO CHARGE NURSE ONLY: CPT

## 2022-05-18 NOTE — PROGRESS NOTES
Patient her to have nurse remove his stent on a string.  Stent was removed without difficulty, patient tolerated procedure well.  He will call with any issues and is aware he may have some pain about an hour or two after stent removal.  Jenifer Garcia RN

## 2022-06-13 ENCOUNTER — TELEPHONE (OUTPATIENT)
Dept: UROLOGY | Facility: CLINIC | Age: 62
End: 2022-06-13

## 2022-06-13 ENCOUNTER — VIRTUAL VISIT (OUTPATIENT)
Dept: UROLOGY | Facility: CLINIC | Age: 62
End: 2022-06-13
Payer: COMMERCIAL

## 2022-06-13 DIAGNOSIS — N20.0 CALCULUS OF KIDNEY: Primary | ICD-10-CM

## 2022-06-13 PROCEDURE — 99213 OFFICE O/P EST LOW 20 MIN: CPT | Mod: 95 | Performed by: UROLOGY

## 2022-06-13 ASSESSMENT — PAIN SCALES - GENERAL: PAINLEVEL: NO PAIN (0)

## 2022-06-13 NOTE — PROGRESS NOTES
Patient is roomed via telephone for a virtual visit.  Patient confirmed he is in the [facility] at the time of this appointment.  Patient understands that this virtual visit is billable and agree to proceed with appointment.

## 2022-06-13 NOTE — PATIENT INSTRUCTIONS
Patient Stated Goal: Prevent further stones  Steps for collecting a 24 hour urine specimen    Please follow the directions carefully. All urine voided for a 24-hour period needs to be collected into the jug.  DO NOT change any of your  normal  daily habits when doing this test. Continue to follow your regular diet, intake of fluids, and usual activity level. Pick the most convenient day with your schedule, perhaps on a weekend or a day off.    Start your Diet Log the day before collection and continue on the day of urine collection.  You MUST bring Diet Log with you on follow up visit to discuss results.  One 24hr Urine Collection   Two 24hr Urine Collections  (do not collect on consecutive days)    PLEASE COMPLETE THE 2nd JUG WITHIN 1-2 WEEKS FROM THE 1st JUG    STEP 1  Empty your bladder completely into the toilet. This will be your start time. Write your full legal name, start date and time on the jug label.  Collection start and stop times need to match exactly!  For example:  6 am to 6 am.    STEP 2  The next time you urinate, empty your bladder directly into the jug or collection hat and pour urine into the jug.  Screw the lid back onto the jug.  Do not spill!    STEP 3  Place the jug in the refrigerator or a cooler with ice during the collection period.  Failure to keep it cool could cause inaccurate test results. DO NOT Freeze.    STEP 4  Continue collecting all urine into the jug for the rest of the day, for the full 24 hours.  DO NOT stop early or go over 24 hours!    STEP 5  Exactly 24 hours from start of collection, write your full legal name, stop date and time on the jug label.   Collection start and stop times need to match exactly!  For example:  6 am to 6 am.  Failure to label correctly will result in recollection of urine specimen.    STEP 6  Return each jug within 24 hours after final urination.     STEP 7  Drop off jug locations:     STEP 8  Please call [facility] after return of your final jug to  schedule your follow-up visit. [phone]

## 2022-06-13 NOTE — PROGRESS NOTES
Assessment/Plan:    Assessment & Plan   Kash was seen today for post op follow up.    Diagnoses and all orders for this visit:    Calculus of kidney  -     Renal Function Panel ([facility])  -     CALCIUM, IONIZED, SERUM  -     Stone formation, timed urine; Standing  -     Patient Stated Goal: Prevent further stones  -     Parathyroid Hormone Intact; Future        Stone Management Plan  Stone Management 5/10/2022 6/13/2022   Urinary Tract Infection No suspicion of infection No suspicion of infection   Renal Colic Well controlled symptoms Asymptomatic at this time   Renal Failure No suspicion of renal failure No suspicion of renal failure   Current CT date 5/9/2022 -   Right sided stones? Yes -   R Number of ureteral stones 1 -   R GSD of ureteral stones 5 -   R Location of ureteral stone Mid -   R Number of kidney stones  3 -   R GSD of kidney stones 4 - 10 -   R Hydronephrosis Mild -   R Stone Event New event Resolved event   Diagnosis date 5/9/2022 -   Initial location of primary symptomatic stone Mid -   Initial GSD of primary symptomatic stone 5 -   Resolved date - 6/13/2022   R Current Plan Clear -   Clear rationale Optimally managed electively -   Left sided stones? Yes -   L Number of ureteral stones No ureteral stones -   L Number of kidney stones  4 -   L GSD of kidney stones < 2 -   L Hydronephrosis None -   L Stone Event No current event No current event   L Current Plan Observe -   Observe rationale Limited stone burden with good prognosis for spontaneous passage -             PLAN    Video call duration: 10 minutes  15 minutes spent on the date of the encounter doing chart review, history and exam, documentation and further activities per the note    PRADIP MOYER MD  [facility]    HPI  Mr. Kash Tavarez is a 62 year old  male who is being evaluated via a billable video visit by [facility] [place] for late postoperative follow-up.      He returns status post Right ureteroscopic laser lithotripsy for renal and ureteral stone. He has had no unanticipated post-operative events.     He is asymptomatic at present. He denies symptoms of fever, chills, flank pain, nausea, vomiting, urinary frequency and dysuria.    Imaging was not performed today because confident stone clearance..     Stone composition was 100% calcium oxalate.     He is at risk for ongoing active stone disease and will initiate stone risk evaluation. Serum stone risk chemistries including parathyroid hormone and ionized calcium will be obtained before next visit. Two 24 hour urine collections and dietary journal will be obtained at earliest covenience..    ROS   Review of systems is negative except for HPI.    Past Medical History:   Diagnosis Date     Hypertension      Nephrolithiasis      Type 2 diabetes mellitus (H)        Past Surgical History:   Procedure Laterality Date     COLONOSCOPY       COMBINED CYSTOSCOPY, INSERT STENT URETER(S) Right 5/12/2022    Procedure: CYSTOURETEROSCOPY, RETROGRADE PYELOGRAM, LASER LITHOTRIPSY WITH CALCULUS REMOVAL AND URETERAL STENT INSERTION;  Surgeon: James Mosley MD;  Location: [facility] OR     KIDNEY STONE SURGERY       [facility] APPENDECTOMY      Description: Appendectomy;  Recorded: 03/04/2008;       Current Outpatient Medications   Medication Sig Dispense Refill     ACCU-CHEK FASTCLIX Misc [ACCU-CHEK FASTCLIX MISC] TEST TWICE DAILY 100 each 0     ACCU-CHEK GUIDE test strip USE 1 EACH AS DIRECTED 2 (TWO) TIMES A DAY AT 7:30AM AND 4:30PM. 200 strip 3     aspirin (ASA) 81 MG EC tablet TAKE 1 TABLET BY MOUTH EVERY DAY 90 tablet 3     atorvastatin (LIPITOR) 40 MG tablet TAKE 1 TABLET BY MOUTH EVERY DAY 90 tablet 3     blood glucose monitoring (ACCU-CHEK FASTCLIX) lancets USE TWICE A DAY (AT 7:30 AM & 4:30 PM) AS DIRECTED 204 each 3     blood-glucose meter Misc [BLOOD-GLUCOSE METER MISC] Use 1 Device As Directed 2 (two) times a day at 9am  and 6pm. 1 each 0     imiquimod (ALDARA) 5 % cream [IMIQUIMOD (ALDARA) 5 % CREAM] APPLY TOPICALLY D TO WARTS  3     lisinopril (ZESTRIL) 20 MG tablet Take 1 tablet (20 mg) by mouth daily 90 tablet 3     metFORMIN (GLUCOPHAGE) 500 MG tablet Take 1 tablet (500 mg) by mouth 3 times daily (with meals) 270 tablet 3     metroNIDAZOLE (METROGEL) 0.75 % gel [METRONIDAZOLE (METROGEL) 0.75 % GEL] APPLY TO FACE D  10     oxymetazoline (RHOFADE) 1 % Crea [OXYMETAZOLINE (RHOFADE) 1 % CREA] Apply topically daily.       triamcinolone (KENALOG) 0.1 % external cream Apply topically 2 times daily 80 g 0       No Known Allergies    Social History     Socioeconomic History     Marital status:      Spouse name: Not on file     Number of children: Not on file     Years of education: Not on file     Highest education level: Not on file   Occupational History     Not on file   Tobacco Use     Smoking status: Never Smoker     Smokeless tobacco: Never Used   Substance and Sexual Activity     Alcohol use: No     Drug use: No     Sexual activity: Yes     Partners: Female   Other Topics Concern     Not on file   Social History Narrative     Not on file     Social Determinants of Health     Financial Resource Strain: Not on file   Food Insecurity: Not on file   Transportation Needs: Not on file   Physical Activity: Not on file   Stress: Not on file   Social Connections: Not on file   Intimate Partner Violence: Not on file   Housing Stability: Not on file       No family history on file.    Objective:     Appears AAO x 3  No vitals obtained due to virtual visit    Labs   Most Recent 3 CBC's:Recent Labs   Lab Test 05/09/22  0900   WBC 11.1*   HGB 13.2*   MCV 85        Most Recent 3 BMP's:Recent Labs   Lab Test 05/09/22  0900 11/11/21  0849 11/05/20  0855    136 140   POTASSIUM 4.6 4.8 5.1*   CHLORIDE 104 101 104   CO2 23 26 25   BUN 19 15 17   CR 1.17 0.97 1.12   ANIONGAP 9 9 11   RIOS 9.0 10.2 9.9   * 184* 217*     Most  Recent Urinalysis:Recent Labs   Lab Test 05/09/22  0906   COLOR Yellow   APPEARANCE Cloudy*   URINEGLC 500 *   URINEBILI Negative   URINEKETONE 40 *   SG 1.029   UBLD >1.0 mg/dL*   URINEPH 5.5   PROTEIN 50 *   NITRITE Negative   LEUKEST Negative   RBCU 0   WBCU 0     Acute Labs Urine Culture  No results found for: CULTURE

## 2022-06-13 NOTE — Clinical Note
Full risk eval; insurance changes at end of month so he wants to do as much as possible by then -- maybe drop off urines directly to lb as opposed to LL if that makes a difference

## 2022-06-23 ENCOUNTER — TRANSFERRED RECORDS (OUTPATIENT)
Dept: HEALTH INFORMATION MANAGEMENT | Facility: CLINIC | Age: 62
End: 2022-06-23

## 2022-07-19 ENCOUNTER — LAB (OUTPATIENT)
Dept: LAB | Facility: CLINIC | Age: 62
End: 2022-07-19
Payer: COMMERCIAL

## 2022-07-19 DIAGNOSIS — N20.0 CALCULUS OF KIDNEY: ICD-10-CM

## 2022-07-19 LAB — PTH-INTACT SERPL-MCNC: 26 PG/ML (ref 15–65)

## 2022-07-19 PROCEDURE — 36415 COLL VENOUS BLD VENIPUNCTURE: CPT

## 2022-07-19 PROCEDURE — 83970 ASSAY OF PARATHORMONE: CPT

## 2022-07-28 ENCOUNTER — VIRTUAL VISIT (OUTPATIENT)
Dept: UROLOGY | Facility: CLINIC | Age: 62
End: 2022-07-28
Payer: COMMERCIAL

## 2022-07-28 ENCOUNTER — TELEPHONE (OUTPATIENT)
Dept: UROLOGY | Facility: CLINIC | Age: 62
End: 2022-07-28

## 2022-07-28 DIAGNOSIS — N20.0 CALCULUS OF KIDNEY: Primary | ICD-10-CM

## 2022-07-28 PROCEDURE — 99213 OFFICE O/P EST LOW 20 MIN: CPT | Mod: 95 | Performed by: UROLOGY

## 2022-07-28 ASSESSMENT — PAIN SCALES - GENERAL: PAINLEVEL: NO PAIN (0)

## 2022-07-28 NOTE — PROGRESS NOTES
Assessment/Plan:    Assessment & Plan   Kash was seen today for prevention.    Diagnoses and all orders for this visit:    Calculus of kidney  -     Patient Stated Goal: Prevent further stones  -     CT Abdomen Pelvis w/o Contrast; Future        Stone Management Plan  Stone Management 5/10/2022 6/13/2022 7/28/2022   Urinary Tract Infection No suspicion of infection No suspicion of infection No suspicion of infection   Renal Colic Well controlled symptoms Asymptomatic at this time Asymptomatic at this time   Renal Failure No suspicion of renal failure No suspicion of renal failure No suspicion of renal failure   Current CT date 5/9/2022 - -   Right sided stones? Yes - -   R Number of ureteral stones 1 - -   R GSD of ureteral stones 5 - -   R Location of ureteral stone Mid - -   R Number of kidney stones  3 - -   R GSD of kidney stones 4 - 10 - -   R Hydronephrosis Mild - -   R Stone Event New event Resolved event No current event   Diagnosis date 5/9/2022 - -   Initial location of primary symptomatic stone Mid - -   Initial GSD of primary symptomatic stone 5 - -   Resolved date - 6/13/2022 -   R Current Plan Clear - -   Clear rationale Optimally managed electively - -   Left sided stones? Yes - -   L Number of ureteral stones No ureteral stones - -   L Number of kidney stones  4 - -   L GSD of kidney stones < 2 - -   L Hydronephrosis None - -   L Stone Event No current event No current event No current event   L Current Plan Observe - -   Observe rationale Limited stone burden with good prognosis for spontaneous passage - -           PLAN      Phone call duration: 10 minutes  15 minutes spent on the date of the encounter doing chart review, history and exam, documentation and further activities per the note    PRADIP MOYER MD  [facility]    Subjective:     HPI  Mr. Kash Tavarez is a 62 year old  male who is being evaluated via a billable telephone visit by [facility] [place] for follow upof his stone disease.    H has done well in the interim. Completed stone ris evaluation for his stone disease which was quiet for about 10 years before returning in large volume.    Risk evaluation demonstrate  has dietary hyperoxaluria mitigated by excellent urine volume. He has regularly been consuming salted cashews.    Will cut out the nuts and watch the salt.    Follow up CT in one year.         ROS   Review of systems is negative except for HPI.    Past Medical History:   Diagnosis Date     Hypertension      Nephrolithiasis      Type 2 diabetes mellitus (H)        Past Surgical History:   Procedure Laterality Date     COLONOSCOPY       COMBINED CYSTOSCOPY, INSERT STENT URETER(S) Right 5/12/2022    Procedure: CYSTOURETEROSCOPY, RETROGRADE PYELOGRAM, LASER LITHOTRIPSY WITH CALCULUS REMOVAL AND URETERAL STENT INSERTION;  Surgeon: James Mosley MD;  Location: [facility] OR     KIDNEY STONE SURGERY       [facility] APPENDECTOMY      Description: Appendectomy;  Recorded: 03/04/2008;       Current Outpatient Medications   Medication Sig Dispense Refill     ACCU-CHEK FASTCLIX Misc [ACCU-CHEK FASTCLIX MISC] TEST TWICE DAILY 100 each 0     ACCU-CHEK GUIDE test strip USE 1 EACH AS DIRECTED 2 (TWO) TIMES A DAY AT 7:30AM AND 4:30PM. 200 strip 3     aspirin (ASA) 81 MG EC tablet TAKE 1 TABLET BY MOUTH EVERY DAY 90 tablet 3     atorvastatin (LIPITOR) 40 MG tablet TAKE 1 TABLET BY MOUTH EVERY DAY 90 tablet 3     blood glucose monitoring (ACCU-CHEK FASTCLIX) lancets USE TWICE A DAY (AT 7:30 AM & 4:30 PM) AS DIRECTED 204 each 3     blood-glucose meter Misc [BLOOD-GLUCOSE METER MISC] Use 1 Device As Directed 2 (two) times a day at 9am and 6pm. 1 each 0     imiquimod (ALDARA) 5 % cream [IMIQUIMOD (ALDARA) 5 % CREAM] APPLY TOPICALLY D TO WARTS  3     lisinopril (ZESTRIL) 20 MG tablet Take 1 tablet (20 mg) by mouth daily 90 tablet 3     metFORMIN (GLUCOPHAGE) 500 MG tablet Take 1  tablet (500 mg) by mouth 3 times daily (with meals) 270 tablet 3     metroNIDAZOLE (METROGEL) 0.75 % gel [METRONIDAZOLE (METROGEL) 0.75 % GEL] APPLY TO FACE D  10     oxymetazoline (RHOFADE) 1 % Crea [OXYMETAZOLINE (RHOFADE) 1 % CREA] Apply topically daily.       triamcinolone (KENALOG) 0.1 % external cream Apply topically 2 times daily 80 g 0       No Known Allergies    Social History     Socioeconomic History     Marital status:      Spouse name: Not on file     Number of children: Not on file     Years of education: Not on file     Highest education level: Not on file   Occupational History     Not on file   Tobacco Use     Smoking status: Never Smoker     Smokeless tobacco: Never Used   Substance and Sexual Activity     Alcohol use: No     Drug use: No     Sexual activity: Yes     Partners: Female   Other Topics Concern     Not on file   Social History Narrative     Not on file     Social Determinants of Health     Financial Resource Strain: Not on file   Food Insecurity: Not on file   Transportation Needs: Not on file   Physical Activity: Not on file   Stress: Not on file   Social Connections: Not on file   Intimate Partner Violence: Not on file   Housing Stability: Not on file       No family history on file.    Objective:     No vitals or physical exam obtained due to virtual visit  Labs     Most Recent 3 CBC's:Recent Labs   Lab Test 05/09/22  0900   WBC 11.1*   HGB 13.2*   MCV 85        Most Recent 3 BMP's:Recent Labs   Lab Test 05/09/22  0900 11/11/21  0849 11/05/20  0855    136 140   POTASSIUM 4.6 4.8 5.1*   CHLORIDE 104 101 104   CO2 23 26 25   BUN 19 15 17   CR 1.17 0.97 1.12   ANIONGAP 9 9 11   RIOS 9.0 10.2 9.9   * 184* 217*     Most Recent Urinalysis:Recent Labs   Lab Test 05/09/22  0906   COLOR Yellow   APPEARANCE Cloudy*   URINEGLC 500 *   URINEBILI Negative   URINEKETONE 40 *   SG 1.029   UBLD >1.0 mg/dL*   URINEPH 5.5   PROTEIN 50 *   NITRITE Negative   LEUKEST Negative    RBCU 0   WBCU 0     Acute Labs Urine Culture  No results found for: CULTURE

## 2022-07-28 NOTE — PROGRESS NOTES
Patient is roomed via telephone for a televisit.  Patient confirmed he is in the [facility] at the time of this appointment.  Patient understands that this virtual visit is billable and agree to proceed with appointment.

## 2022-08-16 ENCOUNTER — TRANSFERRED RECORDS (OUTPATIENT)
Dept: HEALTH INFORMATION MANAGEMENT | Facility: CLINIC | Age: 62
End: 2022-08-16

## 2022-08-16 LAB — RETINOPATHY: NEGATIVE

## 2022-10-01 ENCOUNTER — HEALTH MAINTENANCE LETTER (OUTPATIENT)
Age: 62
End: 2022-10-01

## 2022-10-06 DIAGNOSIS — Z76.0 ENCOUNTER FOR MEDICATION REFILL: Primary | ICD-10-CM

## 2022-10-06 RX ORDER — ATORVASTATIN CALCIUM 40 MG/1
TABLET, FILM COATED ORAL
Qty: 90 TABLET | Refills: 3 | Status: SHIPPED | OUTPATIENT
Start: 2022-10-06 | End: 2023-07-21 | Stop reason: ALTCHOICE

## 2022-10-14 ENCOUNTER — TRANSFERRED RECORDS (OUTPATIENT)
Dept: HEALTH INFORMATION MANAGEMENT | Facility: CLINIC | Age: 62
End: 2022-10-14

## 2022-11-09 DIAGNOSIS — Z76.0 ENCOUNTER FOR MEDICATION REFILL: Primary | ICD-10-CM

## 2022-11-09 RX ORDER — BLOOD SUGAR DIAGNOSTIC
STRIP MISCELLANEOUS
Qty: 200 STRIP | Refills: 3 | Status: SHIPPED | OUTPATIENT
Start: 2022-11-09 | End: 2024-06-14

## 2022-11-17 ENCOUNTER — ANCILLARY PROCEDURE (OUTPATIENT)
Dept: GENERAL RADIOLOGY | Facility: CLINIC | Age: 62
End: 2022-11-17
Attending: FAMILY MEDICINE
Payer: COMMERCIAL

## 2022-11-17 ENCOUNTER — OFFICE VISIT (OUTPATIENT)
Dept: FAMILY MEDICINE | Facility: CLINIC | Age: 62
End: 2022-11-17
Payer: COMMERCIAL

## 2022-11-17 VITALS
TEMPERATURE: 98.4 F | SYSTOLIC BLOOD PRESSURE: 124 MMHG | WEIGHT: 245.75 LBS | BODY MASS INDEX: 32.57 KG/M2 | RESPIRATION RATE: 16 BRPM | DIASTOLIC BLOOD PRESSURE: 74 MMHG | HEART RATE: 90 BPM | HEIGHT: 73 IN | OXYGEN SATURATION: 98 %

## 2022-11-17 DIAGNOSIS — Z00.00 ROUTINE GENERAL MEDICAL EXAMINATION AT HEALTH CARE FACILITY: Primary | ICD-10-CM

## 2022-11-17 DIAGNOSIS — Z13.220 SCREENING FOR HYPERLIPIDEMIA: ICD-10-CM

## 2022-11-17 DIAGNOSIS — R07.9 CHEST PAIN, UNSPECIFIED TYPE: ICD-10-CM

## 2022-11-17 DIAGNOSIS — E11.9 TYPE 2 DIABETES MELLITUS WITHOUT COMPLICATION, WITHOUT LONG-TERM CURRENT USE OF INSULIN (H): ICD-10-CM

## 2022-11-17 LAB
ALBUMIN SERPL BCG-MCNC: 4.5 G/DL (ref 3.5–5.2)
ALP SERPL-CCNC: 74 U/L (ref 40–129)
ALT SERPL W P-5'-P-CCNC: 20 U/L (ref 10–50)
ANION GAP SERPL CALCULATED.3IONS-SCNC: 13 MMOL/L (ref 7–15)
AST SERPL W P-5'-P-CCNC: 18 U/L (ref 10–50)
BILIRUB SERPL-MCNC: 0.5 MG/DL
BUN SERPL-MCNC: 16.2 MG/DL (ref 8–23)
CALCIUM SERPL-MCNC: 9.8 MG/DL (ref 8.8–10.2)
CHLORIDE SERPL-SCNC: 101 MMOL/L (ref 98–107)
CHOLEST SERPL-MCNC: 176 MG/DL
CREAT SERPL-MCNC: 0.87 MG/DL (ref 0.67–1.17)
DEPRECATED HCO3 PLAS-SCNC: 24 MMOL/L (ref 22–29)
ERYTHROCYTE [DISTWIDTH] IN BLOOD BY AUTOMATED COUNT: 12.5 % (ref 10–15)
GFR SERPL CREATININE-BSD FRML MDRD: >90 ML/MIN/1.73M2
GLUCOSE SERPL-MCNC: 154 MG/DL (ref 70–99)
HBA1C MFR BLD: 7.3 % (ref 0–5.6)
HCT VFR BLD AUTO: 45 % (ref 40–53)
HDLC SERPL-MCNC: 39 MG/DL
HGB BLD-MCNC: 14.7 G/DL (ref 13.3–17.7)
LDLC SERPL CALC-MCNC: 113 MG/DL
MCH RBC QN AUTO: 28.4 PG (ref 26.5–33)
MCHC RBC AUTO-ENTMCNC: 32.7 G/DL (ref 31.5–36.5)
MCV RBC AUTO: 87 FL (ref 78–100)
NONHDLC SERPL-MCNC: 137 MG/DL
PLATELET # BLD AUTO: 212 10E3/UL (ref 150–450)
POTASSIUM SERPL-SCNC: 5.4 MMOL/L (ref 3.4–5.3)
PROT SERPL-MCNC: 7.4 G/DL (ref 6.4–8.3)
PSA SERPL-MCNC: 1.18 NG/ML (ref 0–4.5)
RBC # BLD AUTO: 5.18 10E6/UL (ref 4.4–5.9)
SODIUM SERPL-SCNC: 138 MMOL/L (ref 136–145)
TRIGL SERPL-MCNC: 122 MG/DL
WBC # BLD AUTO: 12.7 10E3/UL (ref 4–11)

## 2022-11-17 PROCEDURE — 99396 PREV VISIT EST AGE 40-64: CPT | Performed by: FAMILY MEDICINE

## 2022-11-17 PROCEDURE — 36415 COLL VENOUS BLD VENIPUNCTURE: CPT | Performed by: FAMILY MEDICINE

## 2022-11-17 PROCEDURE — 71046 X-RAY EXAM CHEST 2 VIEWS: CPT | Mod: TC | Performed by: RADIOLOGY

## 2022-11-17 PROCEDURE — 85027 COMPLETE CBC AUTOMATED: CPT | Performed by: FAMILY MEDICINE

## 2022-11-17 PROCEDURE — 83036 HEMOGLOBIN GLYCOSYLATED A1C: CPT | Performed by: FAMILY MEDICINE

## 2022-11-17 PROCEDURE — 80061 LIPID PANEL: CPT | Performed by: FAMILY MEDICINE

## 2022-11-17 PROCEDURE — 80053 COMPREHEN METABOLIC PANEL: CPT | Performed by: FAMILY MEDICINE

## 2022-11-17 PROCEDURE — G0103 PSA SCREENING: HCPCS | Performed by: FAMILY MEDICINE

## 2022-11-17 ASSESSMENT — ENCOUNTER SYMPTOMS
CONSTIPATION: 0
NAUSEA: 0
MYALGIAS: 0
PARESTHESIAS: 0
HEMATOCHEZIA: 0
SHORTNESS OF BREATH: 0
DYSURIA: 0
JOINT SWELLING: 0
WEAKNESS: 0
FREQUENCY: 0
EYE PAIN: 0
SORE THROAT: 0
COUGH: 0
ARTHRALGIAS: 0
FEVER: 0
PALPITATIONS: 0
DIARRHEA: 0
HEARTBURN: 0
DIZZINESS: 0
HEADACHES: 0
NERVOUS/ANXIOUS: 0
HEMATURIA: 0
ABDOMINAL PAIN: 0
CHILLS: 0

## 2022-11-17 NOTE — PROGRESS NOTES
ASSESMENT AND PLAN:    Physical, Health Maitenence -   Reviewed healthy lifestyle, diet, exercise, vitamins, and follow-up plan today with patient.  Reviewed age appropriate cancer and other screening recommendations.  Immunization review and update done.  Reviewed indicated lab tests, see lab orders.   -     PSA, screen; Future  Chest pain, unspecified type  Reviewed the differential diagnosis today with the patient.  The exertional pattern is concerning and his risk factor profile is significant.  He is taking aspirin and atorvastatin and will continue those daily.  We will get him in for a stress test as ordered below.  Counseled the patient in detail on indications for emergent follow-up.  -     Comprehensive metabolic panel (BMP + Alb, Alk Phos, ALT, AST, Total. Bili, TP); Future  -     CBC with platelets; Future  -     XR Chest 2 Views; Future  -     NM Lexiscan stress test; Future  Type 2 diabetes mellitus without complication, without long-term current use of insulin (H)  -     HEMOGLOBIN A1C; Future  -     Lipid panel reflex to direct LDL Non-fasting; Future          HPI: 62-year-old male here for his physical.  As detailed below on the review of systems he mentions chest pain.  Patient reports that over the past 5 days he has been getting some mild substernal chest pain that comes and goes.  It does tend to come with exertion and improved with rest.  Currently no pain.  When the pain happens he does not have associated shortness of breath or palpitations or lightheadedness or nausea.  He has never had similar symptoms in the past.  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        Past Medical History:   Diagnosis Date     Hypertension      Nephrolithiasis      Type 2 diabetes mellitus (H)        Current Outpatient Medications   Medication Sig Dispense Refill     ACCU-CHEK FASTCLIX Misc [ACCU-CHEK FASTCLIX MISC] TEST TWICE DAILY 100 each 0     ACCU-CHEK GUIDE  test strip USE 1 EACH AS DIRECTED 2 (TWO) TIMES A DAY AT 7:30AM AND 4:30PM. 200 strip 3     aspirin (ASA) 81 MG EC tablet TAKE 1 TABLET BY MOUTH EVERY DAY 90 tablet 3     atorvastatin (LIPITOR) 40 MG tablet TAKE 1 TABLET BY MOUTH EVERY DAY 90 tablet 3     blood glucose monitoring (ACCU-CHEK FASTCLIX) lancets USE TWICE A DAY (AT 7:30 AM & 4:30 PM) AS DIRECTED 204 each 3     blood-glucose meter Misc [BLOOD-GLUCOSE METER MISC] Use 1 Device As Directed 2 (two) times a day at 9am and 6pm. 1 each 0     imiquimod (ALDARA) 5 % cream [IMIQUIMOD (ALDARA) 5 % CREAM] APPLY TOPICALLY D TO WARTS  3     lisinopril (ZESTRIL) 20 MG tablet Take 1 tablet (20 mg) by mouth daily 90 tablet 3     metFORMIN (GLUCOPHAGE) 500 MG tablet Take 1 tablet (500 mg) by mouth 3 times daily (with meals) 270 tablet 3     metroNIDAZOLE (METROGEL) 0.75 % gel [METRONIDAZOLE (METROGEL) 0.75 % GEL] APPLY TO FACE D  10     oxymetazoline (RHOFADE) 1 % Crea [OXYMETAZOLINE (RHOFADE) 1 % CREA] Apply topically daily.       triamcinolone (KENALOG) 0.1 % external cream Apply topically 2 times daily 80 g 0       Patient Active Problem List   Diagnosis     Nephrolithiasis     Obesity     Allergic Rhinitis     Hyperlipidemia     Essential Hypertension     Obesity (BMI 35.0-39.9) with comorbidity (H)     Type 2 diabetes mellitus without complication, without long-term current use of insulin (H)     Calculus of ureter     Hydronephrosis with urinary obstruction due to ureteral calculus       Social History     Socioeconomic History     Marital status:      Spouse name: None     Number of children: None     Years of education: None     Highest education level: None   Tobacco Use     Smoking status: Never     Smokeless tobacco: Never   Substance and Sexual Activity     Alcohol use: No     Drug use: No     Sexual activity: Yes     Partners: Female       History   Smoking Status     Never   Smokeless Tobacco     Never       OBJECTICE: BP (!) 134/94   Pulse 90    "Temp 98.4  F (36.9  C) (Oral)   Resp 16   Ht 1.854 m (6' 1\")   Wt 111.5 kg (245 lb 12 oz)   SpO2 98%   BMI 32.42 kg/m        Gen - alert, orientated, NAD  Eyes - fundascopic exam limited by the undialated pupil but looks symmetric  ENT - oropharynx clear, TMs clear  Neck - supple, no palpable mass or lymphadenopathy  CV - RRR, no murmur  Resp - lungs CTA  Ab - soft, nontender, no palpable mass or organomegaly   - normal appearance to the external genetalia, normal testicular exam bilaterally, no hernia  Extrem - warm, no edema  Neuro - CN II-XII intact, strength, sensation, reflexes intact and symmetric  Skin - no rash, no atypical appearing lesions seen.       Healthy Habits:     Getting at least 3 servings of Calcium per day:  Yes    Bi-annual eye exam:  Yes    Dental care twice a year:  Yes    Sleep apnea or symptoms of sleep apnea:  None    Diet:  Diabetic    Frequency of exercise:  2-3 days/week    Duration of exercise:  15-30 minutes    Taking medications regularly:  Yes    Medication side effects:  None    PHQ-2 Total Score: 0    Additional concerns today:  No    Today's PHQ-2 Score:   PHQ-2 ( 1999 [employer]) 11/17/2022   Q1: Little interest or pleasure in doing things 0   Q2: Feeling down, depressed or hopeless 0   PHQ-2 Score 0   Q1: Little interest or pleasure in doing things Not at all   Q2: Feeling down, depressed or hopeless Not at all   PHQ-2 Score 0       Social History     Tobacco Use     Smoking status: Never     Smokeless tobacco: Never   Substance Use Topics     Alcohol use: No     If you drink alcohol do you typically have >3 drinks per day or >7 drinks per week? No    Alcohol Use 11/17/2022   Prescreen: >3 drinks/day or >7 drinks/week? No       Last PSA:   Prostate Specific Antigen Screen   Date Value Ref Range Status   11/11/2021 3.11 0.00 - 4.50 ug/L Final           Review of Systems   Constitutional: Negative for chills and fever.   HENT: Negative for congestion, ear pain, hearing loss " and sore throat.    Eyes: Negative for pain and visual disturbance.   Respiratory: Negative for cough and shortness of breath.    Cardiovascular: Positive for chest pain. Negative for palpitations and peripheral edema.   Gastrointestinal: Negative for abdominal pain, constipation, diarrhea, heartburn, hematochezia and nausea.   Genitourinary: Negative for dysuria, frequency, genital sores, hematuria, impotence, penile discharge and urgency.   Musculoskeletal: Negative for arthralgias, joint swelling and myalgias.   Skin: Negative for rash.   Neurological: Negative for dizziness, weakness, headaches and paresthesias.   Psychiatric/Behavioral: Negative for mood changes. The patient is not nervous/anxious.

## 2022-11-30 ENCOUNTER — HOSPITAL ENCOUNTER (OUTPATIENT)
Dept: NUCLEAR MEDICINE | Facility: HOSPITAL | Age: 62
Discharge: HOME | End: 2022-11-30
Attending: FAMILY MEDICINE
Payer: COMMERCIAL

## 2022-11-30 ENCOUNTER — HOSPITAL ENCOUNTER (OUTPATIENT)
Dept: CARDIOLOGY | Facility: HOSPITAL | Age: 62
Discharge: HOME | End: 2022-11-30
Attending: FAMILY MEDICINE
Payer: COMMERCIAL

## 2022-11-30 DIAGNOSIS — R07.9 CHEST PAIN, UNSPECIFIED TYPE: ICD-10-CM

## 2022-11-30 DIAGNOSIS — R94.39 ABNORMAL CARDIOVASCULAR STRESS TEST: Primary | ICD-10-CM

## 2022-11-30 LAB
CV BLOOD PRESSURE: 35 MMHG
CV STRESS CURRENT BP HE: NORMAL
CV STRESS CURRENT HR HE: 100
CV STRESS CURRENT HR HE: 101
CV STRESS CURRENT HR HE: 102
CV STRESS CURRENT HR HE: 103
CV STRESS CURRENT HR HE: 104
CV STRESS CURRENT HR HE: 105
CV STRESS CURRENT HR HE: 83
CV STRESS CURRENT HR HE: 85
CV STRESS CURRENT HR HE: 89
CV STRESS CURRENT HR HE: 89
CV STRESS CURRENT HR HE: 91
CV STRESS CURRENT HR HE: 91
CV STRESS CURRENT HR HE: 92
CV STRESS CURRENT HR HE: 93
CV STRESS CURRENT HR HE: 94
CV STRESS CURRENT HR HE: 98
CV STRESS DEVIATION TIME HE: NORMAL
CV STRESS ECHO PERCENT HR HE: NORMAL
CV STRESS EXERCISE STAGE HE: NORMAL
CV STRESS FINAL RESTING BP HE: NORMAL
CV STRESS FINAL RESTING HR HE: 92
CV STRESS MAX HR HE: 105
CV STRESS MAX TREADMILL GRADE HE: 0
CV STRESS MAX TREADMILL SPEED HE: 0
CV STRESS PEAK DIA BP HE: NORMAL
CV STRESS PEAK SYS BP HE: NORMAL
CV STRESS PHASE HE: NORMAL
CV STRESS PROTOCOL HE: NORMAL
CV STRESS RESTING PT POSITION HE: NORMAL
CV STRESS ST DEVIATION AMOUNT HE: NORMAL
CV STRESS ST DEVIATION ELEVATION HE: NORMAL
CV STRESS ST EVELATION AMOUNT HE: NORMAL
CV STRESS TEST TYPE HE: NORMAL
CV STRESS TOTAL STAGE TIME MIN 1 HE: NORMAL
RATE PRESSURE PRODUCT: NORMAL
STRESS ECHO BASELINE DIASTOLIC HE: 111
STRESS ECHO BASELINE HR: 85
STRESS ECHO BASELINE SYSTOLIC BP: 177
STRESS ECHO CALCULATED PERCENT HR: 66 %
STRESS ECHO LAST STRESS DIASTOLIC BP: 87
STRESS ECHO LAST STRESS HR: 104
STRESS ECHO LAST STRESS SYSTOLIC BP: 153
STRESS ECHO TARGET HR: 158

## 2022-11-30 PROCEDURE — 78452 HT MUSCLE IMAGE SPECT MULT: CPT | Mod: 26 | Performed by: INTERNAL MEDICINE

## 2022-11-30 PROCEDURE — 93018 CV STRESS TEST I&R ONLY: CPT | Performed by: INTERNAL MEDICINE

## 2022-11-30 PROCEDURE — 93016 CV STRESS TEST SUPVJ ONLY: CPT | Performed by: INTERNAL MEDICINE

## 2022-11-30 PROCEDURE — 343N000001 HC RX 343: Performed by: FAMILY MEDICINE

## 2022-11-30 PROCEDURE — A9500 TC99M SESTAMIBI: HCPCS | Performed by: FAMILY MEDICINE

## 2022-11-30 PROCEDURE — 250N000011 HC RX IP 250 OP 636: Performed by: FAMILY MEDICINE

## 2022-11-30 PROCEDURE — 78452 HT MUSCLE IMAGE SPECT MULT: CPT

## 2022-11-30 PROCEDURE — 93017 CV STRESS TEST TRACING ONLY: CPT

## 2022-11-30 RX ORDER — NITROGLYCERIN 0.4 MG/1
0.4 TABLET SUBLINGUAL EVERY 5 MIN PRN
Qty: 25 TABLET | Refills: 1 | Status: SHIPPED | OUTPATIENT
Start: 2022-11-30 | End: 2022-12-08

## 2022-11-30 RX ORDER — CAFFEINE CITRATE 20 MG/ML
60 SOLUTION INTRAVENOUS
Status: DISCONTINUED | OUTPATIENT
Start: 2022-11-30 | End: 2022-11-30 | Stop reason: HOSPADM

## 2022-11-30 RX ORDER — ALBUTEROL SULFATE 0.83 MG/ML
2.5 SOLUTION RESPIRATORY (INHALATION)
Status: DISCONTINUED | OUTPATIENT
Start: 2022-11-30 | End: 2022-11-30 | Stop reason: HOSPADM

## 2022-11-30 RX ORDER — CAFFEINE 200 MG
200 TABLET ORAL
Status: DISCONTINUED | OUTPATIENT
Start: 2022-11-30 | End: 2022-11-30 | Stop reason: HOSPADM

## 2022-11-30 RX ORDER — AMINOPHYLLINE 25 MG/ML
50 INJECTION, SOLUTION INTRAVENOUS
Status: DISCONTINUED | OUTPATIENT
Start: 2022-11-30 | End: 2022-11-30 | Stop reason: HOSPADM

## 2022-11-30 RX ORDER — REGADENOSON 0.08 MG/ML
0.4 INJECTION, SOLUTION INTRAVENOUS ONCE
Status: COMPLETED | OUTPATIENT
Start: 2022-11-30 | End: 2022-11-30

## 2022-11-30 RX ADMIN — Medication 11 MCI.: at 12:25

## 2022-11-30 RX ADMIN — REGADENOSON 0.4 MG: 0.08 INJECTION, SOLUTION INTRAVENOUS at 13:45

## 2022-11-30 RX ADMIN — Medication 39.3 MILLICURIE: at 14:44

## 2022-12-01 ENCOUNTER — MYC MEDICAL ADVICE (OUTPATIENT)
Dept: FAMILY MEDICINE | Facility: CLINIC | Age: 62
End: 2022-12-01

## 2022-12-01 LAB
ABO/RH(D): NORMAL
ANTIBODY SCREEN: NEGATIVE
SPECIMEN EXPIRATION DATE: NORMAL

## 2022-12-02 ENCOUNTER — PREP FOR PROCEDURE (OUTPATIENT)
Dept: CARDIOLOGY | Facility: CLINIC | Age: 62
End: 2022-12-02

## 2022-12-02 ENCOUNTER — OFFICE VISIT (OUTPATIENT)
Dept: CARDIOLOGY | Facility: CLINIC | Age: 62
End: 2022-12-02
Attending: FAMILY MEDICINE
Payer: COMMERCIAL

## 2022-12-02 ENCOUNTER — LAB (OUTPATIENT)
Dept: CARDIOLOGY | Facility: CLINIC | Age: 62
End: 2022-12-02
Payer: COMMERCIAL

## 2022-12-02 ENCOUNTER — TELEPHONE (OUTPATIENT)
Dept: CARDIOLOGY | Facility: CLINIC | Age: 62
End: 2022-12-02

## 2022-12-02 VITALS
HEART RATE: 92 BPM | DIASTOLIC BLOOD PRESSURE: 92 MMHG | RESPIRATION RATE: 12 BRPM | HEIGHT: 73 IN | BODY MASS INDEX: 32.05 KG/M2 | SYSTOLIC BLOOD PRESSURE: 152 MMHG | WEIGHT: 241.8 LBS

## 2022-12-02 DIAGNOSIS — I20.0 PROGRESSIVE ANGINA (H): ICD-10-CM

## 2022-12-02 DIAGNOSIS — I50.9 NEW ONSET OF CONGESTIVE HEART FAILURE (H): ICD-10-CM

## 2022-12-02 DIAGNOSIS — R07.9 CHEST PAIN, UNSPECIFIED TYPE: ICD-10-CM

## 2022-12-02 DIAGNOSIS — E11.9 TYPE 2 DIABETES MELLITUS WITHOUT COMPLICATION, WITHOUT LONG-TERM CURRENT USE OF INSULIN (H): ICD-10-CM

## 2022-12-02 DIAGNOSIS — R94.39 ABNORMAL CARDIOVASCULAR STRESS TEST: Primary | ICD-10-CM

## 2022-12-02 DIAGNOSIS — R94.39 ABNORMAL CARDIOVASCULAR STRESS TEST: ICD-10-CM

## 2022-12-02 DIAGNOSIS — E78.2 MIXED HYPERLIPIDEMIA: ICD-10-CM

## 2022-12-02 DIAGNOSIS — I20.0 PROGRESSIVE ANGINA (H): Primary | ICD-10-CM

## 2022-12-02 DIAGNOSIS — I10 ESSENTIAL HYPERTENSION: ICD-10-CM

## 2022-12-02 DIAGNOSIS — E66.01 MORBID OBESITY (H): ICD-10-CM

## 2022-12-02 DIAGNOSIS — I50.21 ACUTE HFREF (HEART FAILURE WITH REDUCED EJECTION FRACTION) (H): ICD-10-CM

## 2022-12-02 LAB
ANION GAP SERPL CALCULATED.3IONS-SCNC: 9 MMOL/L (ref 5–18)
BUN SERPL-MCNC: 13 MG/DL (ref 8–22)
CALCIUM SERPL-MCNC: 10.5 MG/DL (ref 8.5–10.5)
CHLORIDE BLD-SCNC: 104 MMOL/L (ref 98–107)
CO2 SERPL-SCNC: 28 MMOL/L (ref 22–31)
CREAT SERPL-MCNC: 0.89 MG/DL (ref 0.7–1.3)
ERYTHROCYTE [DISTWIDTH] IN BLOOD BY AUTOMATED COUNT: 12.4 % (ref 10–15)
GFR SERPL CREATININE-BSD FRML MDRD: >90 ML/MIN/1.73M2
GLUCOSE BLD-MCNC: 156 MG/DL (ref 70–125)
HCT VFR BLD AUTO: 45.8 % (ref 40–53)
HGB BLD-MCNC: 14.8 G/DL (ref 13.3–17.7)
MCH RBC QN AUTO: 28.6 PG (ref 26.5–33)
MCHC RBC AUTO-ENTMCNC: 32.3 G/DL (ref 31.5–36.5)
MCV RBC AUTO: 89 FL (ref 78–100)
PLATELET # BLD AUTO: 244 10E3/UL (ref 150–450)
POTASSIUM BLD-SCNC: 4.9 MMOL/L (ref 3.5–5)
RBC # BLD AUTO: 5.17 10E6/UL (ref 4.4–5.9)
SODIUM SERPL-SCNC: 141 MMOL/L (ref 136–145)
WBC # BLD AUTO: 9.8 10E3/UL (ref 4–11)

## 2022-12-02 PROCEDURE — 80048 BASIC METABOLIC PNL TOTAL CA: CPT

## 2022-12-02 PROCEDURE — 99205 OFFICE O/P NEW HI 60 MIN: CPT | Performed by: INTERNAL MEDICINE

## 2022-12-02 PROCEDURE — 36415 COLL VENOUS BLD VENIPUNCTURE: CPT

## 2022-12-02 PROCEDURE — 86900 BLOOD TYPING SEROLOGIC ABO: CPT

## 2022-12-02 PROCEDURE — 85027 COMPLETE CBC AUTOMATED: CPT

## 2022-12-02 PROCEDURE — 86850 RBC ANTIBODY SCREEN: CPT

## 2022-12-02 PROCEDURE — 86901 BLOOD TYPING SEROLOGIC RH(D): CPT

## 2022-12-02 RX ORDER — DIAZEPAM 5 MG
10 TABLET ORAL
Status: CANCELLED | OUTPATIENT
Start: 2022-12-05

## 2022-12-02 RX ORDER — LIDOCAINE 40 MG/G
CREAM TOPICAL
Status: CANCELLED | OUTPATIENT
Start: 2022-12-02

## 2022-12-02 RX ORDER — METOPROLOL SUCCINATE 25 MG/1
25 TABLET, EXTENDED RELEASE ORAL DAILY
Qty: 90 TABLET | Refills: 3 | Status: ON HOLD | OUTPATIENT
Start: 2022-12-02 | End: 2023-01-17

## 2022-12-02 RX ORDER — ASPIRIN 81 MG/1
243 TABLET, CHEWABLE ORAL ONCE
Status: CANCELLED | OUTPATIENT
Start: 2022-12-05

## 2022-12-02 RX ORDER — DEXTROSE MONOHYDRATE 25 G/50ML
25-50 INJECTION, SOLUTION INTRAVENOUS
Status: CANCELLED | OUTPATIENT
Start: 2022-12-05

## 2022-12-02 RX ORDER — ASPIRIN 325 MG
325 TABLET ORAL ONCE
Status: CANCELLED | OUTPATIENT
Start: 2022-12-05 | End: 2022-12-02

## 2022-12-02 RX ORDER — LORATADINE 10 MG/1
10 TABLET ORAL DAILY
COMMUNITY

## 2022-12-02 RX ORDER — MULTIVITAMIN,THERAPEUTIC
1 TABLET ORAL DAILY
COMMUNITY

## 2022-12-02 RX ORDER — NICOTINE POLACRILEX 4 MG
15-30 LOZENGE BUCCAL
Status: CANCELLED | OUTPATIENT
Start: 2022-12-05

## 2022-12-02 RX ORDER — SODIUM CHLORIDE 9 MG/ML
INJECTION, SOLUTION INTRAVENOUS CONTINUOUS
Status: CANCELLED | OUTPATIENT
Start: 2022-12-05

## 2022-12-02 RX ORDER — FENTANYL CITRATE 50 UG/ML
25 INJECTION, SOLUTION INTRAMUSCULAR; INTRAVENOUS
Status: CANCELLED | OUTPATIENT
Start: 2022-12-05

## 2022-12-02 RX ORDER — VITAMIN B COMPLEX
25 TABLET ORAL DAILY
COMMUNITY
End: 2023-12-21 | Stop reason: DRUGHIGH

## 2022-12-02 NOTE — LETTER
12/2/2022    Catalino Nuñez MD  [address]  [place] [address]    RE: Kash Tavarez       Dear Colleague,     I had the pleasure of seeing Kash Tavarez in the [facility].  [facility]  HEART CARE ENCOUNTER CONSULTATON NOTE      [facility]  [phone]      Assessment/Recommendations   Assessment:   1. Anginal chest pain, progressive given abnormal NM stress, likely severe obstructive coronary artery disease   2. New onset left ventricular systolic heart failure, LVEF: 35% by NM stress. NYHA II (exertional dyspnea)  3. DM type II, on metformin  Lab Results   Component Value Date    A1C 7.3 11/17/2022    A1C 7.8 05/05/2022    A1C 7.9 11/11/2021    A1C 8.4 05/03/2021    A1C 8.2 11/05/2020   4. HLD  5. Hypertension     Plan:   1. Coronary angiogram with possible PCI, urgent. Given recent LVEF patient at high risk of cardiovascular event and need to determine cause of significant cardiomyopathy.    2. Complete echo, urgent.    3. Start metoprolol XL 25 mg daily for HFrEF and presumed CAD  4. Lisinopril 20 mg dialy  5. Increase atorvastatin to 80 mg daily, post CATH  6. ASA 81 mg daily   7.  Long discussion with the patient and his wife regarding findings of stress test.  Outlined possible findings including nonobstructive coronary disease, severe obstructive coronary disease requiring percutaneous intervention and possible need for coronary bypass surgery.   8.  PRN SL nitroglycerin  9.  Call instructions provided to patient.  He has progressive symptoms or worsening symptoms recommend proceeding to emergency room to call and EMS.  10.  Patient to avoid all strenuous activity until coronary angiogram.        History of Present Illness/Subjective    HPI: Kash Tavarez is a 62 year old male hypertension, diabetes, hyperlipidemia presents to cardiology clinic with progressive anginal chest pain, dyspnea exertion and severely abnormal nuclear stress test.    Patient with  past 3 to 4 months has been noticing progressive dyspnea on exertion.  Associated with dyspnea and his chest pressure.  He was working on campaigns over the past few months and was noticing when walking up driveways that he feels short of breath would be diaphoretic and would occasionally have chest pressure.  The symptoms resolved about 2 to 3 minutes after resting.  He also notices when carrying heavy object such as a band equipment he was noticing chest pressure across his chest along with shortness of breath and diaphoresis.     Given the symptoms that his primary care provider recommended that he had a nuclear stress test.  His nuclear stress test demonstrated significant anterior wall ischemia along with reduction in left ventricular ejection fraction.  Ejection fraction was 35%.    Currently he is on aspirin statin and lisinopril.  He has been diagnosed with diabetes for the past 10 years.  On and off metformin based on his A1c.    Renal function is normal.    Following the risk and benefits of coronary angiogram.  Outlined the need for possible coronary artery bypass surgery.  Given progressive angina recommend urgent coronary angiogram with poss percutaneous coronary mention or coronary bypass surgery if needed.  He is at high risk for cardiovascular events given reduced ejection fraction, diabetes and progressive angina.    NM Stress: 11/30/22     The nuclear stress test is abnormal.     There is a medium sized area of moderate ischemia in the mid to distal anterior and anterolateral segment(s) of the left ventricle.     Left ventricular function is moderately reduced.     The left ventricular ejection fraction at rest is 35%.     The stress electrocardiogram was non-diagnostic due to baseline ST changes.     There is no prior study for comparison.       Physical Examination  Review of Systems   Vitals: BP (!) 152/92 (BP Location: Left arm, Patient Position: Sitting, Cuff Size: Adult Large)   Pulse 92    "Resp 12   Ht 1.854 m (6' 1\")   Wt 109.7 kg (241 lb 12.8 oz)   BMI 31.90 kg/m    BMI= Body mass index is 31.9 kg/m .  Wt Readings from Last 3 Encounters:   11/17/22 111.5 kg (245 lb 12 oz)   05/12/22 117.9 kg (260 lb)   05/09/22 117.9 kg (260 lb)           ENT/Mouth: membranes moist, no oral lesions or bleeding gums.      EYES:  no scleral icterus, normal conjunctivae       Chest/Lungs:   lungs are clear to auscultation, no rales or wheezing, no sternal scar, equal chest wall expansion    Cardiovascular:   Regular. Normal first and second heart sounds with no murmurs, rubs, or gallops; the carotid, radial and posterior tibial pulses are intact, Jugular venous pressure noraml, no edema bilaterally    Abdomen:  no tenderness; bowel sounds are present   Extremities: no cyanosis or clubbing   Skin: no xanthelasma, warm.    Neurologic: normal  bilateral, no tremors     Psychiatric: alert and oriented x3, calm        Please refer above for cardiac ROS details.        Medical History  Surgical History Family History Social History   Past Medical History:   Diagnosis Date     Hypertension      Nephrolithiasis      Type 2 diabetes mellitus (H)      Past Surgical History:   Procedure Laterality Date     COLONOSCOPY       COMBINED CYSTOSCOPY, INSERT STENT URETER(S) Right 5/12/2022    Procedure: CYSTOURETEROSCOPY, RETROGRADE PYELOGRAM, LASER LITHOTRIPSY WITH CALCULUS REMOVAL AND URETERAL STENT INSERTION;  Surgeon: James Mosley MD;  [facility]     KIDNEY STONE SURGERY       [facility] APPENDECTOMY      Description: Appendectomy;  Recorded: 03/04/2008;     No family history on file.     Social History     Socioeconomic History     Marital status:      Spouse name: Not on file     Number of children: Not on file     Years of education: Not on file     Highest education level: Not on file   Occupational History     Not on file   Tobacco Use     Smoking status: Never     Smokeless tobacco: Never "   Substance and Sexual Activity     Alcohol use: No     Drug use: No     Sexual activity: Yes     Partners: Female   Other Topics Concern     Not on file   Social History Narrative     Not on file     Social Determinants of Health     Financial Resource Strain: Not on file   Food Insecurity: Not on file   Transportation Needs: Not on file   Physical Activity: Not on file   Stress: Not on file   Social Connections: Not on file   Intimate Partner Violence: Not on file   Housing Stability: Not on file           Medications  Allergies   Current Outpatient Medications   Medication Sig Dispense Refill     ACCU-CHEK FASTCLIX Misc [ACCU-CHEK FASTCLIX MISC] TEST TWICE DAILY 100 each 0     ACCU-CHEK GUIDE test strip USE 1 EACH AS DIRECTED 2 (TWO) TIMES A DAY AT 7:30AM AND 4:30PM. 200 strip 3     aspirin (ASA) 81 MG EC tablet TAKE 1 TABLET BY MOUTH EVERY DAY 90 tablet 3     atorvastatin (LIPITOR) 40 MG tablet TAKE 1 TABLET BY MOUTH EVERY DAY 90 tablet 3     blood glucose monitoring (ACCU-CHEK FASTCLIX) lancets USE TWICE A DAY (AT 7:30 AM & 4:30 PM) AS DIRECTED 204 each 3     blood-glucose meter Misc [BLOOD-GLUCOSE METER MISC] Use 1 Device As Directed 2 (two) times a day at 9am and 6pm. 1 each 0     imiquimod (ALDARA) 5 % cream [IMIQUIMOD (ALDARA) 5 % CREAM] APPLY TOPICALLY D TO WARTS  3     lisinopril (ZESTRIL) 20 MG tablet Take 1 tablet (20 mg) by mouth daily 90 tablet 3     metFORMIN (GLUCOPHAGE) 500 MG tablet Take 1 tablet (500 mg) by mouth 3 times daily (with meals) 270 tablet 3     metroNIDAZOLE (METROGEL) 0.75 % gel [METRONIDAZOLE (METROGEL) 0.75 % GEL] APPLY TO FACE D  10     nitroGLYcerin (NITROSTAT) 0.4 MG sublingual tablet Place 1 tablet (0.4 mg) under the tongue every 5 minutes as needed for chest pain For chest pain place 1 tablet under the tongue every 5 minutes for 3 doses. If symptoms persist 5 minutes after 1st dose call 911. 25 tablet 1     oxymetazoline (RHOFADE) 1 % Crea [OXYMETAZOLINE (RHOFADE) 1 % CREA]  Apply topically daily.       triamcinolone (KENALOG) 0.1 % external cream Apply topically 2 times daily 80 g 0     No Known Allergies       Lab Results    Chemistry/lipid CBC Cardiac Enzymes/BNP/TSH/INR   Recent Labs   Lab Test 11/17/22  0857   CHOL 176   HDL 39*   *   TRIG 122     Recent Labs   Lab Test 11/17/22  0857 11/11/21  0849 11/05/20  0855   * 110 128     Recent Labs   Lab Test 11/17/22  0857      POTASSIUM 5.4*   CHLORIDE 101   CO2 24   *   BUN 16.2   CR 0.87   GFRESTIMATED >90   RIOS 9.8     Recent Labs   Lab Test 11/17/22  0857 05/09/22  0900 11/11/21  0849   CR 0.87 1.17 0.97     Recent Labs   Lab Test 11/17/22  0857 05/05/22  0824 11/11/21  0841   A1C 7.3* 7.8* 7.9*          Recent Labs   Lab Test 11/17/22  0857   WBC 12.7*   HGB 14.7   HCT 45.0   MCV 87        Recent Labs   Lab Test 11/17/22  0857 05/09/22  0900   HGB 14.7 13.2*    No results for input(s): TROPONINI in the last 94057 hours.  No results for input(s): BNP, NTBNPI, NTBNP in the last 49629 hours.  No results for input(s): TSH in the last 15384 hours.  No results for input(s): INR in the last 35464 hours.     Aki Pereira DO                Thank you for allowing me to participate in the care of your patient.      Sincerely,     Aki Pereira DO     [facility]  cc:   Catalino Nuñez MD  [address]  [place] [address]

## 2022-12-02 NOTE — TELEPHONE ENCOUNTER
Verbal order from MAT to get patient scheduled for CORS poss PCI, ECHO same day/am of procedure. Case request placed. Labs pre-procedure ordered. Met with patient and wife. Pt agreeable to proceed. Scheduled for 12/5/22 at 0930 arrival. Procedure prep and education reviewed. Opportunity to ask questions and have them answered, none further at this time. Pt was encouraged to rest/relax through the weekend until procedure. Orders placed. Documentation of education. Pt ready for procedure. No covid test preformed, patient instructed to monitor for symptoms and call if acute illness.  CEDRICK,Rn

## 2022-12-02 NOTE — TELEPHONE ENCOUNTER
Kash Tavarez  [address] [facility] [address]  [phone] (home)     Reason: Progressive angina, abnormal stress test   PCP:  Catalino Nuñez  H&P completed by:  Dr. Scott 12/2/22  Admit date 12/5/22 Arrival time:  0930  Anticoagulation:  NA  Previous PCI: No  Bypass Grafts: No  Renal Issues: No  Diabetic?: Yes  Device?: No    Ambulation status: Independently    Reason for Visit:  Patient seen for pre-procedure education in preparation for: Coronary Angiogram with Possible PCI    Procedure Prep:  EKG results obtained, dated: To be completed on day of cath lab procedure  Hemogram results obtained: Completed on 12/2/22  Basic Metabolic Panel results obtained: Completed on 12/2/22  Pertinent cardiac test results:None.    Patient Education    1. Your arrival time is 0930.  [place] [address] - Main Entrance of the Hospital  2. Please plan on being at the hospital all day.  3. At any time, emergencies and/or urgent cases may come up which could delay the start of your procedure.    COVID Testing Instructions  *Mandatory COVID Testing:   ALL Patients will need to complete a COVID test no sooner than 4 days prior to their procedure (regardless of vaccination status).      To schedule COVID testing Please call [phone]    If you want to complete this at an outside facility please call them to find out if they will have the results within the appropriate time frame and their fax number.  You will need to provide us with that information so we can send the order.    The facility completing the test will need to fax the results to [phone]    If you are running into and issues please call us.     Pre-procedure instructions  Patient instructed to not Eat or Drink after midnight.  Patient instructed to shower the evening before or the morning of the procedure.  Patient instructed to arrange for transportation home following procedure  from a responsible family member or friend. No driving for at least 24 hours.  Patient instructed to have a responsible adult with them for 24 hours post-procedure.  Post-procedure follow up process.  Conscious sedation discussed.    Pre-procedure medication instructions.  Continue medications as scheduled, with a small amount of water on the day of the procedure unless indicated. (NO Diabetic Medications or Blood Thinners)  Pt instructed not to consume Alcohol, Tobacco, Caffeine, or Carbonated beverages 12 hours prior to procedure.  Patient instructed to take 325 mg of Aspirin the night before and morning of procedure: Yes  Other medication: Morning of procedure please HOLD all vitamins, minerals, and supplements. Please TAKE (4) baby aspirin or (1) full size 325 mg aspirin morning of procedure. Do not take Metformin morning of procedure. You will be instructed when to resume post-procedure.              Diabetic Medication Instructions  ? DO NOT take any oral diabetic medication, short-acting diabetes medications/insulin, humalog or regular insulin the morning of your test  ? Take   dose of long-acting insulin (Lantus, Levemir) the day of your test  ? Hold Oral Diabetic on the day of the procedure and for 48 hours after IV contrast given  ? Remember to  bring your glucometer and insulin with you to take after your test if needed              Anticoagulation Medication Instructions     N/A    Patient states understanding of procedure and agrees to proceed.    *PATIENTS RECORDS AVAILABLE IN [email or web address] UNLESS OTHERWISE INDICATED*      Patient Active Problem List   Diagnosis     Nephrolithiasis     Obesity     Allergic Rhinitis     Mixed hyperlipidemia     Essential Hypertension     Obesity (BMI 35.0-39.9) with comorbidity (H)     Type 2 diabetes mellitus without complication, without long-term current use of insulin (H)     Calculus of ureter     Hydronephrosis with urinary obstruction due to ureteral calculus        Current Outpatient Medications   Medication Sig Dispense Refill     ACCU-CHEK FASTCLIX Misc [ACCU-CHEK FASTCLIX MISC] TEST TWICE DAILY 100 each 0     ACCU-CHEK GUIDE test strip USE 1 EACH AS DIRECTED 2 (TWO) TIMES A DAY AT 7:30AM AND 4:30PM. 200 strip 3     aspirin (ASA) 81 MG EC tablet TAKE 1 TABLET BY MOUTH EVERY DAY 90 tablet 3     atorvastatin (LIPITOR) 40 MG tablet TAKE 1 TABLET BY MOUTH EVERY DAY 90 tablet 3     blood glucose monitoring (ACCU-CHEK FASTCLIX) lancets USE TWICE A DAY (AT 7:30 AM & 4:30 PM) AS DIRECTED 204 each 3     blood-glucose meter Misc [BLOOD-GLUCOSE METER MISC] Use 1 Device As Directed 2 (two) times a day at 9am and 6pm. 1 each 0     imiquimod (ALDARA) 5 % cream [IMIQUIMOD (ALDARA) 5 % CREAM] APPLY TOPICALLY D TO WARTS  3     lisinopril (ZESTRIL) 20 MG tablet Take 1 tablet (20 mg) by mouth daily 90 tablet 3     loratadine (CLARITIN) 10 MG tablet Take 10 mg by mouth daily       metFORMIN (GLUCOPHAGE) 500 MG tablet Take 1 tablet (500 mg) by mouth 3 times daily (with meals) 270 tablet 3     metoprolol succinate ER (TOPROL XL) 25 MG 24 hr tablet Take 1 tablet (25 mg) by mouth daily 90 tablet 3     metroNIDAZOLE (METROGEL) 0.75 % gel [METRONIDAZOLE (METROGEL) 0.75 % GEL] APPLY TO FACE D  10     multivitamin, therapeutic (THERA-VIT) TABS tablet Take 1 tablet by mouth daily       nitroGLYcerin (NITROSTAT) 0.4 MG sublingual tablet Place 1 tablet (0.4 mg) under the tongue every 5 minutes as needed for chest pain For chest pain place 1 tablet under the tongue every 5 minutes for 3 doses. If symptoms persist 5 minutes after 1st dose call 911. 25 tablet 1     oxymetazoline (RHOFADE) 1 % Crea [OXYMETAZOLINE (RHOFADE) 1 % CREA] Apply topically daily.       triamcinolone (KENALOG) 0.1 % external cream Apply topically 2 times daily 80 g 0     Vitamin D3 (CHOLECALCIFEROL) 25 mcg (1000 units) tablet Take 1 tablet by mouth daily         No Known Allergies    Order Set Entry: 12/2/22    Plan: Patient  education completed. Opportunity to ask questions and have them answered. None further at this time. Patient verbalized understanding of responsible adult for 24 hours and  post-procedure at time of discharge. Pt ready for procedure.    Phil Ellis RN on 12/2/2022 at 10:47 AM

## 2022-12-02 NOTE — H&P (VIEW-ONLY)
[facility]  HEART CARE ENCOUNTER CONSULTATON NOTE      [facility]  [phone]      Assessment/Recommendations   Assessment:   1. Anginal chest pain, progressive given abnormal NM stress, likely severe obstructive coronary artery disease   2. New onset left ventricular systolic heart failure, LVEF: 35% by NM stress. NYHA II (exertional dyspnea)  3. DM type II, on metformin  Lab Results   Component Value Date    A1C 7.3 11/17/2022    A1C 7.8 05/05/2022    A1C 7.9 11/11/2021    A1C 8.4 05/03/2021    A1C 8.2 11/05/2020   4. HLD  5. Hypertension     Plan:   1. Coronary angiogram with possible PCI, urgent. Given recent LVEF patient at high risk of cardiovascular event and need to determine cause of significant cardiomyopathy.    2. Complete echo, urgent.    3. Start metoprolol XL 25 mg daily for HFrEF and presumed CAD  4. Lisinopril 20 mg dialy  5. Increase atorvastatin to 80 mg daily, post CATH  6. ASA 81 mg daily   7.  Long discussion with the patient and his wife regarding findings of stress test.  Outlined possible findings including nonobstructive coronary disease, severe obstructive coronary disease requiring percutaneous intervention and possible need for coronary bypass surgery.   8.  PRN SL nitroglycerin  9.  Call instructions provided to patient.  He has progressive symptoms or worsening symptoms recommend proceeding to emergency room to call and EMS.  10.  Patient to avoid all strenuous activity until coronary angiogram.        History of Present Illness/Subjective    HPI: Kash Tavarez is a 62 year old male hypertension, diabetes, hyperlipidemia presents to cardiology clinic with progressive anginal chest pain, dyspnea exertion and severely abnormal nuclear stress test.    Patient with past 3 to 4 months has been noticing progressive dyspnea on exertion.  Associated with dyspnea and his chest pressure.  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  The symptoms resolved about 2 to 3 minutes after resting.  He also notices when carrying heavy object such as a band equipment he was noticing chest pressure across his chest along with shortness of breath and diaphoresis.     Given the symptoms that his primary care provider recommended that he had a nuclear stress test.  His nuclear stress test demonstrated significant anterior wall ischemia along with reduction in left ventricular ejection fraction.  Ejection fraction was 35%.    Currently he is on aspirin statin and lisinopril.  He has been diagnosed with diabetes for the past 10 years.  On and off metformin based on his A1c.    Renal function is normal.    Following the risk and benefits of coronary angiogram.  Outlined the need for possible coronary artery bypass surgery.  Given progressive angina recommend urgent coronary angiogram with poss percutaneous coronary mention or coronary bypass surgery if needed.  He is at high risk for cardiovascular events given reduced ejection fraction, diabetes and progressive angina.    NM Stress: 11/30/22     The nuclear stress test is abnormal.     There is a medium sized area of moderate ischemia in the mid to distal anterior and anterolateral segment(s) of the left ventricle.     Left ventricular function is moderately reduced.     The left ventricular ejection fraction at rest is 35%.     The stress electrocardiogram was non-diagnostic due to baseline ST changes.     There is no prior study for comparison.       Physical Examination  Review of Systems   Vitals: BP (!) 152/92 (BP Location: Left arm, Patient Position: Sitting, Cuff Size: Adult Large)   Pulse 92   Resp 12   Ht 1.854 m (6' 1\")   Wt 109.7 kg (241 lb 12.8 oz)   BMI 31.90 kg/m    BMI= Body mass index is 31.9 kg/m .  Wt Readings from Last 3 Encounters:   11/17/22 111.5 kg (245 lb 12 oz)   05/12/22 " 117.9 kg (260 lb)   05/09/22 117.9 kg (260 lb)           ENT/Mouth: membranes moist, no oral lesions or bleeding gums.      EYES:  no scleral icterus, normal conjunctivae       Chest/Lungs:   lungs are clear to auscultation, no rales or wheezing, no sternal scar, equal chest wall expansion    Cardiovascular:   Regular. Normal first and second heart sounds with no murmurs, rubs, or gallops; the carotid, radial and posterior tibial pulses are intact, Jugular venous pressure noraml, no edema bilaterally    Abdomen:  no tenderness; bowel sounds are present   Extremities: no cyanosis or clubbing   Skin: no xanthelasma, warm.    Neurologic: normal  bilateral, no tremors     Psychiatric: alert and oriented x3, calm        Please refer above for cardiac ROS details.        Medical History  Surgical History Family History Social History   Past Medical History:   Diagnosis Date     Hypertension      Nephrolithiasis      Type 2 diabetes mellitus (H)      Past Surgical History:   Procedure Laterality Date     COLONOSCOPY       COMBINED CYSTOSCOPY, INSERT STENT URETER(S) Right 5/12/2022    Procedure: CYSTOURETEROSCOPY, RETROGRADE PYELOGRAM, LASER LITHOTRIPSY WITH CALCULUS REMOVAL AND URETERAL STENT INSERTION;  Surgeon: James Mosley MD;  Location: [facility] OR     KIDNEY STONE SURGERY       [facility] APPENDECTOMY      Description: Appendectomy;  Recorded: 03/04/2008;     No family history on file.     Social History     Socioeconomic History     Marital status:      Spouse name: Not on file     Number of children: Not on file     Years of education: Not on file     Highest education level: Not on file   Occupational History     Not on file   Tobacco Use     Smoking status: Never     Smokeless tobacco: Never   Substance and Sexual Activity     Alcohol use: No     Drug use: No     Sexual activity: Yes     Partners: Female   Other Topics Concern     Not on file   Social History Narrative     Not on file     Social  Determinants of Health     Financial Resource Strain: Not on file   Food Insecurity: Not on file   Transportation Needs: Not on file   Physical Activity: Not on file   Stress: Not on file   Social Connections: Not on file   Intimate Partner Violence: Not on file   Housing Stability: Not on file           Medications  Allergies   Current Outpatient Medications   Medication Sig Dispense Refill     ACCU-CHEK FASTCLIX Misc [ACCU-CHEK FASTCLIX MISC] TEST TWICE DAILY 100 each 0     ACCU-CHEK GUIDE test strip USE 1 EACH AS DIRECTED 2 (TWO) TIMES A DAY AT 7:30AM AND 4:30PM. 200 strip 3     aspirin (ASA) 81 MG EC tablet TAKE 1 TABLET BY MOUTH EVERY DAY 90 tablet 3     atorvastatin (LIPITOR) 40 MG tablet TAKE 1 TABLET BY MOUTH EVERY DAY 90 tablet 3     blood glucose monitoring (ACCU-CHEK FASTCLIX) lancets USE TWICE A DAY (AT 7:30 AM & 4:30 PM) AS DIRECTED 204 each 3     blood-glucose meter Misc [BLOOD-GLUCOSE METER MISC] Use 1 Device As Directed 2 (two) times a day at 9am and 6pm. 1 each 0     imiquimod (ALDARA) 5 % cream [IMIQUIMOD (ALDARA) 5 % CREAM] APPLY TOPICALLY D TO WARTS  3     lisinopril (ZESTRIL) 20 MG tablet Take 1 tablet (20 mg) by mouth daily 90 tablet 3     metFORMIN (GLUCOPHAGE) 500 MG tablet Take 1 tablet (500 mg) by mouth 3 times daily (with meals) 270 tablet 3     metroNIDAZOLE (METROGEL) 0.75 % gel [METRONIDAZOLE (METROGEL) 0.75 % GEL] APPLY TO FACE D  10     nitroGLYcerin (NITROSTAT) 0.4 MG sublingual tablet Place 1 tablet (0.4 mg) under the tongue every 5 minutes as needed for chest pain For chest pain place 1 tablet under the tongue every 5 minutes for 3 doses. If symptoms persist 5 minutes after 1st dose call 911. 25 tablet 1     oxymetazoline (RHOFADE) 1 % Crea [OXYMETAZOLINE (RHOFADE) 1 % CREA] Apply topically daily.       triamcinolone (KENALOG) 0.1 % external cream Apply topically 2 times daily 80 g 0     No Known Allergies       Lab Results    Chemistry/lipid CBC Cardiac Enzymes/BNP/TSH/INR    Recent Labs   Lab Test 11/17/22  0857   CHOL 176   HDL 39*   *   TRIG 122     Recent Labs   Lab Test 11/17/22  0857 11/11/21  0849 11/05/20  0855   * 110 128     Recent Labs   Lab Test 11/17/22  0857      POTASSIUM 5.4*   CHLORIDE 101   CO2 24   *   BUN 16.2   CR 0.87   GFRESTIMATED >90   RIOS 9.8     Recent Labs   Lab Test 11/17/22  0857 05/09/22  0900 11/11/21  0849   CR 0.87 1.17 0.97     Recent Labs   Lab Test 11/17/22  0857 05/05/22  0824 11/11/21  0841   A1C 7.3* 7.8* 7.9*          Recent Labs   Lab Test 11/17/22  0857   WBC 12.7*   HGB 14.7   HCT 45.0   MCV 87        Recent Labs   Lab Test 11/17/22  0857 05/09/22  0900   HGB 14.7 13.2*    No results for input(s): TROPONINI in the last 16175 hours.  No results for input(s): BNP, NTBNPI, NTBNP in the last 34174 hours.  No results for input(s): TSH in the last 25488 hours.  No results for input(s): INR in the last 21254 hours.     Aki Pereira, DO

## 2022-12-02 NOTE — PROGRESS NOTES
[facility]  HEART CARE ENCOUNTER CONSULTATON NOTE      [facility]  [phone]      Assessment/Recommendations   Assessment:   1. Anginal chest pain, progressive given abnormal NM stress, likely severe obstructive coronary artery disease   2. New onset left ventricular systolic heart failure, LVEF: 35% by NM stress. NYHA II (exertional dyspnea)  3. DM type II, on metformin  Lab Results   Component Value Date    A1C 7.3 11/17/2022    A1C 7.8 05/05/2022    A1C 7.9 11/11/2021    A1C 8.4 05/03/2021    A1C 8.2 11/05/2020   4. HLD  5. Hypertension     Plan:   1. Coronary angiogram with possible PCI, urgent. Given recent LVEF patient at high risk of cardiovascular event and need to determine cause of significant cardiomyopathy.    2. Complete echo, urgent.    3. Start metoprolol XL 25 mg daily for HFrEF and presumed CAD  4. Lisinopril 20 mg dialy  5. Increase atorvastatin to 80 mg daily, post CATH  6. ASA 81 mg daily   7.  Long discussion with the patient and his wife regarding findings of stress test.  Outlined possible findings including nonobstructive coronary disease, severe obstructive coronary disease requiring percutaneous intervention and possible need for coronary bypass surgery.   8.  PRN SL nitroglycerin  9.  Call instructions provided to patient.  He has progressive symptoms or worsening symptoms recommend proceeding to emergency room to call and EMS.  10.  Patient to avoid all strenuous activity until coronary angiogram.        History of Present Illness/Subjective    HPI: Kash Tavarez is a 62 year old male hypertension, diabetes, hyperlipidemia presents to cardiology clinic with progressive anginal chest pain, dyspnea exertion and severely abnormal nuclear stress test.    Patient with past 3 to 4 months has been noticing progressive dyspnea on exertion.  Associated with dyspnea and his chest pressure.  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  The symptoms resolved about 2 to 3 minutes after resting.  He also notices when carrying heavy object such as a band equipment he was noticing chest pressure across his chest along with shortness of breath and diaphoresis.     Given the symptoms that his primary care provider recommended that he had a nuclear stress test.  His nuclear stress test demonstrated significant anterior wall ischemia along with reduction in left ventricular ejection fraction.  Ejection fraction was 35%.    Currently he is on aspirin statin and lisinopril.  He has been diagnosed with diabetes for the past 10 years.  On and off metformin based on his A1c.    Renal function is normal.    Following the risk and benefits of coronary angiogram.  Outlined the need for possible coronary artery bypass surgery.  Given progressive angina recommend urgent coronary angiogram with poss percutaneous coronary mention or coronary bypass surgery if needed.  He is at high risk for cardiovascular events given reduced ejection fraction, diabetes and progressive angina.    NM Stress: 11/30/22     The nuclear stress test is abnormal.     There is a medium sized area of moderate ischemia in the mid to distal anterior and anterolateral segment(s) of the left ventricle.     Left ventricular function is moderately reduced.     The left ventricular ejection fraction at rest is 35%.     The stress electrocardiogram was non-diagnostic due to baseline ST changes.     There is no prior study for comparison.       Physical Examination  Review of Systems   Vitals: BP (!) 152/92 (BP Location: Left arm, Patient Position: Sitting, Cuff Size: Adult Large)   Pulse 92   Resp 12   Ht 1.854 m (6' 1\")   Wt 109.7 kg (241 lb 12.8 oz)   BMI 31.90 kg/m    BMI= Body mass index is 31.9 kg/m .  Wt Readings from Last 3 Encounters:   11/17/22 111.5 kg (245 lb 12 oz)   05/12/22 " 117.9 kg (260 lb)   05/09/22 117.9 kg (260 lb)           ENT/Mouth: membranes moist, no oral lesions or bleeding gums.      EYES:  no scleral icterus, normal conjunctivae       Chest/Lungs:   lungs are clear to auscultation, no rales or wheezing, no sternal scar, equal chest wall expansion    Cardiovascular:   Regular. Normal first and second heart sounds with no murmurs, rubs, or gallops; the carotid, radial and posterior tibial pulses are intact, Jugular venous pressure noraml, no edema bilaterally    Abdomen:  no tenderness; bowel sounds are present   Extremities: no cyanosis or clubbing   Skin: no xanthelasma, warm.    Neurologic: normal  bilateral, no tremors     Psychiatric: alert and oriented x3, calm        Please refer above for cardiac ROS details.        Medical History  Surgical History Family History Social History   Past Medical History:   Diagnosis Date     Hypertension      Nephrolithiasis      Type 2 diabetes mellitus (H)      Past Surgical History:   Procedure Laterality Date     COLONOSCOPY       COMBINED CYSTOSCOPY, INSERT STENT URETER(S) Right 5/12/2022    Procedure: CYSTOURETEROSCOPY, RETROGRADE PYELOGRAM, LASER LITHOTRIPSY WITH CALCULUS REMOVAL AND URETERAL STENT INSERTION;  Surgeon: James Mosley MD;  Location: [facility] OR     KIDNEY STONE SURGERY       [facility] APPENDECTOMY      Description: Appendectomy;  Recorded: 03/04/2008;     No family history on file.     Social History     Socioeconomic History     Marital status:      Spouse name: Not on file     Number of children: Not on file     Years of education: Not on file     Highest education level: Not on file   Occupational History     Not on file   Tobacco Use     Smoking status: Never     Smokeless tobacco: Never   Substance and Sexual Activity     Alcohol use: No     Drug use: No     Sexual activity: Yes     Partners: Female   Other Topics Concern     Not on file   Social History Narrative     Not on file     Social  Determinants of Health     Financial Resource Strain: Not on file   Food Insecurity: Not on file   Transportation Needs: Not on file   Physical Activity: Not on file   Stress: Not on file   Social Connections: Not on file   Intimate Partner Violence: Not on file   Housing Stability: Not on file           Medications  Allergies   Current Outpatient Medications   Medication Sig Dispense Refill     ACCU-CHEK FASTCLIX Misc [ACCU-CHEK FASTCLIX MISC] TEST TWICE DAILY 100 each 0     ACCU-CHEK GUIDE test strip USE 1 EACH AS DIRECTED 2 (TWO) TIMES A DAY AT 7:30AM AND 4:30PM. 200 strip 3     aspirin (ASA) 81 MG EC tablet TAKE 1 TABLET BY MOUTH EVERY DAY 90 tablet 3     atorvastatin (LIPITOR) 40 MG tablet TAKE 1 TABLET BY MOUTH EVERY DAY 90 tablet 3     blood glucose monitoring (ACCU-CHEK FASTCLIX) lancets USE TWICE A DAY (AT 7:30 AM & 4:30 PM) AS DIRECTED 204 each 3     blood-glucose meter Misc [BLOOD-GLUCOSE METER MISC] Use 1 Device As Directed 2 (two) times a day at 9am and 6pm. 1 each 0     imiquimod (ALDARA) 5 % cream [IMIQUIMOD (ALDARA) 5 % CREAM] APPLY TOPICALLY D TO WARTS  3     lisinopril (ZESTRIL) 20 MG tablet Take 1 tablet (20 mg) by mouth daily 90 tablet 3     metFORMIN (GLUCOPHAGE) 500 MG tablet Take 1 tablet (500 mg) by mouth 3 times daily (with meals) 270 tablet 3     metroNIDAZOLE (METROGEL) 0.75 % gel [METRONIDAZOLE (METROGEL) 0.75 % GEL] APPLY TO FACE D  10     nitroGLYcerin (NITROSTAT) 0.4 MG sublingual tablet Place 1 tablet (0.4 mg) under the tongue every 5 minutes as needed for chest pain For chest pain place 1 tablet under the tongue every 5 minutes for 3 doses. If symptoms persist 5 minutes after 1st dose call 911. 25 tablet 1     oxymetazoline (RHOFADE) 1 % Crea [OXYMETAZOLINE (RHOFADE) 1 % CREA] Apply topically daily.       triamcinolone (KENALOG) 0.1 % external cream Apply topically 2 times daily 80 g 0     No Known Allergies       Lab Results    Chemistry/lipid CBC Cardiac Enzymes/BNP/TSH/INR    Recent Labs   Lab Test 11/17/22  0857   CHOL 176   HDL 39*   *   TRIG 122     Recent Labs   Lab Test 11/17/22  0857 11/11/21  0849 11/05/20  0855   * 110 128     Recent Labs   Lab Test 11/17/22  0857      POTASSIUM 5.4*   CHLORIDE 101   CO2 24   *   BUN 16.2   CR 0.87   GFRESTIMATED >90   RIOS 9.8     Recent Labs   Lab Test 11/17/22  0857 05/09/22  0900 11/11/21  0849   CR 0.87 1.17 0.97     Recent Labs   Lab Test 11/17/22  0857 05/05/22  0824 11/11/21  0841   A1C 7.3* 7.8* 7.9*          Recent Labs   Lab Test 11/17/22  0857   WBC 12.7*   HGB 14.7   HCT 45.0   MCV 87        Recent Labs   Lab Test 11/17/22  0857 05/09/22  0900   HGB 14.7 13.2*    No results for input(s): TROPONINI in the last 23133 hours.  No results for input(s): BNP, NTBNPI, NTBNP in the last 62420 hours.  No results for input(s): TSH in the last 38003 hours.  No results for input(s): INR in the last 63782 hours.     Aki Pereira, DO

## 2022-12-05 ENCOUNTER — HOSPITAL ENCOUNTER (OUTPATIENT)
Dept: CARDIOLOGY | Facility: HOSPITAL | Age: 62
Discharge: HOME | End: 2022-12-05
Attending: INTERNAL MEDICINE | Admitting: INTERNAL MEDICINE
Payer: COMMERCIAL

## 2022-12-05 ENCOUNTER — HOSPITAL ENCOUNTER (OUTPATIENT)
Facility: HOSPITAL | Age: 62
Discharge: HOME | End: 2022-12-05
Attending: INTERNAL MEDICINE | Admitting: INTERNAL MEDICINE
Payer: COMMERCIAL

## 2022-12-05 VITALS
OXYGEN SATURATION: 96 % | TEMPERATURE: 98 F | SYSTOLIC BLOOD PRESSURE: 142 MMHG | RESPIRATION RATE: 18 BRPM | DIASTOLIC BLOOD PRESSURE: 84 MMHG | HEART RATE: 71 BPM | BODY MASS INDEX: 31.2 KG/M2 | WEIGHT: 235.4 LBS | HEIGHT: 73 IN

## 2022-12-05 DIAGNOSIS — I20.0 PROGRESSIVE ANGINA (H): ICD-10-CM

## 2022-12-05 DIAGNOSIS — E78.2 MIXED HYPERLIPIDEMIA: ICD-10-CM

## 2022-12-05 DIAGNOSIS — I25.118 CORONARY ARTERY DISEASE OF NATIVE ARTERY OF NATIVE HEART WITH STABLE ANGINA PECTORIS (H): Primary | ICD-10-CM

## 2022-12-05 DIAGNOSIS — R94.39 ABNORMAL CARDIOVASCULAR STRESS TEST: ICD-10-CM

## 2022-12-05 DIAGNOSIS — E11.9 TYPE 2 DIABETES MELLITUS WITHOUT COMPLICATION, WITHOUT LONG-TERM CURRENT USE OF INSULIN (H): ICD-10-CM

## 2022-12-05 DIAGNOSIS — I50.9 NEW ONSET OF CONGESTIVE HEART FAILURE (H): ICD-10-CM

## 2022-12-05 LAB
ATRIAL RATE - MUSE: 72 BPM
DIASTOLIC BLOOD PRESSURE - MUSE: NORMAL MMHG
INTERPRETATION ECG - MUSE: NORMAL
LVEF ECHO: NORMAL
P AXIS - MUSE: 56 DEGREES
PR INTERVAL - MUSE: 176 MS
QRS DURATION - MUSE: 114 MS
QT - MUSE: 392 MS
QTC - MUSE: 429 MS
R AXIS - MUSE: 5 DEGREES
SYSTOLIC BLOOD PRESSURE - MUSE: NORMAL MMHG
T AXIS - MUSE: 45 DEGREES
VENTRICULAR RATE- MUSE: 72 BPM

## 2022-12-05 PROCEDURE — 93010 ELECTROCARDIOGRAM REPORT: CPT | Performed by: INTERNAL MEDICINE

## 2022-12-05 PROCEDURE — 272N000001 HC OR GENERAL SUPPLY STERILE: Performed by: INTERNAL MEDICINE

## 2022-12-05 PROCEDURE — 999N000208 ECHOCARDIOGRAM COMPLETE

## 2022-12-05 PROCEDURE — 999N000054 HC STATISTIC EKG NON-CHARGEABLE

## 2022-12-05 PROCEDURE — 250N000009 HC RX 250: Performed by: INTERNAL MEDICINE

## 2022-12-05 PROCEDURE — 93005 ELECTROCARDIOGRAM TRACING: CPT

## 2022-12-05 PROCEDURE — 250N000011 HC RX IP 250 OP 636: Performed by: INTERNAL MEDICINE

## 2022-12-05 PROCEDURE — 99152 MOD SED SAME PHYS/QHP 5/>YRS: CPT | Performed by: INTERNAL MEDICINE

## 2022-12-05 PROCEDURE — C1769 GUIDE WIRE: HCPCS | Performed by: INTERNAL MEDICINE

## 2022-12-05 PROCEDURE — 250N000013 HC RX MED GY IP 250 OP 250 PS 637: Performed by: INTERNAL MEDICINE

## 2022-12-05 PROCEDURE — 258N000003 HC RX IP 258 OP 636: Performed by: INTERNAL MEDICINE

## 2022-12-05 PROCEDURE — 93458 L HRT ARTERY/VENTRICLE ANGIO: CPT | Performed by: INTERNAL MEDICINE

## 2022-12-05 PROCEDURE — C1894 INTRO/SHEATH, NON-LASER: HCPCS | Performed by: INTERNAL MEDICINE

## 2022-12-05 PROCEDURE — 255N000002 HC RX 255 OP 636: Performed by: INTERNAL MEDICINE

## 2022-12-05 PROCEDURE — 93306 TTE W/DOPPLER COMPLETE: CPT | Mod: 26 | Performed by: INTERNAL MEDICINE

## 2022-12-05 PROCEDURE — 93458 L HRT ARTERY/VENTRICLE ANGIO: CPT | Mod: 26 | Performed by: INTERNAL MEDICINE

## 2022-12-05 RX ORDER — ASPIRIN 325 MG
325 TABLET ORAL ONCE
Status: DISCONTINUED | OUTPATIENT
Start: 2022-12-05 | End: 2022-12-05 | Stop reason: HOSPADM

## 2022-12-05 RX ORDER — LIDOCAINE 40 MG/G
CREAM TOPICAL
Status: DISCONTINUED | OUTPATIENT
Start: 2022-12-05 | End: 2022-12-05 | Stop reason: HOSPADM

## 2022-12-05 RX ORDER — FLUMAZENIL 0.1 MG/ML
0.2 INJECTION, SOLUTION INTRAVENOUS
Status: DISCONTINUED | OUTPATIENT
Start: 2022-12-05 | End: 2022-12-05 | Stop reason: HOSPADM

## 2022-12-05 RX ORDER — NALOXONE HYDROCHLORIDE 0.4 MG/ML
0.4 INJECTION, SOLUTION INTRAMUSCULAR; INTRAVENOUS; SUBCUTANEOUS
Status: DISCONTINUED | OUTPATIENT
Start: 2022-12-05 | End: 2022-12-05 | Stop reason: HOSPADM

## 2022-12-05 RX ORDER — ACETAMINOPHEN 325 MG/1
650 TABLET ORAL EVERY 4 HOURS PRN
Status: DISCONTINUED | OUTPATIENT
Start: 2022-12-05 | End: 2022-12-05 | Stop reason: HOSPADM

## 2022-12-05 RX ORDER — OXYCODONE HYDROCHLORIDE 5 MG/1
5 TABLET ORAL EVERY 4 HOURS PRN
Status: DISCONTINUED | OUTPATIENT
Start: 2022-12-05 | End: 2022-12-05 | Stop reason: HOSPADM

## 2022-12-05 RX ORDER — FENTANYL CITRATE 50 UG/ML
25 INJECTION, SOLUTION INTRAMUSCULAR; INTRAVENOUS
Status: DISCONTINUED | OUTPATIENT
Start: 2022-12-05 | End: 2022-12-05 | Stop reason: HOSPADM

## 2022-12-05 RX ORDER — FENTANYL CITRATE 50 UG/ML
INJECTION, SOLUTION INTRAMUSCULAR; INTRAVENOUS
Status: DISCONTINUED | OUTPATIENT
Start: 2022-12-05 | End: 2022-12-05 | Stop reason: HOSPADM

## 2022-12-05 RX ORDER — DEXTROSE MONOHYDRATE 25 G/50ML
25-50 INJECTION, SOLUTION INTRAVENOUS
Status: DISCONTINUED | OUTPATIENT
Start: 2022-12-05 | End: 2022-12-05 | Stop reason: HOSPADM

## 2022-12-05 RX ORDER — ASPIRIN 81 MG/1
243 TABLET, CHEWABLE ORAL ONCE
Status: DISCONTINUED | OUTPATIENT
Start: 2022-12-05 | End: 2022-12-05 | Stop reason: HOSPADM

## 2022-12-05 RX ORDER — NICOTINE POLACRILEX 4 MG
15-30 LOZENGE BUCCAL
Status: DISCONTINUED | OUTPATIENT
Start: 2022-12-05 | End: 2022-12-05 | Stop reason: HOSPADM

## 2022-12-05 RX ORDER — SODIUM CHLORIDE 9 MG/ML
INJECTION, SOLUTION INTRAVENOUS CONTINUOUS
Status: DISCONTINUED | OUTPATIENT
Start: 2022-12-05 | End: 2022-12-05 | Stop reason: HOSPADM

## 2022-12-05 RX ORDER — OXYCODONE HYDROCHLORIDE 5 MG/1
10 TABLET ORAL EVERY 4 HOURS PRN
Status: DISCONTINUED | OUTPATIENT
Start: 2022-12-05 | End: 2022-12-05 | Stop reason: HOSPADM

## 2022-12-05 RX ORDER — DIAZEPAM 10 MG
10 TABLET ORAL
Status: COMPLETED | OUTPATIENT
Start: 2022-12-05 | End: 2022-12-05

## 2022-12-05 RX ORDER — NALOXONE HYDROCHLORIDE 0.4 MG/ML
0.2 INJECTION, SOLUTION INTRAMUSCULAR; INTRAVENOUS; SUBCUTANEOUS
Status: DISCONTINUED | OUTPATIENT
Start: 2022-12-05 | End: 2022-12-05 | Stop reason: HOSPADM

## 2022-12-05 RX ORDER — ATROPINE SULFATE 0.1 MG/ML
0.5 INJECTION INTRAVENOUS
Status: DISCONTINUED | OUTPATIENT
Start: 2022-12-05 | End: 2022-12-05 | Stop reason: HOSPADM

## 2022-12-05 RX ADMIN — PERFLUTREN 3 ML: 6.52 INJECTION, SUSPENSION INTRAVENOUS at 11:05

## 2022-12-05 RX ADMIN — DIAZEPAM 10 MG: 10 TABLET ORAL at 10:10

## 2022-12-05 RX ADMIN — SODIUM CHLORIDE: 9 INJECTION, SOLUTION INTRAVENOUS at 09:57

## 2022-12-05 ASSESSMENT — ACTIVITIES OF DAILY LIVING (ADL)
ADLS_ACUITY_SCORE: 35

## 2022-12-05 ASSESSMENT — EJECTION FRACTION: EF_VALUE: .19

## 2022-12-05 NOTE — PROGRESS NOTES
Patient was kept comfortable during post-procedure stay.VSS. Denies pain. Right radial access site remains dry & free from signs of bleeding. Appointments made & included in AVS. Dr. North was able to speak with patient post procedure. Post-op instructions given to patient & spouse. Able to ask questions. Verbalized no concerns. Belongings returned. Discharged in stable condition.

## 2022-12-05 NOTE — PRE-PROCEDURE
GENERAL PRE-PROCEDURE:   Procedure:  Coronary angiogram with possible PCI  Date/Time:  12/5/2022 9:47 AM    Written consent obtained?: Yes    Risks and benefits: Risks, benefits and alternatives were discussed    Consent given by:  Patient  Patient states understanding of procedure being performed: Yes    Patient's understanding of procedure matches consent: Yes    Procedure consent matches procedure scheduled: Yes    Expected level of sedation:  Moderate  Appropriately NPO:  Yes  ASA Class:  3 (chest pain, abnormal stress test, HFrEF; NYHA Class II, DM Type II, Class I obesity; BMI 31.06kg/m2)  Mallampati  :  Grade 3- soft palate visible, posterior pharyngeal wall not visible  Lungs:  Lungs clear with good breath sounds bilaterally  Heart:  Normal heart sounds and rate  History & Physical reviewed:  History and physical reviewed and no updates needed  Statement of review:  I have reviewed the lab findings, diagnostic data, medications, and the plan for sedation

## 2022-12-05 NOTE — DISCHARGE INSTRUCTIONS
Interventional Cardiology  Coronary Angiogram/Angioplasty/Stent/Atherectomy Discharge  Instructions -   Radial (wrist) Approach       The instructions below are to help you understand how to take care of yourself. There is also information about when to call the doctor or emergency services.    **Do not stop your aspirin or platelet inhibitor unless directed by your Cardiologist.  These medications help to prevent platelets in your blood from sticking together and forming a clot.   Examples of these medications are: Ticagrelor (Brilinta), Clopidogrel (Plavix), Prasugrel (Effient)    For 24 hours after procedure:  Do not subject hand/arm to any forceful movements for 24 hours, such as supporting weight when rising from a chair or bed.  Do not drive a car for 24 hours.  The dressing on the puncture site may be removed after 24 hours and left open to air. If minor oozing, you may apply a Band-Aid and remove after 12 hours.   You may shower on the day after your procedure. Do not take a tub bath or wash dishes (no soaking wrist) with the puncture site in water for 3 days after the procedure.    For 48 hours following the procedure:  Do not operate a lawnmower, motorcycle, chainsaw or all-terrain vehicle.  Do not lift anything heavier than 5-10 pounds with affected arm for 5 days.  Avoid excessive bending (flexion/extension) wrist movement.  Do not engage in vigorous exercise (i.e. tennis, golf) using the affected arm for 5 days after discharge.  You may return to work in 72 hours if no complications and no heavy lifting.    If bleeding should occur following discharge:  Sit down and apply firm pressure with your thumb against the puncture site and fingers against back of wrist for 10 minutes.  If the bleeding stops, continue to rest, keeping your wrist still for 2 hours. Notify your doctor as soon as possible.  If bleeding does not stop after 10 minutes or if there is a large amount of bleeding or spurting, call 911  immediately. Do not drive yourself to the hospital.           Contact the [facility] at [phone] if you develop:  Fever over 100.4, that lasts more than one day  Redness, heat, or pus at the puncture site  Change in color or temperature of the hand or arm    Expect mild tingling of hand and tenderness at the puncture site for up to 3 days. You may take Tylenol or a pain medicine recommended by your doctor.                       Our Cardiac Rehab staff may visit briefly with you while your in the hospital.   If they miss you, someone will contact you after you are home.  You are encouraged to enroll in an Outpatient Cardiac Rehab Program     No elective dental work for 6 weeks after having a stent    Your Procedural Physician was: Mary Anne  the phone number is: [phone]    [facility]  [phone]  If you are calling after hours, please listen to the entire voicemail, a live  will answer at the end of the message    Procedural Sedation  Procedural sedation is medicine to ease discomfort, pain, and anxiety during a procedure. The medicine is often given through an IV (intravenous) line in your arm or hand. In some cases, the medicine may be taken by mouth or inhaled. While you are under sedation, you will likely be awake. But you may not remember anything afterward.   Why procedural sedation is used  Sedation is used for many types of procedures. The goal is to reduce pain, anxiety, and stressful memories of a procedure. It can help your healthcare provider treat you. For example, having a broken bone fixed may be easier if you feel relaxed.   This type of sedation is used only for short, basic procedures. It's not used for complex surgery. Some procedures that use this type of sedation include:   Dental surgery  Breast biopsy, to take a sample of breast tissue  Endoscopy, to look at gastrointestinal problems  Bronchoscopy, to check for lung problems  Bone or joint  realignment, to fix a broken bone or dislocated joint  Minor foot or skin surgery  Electrical cardioversion, to restore a normal heart rhythm  Lumbar puncture, to assess neurological disease  Risks of procedural sedation  Risks and possible side effects include:   Headache  Nausea and vomiting  Unpleasant memory of the procedure  Lowered rate of breathing  Changes in heart rate and blood pressure (rare)  Inhalation of stomach contents into your lungs (rare)  Side effects will likely go away shortly after the procedure. Your healthcare team will watch your heart rate and breathing during and after your sedation. This is to help prevent problems.   Your own risks may vary. They can be based on your age and your overall health. They also depend on the type of sedation you are given. Talk with your healthcare provider about the risks that apply most to you.   Getting ready for procedural sedation   Talk with your healthcare provider about how to get ready for your procedure. Tell him or her about all the medicines you take. This includes over-the-counter medicines such as ibuprofen. It also includes vitamins, herbs, and other supplements. You may need to stop taking some medicines before the procedure, such as blood thinners and aspirin. If you smoke, you should stop. This is to lessen the chance of a lung problem. Talk with your healthcare provider if you need help to stop smoking.   Tell your healthcare provider if you:   Have had any problems in the past with sedation or anesthesia  Have had any recent changes in your health, such as an infection or fever  Are pregnant or think you could be  Also:  Ask a family member or friend to take you home after the procedure. You can t drive on the day you have sedation.  Follow any directions you are given for not eating or drinking before procedure.  Don't make any important decisions, such as financial or legal, on the day after you have sedation.  Follow all other  instructions from your healthcare provider.  During your procedural sedation  You may have your procedure in a hospital or a medical clinic. Sedation is done by a trained healthcare provider. In general, you can expect the following:   You will be given medicine through an IV line in your arm or hand. Or you may receive a shot. The medicine may also be given by mouth. Or you may inhale it through a mask.  If you have medicine through an IV, you may feel the effects very quickly. You will start to feel relaxed and drowsy.  During the procedure, your heart rate, breathing, and blood pressure will be closely watched. Your breathing and blood pressure may decrease a little. But you will likely not need help with your breathing. You may receive a little extra oxygen. This is done through a mask or some soft plastic prongs under your nose.  You will probably be awake the entire time. If you do fall asleep, you should be easy to wake up, if needed. You should feel little or no pain.  When your procedure is over, the sedative medicine will be stopped.  After your procedural sedation  You will begin to feel more awake and aware. But you will likely be drowsy for a while afterward. You will be closely watched as you become more alert. You may have a faint memory of the procedure. Or you may not remember it at all.   You should be able to return home within 1 to 2 hours after your procedure. Plan to have someone stay with you for a few hours. Side effects such as headache and nausea may go away quickly. Tell your healthcare provider if they continue.   Don t drive or make any important decisions for at least 24 hours. Be sure to follow all after-care instructions.   When to call your healthcare provider  Have someone call your healthcare provider right away if you have any of these:   Drowsiness that gets worse  Weakness or dizziness that gets worse  Repeated vomiting  You can t be awakened  Severe or ongoing pain from the  procedure, not relieved by the pain medicine  [employer] last reviewed this educational content on 11/1/2019 2000-2021 [employer]. All rights reserved. This information is not intended as a substitute for professional medical care. Always follow your healthcare professional's instructions.

## 2022-12-05 NOTE — INTERVAL H&P NOTE
"I have reviewed the surgical (or preoperative) H&P that is linked to this encounter, and examined the patient. There are no significant changes    Clinical Conditions Present on Arrival:  Clinically Significant Risk Factors Present on Admission          # Hyperkalemia: Highest K = 5.4 mmol/L (Ref range: 3.4-5.3) in last 30 days, will monitor as appropriate   # Hypercalcemia: Highest Ca = 10.5 mg/dL (Ref range: 8.5 - 10.1 mg/dL) in last 30 days, will monitor as appropriate         # DMII: A1C = 7.3 % (Ref range: 0.0 - 5.6 %) within past 3 months  # Obesity: Estimated body mass index is 31.06 kg/m  as calculated from the following:    Height as of this encounter: 1.854 m (6' 1\").    Weight as of this encounter: 106.8 kg (235 lb 6.4 oz).       "

## 2022-12-08 DIAGNOSIS — Z76.0 ENCOUNTER FOR MEDICATION REFILL: Primary | ICD-10-CM

## 2022-12-08 DIAGNOSIS — R94.39 ABNORMAL CARDIOVASCULAR STRESS TEST: ICD-10-CM

## 2022-12-08 DIAGNOSIS — R07.9 CHEST PAIN, UNSPECIFIED TYPE: ICD-10-CM

## 2022-12-08 RX ORDER — NITROGLYCERIN 0.4 MG/1
0.4 TABLET SUBLINGUAL EVERY 5 MIN PRN
Qty: 90 TABLET | Refills: 3 | Status: SHIPPED | OUTPATIENT
Start: 2022-12-08 | End: 2024-06-14

## 2022-12-12 ENCOUNTER — OFFICE VISIT (OUTPATIENT)
Dept: CARDIOLOGY | Facility: CLINIC | Age: 62
End: 2022-12-12
Attending: INTERNAL MEDICINE
Payer: COMMERCIAL

## 2022-12-12 VITALS
RESPIRATION RATE: 16 BRPM | HEIGHT: 73 IN | WEIGHT: 245 LBS | DIASTOLIC BLOOD PRESSURE: 80 MMHG | BODY MASS INDEX: 32.47 KG/M2 | SYSTOLIC BLOOD PRESSURE: 140 MMHG | HEART RATE: 90 BPM

## 2022-12-12 DIAGNOSIS — R94.39 ABNORMAL CARDIOVASCULAR STRESS TEST: ICD-10-CM

## 2022-12-12 DIAGNOSIS — I25.118 CORONARY ARTERY DISEASE OF NATIVE ARTERY OF NATIVE HEART WITH STABLE ANGINA PECTORIS (H): Primary | ICD-10-CM

## 2022-12-12 DIAGNOSIS — I20.0 PROGRESSIVE ANGINA (H): ICD-10-CM

## 2022-12-12 PROCEDURE — 99205 OFFICE O/P NEW HI 60 MIN: CPT | Performed by: THORACIC SURGERY (CARDIOTHORACIC VASCULAR SURGERY)

## 2022-12-12 NOTE — PATIENT INSTRUCTIONS
You were seen today in the [facility]    Lisa our  will call you to schedule surgery and pre-admission testing.    Please call MARTY Calderon surgery coordinator with any questions.  Thank you.    Phone [phone]  Fax [phone]

## 2022-12-12 NOTE — LETTER
12/12/2022    Catalino Nuñez MD  [address]  [place] [address]    RE: Kash Tavarez       Dear Colleague,     I had the pleasure of seeing Kash Tavarez in the [facility].  [place] Cardiovascular Surgery Consult     Kash Tavarez MRN# [id number]   YOB: 1960 Age: 62 year old      Primary care provider: Catalino Nuñez    Referring Cardiologist(s): Kalia North MD and Aki Pereira MD    Date of Service: December 12, 2022    Reason for consult: Severe, multi-vessel coronary artery disease           Assessment and Plan:   Kash Tavarez is a 62 year old male with diabetes and symptoms of stable angina. Coronary angiogram reveals severe, multi-vessel disease. Echocardiogram reveals preserved function without valvular abnormalities. I recommend coronary artery bypass. I described the technical details, as well as the expected postoperative course and recovery to the patient. I also discussed the risks, benefits, and alternatives of the procedure. The risks include but are not limited to bleeding, infection, stroke, heart attack, graft failure, dysrhythmia, respiratory failure, kidney or liver injury, nerve injury, bowel or limb ischemia, and death. The calculated STS risk of mortality is 1%, and the calculated STS risk of major morbidity or mortality is 5%. The patient understands these risks and wishes to undergo the operation.    1) Schedule for coronary artery bypass, BIMA and left radial  2) Preop bloodwork, education, and carotid ultrasound    Arturo Bear MD  Cardiothoracic Surgery  [phone]             Chief Complaint:   Chest pain         History of Present Illness:   Mr. Kash Tavarez is a 62 year old male with a history significant for diabetes and hypertension who presented with several months of progressive chest pressure and dyspnea with exertion.  A stress test ordered for ischemic evaluation was positive.  This was followed up by an invasive  coronary angiogram which identified severe multivessel disease.  He denies symptoms at rest and states that his symptoms resolve with rest.  He denies palpitations, syncope, or or orthopnea.                  Past Medical History:     Past Medical History:   Diagnosis Date     Coronary artery disease of native artery of native heart with stable angina pectoris (H)      Hypertension      Nephrolithiasis      Type 2 diabetes mellitus (H)              Past Surgical History:     Past Surgical History:   Procedure Laterality Date     COLONOSCOPY       COMBINED CYSTOSCOPY, INSERT STENT URETER(S) Right 5/12/2022    Procedure: CYSTOURETEROSCOPY, RETROGRADE PYELOGRAM, LASER LITHOTRIPSY WITH CALCULUS REMOVAL AND URETERAL STENT INSERTION;  Surgeon: James Mosley MD;  Location: [facility]     CV CORONARY ANGIOGRAM N/A 12/5/2022    Procedure: Coronary Angiogram;  Surgeon: Aristides North MD;  Location: [facility] CATH LAB CV     CV LEFT HEART CATH N/A 12/5/2022    Procedure: Left Heart Catheterization;  Surgeon: Aristides North MD;  Location: [facility] CATH LAB CV     KIDNEY STONE SURGERY       ZZC APPENDECTOMY      Description: Appendectomy;  Recorded: 03/04/2008;              Social History:     Social History     Socioeconomic History     Marital status:      Spouse name: Not on file     Number of children: Not on file     Years of education: Not on file     Highest education level: Not on file   Occupational History     Not on file   Tobacco Use     Smoking status: Never     Smokeless tobacco: Never   Vaping Use     Vaping Use: Never used   Substance and Sexual Activity     Alcohol use: No     Drug use: No     Sexual activity: Yes     Partners: Female   Other Topics Concern     Not on file   Social History Narrative     Not on file     Social Determinants of Health     Financial Resource Strain: Not on file   Food Insecurity: Not on file   Transportation Needs: Not on file   Physical Activity: Not on file    Stress: Not on file   Social Connections: Not on file   Intimate Partner Violence: Not on file   Housing Stability: Not on file            Family History:     Family History   Problem Relation Age of Onset     Heart Disease Father              Immunizations:     Immunization History   Administered Date(s) Administered     COVID-19 Vaccine 12+ (Pfizer 2022) 05/05/2022     COVID-19 Vaccine 12+ (Pfizer) 03/17/2021, 04/07/2021, 10/14/2021     COVID-19 Vaccine Bivalent Booster 12+ (Pfizer) 10/14/2022     DT (PEDS <7y) 04/01/2005     FLU 6-35 months 10/28/2009, 10/17/2015     Flu, Unspecified 09/10/2019     HepA, Unspecified 03/16/2010, 03/21/2011     HepA-Adult 03/16/2010, 03/21/2011     Influenza (H1N1) 01/19/2010     Influenza (IIV3) PF 10/27/2010, 09/21/2011, 09/19/2012, 09/18/2013, 10/09/2014     Influenza Vaccine 50-64 or 18-64 w/egg allergy (Flublok) 10/14/2022     Influenza Vaccine >6 months (Alfuria,Fluzone) 10/06/2017, 10/06/2021     Influenza Vaccine, 6+MO IM (QUADRIVALENT W/PRESERVATIVES) 10/21/2016, 10/09/2018     Influenza,INJ,MDCK,PF,Quad >6mo(Flucelvax) 09/10/2019, 09/26/2020     Pneumo Conj 13-V (2010&after) 11/05/2020     Pneumococcal 23 valent 11/04/2019     Td (Adult), Adsorbed 04/01/2005     Td,adult,historic,unspecified 04/01/2005     Tdap (Adacel,Boostrix) 05/22/2013     Zoster vaccine recombinant adjuvanted (SHINGRIX) 11/04/2019, 01/15/2020     Zoster vaccine, live 05/13/2015             Allergies:    No Known Allergies          Medications:     Current Outpatient Medications   Medication     nitroGLYcerin (NITROSTAT) 0.4 MG sublingual tablet     ACCU-CHEK FASTCLIX Misc     ACCU-CHEK GUIDE test strip     aspirin (ASA) 81 MG EC tablet     atorvastatin (LIPITOR) 40 MG tablet     blood glucose monitoring (ACCU-CHEK FASTCLIX) lancets     blood-glucose meter Misc     imiquimod (ALDARA) 5 % cream     lisinopril (ZESTRIL) 20 MG tablet     loratadine (CLARITIN) 10 MG tablet     metFORMIN (GLUCOPHAGE) 500  "MG tablet     metoprolol succinate ER (TOPROL XL) 25 MG 24 hr tablet     metroNIDAZOLE (METROGEL) 0.75 % gel     multivitamin, therapeutic (THERA-VIT) TABS tablet     oxymetazoline (RHOFADE) 1 % Crea     triamcinolone (KENALOG) 0.1 % external cream     Vitamin D3 (CHOLECALCIFEROL) 25 mcg (1000 units) tablet     No current facility-administered medications for this visit.             Review of Systems:     A 10 point ROS was performed and is negative other than HPI.             Physical Exam:   BP (!) 140/80 (BP Location: Right arm, Patient Position: Sitting, Cuff Size: Adult Large)   Pulse 90   Resp 16   Ht 1.854 m (6' 1\")   Wt 111.1 kg (245 lb)   BMI 32.32 kg/m       Gen: NAD  Neck: No JVD, trachea midline  ENT: EOMI, anicteric  Lungs: CTAB, non-labored breathing  CV: regular rhythm, normal rate  Abd: soft, nt, nd  Ext: no deformities, LUE dominant arm and Ketan's test negative with brisk capillary refill upon ulnar artery release  Neuro: AOx3    Labs:  Lab Results   Component Value Date    WBC 9.8 12/02/2022    HGB 14.8 12/02/2022    HCT 45.8 12/02/2022     12/02/2022     12/02/2022    POTASSIUM 4.9 12/02/2022    CHLORIDE 104 12/02/2022    CO2 28 12/02/2022    BUN 13 12/02/2022    CR 0.89 12/02/2022     (H) 12/02/2022    AST 18 11/17/2022    ALT 20 11/17/2022    ALKPHOS 74 11/17/2022    BILITOTAL 0.5 11/17/2022       Imaging:  Transthoracic echocardiogram (12/5/2022): I have personally reviewed these images  LVEF 60-65%, concentric LVH, mod LA dilation, no significant valvular disease     Coronary angiogram (12/5/2022):  I have personally reviewed these images  pLAD bifurcation at D1 lesion extending to D2  pLCx lesion including OM1  rPL moderate disease      Thank you for allowing me to participate in the care of your patient.    Sincerely,   Arturo Bear MD   [facility]  cc:   Aristides North MD  [address] [place] [address] " [phone]  [place] [address]

## 2022-12-12 NOTE — PROGRESS NOTES
[facility] Cardiovascular Surgery Consult     Kash Tavarez MRN# [id number]   YOB: 1960 Age: 62 year old      Primary care provider: Catalino Nuñez    Referring Cardiologist(s): Kalia North MD and Aki Pereira MD    Date of Service: December 12, 2022    Reason for consult: Severe, multi-vessel coronary artery disease           Assessment and Plan:   Kash Tavarez is a 62 year old male with diabetes and symptoms of stable angina. Coronary angiogram reveals severe, multi-vessel disease. Echocardiogram reveals preserved function without valvular abnormalities. I recommend coronary artery bypass. I described the technical details, as well as the expected postoperative course and recovery to the patient. I also discussed the risks, benefits, and alternatives of the procedure. The risks include but are not limited to bleeding, infection, stroke, heart attack, graft failure, dysrhythmia, respiratory failure, kidney or liver injury, nerve injury, bowel or limb ischemia, and death. The calculated STS risk of mortality is 1%, and the calculated STS risk of major morbidity or mortality is 5%. The patient understands these risks and wishes to undergo the operation.    1) Schedule for coronary artery bypass, BIMA and left radial  2) Preop bloodwork, education, and carotid ultrasound    Arturo Bear MD  Cardiothoracic Surgery  [phone]             Chief Complaint:   Chest pain         History of Present Illness:   Mr. Kash Tavarez is a 62 year old male with a history significant for diabetes and hypertension who presented with several months of progressive chest pressure and dyspnea with exertion.  A stress test ordered for ischemic evaluation was positive.  This was followed up by an invasive coronary angiogram which identified severe multivessel disease.  He denies symptoms at rest and states that his symptoms resolve with rest.  He denies palpitations, syncope, or or orthopnea.                   Past Medical History:     Past Medical History:   Diagnosis Date     Coronary artery disease of native artery of native heart with stable angina pectoris (H)      Hypertension      Nephrolithiasis      Type 2 diabetes mellitus (H)              Past Surgical History:     Past Surgical History:   Procedure Laterality Date     COLONOSCOPY       COMBINED CYSTOSCOPY, INSERT STENT URETER(S) Right 5/12/2022    Procedure: CYSTOURETEROSCOPY, RETROGRADE PYELOGRAM, LASER LITHOTRIPSY WITH CALCULUS REMOVAL AND URETERAL STENT INSERTION;  Surgeon: James Mosley MD;  Location: [facility]     CV CORONARY ANGIOGRAM N/A 12/5/2022    Procedure: Coronary Angiogram;  Surgeon: Aristides North MD;  Location: [facility] CATH LAB CV     CV LEFT HEART CATH N/A 12/5/2022    Procedure: Left Heart Catheterization;  Surgeon: Aristides North MD;  Location: [facility] CATH LAB CV     KIDNEY STONE SURGERY       ZZC APPENDECTOMY      Description: Appendectomy;  Recorded: 03/04/2008;              Social History:     Social History     Socioeconomic History     Marital status:      Spouse name: Not on file     Number of children: Not on file     Years of education: Not on file     Highest education level: Not on file   Occupational History     Not on file   Tobacco Use     Smoking status: Never     Smokeless tobacco: Never   Vaping Use     Vaping Use: Never used   Substance and Sexual Activity     Alcohol use: No     Drug use: No     Sexual activity: Yes     Partners: Female   Other Topics Concern     Not on file   Social History Narrative     Not on file     Social Determinants of Health     Financial Resource Strain: Not on file   Food Insecurity: Not on file   Transportation Needs: Not on file   Physical Activity: Not on file   Stress: Not on file   Social Connections: Not on file   Intimate Partner Violence: Not on file   Housing Stability: Not on file            Family History:     Family History   Problem Relation Age of Onset      Heart Disease Father              Immunizations:     Immunization History   Administered Date(s) Administered     COVID-19 Vaccine 12+ (Pfizer 2022) 05/05/2022     COVID-19 Vaccine 12+ (Pfizer) 03/17/2021, 04/07/2021, 10/14/2021     COVID-19 Vaccine Bivalent Booster 12+ (Pfizer) 10/14/2022     DT (PEDS <7y) 04/01/2005     FLU 6-35 months 10/28/2009, 10/17/2015     Flu, Unspecified 09/10/2019     HepA, Unspecified 03/16/2010, 03/21/2011     HepA-Adult 03/16/2010, 03/21/2011     Influenza (H1N1) 01/19/2010     Influenza (IIV3) PF 10/27/2010, 09/21/2011, 09/19/2012, 09/18/2013, 10/09/2014     Influenza Vaccine 50-64 or 18-64 w/egg allergy (Flublok) 10/14/2022     Influenza Vaccine >6 months (Alfuria,Fluzone) 10/06/2017, 10/06/2021     Influenza Vaccine, 6+MO IM (QUADRIVALENT W/PRESERVATIVES) 10/21/2016, 10/09/2018     Influenza,INJ,MDCK,PF,Quad >6mo(Flucelvax) 09/10/2019, 09/26/2020     Pneumo Conj 13-V (2010&after) 11/05/2020     Pneumococcal 23 valent 11/04/2019     Td (Adult), Adsorbed 04/01/2005     Td,adult,historic,unspecified 04/01/2005     Tdap (Adacel,Boostrix) 05/22/2013     Zoster vaccine recombinant adjuvanted (SHINGRIX) 11/04/2019, 01/15/2020     Zoster vaccine, live 05/13/2015             Allergies:    No Known Allergies          Medications:     Current Outpatient Medications   Medication     nitroGLYcerin (NITROSTAT) 0.4 MG sublingual tablet     ACCU-CHEK FASTCLIX [facility]     ACCU-CHEK GUIDE test strip     aspirin (ASA) 81 MG EC tablet     atorvastatin (LIPITOR) 40 MG tablet     blood glucose monitoring (ACCU-CHEK FASTCLIX) lancets     blood-glucose meter Misc     imiquimod (ALDARA) 5 % cream     lisinopril (ZESTRIL) 20 MG tablet     loratadine (CLARITIN) 10 MG tablet     metFORMIN (GLUCOPHAGE) 500 MG tablet     metoprolol succinate ER (TOPROL XL) 25 MG 24 hr tablet     metroNIDAZOLE (METROGEL) 0.75 % gel     multivitamin, therapeutic (THERA-VIT) TABS tablet     oxymetazoline (RHOFADE) 1 % Crea      "triamcinolone (KENALOG) 0.1 % external cream     Vitamin D3 (CHOLECALCIFEROL) 25 mcg (1000 units) tablet     No current facility-administered medications for this visit.             Review of Systems:     A 10 point ROS was performed and is negative other than HPI.             Physical Exam:   BP (!) 140/80 (BP Location: Right arm, Patient Position: Sitting, Cuff Size: Adult Large)   Pulse 90   Resp 16   Ht 1.854 m (6' 1\")   Wt 111.1 kg (245 lb)   BMI 32.32 kg/m       Gen: NAD  Neck: No JVD, trachea midline  ENT: EOMI, anicteric  Lungs: CTAB, non-labored breathing  CV: regular rhythm, normal rate  Abd: soft, nt, nd  Ext: no deformities, LUE dominant arm and Ketan's test negative with brisk capillary refill upon ulnar artery release  Neuro: AOx3    Labs:  Lab Results   Component Value Date    WBC 9.8 12/02/2022    HGB 14.8 12/02/2022    HCT 45.8 12/02/2022     12/02/2022     12/02/2022    POTASSIUM 4.9 12/02/2022    CHLORIDE 104 12/02/2022    CO2 28 12/02/2022    BUN 13 12/02/2022    CR 0.89 12/02/2022     (H) 12/02/2022    AST 18 11/17/2022    ALT 20 11/17/2022    ALKPHOS 74 11/17/2022    BILITOTAL 0.5 11/17/2022       Imaging:  Transthoracic echocardiogram (12/5/2022): I have personally reviewed these images  LVEF 60-65%, concentric LVH, mod LA dilation, no significant valvular disease     Coronary angiogram (12/5/2022):  I have personally reviewed these images  pLAD bifurcation at D1 lesion extending to D2  pLCx lesion including OM1  rPL moderate disease  "

## 2022-12-13 ENCOUNTER — PREP FOR PROCEDURE (OUTPATIENT)
Dept: CARDIOLOGY | Facility: CLINIC | Age: 62
End: 2022-12-13

## 2022-12-13 DIAGNOSIS — I25.118 CORONARY ARTERY DISEASE OF NATIVE ARTERY OF NATIVE HEART WITH STABLE ANGINA PECTORIS (H): Primary | ICD-10-CM

## 2022-12-13 DIAGNOSIS — I25.10 CAD (CORONARY ARTERY DISEASE): Primary | ICD-10-CM

## 2022-12-13 RX ORDER — CHLORHEXIDINE GLUCONATE ORAL RINSE 1.2 MG/ML
10 SOLUTION DENTAL ONCE
Status: CANCELLED | OUTPATIENT
Start: 2023-01-13 | End: 2022-12-13

## 2022-12-13 RX ORDER — CEFAZOLIN SODIUM 2 G/100ML
2 INJECTION, SOLUTION INTRAVENOUS SEE ADMIN INSTRUCTIONS
Status: CANCELLED | OUTPATIENT
Start: 2022-12-13

## 2022-12-13 RX ORDER — SODIUM CHLORIDE, SODIUM GLUCONATE, SODIUM ACETATE, POTASSIUM CHLORIDE AND MAGNESIUM CHLORIDE 526; 502; 368; 37; 30 MG/100ML; MG/100ML; MG/100ML; MG/100ML; MG/100ML
1000 INJECTION, SOLUTION INTRAVENOUS
Status: CANCELLED | OUTPATIENT
Start: 2022-12-23 | End: 2022-12-23

## 2022-12-13 RX ORDER — LIDOCAINE 40 MG/G
CREAM TOPICAL
Status: CANCELLED | OUTPATIENT
Start: 2022-12-13

## 2022-12-13 RX ORDER — DEXMEDETOMIDINE HYDROCHLORIDE 4 UG/ML
0.2-1.2 INJECTION, SOLUTION INTRAVENOUS CONTINUOUS
Status: CANCELLED | OUTPATIENT
Start: 2022-12-13

## 2022-12-13 RX ORDER — ASPIRIN 81 MG/1
162 TABLET, CHEWABLE ORAL
Status: CANCELLED | OUTPATIENT
Start: 2022-12-13

## 2022-12-13 RX ORDER — CEFAZOLIN SODIUM 2 G/100ML
2 INJECTION, SOLUTION INTRAVENOUS
Status: CANCELLED | OUTPATIENT
Start: 2022-12-13

## 2022-12-13 RX ORDER — PHENYLEPHRINE HCL IN 0.9% NACL 50MG/250ML
.1-6 PLASTIC BAG, INJECTION (ML) INTRAVENOUS CONTINUOUS
Status: CANCELLED | OUTPATIENT
Start: 2022-12-13

## 2022-12-13 RX ORDER — ASPIRIN 81 MG/1
81 TABLET, CHEWABLE ORAL
Status: CANCELLED | OUTPATIENT
Start: 2022-12-13

## 2022-12-16 ENCOUNTER — HOSPITAL ENCOUNTER (OUTPATIENT)
Dept: RADIOLOGY | Facility: HOSPITAL | Age: 62
Discharge: HOME | End: 2022-12-16
Attending: THORACIC SURGERY (CARDIOTHORACIC VASCULAR SURGERY)
Payer: COMMERCIAL

## 2022-12-16 ENCOUNTER — HOSPITAL ENCOUNTER (OUTPATIENT)
Dept: ULTRASOUND IMAGING | Facility: HOSPITAL | Age: 62
Discharge: HOME | End: 2022-12-16
Attending: THORACIC SURGERY (CARDIOTHORACIC VASCULAR SURGERY)
Payer: COMMERCIAL

## 2022-12-16 ENCOUNTER — DOCUMENTATION ONLY (OUTPATIENT)
Dept: OTHER | Facility: CLINIC | Age: 62
End: 2022-12-16

## 2022-12-16 ENCOUNTER — HOSPITAL ENCOUNTER (OUTPATIENT)
Dept: SURGERY | Facility: HOSPITAL | Age: 62
Discharge: HOME | End: 2022-12-16
Admitting: THORACIC SURGERY (CARDIOTHORACIC VASCULAR SURGERY)
Payer: COMMERCIAL

## 2022-12-16 ENCOUNTER — ANESTHESIA EVENT (OUTPATIENT)
Dept: SURGERY | Facility: HOSPITAL | Age: 62
End: 2022-12-16
Payer: COMMERCIAL

## 2022-12-16 VITALS — WEIGHT: 246.4 LBS | HEIGHT: 73 IN | HEART RATE: 78 BPM | BODY MASS INDEX: 32.66 KG/M2

## 2022-12-16 DIAGNOSIS — I25.118 CORONARY ARTERY DISEASE OF NATIVE ARTERY OF NATIVE HEART WITH STABLE ANGINA PECTORIS (H): ICD-10-CM

## 2022-12-16 LAB
ABO/RH(D): NORMAL
ALBUMIN UR-MCNC: NEGATIVE MG/DL
ANTIBODY SCREEN: NEGATIVE
APPEARANCE UR: CLEAR
APTT PPP: 23 SECONDS (ref 22–38)
BILIRUB UR QL STRIP: NEGATIVE
COLOR UR AUTO: YELLOW
GLUCOSE UR STRIP-MCNC: >1000 MG/DL
HGB UR QL STRIP: NEGATIVE
INR PPP: 0.99 (ref 0.85–1.15)
KETONES UR STRIP-MCNC: NEGATIVE MG/DL
LEUKOCYTE ESTERASE UR QL STRIP: NEGATIVE
MAGNESIUM SERPL-MCNC: 1.6 MG/DL (ref 1.7–2.3)
MRSA DNA SPEC QL NAA+PROBE: NEGATIVE
MUCOUS THREADS #/AREA URNS LPF: PRESENT /LPF
NITRATE UR QL: NEGATIVE
PH UR STRIP: 5.5 [PH] (ref 5–7)
PREALB SERPL IA-MCNC: 31 MG/DL (ref 15–45)
RBC URINE: 0 /HPF
SA TARGET DNA: POSITIVE
SP GR UR STRIP: 1.02 (ref 1–1.03)
SPECIMEN EXPIRATION DATE: NORMAL
UROBILINOGEN UR STRIP-MCNC: <2 MG/DL
WBC URINE: 0 /HPF

## 2022-12-16 PROCEDURE — 93880 EXTRACRANIAL BILAT STUDY: CPT

## 2022-12-16 PROCEDURE — 85730 THROMBOPLASTIN TIME PARTIAL: CPT | Performed by: THORACIC SURGERY (CARDIOTHORACIC VASCULAR SURGERY)

## 2022-12-16 PROCEDURE — 81001 URINALYSIS AUTO W/SCOPE: CPT | Performed by: THORACIC SURGERY (CARDIOTHORACIC VASCULAR SURGERY)

## 2022-12-16 PROCEDURE — 36415 COLL VENOUS BLD VENIPUNCTURE: CPT | Performed by: THORACIC SURGERY (CARDIOTHORACIC VASCULAR SURGERY)

## 2022-12-16 PROCEDURE — 86850 RBC ANTIBODY SCREEN: CPT | Performed by: THORACIC SURGERY (CARDIOTHORACIC VASCULAR SURGERY)

## 2022-12-16 PROCEDURE — 85610 PROTHROMBIN TIME: CPT | Performed by: THORACIC SURGERY (CARDIOTHORACIC VASCULAR SURGERY)

## 2022-12-16 PROCEDURE — 84134 ASSAY OF PREALBUMIN: CPT | Performed by: THORACIC SURGERY (CARDIOTHORACIC VASCULAR SURGERY)

## 2022-12-16 PROCEDURE — 71046 X-RAY EXAM CHEST 2 VIEWS: CPT

## 2022-12-16 PROCEDURE — 87641 MR-STAPH DNA AMP PROBE: CPT | Performed by: THORACIC SURGERY (CARDIOTHORACIC VASCULAR SURGERY)

## 2022-12-16 PROCEDURE — 83735 ASSAY OF MAGNESIUM: CPT | Performed by: THORACIC SURGERY (CARDIOTHORACIC VASCULAR SURGERY)

## 2022-12-16 PROCEDURE — 86901 BLOOD TYPING SEROLOGIC RH(D): CPT | Performed by: THORACIC SURGERY (CARDIOTHORACIC VASCULAR SURGERY)

## 2022-12-16 NOTE — ANESTHESIA PREPROCEDURE EVALUATION
Anesthesia Pre-Procedure Evaluation    Patient: Kash Tavarez   MRN: [id number] : 1960        Procedure : Procedure(s):  CORONARY ARTERY BYPASS GRAFT (CABG), ENDOSCOPIC VESSEL PROCUREMENT, BILATERAL INTERNAL MAMMARY ARTERY HARVEST, LEFT RADIAL ARTERY HARVEST          Past Medical History:   Diagnosis Date     Coronary artery disease of native artery of native heart with stable angina pectoris (H)      Hypertension      Nephrolithiasis      Type 2 diabetes mellitus (H)       Past Surgical History:   Procedure Laterality Date     COLONOSCOPY       COMBINED CYSTOSCOPY, INSERT STENT URETER(S) Right 2022    Procedure: CYSTOURETEROSCOPY, RETROGRADE PYELOGRAM, LASER LITHOTRIPSY WITH CALCULUS REMOVAL AND URETERAL STENT INSERTION;  Surgeon: James Mosley MD;  Location: [facility]     CV CORONARY ANGIOGRAM N/A 2022    Procedure: Coronary Angiogram;  Surgeon: Aristides North MD;  Location: [facility] CATH LAB CV     CV LEFT HEART CATH N/A 2022    Procedure: Left Heart Catheterization;  Surgeon: Aristides North MD;  Location: [facility] CATH LAB CV     KIDNEY STONE SURGERY       Z APPENDECTOMY      Description: Appendectomy;  Recorded: 2008;      No Known Allergies   Social History     Tobacco Use     Smoking status: Never     Smokeless tobacco: Never   Substance Use Topics     Alcohol use: No      Wt Readings from Last 1 Encounters:   22 111.8 kg (246 lb 6.4 oz)        Anesthesia Evaluation   Pt has had prior anesthetic.     No history of anesthetic complications       ROS/MED HX  ENT/Pulmonary:       Neurologic:       Cardiovascular:     (+) hypertension--CAD angina---    METS/Exercise Tolerance:     Hematologic:       Musculoskeletal:       GI/Hepatic:       Renal/Genitourinary:     (+) renal disease,     Endo:     (+) type I DM, Obesity,     Psychiatric/Substance Use:       Infectious Disease:       Malignancy:       Other:            Physical Exam    Airway         Mallampati: II    Neck ROM: full     Respiratory Devices and Support         Dental  no notable dental history         Cardiovascular   cardiovascular exam normal          Pulmonary   pulmonary exam normal                OUTSIDE LABS:  CBC:   Lab Results   Component Value Date    WBC 9.8 12/02/2022    WBC 12.7 (H) 11/17/2022    HGB 14.8 12/02/2022    HGB 14.7 11/17/2022    HCT 45.8 12/02/2022    HCT 45.0 11/17/2022     12/02/2022     11/17/2022     BMP:   Lab Results   Component Value Date     12/02/2022     11/17/2022    POTASSIUM 4.9 12/02/2022    POTASSIUM 5.4 (H) 11/17/2022    CHLORIDE 104 12/02/2022    CHLORIDE 101 11/17/2022    CO2 28 12/02/2022    CO2 24 11/17/2022    BUN 13 12/02/2022    BUN 16.2 11/17/2022    CR 0.89 12/02/2022    CR 0.87 11/17/2022     (H) 12/02/2022     (H) 11/17/2022     COAGS:   Lab Results   Component Value Date    PTT 23 12/16/2022    INR 0.99 12/16/2022     POC: No results found for: BGM, HCG, HCGS  HEPATIC:   Lab Results   Component Value Date    ALBUMIN 4.5 11/17/2022    PROTTOTAL 7.4 11/17/2022    ALT 20 11/17/2022    AST 18 11/17/2022    ALKPHOS 74 11/17/2022    BILITOTAL 0.5 11/17/2022     OTHER:   Lab Results   Component Value Date    A1C 7.3 (H) 11/17/2022    RIOS 10.5 12/02/2022    MAG 1.6 (L) 12/16/2022    LIPASE 32 05/09/2022    CRP 1.6 (H) 05/09/2022       Anesthesia Plan    ASA Status:  3      Anesthesia Type: General.     - Airway: ETT         Techniques and Equipment:     - Lines/Monitors: 2nd IV, Arterial Line, Central Line, PAC, NIRS, MARIAH            MARIAH Absolute Contra-indication: NONE            MARIAH Relative Contra-indication: NONE     Consents    Anesthesia Plan(s) and associated risks, benefits, and realistic alternatives discussed. Questions answered and patient/representative(s) expressed understanding.     - Discussed: Risks, Benefits and Alternatives for BOTH SEDATION and the PROCEDURE were discussed     - Discussed  with:  Patient      - Extended Intubation/Ventilatory Support Discussed: No.      - Patient is DNR/DNI Status: No    Use of blood products discussed: No .     Postoperative Care    Pain management: Multi-modal analgesia.   PONV prophylaxis: Ondansetron (or other 5HT-3), Dexamethasone or Solumedrol     Comments:                Suzy Gonsalez MD

## 2022-12-16 NOTE — PROGRESS NOTES
SPIRITUAL HEALTH SERVICES NOTE  [facility]; OTTO/Pre-OP    SPIRITUAL ASSESSMENT    Kash and Chelsie processing his need for surgery    Identifies family and friends for support    Thirteen year old son; wondering about how this will impact him    Retired; no concerns about work    Voodoo; will notify his Nondenominational of surgery    CARE PROVIDED  Provided calm, empathic listening and presence  Normalized/validated his/her feelings of anxiety, concern, and hopefulness  Explored/identified sources of support  Encouraged being gentle with themselves and asking others for help  Prayer shared    SPIRITUAL CARE NOTE  Pre-surgical visit with Kash and his wife Chelsie. He is currently scheduled to have a CABG with Dr. Bear on Friday, December 23rd. Kash learned of his need for surgery about a week ago when he went in for multiple stents. Because of his current condition and his DM II, it was determined that a CABG may be a better long term solution. Kash's main concern at this time is his recovery period. Chelsie's sister-in-law recently had a CABG and they have been able to observe her recovery. Slime are retired and have a 13 year old son. Kash wonders about the risk and potential for viral infections afterwards. They identify support from Chelsie's family and some friends. I encouraged them both to be patient with themselves and to lean into their community as Kash recovers. He is looking forward to taking his son to [place] again when he is fully healed. Kash is Voodoo and is connected to [facility] [employer] in [place] and Chelsie is Amish. He is aware that a hospital  will be available while he is here. Kash plans to do his Cardiac Rehab at [facility].     Time Spent with Patient/Family: 25 minutes    Plan of Care: Will follow-up with patient/family after surgery.     Yumiko Marcus M.Div.      Office: [phone] (for non-urgent requests)  Please Harjinder or  page through [facility] for time-sensitive requests

## 2022-12-16 NOTE — PHARMACY-ADMISSION MEDICATION HISTORY
Pharmacy Note - Admission Medication History   ______________________________________________________________________    Prior To Admission (PTA) med list completed and updated in EMR.       PTA Med List   Medication Sig Last Dose     aspirin (ASA) 81 MG EC tablet TAKE 1 TABLET BY MOUTH EVERY DAY (Patient taking differently: Take 81 mg by mouth daily) 12/16/2022 at am     atorvastatin (LIPITOR) 40 MG tablet TAKE 1 TABLET BY MOUTH EVERY DAY (Patient taking differently: Take 40 mg by mouth daily) 12/16/2022 at am     lisinopril (ZESTRIL) 20 MG tablet Take 1 tablet (20 mg) by mouth daily 12/16/2022 at am     loratadine (CLARITIN) 10 MG tablet Take 10 mg by mouth daily 12/16/2022 at am     metFORMIN (GLUCOPHAGE) 500 MG tablet Take 1 tablet (500 mg) by mouth 3 times daily (with meals) 12/16/2022 at am     metoprolol succinate ER (TOPROL XL) 25 MG 24 hr tablet Take 1 tablet (25 mg) by mouth daily 12/16/2022 at am     metroNIDAZOLE (METROGEL) 0.75 % gel 2 times daily Apply to face 12/15/2022 at pm     multivitamin, therapeutic (THERA-VIT) TABS tablet Take 1 tablet by mouth daily 12/16/2022 at am     nitroGLYcerin (NITROSTAT) 0.4 MG sublingual tablet PLACE 1 TABLET (0.4 MG) UNDER THE TONGUE EVERY 5 MINUTES AS NEEDED FOR CHEST PAIN FOR CHEST PAIN PLACE 1 TABLET UNDER THE TONGUE EVERY 5 MINUTES FOR 3 DOSES. IF SYMPTOMS PERSIST 5 MINUTES AFTER 1ST DOSE CALL 911. Unknown     Vitamin D3 (CHOLECALCIFEROL) 25 mcg (1000 units) tablet Take 4 tablets by mouth daily 12/16/2022 at am       Information source(s): Patient and CareEverywhere/SureScripts    Method of interview communication: in-person    Patient was asked about OTC/herbal products specifically.  PTA med list reflects this.    Based on the pharmacist's assessment, the PTA med list information appears reliable    Allergies were reviewed, assessed, and updated with the patient.      Patient is planning to bring topical medications on day of procedure.     Thank you for the  opportunity to participate in the care of this patient.      Ana Dickson RPH     12/16/2022     8:46 AM

## 2022-12-20 ENCOUNTER — LAB (OUTPATIENT)
Dept: FAMILY MEDICINE | Facility: CLINIC | Age: 62
End: 2022-12-20
Attending: FAMILY MEDICINE
Payer: COMMERCIAL

## 2022-12-20 DIAGNOSIS — Z01.818 PRE-OPERATIVE GENERAL PHYSICAL EXAMINATION: ICD-10-CM

## 2022-12-20 LAB — SARS-COV-2 RNA RESP QL NAA+PROBE: NEGATIVE

## 2022-12-20 PROCEDURE — U0005 INFEC AGEN DETEC AMPLI PROBE: HCPCS

## 2022-12-20 PROCEDURE — U0003 INFECTIOUS AGENT DETECTION BY NUCLEIC ACID (DNA OR RNA); SEVERE ACUTE RESPIRATORY SYNDROME CORONAVIRUS 2 (SARS-COV-2) (CORONAVIRUS DISEASE [COVID-19]), AMPLIFIED PROBE TECHNIQUE, MAKING USE OF HIGH THROUGHPUT TECHNOLOGIES AS DESCRIBED BY CMS-2020-01-R: HCPCS

## 2022-12-22 ENCOUNTER — TELEPHONE (OUTPATIENT)
Dept: ADMINISTRATIVE | Facility: CLINIC | Age: 62
End: 2022-12-22

## 2022-12-22 ENCOUNTER — TELEPHONE (OUTPATIENT)
Dept: CARDIOLOGY | Facility: CLINIC | Age: 62
End: 2022-12-22

## 2022-12-22 DIAGNOSIS — I25.118 CORONARY ARTERY DISEASE OF NATIVE ARTERY OF NATIVE HEART WITH STABLE ANGINA PECTORIS (H): Primary | ICD-10-CM

## 2022-12-22 RX ORDER — CEFAZOLIN SODIUM/WATER 2 G/20 ML
2 SYRINGE (ML) INTRAVENOUS
Status: CANCELLED | OUTPATIENT
Start: 2022-12-22

## 2022-12-22 RX ORDER — SODIUM CHLORIDE, SODIUM GLUCONATE, SODIUM ACETATE, POTASSIUM CHLORIDE AND MAGNESIUM CHLORIDE 526; 502; 368; 37; 30 MG/100ML; MG/100ML; MG/100ML; MG/100ML; MG/100ML
1000 INJECTION, SOLUTION INTRAVENOUS
Status: CANCELLED | OUTPATIENT
Start: 2022-12-22 | End: 2022-12-22

## 2022-12-22 RX ORDER — CEFAZOLIN SODIUM/WATER 2 G/20 ML
2 SYRINGE (ML) INTRAVENOUS
Status: CANCELLED | OUTPATIENT
Start: 2022-12-23

## 2022-12-22 RX ORDER — CEFAZOLIN SODIUM/WATER 2 G/20 ML
2 SYRINGE (ML) INTRAVENOUS SEE ADMIN INSTRUCTIONS
Status: CANCELLED | OUTPATIENT
Start: 2022-12-23

## 2022-12-22 RX ORDER — CEFAZOLIN SODIUM/WATER 2 G/20 ML
2 SYRINGE (ML) INTRAVENOUS SEE ADMIN INSTRUCTIONS
Status: CANCELLED | OUTPATIENT
Start: 2022-12-22

## 2022-12-22 NOTE — TELEPHONE ENCOUNTER
Due to change in the providers schedule, pt needs to r/s their 12/23 surgery. Talked with pt and explained the change. R/s surgery for 1/13. Will call if anything changes

## 2022-12-23 ENCOUNTER — ANESTHESIA (OUTPATIENT)
Dept: SURGERY | Facility: HOSPITAL | Age: 62
End: 2022-12-23
Payer: COMMERCIAL

## 2023-01-10 ENCOUNTER — LAB (OUTPATIENT)
Dept: FAMILY MEDICINE | Facility: CLINIC | Age: 63
End: 2023-01-10
Attending: THORACIC SURGERY (CARDIOTHORACIC VASCULAR SURGERY)
Payer: COMMERCIAL

## 2023-01-10 DIAGNOSIS — I25.118 CORONARY ARTERY DISEASE OF NATIVE ARTERY OF NATIVE HEART WITH STABLE ANGINA PECTORIS (H): ICD-10-CM

## 2023-01-10 LAB — SARS-COV-2 RNA RESP QL NAA+PROBE: NEGATIVE

## 2023-01-10 PROCEDURE — U0005 INFEC AGEN DETEC AMPLI PROBE: HCPCS

## 2023-01-10 PROCEDURE — U0003 INFECTIOUS AGENT DETECTION BY NUCLEIC ACID (DNA OR RNA); SEVERE ACUTE RESPIRATORY SYNDROME CORONAVIRUS 2 (SARS-COV-2) (CORONAVIRUS DISEASE [COVID-19]), AMPLIFIED PROBE TECHNIQUE, MAKING USE OF HIGH THROUGHPUT TECHNOLOGIES AS DESCRIBED BY CMS-2020-01-R: HCPCS

## 2023-01-13 ENCOUNTER — HOSPITAL ENCOUNTER (INPATIENT)
Facility: HOSPITAL | Age: 63
LOS: 4 days | Discharge: HOME | End: 2023-01-17
Attending: THORACIC SURGERY (CARDIOTHORACIC VASCULAR SURGERY) | Admitting: THORACIC SURGERY (CARDIOTHORACIC VASCULAR SURGERY)
Payer: COMMERCIAL

## 2023-01-13 ENCOUNTER — APPOINTMENT (OUTPATIENT)
Dept: RADIOLOGY | Facility: HOSPITAL | Age: 63
End: 2023-01-13
Attending: PHYSICIAN ASSISTANT
Payer: COMMERCIAL

## 2023-01-13 ENCOUNTER — MEDICAL CORRESPONDENCE (OUTPATIENT)
Dept: HEALTH INFORMATION MANAGEMENT | Facility: CLINIC | Age: 63
End: 2023-01-13

## 2023-01-13 DIAGNOSIS — I25.118 CORONARY ARTERY DISEASE OF NATIVE ARTERY OF NATIVE HEART WITH STABLE ANGINA PECTORIS (H): ICD-10-CM

## 2023-01-13 DIAGNOSIS — L30.9 DERMATITIS: ICD-10-CM

## 2023-01-13 DIAGNOSIS — Z95.1 S/P CABG (CORONARY ARTERY BYPASS GRAFT): Primary | ICD-10-CM

## 2023-01-13 LAB
ALBUMIN SERPL BCG-MCNC: 3.6 G/DL (ref 3.5–5.2)
ALP SERPL-CCNC: 56 U/L (ref 40–129)
ALT SERPL W P-5'-P-CCNC: 17 U/L (ref 10–50)
ANION GAP SERPL CALCULATED.3IONS-SCNC: 12 MMOL/L (ref 7–15)
APTT PPP: 26 SECONDS (ref 22–38)
APTT PPP: 30 SECONDS (ref 22–38)
AST SERPL W P-5'-P-CCNC: 24 U/L (ref 10–50)
ATRIAL RATE - MUSE: 86 BPM
BASE EXCESS BLDA CALC-SCNC: -1.2 MMOL/L
BASE EXCESS BLDA CALC-SCNC: -1.3 MMOL/L
BASE EXCESS BLDA CALC-SCNC: -1.9 MMOL/L
BASE EXCESS BLDA CALC-SCNC: -3.7 MMOL/L
BASE EXCESS BLDA CALC-SCNC: 0.8 MMOL/L
BASE EXCESS BLDV CALC-SCNC: 0.4 MMOL/L
BILIRUB SERPL-MCNC: 0.2 MG/DL
BLD PROD TYP BPU: NORMAL
BLD PROD TYP BPU: NORMAL
BLOOD COMPONENT TYPE: NORMAL
BLOOD COMPONENT TYPE: NORMAL
BUN SERPL-MCNC: 12.9 MG/DL (ref 8–23)
CA-I BLD-MCNC: 1.09 MMOL/L (ref 1.11–1.3)
CA-I BLD-MCNC: 1.21 MMOL/L (ref 1.11–1.3)
CA-I BLD-MCNC: 1.25 MMOL/L (ref 1.11–1.3)
CALCIUM SERPL-MCNC: 8.6 MG/DL (ref 8.8–10.2)
CALCIUM, IONIZED MEASURED: 1.12 MMOL/L (ref 1.11–1.3)
CALCIUM, IONIZED MEASURED: 1.13 MMOL/L (ref 1.11–1.3)
CHLORIDE SERPL-SCNC: 104 MMOL/L (ref 98–107)
CODING SYSTEM: NORMAL
CODING SYSTEM: NORMAL
COHGB MFR BLD: 100 % (ref 96–97)
COHGB MFR BLD: 99.5 % (ref 96–97)
CREAT SERPL-MCNC: 0.91 MG/DL (ref 0.67–1.17)
CROSSMATCH: NORMAL
CROSSMATCH: NORMAL
DEPRECATED HCO3 PLAS-SCNC: 21 MMOL/L (ref 22–29)
DIASTOLIC BLOOD PRESSURE - MUSE: NORMAL MMHG
ERYTHROCYTE [DISTWIDTH] IN BLOOD BY AUTOMATED COUNT: 12.5 % (ref 10–15)
ERYTHROCYTE [DISTWIDTH] IN BLOOD BY AUTOMATED COUNT: 12.6 % (ref 10–15)
FIBRINOGEN PPP-MCNC: 298 MG/DL (ref 170–490)
GFR SERPL CREATININE-BSD FRML MDRD: >90 ML/MIN/1.73M2
GLUCOSE BLD-MCNC: 134 MG/DL (ref 70–125)
GLUCOSE BLD-MCNC: 160 MG/DL (ref 70–125)
GLUCOSE BLD-MCNC: 162 MG/DL (ref 70–125)
GLUCOSE BLD-MCNC: 167 MG/DL (ref 70–125)
GLUCOSE BLD-MCNC: 171 MG/DL (ref 70–125)
GLUCOSE BLDC GLUCOMTR-MCNC: 161 MG/DL (ref 70–99)
GLUCOSE BLDC GLUCOMTR-MCNC: 164 MG/DL (ref 70–99)
GLUCOSE BLDC GLUCOMTR-MCNC: 172 MG/DL (ref 70–99)
GLUCOSE BLDC GLUCOMTR-MCNC: 185 MG/DL (ref 70–99)
GLUCOSE BLDC GLUCOMTR-MCNC: 187 MG/DL (ref 70–99)
GLUCOSE BLDC GLUCOMTR-MCNC: 199 MG/DL (ref 70–99)
GLUCOSE BLDC GLUCOMTR-MCNC: 201 MG/DL (ref 70–99)
GLUCOSE BLDC GLUCOMTR-MCNC: 208 MG/DL (ref 70–99)
GLUCOSE BLDC GLUCOMTR-MCNC: 209 MG/DL (ref 70–99)
GLUCOSE BLDC GLUCOMTR-MCNC: 211 MG/DL (ref 70–99)
GLUCOSE BLDC GLUCOMTR-MCNC: 213 MG/DL (ref 70–99)
GLUCOSE BLDC GLUCOMTR-MCNC: 225 MG/DL (ref 70–99)
GLUCOSE SERPL-MCNC: 165 MG/DL (ref 70–99)
HCO3 BLD-SCNC: 22 MMOL/L (ref 23–29)
HCO3 BLDA-SCNC: 23 MMOL/L (ref 23–29)
HCO3 BLDA-SCNC: 23 MMOL/L (ref 23–29)
HCO3 BLDA-SCNC: 24 MMOL/L (ref 23–29)
HCO3 BLDA-SCNC: 25 MMOL/L (ref 23–29)
HCO3 BLDV-SCNC: 24 MMOL/L (ref 24–30)
HCT VFR BLD AUTO: 35 % (ref 40–53)
HCT VFR BLD AUTO: 37.8 % (ref 40–53)
HGB BLD-MCNC: 11.7 G/DL (ref 13.3–17.7)
HGB BLD-MCNC: 11.7 G/DL (ref 13.3–17.7)
HGB BLD-MCNC: 11.9 G/DL (ref 13.3–17.7)
HGB BLD-MCNC: 11.9 G/DL (ref 13.3–17.7)
HGB BLD-MCNC: 12.3 G/DL (ref 13.3–17.7)
HGB BLD-MCNC: 12.7 G/DL (ref 13.3–17.7)
HGB BLD-MCNC: 13.4 G/DL (ref 13.3–17.7)
INR PPP: 1.25 (ref 0.85–1.15)
INR PPP: 1.36 (ref 0.85–1.15)
INTERPRETATION ECG - MUSE: NORMAL
ION CA PH 7.4: 1.11 MMOL/L (ref 1.11–1.3)
ION CA PH 7.4: 1.11 MMOL/L (ref 1.11–1.3)
ISSUE DATE AND TIME: NORMAL
ISSUE DATE AND TIME: NORMAL
LACTATE BLD-SCNC: 1.4 MMOL/L (ref 0.7–2)
LACTATE BLD-SCNC: 1.5 MMOL/L (ref 0.7–2)
LACTATE BLD-SCNC: 2.4 MMOL/L (ref 0.7–2)
LACTATE BLD-SCNC: 2.6 MMOL/L (ref 0.7–2)
LACTATE BLD-SCNC: 3.2 MMOL/L (ref 0.7–2)
LACTATE SERPL-SCNC: 4.3 MMOL/L (ref 0.7–2)
MAGNESIUM SERPL-MCNC: 2.9 MG/DL (ref 1.7–2.3)
MCH RBC QN AUTO: 28.8 PG (ref 26.5–33)
MCH RBC QN AUTO: 28.8 PG (ref 26.5–33)
MCHC RBC AUTO-ENTMCNC: 33.4 G/DL (ref 31.5–36.5)
MCHC RBC AUTO-ENTMCNC: 33.6 G/DL (ref 31.5–36.5)
MCV RBC AUTO: 86 FL (ref 78–100)
MCV RBC AUTO: 86 FL (ref 78–100)
OXYHGB MFR BLD: >98.5 % (ref 96–97)
P AXIS - MUSE: 59 DEGREES
PCO2 BLD: 38 MM HG (ref 35–45)
PCO2 BLDA: 37 MM HG (ref 35–45)
PCO2 BLDA: 43 MM HG (ref 35–45)
PCO2 BLDA: 47 MM HG (ref 35–45)
PCO2 BLDA: 48 MM HG (ref 35–45)
PCO2 BLDV: 53 MM HG (ref 35–50)
PH BLD: 7.36 [PH] (ref 7.37–7.44)
PH BLDA: 7.31 [PH] (ref 7.37–7.44)
PH BLDA: 7.36 [PH] (ref 7.37–7.44)
PH BLDA: 7.36 [PH] (ref 7.37–7.44)
PH BLDA: 7.41 [PH] (ref 7.37–7.44)
PH BLDV: 7.31 [PH] (ref 7.35–7.45)
PH: 7.38 (ref 7.35–7.45)
PH: 7.38 (ref 7.35–7.45)
PHOSPHATE SERPL-MCNC: 2.7 MG/DL (ref 2.5–4.5)
PLATELET # BLD AUTO: 241 10E3/UL (ref 150–450)
PLATELET # BLD AUTO: 245 10E3/UL (ref 150–450)
PO2 BLD: 144 MM HG (ref 80–90)
PO2 BLDA: 307 MM HG (ref 80–90)
PO2 BLDA: 322 MM HG (ref 80–90)
PO2 BLDA: 337 MM HG (ref 80–90)
PO2 BLDA: 357 MM HG (ref 80–90)
PO2 BLDV: 48 MM HG (ref 25–47)
POTASSIUM BLD-SCNC: 4.4 MMOL/L (ref 3.5–5)
POTASSIUM BLD-SCNC: 4.5 MMOL/L (ref 3.5–5)
POTASSIUM BLD-SCNC: 5 MMOL/L (ref 3.5–5)
POTASSIUM BLD-SCNC: 5.9 MMOL/L (ref 3.5–5)
POTASSIUM BLD-SCNC: 6 MMOL/L (ref 3.5–5)
POTASSIUM SERPL-SCNC: 4.2 MMOL/L (ref 3.4–5.3)
POTASSIUM SERPL-SCNC: 4.2 MMOL/L (ref 3.4–5.3)
PR INTERVAL - MUSE: 202 MS
PROT SERPL-MCNC: 5.6 G/DL (ref 6.4–8.3)
QRS DURATION - MUSE: 110 MS
QT - MUSE: 394 MS
QTC - MUSE: 471 MS
R AXIS - MUSE: 21 DEGREES
RBC # BLD AUTO: 4.06 10E6/UL (ref 4.4–5.9)
RBC # BLD AUTO: 4.41 10E6/UL (ref 4.4–5.9)
SATV LHE POCT: 80.5 % (ref 70–75)
SODIUM BLD-SCNC: 137 MMOL/L (ref 136–145)
SODIUM BLD-SCNC: 137 MMOL/L (ref 136–145)
SODIUM BLD-SCNC: 140 MMOL/L (ref 136–145)
SODIUM BLD-SCNC: 141 MMOL/L (ref 136–145)
SODIUM BLD-SCNC: 142 MMOL/L (ref 136–145)
SODIUM SERPL-SCNC: 137 MMOL/L (ref 136–145)
SYSTOLIC BLOOD PRESSURE - MUSE: NORMAL MMHG
T AXIS - MUSE: 14 DEGREES
TEMPERATURE: 37 DEGREES C
UNIT ABO/RH: NORMAL
UNIT ABO/RH: NORMAL
UNIT NUMBER: NORMAL
UNIT NUMBER: NORMAL
UNIT STATUS: NORMAL
UNIT STATUS: NORMAL
UNIT TYPE ISBT: 6200
UNIT TYPE ISBT: 6200
VENTRICULAR RATE- MUSE: 86 BPM
WBC # BLD AUTO: 26.1 10E3/UL (ref 4–11)
WBC # BLD AUTO: 32.3 10E3/UL (ref 4–11)

## 2023-01-13 PROCEDURE — 360N000079 HC SURGERY LEVEL 6, PER MIN: Performed by: THORACIC SURGERY (CARDIOTHORACIC VASCULAR SURGERY)

## 2023-01-13 PROCEDURE — 258N000003 HC RX IP 258 OP 636: Performed by: THORACIC SURGERY (CARDIOTHORACIC VASCULAR SURGERY)

## 2023-01-13 PROCEDURE — 99291 CRITICAL CARE FIRST HOUR: CPT | Mod: FT | Performed by: NURSE PRACTITIONER

## 2023-01-13 PROCEDURE — 410N000004: Performed by: THORACIC SURGERY (CARDIOTHORACIC VASCULAR SURGERY)

## 2023-01-13 PROCEDURE — 250N000024 HC ISOFLURANE, PER MIN: Performed by: THORACIC SURGERY (CARDIOTHORACIC VASCULAR SURGERY)

## 2023-01-13 PROCEDURE — C1763 CONN TISS, NON-HUMAN: HCPCS | Performed by: THORACIC SURGERY (CARDIOTHORACIC VASCULAR SURGERY)

## 2023-01-13 PROCEDURE — 999N000009 HC STATISTIC AIRWAY CARE

## 2023-01-13 PROCEDURE — 82803 BLOOD GASES ANY COMBINATION: CPT

## 2023-01-13 PROCEDURE — 82330 ASSAY OF CALCIUM: CPT | Performed by: THORACIC SURGERY (CARDIOTHORACIC VASCULAR SURGERY)

## 2023-01-13 PROCEDURE — 84100 ASSAY OF PHOSPHORUS: CPT | Performed by: PHYSICIAN ASSISTANT

## 2023-01-13 PROCEDURE — 5A1221Z PERFORMANCE OF CARDIAC OUTPUT, CONTINUOUS: ICD-10-PCS | Performed by: THORACIC SURGERY (CARDIOTHORACIC VASCULAR SURGERY)

## 2023-01-13 PROCEDURE — 02100Z9 BYPASS CORONARY ARTERY, ONE ARTERY FROM LEFT INTERNAL MAMMARY, OPEN APPROACH: ICD-10-PCS | Performed by: THORACIC SURGERY (CARDIOTHORACIC VASCULAR SURGERY)

## 2023-01-13 PROCEDURE — 0WU80JZ SUPPLEMENT CHEST WALL WITH SYNTHETIC SUBSTITUTE, OPEN APPROACH: ICD-10-PCS | Performed by: THORACIC SURGERY (CARDIOTHORACIC VASCULAR SURGERY)

## 2023-01-13 PROCEDURE — 71045 X-RAY EXAM CHEST 1 VIEW: CPT

## 2023-01-13 PROCEDURE — 250N000011 HC RX IP 250 OP 636: Performed by: PHYSICIAN ASSISTANT

## 2023-01-13 PROCEDURE — 85384 FIBRINOGEN ACTIVITY: CPT | Performed by: THORACIC SURGERY (CARDIOTHORACIC VASCULAR SURGERY)

## 2023-01-13 PROCEDURE — 82330 ASSAY OF CALCIUM: CPT

## 2023-01-13 PROCEDURE — 86923 COMPATIBILITY TEST ELECTRIC: CPT | Performed by: THORACIC SURGERY (CARDIOTHORACIC VASCULAR SURGERY)

## 2023-01-13 PROCEDURE — 258N000003 HC RX IP 258 OP 636: Performed by: ANESTHESIOLOGY

## 2023-01-13 PROCEDURE — 03BC3ZZ EXCISION OF LEFT RADIAL ARTERY, PERCUTANEOUS APPROACH: ICD-10-PCS | Performed by: THORACIC SURGERY (CARDIOTHORACIC VASCULAR SURGERY)

## 2023-01-13 PROCEDURE — 272N000202 HC AEROBIKA WITH MANOMETER

## 2023-01-13 PROCEDURE — 83735 ASSAY OF MAGNESIUM: CPT | Performed by: PHYSICIAN ASSISTANT

## 2023-01-13 PROCEDURE — 85014 HEMATOCRIT: CPT | Performed by: THORACIC SURGERY (CARDIOTHORACIC VASCULAR SURGERY)

## 2023-01-13 PROCEDURE — 258N000003 HC RX IP 258 OP 636: Performed by: PHYSICIAN ASSISTANT

## 2023-01-13 PROCEDURE — 93010 ELECTROCARDIOGRAM REPORT: CPT | Performed by: INTERNAL MEDICINE

## 2023-01-13 PROCEDURE — 250N000009 HC RX 250: Performed by: THORACIC SURGERY (CARDIOTHORACIC VASCULAR SURGERY)

## 2023-01-13 PROCEDURE — C1898 LEAD, PMKR, OTHER THAN TRANS: HCPCS | Performed by: THORACIC SURGERY (CARDIOTHORACIC VASCULAR SURGERY)

## 2023-01-13 PROCEDURE — 84295 ASSAY OF SERUM SODIUM: CPT | Performed by: PHYSICIAN ASSISTANT

## 2023-01-13 PROCEDURE — 85730 THROMBOPLASTIN TIME PARTIAL: CPT | Performed by: PHYSICIAN ASSISTANT

## 2023-01-13 PROCEDURE — 85610 PROTHROMBIN TIME: CPT | Performed by: THORACIC SURGERY (CARDIOTHORACIC VASCULAR SURGERY)

## 2023-01-13 PROCEDURE — 250N000012 HC RX MED GY IP 250 OP 636 PS 637: Performed by: THORACIC SURGERY (CARDIOTHORACIC VASCULAR SURGERY)

## 2023-01-13 PROCEDURE — 999N000157 HC STATISTIC RCP TIME EA 10 MIN

## 2023-01-13 PROCEDURE — 82330 ASSAY OF CALCIUM: CPT | Performed by: PHYSICIAN ASSISTANT

## 2023-01-13 PROCEDURE — 85027 COMPLETE CBC AUTOMATED: CPT | Performed by: PHYSICIAN ASSISTANT

## 2023-01-13 PROCEDURE — 85610 PROTHROMBIN TIME: CPT | Performed by: PHYSICIAN ASSISTANT

## 2023-01-13 PROCEDURE — P9016 RBC LEUKOCYTES REDUCED: HCPCS | Performed by: THORACIC SURGERY (CARDIOTHORACIC VASCULAR SURGERY)

## 2023-01-13 PROCEDURE — 999N000141 HC STATISTIC PRE-PROCEDURE NURSING ASSESSMENT: Performed by: THORACIC SURGERY (CARDIOTHORACIC VASCULAR SURGERY)

## 2023-01-13 PROCEDURE — 82805 BLOOD GASES W/O2 SATURATION: CPT | Performed by: PHYSICIAN ASSISTANT

## 2023-01-13 PROCEDURE — C1760 CLOSURE DEV, VASC: HCPCS | Performed by: THORACIC SURGERY (CARDIOTHORACIC VASCULAR SURGERY)

## 2023-01-13 PROCEDURE — C1713 ANCHOR/SCREW BN/BN,TIS/BN: HCPCS | Performed by: THORACIC SURGERY (CARDIOTHORACIC VASCULAR SURGERY)

## 2023-01-13 PROCEDURE — 250N000011 HC RX IP 250 OP 636: Performed by: THORACIC SURGERY (CARDIOTHORACIC VASCULAR SURGERY)

## 2023-01-13 PROCEDURE — 272N000001 HC OR GENERAL SUPPLY STERILE: Performed by: THORACIC SURGERY (CARDIOTHORACIC VASCULAR SURGERY)

## 2023-01-13 PROCEDURE — 278N000051 HC OR IMPLANT GENERAL: Performed by: THORACIC SURGERY (CARDIOTHORACIC VASCULAR SURGERY)

## 2023-01-13 PROCEDURE — 250N000025 HC SEVOFLURANE, PER MIN: Performed by: THORACIC SURGERY (CARDIOTHORACIC VASCULAR SURGERY)

## 2023-01-13 PROCEDURE — 94799 UNLISTED PULMONARY SVC/PX: CPT

## 2023-01-13 PROCEDURE — 33535 CABG ARTERIAL THREE: CPT | Performed by: THORACIC SURGERY (CARDIOTHORACIC VASCULAR SURGERY)

## 2023-01-13 PROCEDURE — 250N000013 HC RX MED GY IP 250 OP 250 PS 637: Performed by: PHYSICIAN ASSISTANT

## 2023-01-13 PROCEDURE — 83605 ASSAY OF LACTIC ACID: CPT | Performed by: PHYSICIAN ASSISTANT

## 2023-01-13 PROCEDURE — 94002 VENT MGMT INPAT INIT DAY: CPT

## 2023-01-13 PROCEDURE — 999N000055 HC STATISTIC END TITIAL CO2 MONITORING

## 2023-01-13 PROCEDURE — 250N000009 HC RX 250: Performed by: ANESTHESIOLOGY

## 2023-01-13 PROCEDURE — 410N000003 HC PER-PERFUSION 1ST 30 MIN: Performed by: THORACIC SURGERY (CARDIOTHORACIC VASCULAR SURGERY)

## 2023-01-13 PROCEDURE — 200N000001 HC R&B ICU

## 2023-01-13 PROCEDURE — 250N000011 HC RX IP 250 OP 636: Performed by: ANESTHESIOLOGY

## 2023-01-13 PROCEDURE — 272N000004 HC RX 272: Performed by: THORACIC SURGERY (CARDIOTHORACIC VASCULAR SURGERY)

## 2023-01-13 PROCEDURE — 93005 ELECTROCARDIOGRAM TRACING: CPT

## 2023-01-13 PROCEDURE — 84132 ASSAY OF SERUM POTASSIUM: CPT | Performed by: PHYSICIAN ASSISTANT

## 2023-01-13 PROCEDURE — 250N000009 HC RX 250: Performed by: PHYSICIAN ASSISTANT

## 2023-01-13 PROCEDURE — 250N000013 HC RX MED GY IP 250 OP 250 PS 637: Performed by: THORACIC SURGERY (CARDIOTHORACIC VASCULAR SURGERY)

## 2023-01-13 PROCEDURE — 02100AW BYPASS CORONARY ARTERY, ONE ARTERY FROM AORTA WITH AUTOLOGOUS ARTERIAL TISSUE, OPEN APPROACH: ICD-10-PCS | Performed by: THORACIC SURGERY (CARDIOTHORACIC VASCULAR SURGERY)

## 2023-01-13 PROCEDURE — 85730 THROMBOPLASTIN TIME PARTIAL: CPT | Performed by: THORACIC SURGERY (CARDIOTHORACIC VASCULAR SURGERY)

## 2023-01-13 PROCEDURE — 370N000017 HC ANESTHESIA TECHNICAL FEE, PER MIN: Performed by: THORACIC SURGERY (CARDIOTHORACIC VASCULAR SURGERY)

## 2023-01-13 DEVICE — GRAFT PERICARDIUM 8X14CM PERI-GUARD PG0814: Type: IMPLANTABLE DEVICE | Site: CHEST | Status: FUNCTIONAL

## 2023-01-13 DEVICE — IMPLANTABLE DEVICE: Type: IMPLANTABLE DEVICE | Site: STERNUM | Status: FUNCTIONAL

## 2023-01-13 DEVICE — SS SUTURE, 4 PER SLEEVE
Type: IMPLANTABLE DEVICE | Site: CHEST | Status: FUNCTIONAL
Brand: MYO/WIRE II

## 2023-01-13 RX ORDER — ONDANSETRON 4 MG/1
4 TABLET, ORALLY DISINTEGRATING ORAL EVERY 6 HOURS PRN
Status: DISCONTINUED | OUTPATIENT
Start: 2023-01-13 | End: 2023-01-17 | Stop reason: HOSPADM

## 2023-01-13 RX ORDER — KETAMINE HYDROCHLORIDE 10 MG/ML
INJECTION INTRAMUSCULAR; INTRAVENOUS PRN
Status: DISCONTINUED | OUTPATIENT
Start: 2023-01-13 | End: 2023-01-13

## 2023-01-13 RX ORDER — LIDOCAINE 40 MG/G
CREAM TOPICAL
Status: DISCONTINUED | OUTPATIENT
Start: 2023-01-13 | End: 2023-01-13

## 2023-01-13 RX ORDER — MUPIROCIN 20 MG/G
0.5 OINTMENT TOPICAL 2 TIMES DAILY
Status: DISCONTINUED | OUTPATIENT
Start: 2023-01-13 | End: 2023-01-17 | Stop reason: HOSPADM

## 2023-01-13 RX ORDER — CALCIUM GLUCONATE 20 MG/ML
2 INJECTION, SOLUTION INTRAVENOUS
Status: DISCONTINUED | OUTPATIENT
Start: 2023-01-13 | End: 2023-01-17 | Stop reason: HOSPADM

## 2023-01-13 RX ORDER — FENTANYL CITRATE 50 UG/ML
25-50 INJECTION, SOLUTION INTRAMUSCULAR; INTRAVENOUS
Status: DISCONTINUED | OUTPATIENT
Start: 2023-01-13 | End: 2023-01-16

## 2023-01-13 RX ORDER — SODIUM CHLORIDE 9 MG/ML
INJECTION, SOLUTION INTRAVENOUS CONTINUOUS PRN
Status: DISCONTINUED | OUTPATIENT
Start: 2023-01-13 | End: 2023-01-13

## 2023-01-13 RX ORDER — OXYCODONE HYDROCHLORIDE 5 MG/1
10 TABLET ORAL EVERY 4 HOURS PRN
Status: DISCONTINUED | OUTPATIENT
Start: 2023-01-13 | End: 2023-01-17 | Stop reason: HOSPADM

## 2023-01-13 RX ORDER — METHADONE HYDROCHLORIDE 10 MG/ML
20 INJECTION, SOLUTION INTRAMUSCULAR; INTRAVENOUS; SUBCUTANEOUS ONCE
Status: COMPLETED | OUTPATIENT
Start: 2023-01-13 | End: 2023-01-13

## 2023-01-13 RX ORDER — CHLORHEXIDINE GLUCONATE ORAL RINSE 1.2 MG/ML
10 SOLUTION DENTAL ONCE
Status: COMPLETED | OUTPATIENT
Start: 2023-01-13 | End: 2023-01-13

## 2023-01-13 RX ORDER — MULTIVITAMIN,THERAPEUTIC
1 TABLET ORAL DAILY
Status: DISCONTINUED | OUTPATIENT
Start: 2023-01-14 | End: 2023-01-17 | Stop reason: HOSPADM

## 2023-01-13 RX ORDER — VITAMIN B COMPLEX
100 TABLET ORAL DAILY
Status: DISCONTINUED | OUTPATIENT
Start: 2023-01-14 | End: 2023-01-17 | Stop reason: HOSPADM

## 2023-01-13 RX ORDER — FENTANYL CITRATE 50 UG/ML
25-100 INJECTION, SOLUTION INTRAMUSCULAR; INTRAVENOUS
Status: DISCONTINUED | OUTPATIENT
Start: 2023-01-13 | End: 2023-01-13

## 2023-01-13 RX ORDER — PROCHLORPERAZINE MALEATE 10 MG
10 TABLET ORAL EVERY 6 HOURS PRN
Status: DISCONTINUED | OUTPATIENT
Start: 2023-01-13 | End: 2023-01-17 | Stop reason: HOSPADM

## 2023-01-13 RX ORDER — SODIUM CHLORIDE, SODIUM GLUCONATE, SODIUM ACETATE, POTASSIUM CHLORIDE AND MAGNESIUM CHLORIDE 526; 502; 368; 37; 30 MG/100ML; MG/100ML; MG/100ML; MG/100ML; MG/100ML
1000 INJECTION, SOLUTION INTRAVENOUS
Status: DISCONTINUED | OUTPATIENT
Start: 2023-01-13 | End: 2023-01-13

## 2023-01-13 RX ORDER — PROTAMINE SULFATE 10 MG/ML
INJECTION, SOLUTION INTRAVENOUS PRN
Status: DISCONTINUED | OUTPATIENT
Start: 2023-01-13 | End: 2023-01-13

## 2023-01-13 RX ORDER — PANTOPRAZOLE SODIUM 40 MG/1
40 TABLET, DELAYED RELEASE ORAL DAILY
Status: DISCONTINUED | OUTPATIENT
Start: 2023-01-13 | End: 2023-01-17 | Stop reason: HOSPADM

## 2023-01-13 RX ORDER — ASPIRIN 81 MG/1
162 TABLET, CHEWABLE ORAL DAILY
Status: DISCONTINUED | OUTPATIENT
Start: 2023-01-14 | End: 2023-01-17 | Stop reason: HOSPADM

## 2023-01-13 RX ORDER — DEXMEDETOMIDINE HYDROCHLORIDE 4 UG/ML
.2-1.2 INJECTION, SOLUTION INTRAVENOUS CONTINUOUS
Status: DISCONTINUED | OUTPATIENT
Start: 2023-01-13 | End: 2023-01-13

## 2023-01-13 RX ORDER — CHLORHEXIDINE GLUCONATE ORAL RINSE 1.2 MG/ML
15 SOLUTION DENTAL EVERY 12 HOURS
Status: DISCONTINUED | OUTPATIENT
Start: 2023-01-13 | End: 2023-01-15

## 2023-01-13 RX ORDER — ASPIRIN 81 MG/1
162 TABLET, CHEWABLE ORAL
Status: COMPLETED | OUTPATIENT
Start: 2023-01-13 | End: 2023-01-13

## 2023-01-13 RX ORDER — HEPARIN SODIUM 1000 [USP'U]/ML
INJECTION, SOLUTION INTRAVENOUS; SUBCUTANEOUS PRN
Status: DISCONTINUED | OUTPATIENT
Start: 2023-01-13 | End: 2023-01-13

## 2023-01-13 RX ORDER — ONDANSETRON 2 MG/ML
INJECTION INTRAMUSCULAR; INTRAVENOUS PRN
Status: DISCONTINUED | OUTPATIENT
Start: 2023-01-13 | End: 2023-01-13

## 2023-01-13 RX ORDER — ATORVASTATIN CALCIUM 40 MG/1
40 TABLET, FILM COATED ORAL DAILY
Status: DISCONTINUED | OUTPATIENT
Start: 2023-01-13 | End: 2023-01-17 | Stop reason: HOSPADM

## 2023-01-13 RX ORDER — HYDRALAZINE HYDROCHLORIDE 20 MG/ML
10 INJECTION INTRAMUSCULAR; INTRAVENOUS EVERY 30 MIN PRN
Status: DISCONTINUED | OUTPATIENT
Start: 2023-01-13 | End: 2023-01-17 | Stop reason: HOSPADM

## 2023-01-13 RX ORDER — NALOXONE HYDROCHLORIDE 0.4 MG/ML
0.2 INJECTION, SOLUTION INTRAMUSCULAR; INTRAVENOUS; SUBCUTANEOUS
Status: DISCONTINUED | OUTPATIENT
Start: 2023-01-13 | End: 2023-01-17 | Stop reason: HOSPADM

## 2023-01-13 RX ORDER — CEFAZOLIN SODIUM/WATER 2 G/20 ML
2 SYRINGE (ML) INTRAVENOUS EVERY 8 HOURS
Status: DISCONTINUED | OUTPATIENT
Start: 2023-01-13 | End: 2023-01-13 | Stop reason: CLARIF

## 2023-01-13 RX ORDER — CEFAZOLIN SODIUM/WATER 2 G/20 ML
2 SYRINGE (ML) INTRAVENOUS SEE ADMIN INSTRUCTIONS
Status: DISCONTINUED | OUTPATIENT
Start: 2023-01-13 | End: 2023-01-13

## 2023-01-13 RX ORDER — CALCIUM GLUCONATE 20 MG/ML
1 INJECTION, SOLUTION INTRAVENOUS
Status: DISCONTINUED | OUTPATIENT
Start: 2023-01-13 | End: 2023-01-17 | Stop reason: HOSPADM

## 2023-01-13 RX ORDER — AMOXICILLIN 250 MG
1 CAPSULE ORAL 2 TIMES DAILY
Status: DISCONTINUED | OUTPATIENT
Start: 2023-01-13 | End: 2023-01-17 | Stop reason: HOSPADM

## 2023-01-13 RX ORDER — CEFAZOLIN SODIUM 2 G/100ML
2 INJECTION, SOLUTION INTRAVENOUS EVERY 8 HOURS
Status: COMPLETED | OUTPATIENT
Start: 2023-01-13 | End: 2023-01-14

## 2023-01-13 RX ORDER — NICOTINE POLACRILEX 4 MG
15-30 LOZENGE BUCCAL
Status: DISCONTINUED | OUTPATIENT
Start: 2023-01-13 | End: 2023-01-17 | Stop reason: HOSPADM

## 2023-01-13 RX ORDER — METHADONE HYDROCHLORIDE 10 MG/ML
INJECTION, SOLUTION INTRAMUSCULAR; INTRAVENOUS; SUBCUTANEOUS
Status: DISCONTINUED
Start: 2023-01-13 | End: 2023-01-13 | Stop reason: HOSPADM

## 2023-01-13 RX ORDER — PAPAVERINE HYDROCHLORIDE 30 MG/ML
INJECTION INTRAMUSCULAR; INTRAVENOUS
Status: DISCONTINUED
Start: 2023-01-13 | End: 2023-01-13 | Stop reason: WASHOUT

## 2023-01-13 RX ORDER — OXYCODONE HYDROCHLORIDE 5 MG/1
5 TABLET ORAL EVERY 4 HOURS PRN
Status: DISCONTINUED | OUTPATIENT
Start: 2023-01-13 | End: 2023-01-17 | Stop reason: HOSPADM

## 2023-01-13 RX ORDER — LIDOCAINE HYDROCHLORIDE 20 MG/ML
INJECTION, SOLUTION INFILTRATION; PERINEURAL PRN
Status: DISCONTINUED | OUTPATIENT
Start: 2023-01-13 | End: 2023-01-13

## 2023-01-13 RX ORDER — CEFAZOLIN SODIUM/WATER 2 G/20 ML
2 SYRINGE (ML) INTRAVENOUS
Status: COMPLETED | OUTPATIENT
Start: 2023-01-13 | End: 2023-01-13

## 2023-01-13 RX ORDER — NALOXONE HYDROCHLORIDE 0.4 MG/ML
0.4 INJECTION, SOLUTION INTRAMUSCULAR; INTRAVENOUS; SUBCUTANEOUS
Status: DISCONTINUED | OUTPATIENT
Start: 2023-01-13 | End: 2023-01-17 | Stop reason: HOSPADM

## 2023-01-13 RX ORDER — VANCOMYCIN HYDROCHLORIDE 1 G/20ML
INJECTION, POWDER, LYOPHILIZED, FOR SOLUTION INTRAVENOUS PRN
Status: DISCONTINUED | OUTPATIENT
Start: 2023-01-13 | End: 2023-01-13 | Stop reason: HOSPADM

## 2023-01-13 RX ORDER — PROPOFOL 10 MG/ML
INJECTION, EMULSION INTRAVENOUS PRN
Status: DISCONTINUED | OUTPATIENT
Start: 2023-01-13 | End: 2023-01-13

## 2023-01-13 RX ORDER — MAGNESIUM HYDROXIDE 1200 MG/15ML
LIQUID ORAL PRN
Status: DISCONTINUED | OUTPATIENT
Start: 2023-01-13 | End: 2023-01-13 | Stop reason: HOSPADM

## 2023-01-13 RX ORDER — ASPIRIN 81 MG/1
81 TABLET, CHEWABLE ORAL
Status: COMPLETED | OUTPATIENT
Start: 2023-01-13 | End: 2023-01-13

## 2023-01-13 RX ORDER — DEXTROSE MONOHYDRATE 25 G/50ML
25-50 INJECTION, SOLUTION INTRAVENOUS
Status: DISCONTINUED | OUTPATIENT
Start: 2023-01-13 | End: 2023-01-17 | Stop reason: HOSPADM

## 2023-01-13 RX ORDER — SODIUM CHLORIDE, SODIUM LACTATE, POTASSIUM CHLORIDE, CALCIUM CHLORIDE 600; 310; 30; 20 MG/100ML; MG/100ML; MG/100ML; MG/100ML
INJECTION, SOLUTION INTRAVENOUS CONTINUOUS
Status: DISCONTINUED | OUTPATIENT
Start: 2023-01-13 | End: 2023-01-13

## 2023-01-13 RX ORDER — DEXMEDETOMIDINE HYDROCHLORIDE 4 UG/ML
.2-.7 INJECTION, SOLUTION INTRAVENOUS CONTINUOUS
Status: DISCONTINUED | OUTPATIENT
Start: 2023-01-13 | End: 2023-01-14

## 2023-01-13 RX ORDER — LORATADINE 10 MG/1
10 TABLET ORAL DAILY
Status: DISCONTINUED | OUTPATIENT
Start: 2023-01-13 | End: 2023-01-17 | Stop reason: HOSPADM

## 2023-01-13 RX ORDER — MAGNESIUM SULFATE 4 G/50ML
4 INJECTION INTRAVENOUS ONCE
Status: COMPLETED | OUTPATIENT
Start: 2023-01-13 | End: 2023-01-13

## 2023-01-13 RX ORDER — LIDOCAINE 4 G/G
1 PATCH TOPICAL
Status: DISCONTINUED | OUTPATIENT
Start: 2023-01-13 | End: 2023-01-17 | Stop reason: HOSPADM

## 2023-01-13 RX ORDER — ACETAMINOPHEN 325 MG/1
975 TABLET ORAL EVERY 8 HOURS
Status: DISCONTINUED | OUTPATIENT
Start: 2023-01-13 | End: 2023-01-17 | Stop reason: HOSPADM

## 2023-01-13 RX ORDER — ACETAMINOPHEN 325 MG/1
650 TABLET ORAL EVERY 4 HOURS PRN
Status: DISCONTINUED | OUTPATIENT
Start: 2023-01-23 | End: 2023-01-17 | Stop reason: HOSPADM

## 2023-01-13 RX ORDER — HEPARIN SODIUM 5000 [USP'U]/.5ML
5000 INJECTION, SOLUTION INTRAVENOUS; SUBCUTANEOUS EVERY 8 HOURS
Status: DISCONTINUED | OUTPATIENT
Start: 2023-01-14 | End: 2023-01-17 | Stop reason: HOSPADM

## 2023-01-13 RX ORDER — FENTANYL CITRATE 50 UG/ML
INJECTION, SOLUTION INTRAMUSCULAR; INTRAVENOUS PRN
Status: DISCONTINUED | OUTPATIENT
Start: 2023-01-13 | End: 2023-01-13

## 2023-01-13 RX ORDER — BISACODYL 10 MG
10 SUPPOSITORY, RECTAL RECTAL DAILY PRN
Status: DISCONTINUED | OUTPATIENT
Start: 2023-01-13 | End: 2023-01-17 | Stop reason: HOSPADM

## 2023-01-13 RX ORDER — ONDANSETRON 2 MG/ML
4 INJECTION INTRAMUSCULAR; INTRAVENOUS EVERY 6 HOURS PRN
Status: DISCONTINUED | OUTPATIENT
Start: 2023-01-13 | End: 2023-01-17 | Stop reason: HOSPADM

## 2023-01-13 RX ORDER — POLYETHYLENE GLYCOL 3350 17 G/17G
17 POWDER, FOR SOLUTION ORAL DAILY
Status: DISCONTINUED | OUTPATIENT
Start: 2023-01-14 | End: 2023-01-17 | Stop reason: HOSPADM

## 2023-01-13 RX ADMIN — KETAMINE HYDROCHLORIDE 50 MG: 10 INJECTION, SOLUTION INTRAMUSCULAR; INTRAVENOUS at 07:33

## 2023-01-13 RX ADMIN — SODIUM CHLORIDE, POTASSIUM CHLORIDE, SODIUM LACTATE AND CALCIUM CHLORIDE: 600; 310; 30; 20 INJECTION, SOLUTION INTRAVENOUS at 06:23

## 2023-01-13 RX ADMIN — PHENYLEPHRINE HYDROCHLORIDE 100 MCG: 10 INJECTION INTRAVENOUS at 12:09

## 2023-01-13 RX ADMIN — NITROGLYCERIN 20 MCG: 5 INJECTION, SOLUTION INTRAVENOUS at 11:49

## 2023-01-13 RX ADMIN — PHENYLEPHRINE HYDROCHLORIDE 100 MCG: 10 INJECTION INTRAVENOUS at 07:46

## 2023-01-13 RX ADMIN — NITROGLYCERIN 20 MCG: 5 INJECTION, SOLUTION INTRAVENOUS at 08:39

## 2023-01-13 RX ADMIN — PHENYLEPHRINE HYDROCHLORIDE 100 MCG: 10 INJECTION INTRAVENOUS at 07:42

## 2023-01-13 RX ADMIN — PANTOPRAZOLE SODIUM 40 MG: 40 TABLET, DELAYED RELEASE ORAL at 20:02

## 2023-01-13 RX ADMIN — ATORVASTATIN CALCIUM 40 MG: 40 TABLET, FILM COATED ORAL at 20:03

## 2023-01-13 RX ADMIN — CHLORHEXIDINE GLUCONATE 10 ML: 1.2 SOLUTION ORAL at 06:36

## 2023-01-13 RX ADMIN — ONDANSETRON 4 MG: 2 INJECTION INTRAMUSCULAR; INTRAVENOUS at 21:16

## 2023-01-13 RX ADMIN — SODIUM CHLORIDE 7.5 G: 900 INJECTION, SOLUTION INTRAVENOUS at 08:00

## 2023-01-13 RX ADMIN — MUPIROCIN 0.5 G: 20 OINTMENT TOPICAL at 19:35

## 2023-01-13 RX ADMIN — PROPOFOL 30 MG: 10 INJECTION, EMULSION INTRAVENOUS at 08:34

## 2023-01-13 RX ADMIN — CALCIUM GLUCONATE 1 G: 20 INJECTION, SOLUTION INTRAVENOUS at 13:38

## 2023-01-13 RX ADMIN — ASPIRIN 81 MG CHEWABLE TABLET 81 MG: 81 TABLET CHEWABLE at 06:30

## 2023-01-13 RX ADMIN — PHENYLEPHRINE HYDROCHLORIDE 100 MCG: 10 INJECTION INTRAVENOUS at 07:55

## 2023-01-13 RX ADMIN — HYDROMORPHONE HYDROCHLORIDE 0.2 MG: 1 INJECTION, SOLUTION INTRAMUSCULAR; INTRAVENOUS; SUBCUTANEOUS at 15:03

## 2023-01-13 RX ADMIN — LORATADINE 10 MG: 10 TABLET ORAL at 20:03

## 2023-01-13 RX ADMIN — HYDROMORPHONE HYDROCHLORIDE 0.4 MG: 1 INJECTION, SOLUTION INTRAMUSCULAR; INTRAVENOUS; SUBCUTANEOUS at 13:14

## 2023-01-13 RX ADMIN — NITROGLYCERIN 20 MCG: 5 INJECTION, SOLUTION INTRAVENOUS at 07:37

## 2023-01-13 RX ADMIN — PHENYLEPHRINE HYDROCHLORIDE 100 MCG: 10 INJECTION INTRAVENOUS at 11:59

## 2023-01-13 RX ADMIN — ONDANSETRON 4 MG: 2 INJECTION INTRAMUSCULAR; INTRAVENOUS at 12:57

## 2023-01-13 RX ADMIN — PROPOFOL 50 MG: 10 INJECTION, EMULSION INTRAVENOUS at 12:39

## 2023-01-13 RX ADMIN — PHENYLEPHRINE HYDROCHLORIDE 100 MCG: 10 INJECTION INTRAVENOUS at 08:15

## 2023-01-13 RX ADMIN — PHENYLEPHRINE HYDROCHLORIDE 100 MCG: 10 INJECTION INTRAVENOUS at 08:25

## 2023-01-13 RX ADMIN — ROCURONIUM BROMIDE 70 MG: 50 INJECTION, SOLUTION INTRAVENOUS at 07:34

## 2023-01-13 RX ADMIN — INSULIN HUMAN 2 UNITS/HR: 1 INJECTION, SOLUTION INTRAVENOUS at 08:10

## 2023-01-13 RX ADMIN — INSULIN HUMAN 6 UNITS/HR: 1 INJECTION, SOLUTION INTRAVENOUS at 22:22

## 2023-01-13 RX ADMIN — PHENYLEPHRINE HYDROCHLORIDE 100 MCG: 10 INJECTION INTRAVENOUS at 08:20

## 2023-01-13 RX ADMIN — Medication 2 G: at 13:40

## 2023-01-13 RX ADMIN — PHENYLEPHRINE HYDROCHLORIDE 50 MCG: 10 INJECTION INTRAVENOUS at 12:04

## 2023-01-13 RX ADMIN — PHENYLEPHRINE HYDROCHLORIDE 100 MCG: 10 INJECTION INTRAVENOUS at 09:43

## 2023-01-13 RX ADMIN — AMINOCAPROIC ACID 1 G/HR: 250 INJECTION, SOLUTION INTRAVENOUS at 09:00

## 2023-01-13 RX ADMIN — Medication 2 G: at 12:03

## 2023-01-13 RX ADMIN — NITROGLYCERIN 20 MCG: 5 INJECTION, SOLUTION INTRAVENOUS at 11:59

## 2023-01-13 RX ADMIN — MAGNESIUM SULFATE HEPTAHYDRATE 4 G: 80 INJECTION, SOLUTION INTRAVENOUS at 06:32

## 2023-01-13 RX ADMIN — SENNOSIDES AND DOCUSATE SODIUM 1 TABLET: 8.6; 5 TABLET ORAL at 20:02

## 2023-01-13 RX ADMIN — SODIUM CHLORIDE, POTASSIUM CHLORIDE, SODIUM LACTATE AND CALCIUM CHLORIDE 500 ML: 600; 310; 30; 20 INJECTION, SOLUTION INTRAVENOUS at 17:16

## 2023-01-13 RX ADMIN — MUPIROCIN 0.5 G: 20 OINTMENT TOPICAL at 13:49

## 2023-01-13 RX ADMIN — ROCURONIUM BROMIDE 50 MG: 50 INJECTION, SOLUTION INTRAVENOUS at 09:16

## 2023-01-13 RX ADMIN — PHENYLEPHRINE HYDROCHLORIDE 25 MCG: 10 INJECTION INTRAVENOUS at 09:42

## 2023-01-13 RX ADMIN — PHENYLEPHRINE HYDROCHLORIDE 50 MCG: 10 INJECTION INTRAVENOUS at 12:22

## 2023-01-13 RX ADMIN — HYDROMORPHONE HYDROCHLORIDE 0.2 MG: 1 INJECTION, SOLUTION INTRAMUSCULAR; INTRAVENOUS; SUBCUTANEOUS at 22:08

## 2023-01-13 RX ADMIN — Medication 10 MG: at 07:37

## 2023-01-13 RX ADMIN — EPINEPHRINE 0.02 MCG/KG/MIN: 1 INJECTION INTRAMUSCULAR; INTRAVENOUS; SUBCUTANEOUS at 11:25

## 2023-01-13 RX ADMIN — SUGAMMADEX 220 MG: 100 INJECTION, SOLUTION INTRAVENOUS at 12:57

## 2023-01-13 RX ADMIN — SODIUM CHLORIDE: 9 INJECTION, SOLUTION INTRAVENOUS at 08:01

## 2023-01-13 RX ADMIN — CEFAZOLIN SODIUM 2 G: 2 INJECTION, SOLUTION INTRAVENOUS at 22:14

## 2023-01-13 RX ADMIN — PHENYLEPHRINE HYDROCHLORIDE 50 MCG: 10 INJECTION INTRAVENOUS at 12:00

## 2023-01-13 RX ADMIN — PHENYLEPHRINE HYDROCHLORIDE 100 MCG: 10 INJECTION INTRAVENOUS at 08:08

## 2023-01-13 RX ADMIN — Medication 2 G: at 08:03

## 2023-01-13 RX ADMIN — PHENYLEPHRINE HYDROCHLORIDE 0.5 MCG/KG/MIN: 10 INJECTION INTRAVENOUS at 07:58

## 2023-01-13 RX ADMIN — SODIUM CHLORIDE, POTASSIUM CHLORIDE, SODIUM LACTATE AND CALCIUM CHLORIDE 250 ML: 600; 310; 30; 20 INJECTION, SOLUTION INTRAVENOUS at 14:27

## 2023-01-13 RX ADMIN — PHENYLEPHRINE HYDROCHLORIDE 50 MCG: 10 INJECTION INTRAVENOUS at 12:52

## 2023-01-13 RX ADMIN — DEXMEDETOMIDINE HYDROCHLORIDE 0.5 MCG/KG/HR: 400 INJECTION INTRAVENOUS at 08:00

## 2023-01-13 RX ADMIN — SODIUM PHOSPHATE, MONOBASIC, MONOHYDRATE 9 MMOL: 276; 142 INJECTION, SOLUTION INTRAVENOUS at 15:31

## 2023-01-13 RX ADMIN — ACETAMINOPHEN 975 MG: 325 TABLET ORAL at 19:11

## 2023-01-13 RX ADMIN — NITROGLYCERIN 10 MCG: 5 INJECTION, SOLUTION INTRAVENOUS at 11:58

## 2023-01-13 RX ADMIN — DEXMEDETOMIDINE HYDROCHLORIDE 0.4 MCG/KG/HR: 4 INJECTION, SOLUTION INTRAVENOUS at 14:02

## 2023-01-13 RX ADMIN — PROPOFOL 200 MG: 10 INJECTION, EMULSION INTRAVENOUS at 07:33

## 2023-01-13 RX ADMIN — ROCURONIUM BROMIDE 30 MG: 50 INJECTION, SOLUTION INTRAVENOUS at 08:39

## 2023-01-13 RX ADMIN — SODIUM CHLORIDE, POTASSIUM CHLORIDE, SODIUM LACTATE AND CALCIUM CHLORIDE 250 ML: 600; 310; 30; 20 INJECTION, SOLUTION INTRAVENOUS at 13:10

## 2023-01-13 RX ADMIN — HEPARIN SODIUM 40000 UNITS: 1000 INJECTION INTRAVENOUS; SUBCUTANEOUS at 09:42

## 2023-01-13 RX ADMIN — PHENYLEPHRINE HYDROCHLORIDE 50 MCG: 10 INJECTION INTRAVENOUS at 12:28

## 2023-01-13 RX ADMIN — DESMOPRESSIN ACETATE 33.32 MCG: 4 SOLUTION INTRAVENOUS at 12:02

## 2023-01-13 RX ADMIN — NITROGLYCERIN 10 MCG: 5 INJECTION, SOLUTION INTRAVENOUS at 12:14

## 2023-01-13 RX ADMIN — PHENYLEPHRINE HYDROCHLORIDE 50 MCG: 10 INJECTION INTRAVENOUS at 09:54

## 2023-01-13 RX ADMIN — NITROGLYCERIN 20 MCG: 5 INJECTION, SOLUTION INTRAVENOUS at 09:50

## 2023-01-13 RX ADMIN — PHENYLEPHRINE HYDROCHLORIDE 100 MCG: 10 INJECTION INTRAVENOUS at 07:52

## 2023-01-13 RX ADMIN — PHENYLEPHRINE HYDROCHLORIDE 50 MCG: 10 INJECTION INTRAVENOUS at 09:04

## 2023-01-13 RX ADMIN — Medication 10 MG: at 08:05

## 2023-01-13 RX ADMIN — FENTANYL CITRATE 50 MCG: 50 INJECTION, SOLUTION INTRAMUSCULAR; INTRAVENOUS at 07:33

## 2023-01-13 RX ADMIN — PHENYLEPHRINE HYDROCHLORIDE 50 MCG: 10 INJECTION INTRAVENOUS at 09:55

## 2023-01-13 RX ADMIN — CEFAZOLIN SODIUM 2 G: 2 INJECTION, SOLUTION INTRAVENOUS at 17:16

## 2023-01-13 RX ADMIN — PHENYLEPHRINE HYDROCHLORIDE 100 MCG: 10 INJECTION INTRAVENOUS at 07:50

## 2023-01-13 RX ADMIN — NITROGLYCERIN 20 MCG: 5 INJECTION, SOLUTION INTRAVENOUS at 07:40

## 2023-01-13 RX ADMIN — PHENYLEPHRINE HYDROCHLORIDE 100 MCG: 10 INJECTION INTRAVENOUS at 12:18

## 2023-01-13 RX ADMIN — SODIUM CHLORIDE, POTASSIUM CHLORIDE, SODIUM LACTATE AND CALCIUM CHLORIDE 250 ML: 600; 310; 30; 20 INJECTION, SOLUTION INTRAVENOUS at 14:05

## 2023-01-13 RX ADMIN — LIDOCAINE HYDROCHLORIDE 3 ML: 20 INJECTION, SOLUTION INFILTRATION; PERINEURAL at 07:33

## 2023-01-13 RX ADMIN — SODIUM CHLORIDE, POTASSIUM CHLORIDE, SODIUM LACTATE AND CALCIUM CHLORIDE 250 ML: 600; 310; 30; 20 INJECTION, SOLUTION INTRAVENOUS at 15:37

## 2023-01-13 RX ADMIN — PHENYLEPHRINE HYDROCHLORIDE 50 MCG: 10 INJECTION INTRAVENOUS at 09:07

## 2023-01-13 RX ADMIN — PHENYLEPHRINE HYDROCHLORIDE 50 MCG: 10 INJECTION INTRAVENOUS at 08:49

## 2023-01-13 RX ADMIN — PHENYLEPHRINE HYDROCHLORIDE 25 MCG: 10 INJECTION INTRAVENOUS at 09:11

## 2023-01-13 RX ADMIN — FENTANYL CITRATE 50 MCG: 50 INJECTION, SOLUTION INTRAMUSCULAR; INTRAVENOUS at 07:23

## 2023-01-13 RX ADMIN — NITROGLYCERIN 10 MCG: 5 INJECTION, SOLUTION INTRAVENOUS at 12:39

## 2023-01-13 RX ADMIN — PROTAMINE SULFATE 350 MG: 10 INJECTION, SOLUTION INTRAVENOUS at 11:54

## 2023-01-13 RX ADMIN — PHENYLEPHRINE HYDROCHLORIDE 200 MCG: 10 INJECTION INTRAVENOUS at 11:47

## 2023-01-13 ASSESSMENT — ACTIVITIES OF DAILY LIVING (ADL)
ADLS_ACUITY_SCORE: 27
ADLS_ACUITY_SCORE: 20
ADLS_ACUITY_SCORE: 27
ADLS_ACUITY_SCORE: 27
ADLS_ACUITY_SCORE: 20
ADLS_ACUITY_SCORE: 27
ADLS_ACUITY_SCORE: 20

## 2023-01-13 NOTE — PROGRESS NOTES
RT PROGRESS NOTE    VENT DAY# 1    CURRENT SETTINGS:    Vent Mode: CMV/AC  (Continuous Mandatory Ventilation/ Assist Control)  FiO2 (%): 80 %  Resp Rate (Set): 18 breaths/min  Tidal Volume (Set, mL): 650 mL  PEEP (cm H2O): 8 cmH2O    PATIENT PARAMETERS:  PIP 21  Pplat:  16  SBT:yes  02 Sats:  98%  BS: clear;diminished    ETT SIZE 8.0 Secured at 24 cm at teeth/gums      NOTE / SHIFT SUMMARY:   Patient continues to be on ventilator support.   1250: Patient arrived from OR to ICU and placed on the above vent settings. Will SBT per protocol and titrate FiO2 as patient tolerates.         Hayder Herrera, RT

## 2023-01-13 NOTE — PROGRESS NOTES
CT EXTUBATION FAST TRACK:    [facility] - ICU     Fastrack Extubation Goal Time (Under 6 Hours): 1830       Time of Arrival 1250   Precedex Wean Start Time 1402   SBT initiated YES-OR-NO: Yes     Patient Status Extubated 1705   Barriers to Extubation: none

## 2023-01-13 NOTE — CONSULTS
PULMONARY / CRITICAL CARE CONSULTATION NOTE      Consultation - Pulmonary/Critical Care Medicine  Kash Tavarez,  1960, MRN [id number]  Date / Time of Admission:  2023  5:14 AM    Admitting Dx: CAD (coronary artery disease) [I25.10]  Coronary artery disease of native artery of native heart with stable angina pectoris (H) [I25.118]    PCP: Catalino Nuñez, [phone]  Consulting physician:  Arturo Bear MD   Code status:  Full Code       Extended Emergency Contact Information  Primary Emergency Contact: Chelsie Tavarez  Address: [address] [place] [address] [place]  Home Phone: [phone]  Mobile Phone: [phone]  Relation: Spouse  Secondary Emergency Contact: Mckinley Tavarez  Home Phone: [phone]  Relation: Brother       ID:  Kash Tavarez is a 62 year old male with with CAD, DM2, and htn. Underwent CABG x 3 with Dr. Bear today.         ASSESSMENT & PLAN BY SYSTEM   Systems to Assess:     Pulmonary: Post op vent management; no underlying lung disease documented.     Wean supplemental O2 as tolerated; goal O2 sat > 92%.  HOB > 30 degrees to limit aspiration risk.      Check ABG and adjust support as indicated; avoid acidotic state.     Check CXR    Once hemodynamically stable, plan to proceed to wake, wean, and extubate with goal < 6-hours.  Resp: 16        Cardiovascular: CAD s/p CABG x 3 with Dr. Bear 23. Hx of hypertension.     Cardiac monitoring.     SBP goal > 90 mmHg, MAP goal > 65 mmHg.      Goal CI 2-3     Goal PAD low teens    Vasoactive gtts per CV-surgery.     Temporary pacing wires present; will use in setting of symptomatic arrhythmia.     Currently paced @ back up of 50    Underlying rhythm: Sinus rhythm    Neurological: sedation for vent synchrony and comfort.      Neuro checks per ICU protocol.     Sedate with precedex until ready to wean and extubate.     Pain control: PRN    Goal RASS 0 to -1     GI/: no acute issues    NPO until  extubated and fully awake.     Arroyo catheter in place for accurate measurement of I/O.     GI prophylaxis:  Omeprazole    Renal: no acute issues     Monitor I/O's.  Electrolyte repletion PRN.  Avoid/limit nephrotoxic agents.     IVFs: per CV-surgery    Heme/Coag: Acute blood loss anemia; coagulopathy secondary to pump run.     Monitor H/H.     Transfuse per CV-surgery.     Monitor chest tube output hourly; notify CV-surgery for excessive output or abrupt stop in output.     DVT prophylaxis:  SubQ heparin    Infectious disease:     General precautions.     Remove lines and drains once no longer required.     Endocrine: T2DM with hyperglycemia     RHI gtt per ICU protocol.     Musculoskeletal:     Bedrest; initiate mobility protocol.     Lines: A-line, Day# 1, Central line, Day# 1 or [place]ep, Day# 1      Code Status:  FULL CODE    Thank you for this consultation.  Please call if any questions or concerns.        This patient is considered critically ill and requires ICU level of care due to immediate post CV-surgical procedure. High risk for acute hemodynamic collapse and death.     Total Critical Care time, not including separate billable procedure time:  35 minutes.    Shelley Avila, CNP  [facility] Pulmonary/Critical Care      Chief Complaint chief complaint       HPI    Kash Tavarez is a 62 year old male with CAD, T2DM, and hypertension. Underwent CABG x 3 today with Dr. Bear. Procedure reported as straight forward. No difficulties with intubation or line placement. Received 20mg IV Methadone up front. No difficulty going on or coming off pump. Did require a bit of Epi coming off pump and remained on the same. Echo showed normal function which was preserved post procedure. EBL 900mL; received DDAVP and 300mL Cell Saver. No bleeding concerns at end of case. Arrived to ICU intubated and sedated.   Direct sign out received at the bedside from Dr. Blackman.           Review of Systems   Review of  systems not obtained due to patient factors.     Active Problem List   Patient Active Problem List    Diagnosis Date Noted     Coronary artery disease of native artery of native heart with stable angina pectoris (H)      Priority: Medium     Calculus of ureter 05/10/2022     Priority: Medium     Added automatically from request for surgery [id number]       Hydronephrosis with urinary obstruction due to ureteral calculus 05/10/2022     Priority: Medium     Added automatically from request for surgery [id number]       Type 2 diabetes mellitus without complication, without long-term current use of insulin (H) 05/11/2020     Priority: Medium     Obesity (BMI 35.0-39.9) with comorbidity (H) 06/13/2019     Priority: Medium     Allergic Rhinitis      Priority: Medium     Created by Conversion  Replacement Utility updated for latest IMO load         Essential Hypertension      Priority: Medium     Created by Conversion  Replacement Utility updated for latest IMO load         Nephrolithiasis      Priority: Medium     Created by Conversion         Obesity      Priority: Medium     Created by Conversion         Mixed hyperlipidemia      Priority: Medium     Created by Conversion             Medical/Surgical History   Past Medical History:   Diagnosis Date     Coronary artery disease of native artery of native heart with stable angina pectoris (H)      Hypertension      Nephrolithiasis      Type 2 diabetes mellitus (H)      Past Surgical History:   Procedure Laterality Date     COLONOSCOPY       COMBINED CYSTOSCOPY, INSERT STENT URETER(S) Right 5/12/2022    Procedure: CYSTOURETEROSCOPY, RETROGRADE PYELOGRAM, LASER LITHOTRIPSY WITH CALCULUS REMOVAL AND URETERAL STENT INSERTION;  Surgeon: James Mosley MD;  Location: [facility]     CV CORONARY ANGIOGRAM N/A 12/5/2022    Procedure: Coronary Angiogram;  Surgeon: Aristides North MD;  [facility] CATH LAB CV     CV LEFT HEART CATH N/A 12/5/2022    Procedure: Left Heart  Catheterization;  Surgeon: Aristides North MD;  Location: [facility]     KIDNEY STONE SURGERY       [facility] APPENDECTOMY      Description: Appendectomy;  Recorded: 03/04/2008;         Allergies   No Known Allergies      Medications:  OUTpatient medications   Prior to Admission medications    Medication Sig Start Date End Date Taking? Authorizing Provider   aspirin (ASA) 81 MG EC tablet TAKE 1 TABLET BY MOUTH EVERY DAY  Patient taking differently: Take 81 mg by mouth daily 2/15/22  Yes Catalino Nuñez MD   atorvastatin (LIPITOR) 40 MG tablet TAKE 1 TABLET BY MOUTH EVERY DAY  Patient taking differently: Take 40 mg by mouth daily 10/6/22  Yes Catalino Nuñez MD   imiquimod (ALDARA) 5 % cream Apply topically daily To warts 10/28/16  Yes Provider, Historical   lisinopril (ZESTRIL) 20 MG tablet Take 1 tablet (20 mg) by mouth daily 5/5/22  Yes Catalino Nuñez MD   loratadine (CLARITIN) 10 MG tablet Take 10 mg by mouth daily   Yes Reported, Patient   metFORMIN (GLUCOPHAGE) 500 MG tablet Take 1 tablet (500 mg) by mouth 3 times daily (with meals) 5/5/22  Yes Catalino Nuñez MD   metoprolol succinate ER (TOPROL XL) 25 MG 24 hr tablet Take 1 tablet (25 mg) by mouth daily 12/2/22  Yes Aki Pereira,    metroNIDAZOLE (METROGEL) 0.75 % gel 2 times daily Apply to face 10/28/16  Yes Provider, Historical   multivitamin, therapeutic (THERA-VIT) TABS tablet Take 1 tablet by mouth daily   Yes Reported, Patient   nitroGLYcerin (NITROSTAT) 0.4 MG sublingual tablet PLACE 1 TABLET (0.4 MG) UNDER THE TONGUE EVERY 5 MINUTES AS NEEDED FOR CHEST PAIN FOR CHEST PAIN PLACE 1 TABLET UNDER THE TONGUE EVERY 5 MINUTES FOR 3 DOSES. IF SYMPTOMS PERSIST 5 MINUTES AFTER 1ST DOSE CALL 911. 12/8/22  Yes Catalino Nuñez MD   oxymetazoline (RHOFADE) 1 % Crea [OXYMETAZOLINE (RHOFADE) 1 % CREA] Apply topically daily. 11/5/20  Yes Provider, Historical   triamcinolone (KENALOG) 0.1 % external cream Apply topically 2 times daily  Patient  taking differently: Apply topically daily as needed Apply to neck rash 11/11/21  Yes Catalino Nuñez MD   Vitamin D3 (CHOLECALCIFEROL) 25 mcg (1000 units) tablet Take 4 tablets by mouth daily   Yes Reported, Patient   ACCU-CHEK FASTCLIX Misc [ACCU-CHEK FASTCLIX MISC] TEST TWICE DAILY 12/15/14   Kash Chacon   ACCU-CHEK GUIDE test strip USE 1 EACH AS DIRECTED 2 (TWO) TIMES A DAY AT 7:30AM AND 4:30PM. 11/9/22   Catalino Nuñez MD   blood glucose monitoring (ACCU-CHEK FASTCLIX) lancets USE TWICE A DAY (AT 7:30 AM & 4:30 PM) AS DIRECTED 10/31/21   Catalino Nuñez MD   blood-glucose meter Misc [BLOOD-GLUCOSE METER MISC] Use 1 Device As Directed 2 (two) times a day at 9am and 6pm. 2/11/19   Catalino Nuñez MD           Medications:  INpatient medications     acetaminophen  975 mg Oral Q8H     [START ON 1/14/2023] aspirin  162 mg Oral or NG Tube Daily     atorvastatin  40 mg Oral Daily     ceFAZolin  2 g Intravenous Q8H     chlorhexidine  15 mL Mouth/Throat Q12H     [START ON 1/14/2023] heparin ANTICOAGULANT  5,000 Units Subcutaneous Q8H     lidocaine   Transdermal Q8H ZABRINA     loratadine  10 mg Oral Daily     [Held by provider] metFORMIN  500 mg Oral TID w/meals     [START ON 1/14/2023] multivitamin, therapeutic  1 tablet Oral Daily     mupirocin  0.5 g Both Nostrils BID     pantoprazole  40 mg Oral or NG Tube Daily    Or     pantoprazole  40 mg Oral Daily     [START ON 1/14/2023] polyethylene glycol  17 g Oral Daily     senna-docusate  1 tablet Oral BID     [START ON 1/14/2023] Vitamin D3  100 mcg Oral Daily           Family History  Social History     Reviewed  Family History   Problem Relation Age of Onset     Heart Disease Father      Alcoholism Father      Cerebrovascular Disease Father        Reviewed  Social History     Socioeconomic History     Marital status:      Spouse name: Not on file     Number of children: Not on file     Years of education: Not on file     Highest education level: Not on file  "  Occupational History     Not on file   Tobacco Use     Smoking status: Never     Smokeless tobacco: Never   Vaping Use     Vaping Use: Never used   Substance and Sexual Activity     Alcohol use: No     Drug use: No     Sexual activity: Not on file     Comment: not asked   Other Topics Concern     Parent/sibling w/ CABG, MI or angioplasty before 65F 55M? No   Social History Narrative     Not on file     Social Determinants of Health     Financial Resource Strain: Not on file   Food Insecurity: Not on file   Transportation Needs: Not on file   Physical Activity: Not on file   Stress: Not on file   Social Connections: Not on file   Intimate Partner Violence: Not on file   Housing Stability: Not on file      Psychosocial Needs  Social History     Social History Narrative     Not on file     Additional psychosocial needs reviewed per nursing assessment.      Physical Exam   VITALS:  /61   Pulse 83   Temp 97.7  F (36.5  C) (Bladder)   Resp 16   Ht 1.854 m (6' 1\")   Wt 111.4 kg (245 lb 9.6 oz)   SpO2 96%   BMI 32.40 kg/m    [unfilled]    PHYSICAL EXAM:   GEN: intubated and sedated  HEENT:  OETT in place; AT/NC  NECK: Supple.  RIJ introducer with PA-C in place.   PULM:  Unlabored on full vent; lungs are diminished, equal, no wheeze.   CVS:   RRR S1S2; chest tubes in place with sanguinous output.   ABDOMEN:   Soft ntnd  EXTREMITIES/SKIN:    Incisions clean. Visible skin intact.   NEURO:  .  sedated    I&O:      Intake/Output Summary (Last 24 hours) at 1/13/2023 1311  Last data filed at 1/13/2023 1300  Gross per 24 hour   Intake 1550 ml   Output 1570 ml   Net -20 ml        Pertinent Labs   Lab Results: personally reviewed.     Serum Glucose range:Invalid input(s): POCGLU    No results for input(s): POCPEEP, TEMP, POCRATE, POCFLOW, PSV in the last 24202 hours.    Invalid input(s): PHART, SEF3RXV, PO2ART, OXYHB, QX96LFIZDOL, BEARTCALC, 02SAT, VENTTIVOL, CARBOXYHGB, METHGB, 25770  CBC:  Recent Labs   Lab " 01/13/23  1207 01/13/23  1206 01/13/23  1149   WBC 26.1*  --   --    HGB 11.7* 11.9* 11.9*   HCT 35.0*  --   --      --   --      Chemistry:  Recent Labs   Lab 01/13/23  1206 01/13/23  1149 01/13/23  1100    142 137     Coags:  Lab Results   Component Value Date    INR 1.36 (H) 01/13/2023    INR 0.99 12/16/2022     Cardiac Markers:  No results for input(s): CKTOTAL, TROPONINI in the last 168 hours.    Invalid input(s): TROPONINT, CKMBINDEX    Microbiology:    None this admission       Radiology:    Chest X-Ray: post op film pending

## 2023-01-13 NOTE — ANESTHESIA POSTPROCEDURE EVALUATION
Patient: Kash Tavarez    Procedure: Procedure(s):  CORONARY ARTERY BYPASS GRAFT (CABG) x 3, LEFT INTERNAL MAMMARY ARTERY HARVEST, LEFT RADIAL ARTERY HARVEST, ANESTHESIA TRANSESOPHAGEAL ECHOCARDIOGRAM, EPI-AORTIC ULTRASOUND       Anesthesia Type:  General    Note:  Disposition: ICU            ICU Sign Out: Anesthesiologist/ICU physician sign out WAS performed   Postop Pain Control: Uneventful            Sign Out: Well controlled pain   PONV: No   Neuro/Psych: Uneventful            Sign Out: Acceptable/Baseline neuro status   Airway/Respiratory:             Sign Out: AIRWAY IN SITU/Resp. Support               Airway in situ/Resp. Support: ETT                 Reason: Planned Pre-op   CV/Hemodynamics:             Sign Out: Detailed CV status               Blood Pressure: Pressors               Rate/Rhythm: Normal HR               Perfusion:  Adequate perfusion indices   Other NRE: NONE   DID A NON-ROUTINE EVENT OCCUR?     Event details/Postop Comments:  Patient transferred to ICU intubated and sedated. Epi and phenylephrine infusions ongoing but at minimal doses. Handoff given to ICU provider and RN at bedside.            Last vitals:  Vitals:    01/13/23 0556 01/13/23 0557 01/13/23 1300   BP: 134/78 (!) 159/85 110/61   Pulse:   83   Resp:   16   Temp:   36.5  C (97.7  F)   SpO2: 95%  96%       Electronically Signed By: Fariba Blackman MD  January 13, 2023  1:11 PM

## 2023-01-13 NOTE — ANESTHESIA CARE TRANSFER NOTE
Patient: Kash Tavarez    Procedure: Procedure(s):  CORONARY ARTERY BYPASS GRAFT (CABG) x 3, LEFT INTERNAL MAMMARY ARTERY HARVEST, LEFT RADIAL ARTERY HARVEST, ANESTHESIA TRANSESOPHAGEAL ECHOCARDIOGRAM, EPI-AORTIC ULTRASOUND       Diagnosis: CAD (coronary artery disease) [I25.10]  Diagnosis Additional Information: No value filed.    Anesthesia Type:   General     Note:    Oropharynx: endotracheal tube in place and ventilatory support  Level of Consciousness: iatrogenic sedation  Patient oxygen source: ett.    Independent Airway: airway patency not satisfactory and stable  Dentition: dentition unchanged  Vital Signs Stable: post-procedure vital signs reviewed and stable  Report to RN Given: handoff report given  Patient transferred to: ICU  Comments: Transported to ICU on continuous monitors and with ambu bag ventilation, monitored continuously throughout transport with MDA and surgeon. Report given to ICU providers.  ICU Handoff: Call for PAUSE to initiate/utilize ICU HANDOFF, Identified Patient, Identified Responsible Provider, Reviewed the Pertinent Medical History, Discussed Surgical Course, Reviewed Intra-OP Anesthesia Management and Issues during Anesthesia, Set Expectations for Post Procedure Period and Allowed Opportunity for Questions and Acknowledgement of Understanding      Vitals:  Vitals Value Taken Time   /61 01/13/23 1300   Temp 36.5  C (97.7  F) 01/13/23 1300   Pulse 79    Resp 16 01/13/23 1300   SpO2 96 % 01/13/23 1300       Electronically Signed By: ADITI Saha CRNA  January 13, 2023  1:00 PM

## 2023-01-13 NOTE — CONSULTS
CARDIAC SURGERY NUTRITION CONSULT    Received standing order to educate patient.    Will follow and complete diet education after patient is extubated and/or is transferred to medical unit.    Patient will receive additional nutrition education during the Outpatient Cardiac Rehab Program (nutrition classes/dietitian counseling).

## 2023-01-13 NOTE — ANESTHESIA PROCEDURE NOTES
Central Line/PA Catheter Placement    Pre-Procedure   Staff -        Anesthesiologist:  Fariba Blackman MD       Performed By: anesthesiologist       Location: OR       Pre-Anesthestic Checklist: patient identified, IV checked, site marked, risks and benefits discussed, informed consent, monitors and equipment checked, pre-op evaluation and at physician/surgeon's request  Timeout:       Correct Patient: Yes        Correct Procedure: Yes        Correct Site: Yes        Correct Position: Yes        Correct Laterality: Yes   Line Placement:   This line was placed Post Induction starting at 1/13/2023 7:42 AM and ending at 1/13/2023 7:53 AM    Procedure   Procedure: central line       Laterality: right       Insertion Site: internal jugular.       Patient Position: Trendelenburg  Sterile Prep        All elements of maximal sterile barrier technique followed       Patient Prep/Sterile Barriers: draped, hand hygiene, gloves , hat , mask , draped, gown, sterile gel and probe cover       Skin prep: Chloraprep  Insertion/Injection        Technique: ultrasound guided and Seldinger Technique        1. Ultrasound was used to evaluate the access site.       2. Vein evaluated via ultrasound for patency/adequacy.       3. Using real-time ultrasound the needle/catheter was observed entering the artery/vein.       4. Permanent image was captured and entered into the patient's record.       5. The visualized structures were anatomically normal.       6. There were no apparent abnormal pathologic findings.       Introducer Type: 9 Fr, 2-lumen MAC        Type: PA/CVC with Introducer       PA Catheter Type: Thermodilution         Appropriate RV, RA and PA waveforms noted:  Yes            Distance to Wedge Position cm: 48            Withdrawn and Locked at cm: 47            Balloon down at end of the procedure:   Narrative         Secured by: suture       Tegaderm and Biopatch dressing used.       Complications: None apparent,         blood aspirated from all lumens,        All lumens flushed: Yes       Verification method: MARIAH and Placement to be verified post-op

## 2023-01-13 NOTE — H&P
[facility] Cardiovascular Surgery History and Physical     Kash Tavarez MRN# [id number]   YOB: 1960 Age: 62 year old      Date of Admission: 1/13/2023    Primary care provider: Catalino Nuñez    Referring Cardiologist(s): Kalia North and Aki Pereira MD    Date of Service: January 13, 2023    Reason for consult: Severe, multi-vessel coronary artery disease           Assessment and Plan:   Kash Tavarez is a 62 year old male with diabetes and stable angina. Coronary angiogram reveals severe multi-vessel disease. Echocardiogram reveals preserved function without valvular disease. I recommend coronary bypass. Risks, benefits, and alternatives have been previously discussed. He agrees to proceed with surgery.    1) CAB today    Arturo Bear MD  Cardiothoracic Surgery  [phone]             Chief Complaint:   Chest pressure, WILLARD         History of Present Illness:   Mr. Kash Tavarez is a 62 year old male who presents with a history of diabetes and progressive symptoms of chest pressure and shortness of breath with exertion. There have been no changes in his health since our last visit on 12/12/2022. He presents today for scheduled coronary bypass surgery.                   Past Medical History:     Past Medical History:   Diagnosis Date     Coronary artery disease of native artery of native heart with stable angina pectoris (H)      Hypertension      Nephrolithiasis      Type 2 diabetes mellitus (H)              Past Surgical History:     Past Surgical History:   Procedure Laterality Date     COLONOSCOPY       COMBINED CYSTOSCOPY, INSERT STENT URETER(S) Right 5/12/2022    Procedure: CYSTOURETEROSCOPY, RETROGRADE PYELOGRAM, LASER LITHOTRIPSY WITH CALCULUS REMOVAL AND URETERAL STENT INSERTION;  Surgeon: James Mosley MD;  Location: [facility]     CV CORONARY ANGIOGRAM N/A 12/5/2022    Procedure: Coronary Angiogram;  Surgeon: Aristides North MD;  Location: [facility] CATH LAB CV      CV LEFT HEART CATH N/A 12/5/2022    Procedure: Left Heart Catheterization;  Surgeon: Aristides North MD;  Location: [facility] CATH LAB CV     KIDNEY STONE SURGERY       ZZC APPENDECTOMY      Description: Appendectomy;  Recorded: 03/04/2008;              Social History:     Social History     Socioeconomic History     Marital status:      Spouse name: Not on file     Number of children: Not on file     Years of education: Not on file     Highest education level: Not on file   Occupational History     Not on file   Tobacco Use     Smoking status: Never     Smokeless tobacco: Never   Vaping Use     Vaping Use: Never used   Substance and Sexual Activity     Alcohol use: No     Drug use: No     Sexual activity: Not on file     Comment: not asked   Other Topics Concern     Parent/sibling w/ CABG, MI or angioplasty before 65F 55M? No   Social History Narrative     Not on file     Social Determinants of Health     Financial Resource Strain: Not on file   Food Insecurity: Not on file   Transportation Needs: Not on file   Physical Activity: Not on file   Stress: Not on file   Social Connections: Not on file   Intimate Partner Violence: Not on file   Housing Stability: Not on file            Family History:     Family History   Problem Relation Age of Onset     Heart Disease Father      Alcoholism Father      Cerebrovascular Disease Father              Immunizations:     Immunization History   Administered Date(s) Administered     COVID-19 Vaccine 12+ (Pfizer 2022) 05/05/2022     COVID-19 Vaccine 12+ (Pfizer) 03/17/2021, 04/07/2021, 10/14/2021     COVID-19 Vaccine Bivalent Booster 12+ (Pfizer) 10/14/2022     DT (PEDS <7y) 04/01/2005     FLU 6-35 months 10/28/2009, 10/17/2015     Flu, Unspecified 09/10/2019     HepA, Unspecified 03/16/2010, 03/21/2011     HepA-Adult 03/16/2010, 03/21/2011     Influenza (H1N1) 01/19/2010     Influenza (IIV3) PF 10/27/2010, 09/21/2011, 09/19/2012, 09/18/2013, 10/09/2014     Influenza  Vaccine 50-64 or 18-64 w/egg allergy (Flublok) 10/14/2022     Influenza Vaccine >6 months (Alfuria,Fluzone) 10/06/2017, 10/06/2021     Influenza Vaccine, 6+MO IM (QUADRIVALENT W/PRESERVATIVES) 10/21/2016, 10/09/2018     Influenza,INJ,MDCK,PF,Quad >6mo(Flucelvax) 09/10/2019, 09/26/2020     Pneumo Conj 13-V (2010&after) 11/05/2020     Pneumococcal 23 valent 11/04/2019     Td (Adult), Adsorbed 04/01/2005     Td,adult,historic,unspecified 04/01/2005     Tdap (Adacel,Boostrix) 05/22/2013     Zoster vaccine recombinant adjuvanted (SHINGRIX) 11/04/2019, 01/15/2020     Zoster vaccine, live 05/13/2015             Allergies:    No Known Allergies          Medications:     Current Facility-Administered Medications   Medication     aminocaproic acid (AMICAR) 5 g in sodium chloride 0.9 % 100 mL infusion     aminocaproic acid (AMICAR) 7.5 g in sodium chloride 0.9 % 150 mL bolus     cardioplegia low K - standard PERFUSION     cardioplegic soln Hi K - Standard PERFUSION     cardioplegic soln Hot Shot - Standard PERFUSION     ceFAZolin Sodium (ANCEF) injection 2 g     ceFAZolin Sodium (ANCEF) injection 2 g     desmopressin (DDAVP) 33.32 mcg in sodium chloride 0.9 % 50 mL intermittent infusion     dexmedetomidine (PRECEDEX) 400 mcg in 0.9% sodium chloride 100 mL     EPINEPHrine (ADRENALIN) 5 mg in sodium chloride 0.9 % 250 mL infusion CENTRAL     fentaNYL (PF) (SUBLIMAZE) injection  mcg     heparin cell saver PERFUSION solution     HOLD:  All AM Medications     insulin regular (MYXREDLIN) 1 unit/mL infusion     lactated ringers BOLUS 250 mL     lactated ringers infusion     lidocaine (LMX4) cream     lidocaine (LMX4) cream     lidocaine 1 % 0.1-1 mL     lidocaine 1 % 0.1-1 mL     magnesium sulfate 4 g in 50 mL sterile water (premade)     methadone (DOLOPHINE) 10 MG/ML injection     methadone (DOLOPHINE) 10 MG/ML injection     methadone (DOLOPHINE) injection 20 mg     metoprolol tartrate (LOPRESSOR) half-tab 12.5 mg      midazolam (VERSED) injection 0.5-2 mg     niCARdipine 40 mg in 200 mL NS (CARDENE) infusion     papaverine 30 MG/ML injection     phenylephrine (GATITO-SYNEPHRINE) 50 mg in sodium chloride 0.9 % 250 mL infusion     prime solution for OR INFUSION     sodium chloride (PF) 0.9% PF flush 3 mL     sodium chloride (PF) 0.9% PF flush 3 mL     sodium chloride (PF) 0.9% PF flush 3 mL     sodium chloride (PF) 0.9% PF flush 3 mL     vein mixture infusion             Review of Systems:     A 10 point ROS was performed and is negative other than HPI.             Physical Exam:        Temp:  [98.1  F (36.7  C)] 98.1  F (36.7  C)  Pulse:  [80] 80  Resp:  [18] 18  BP: (134-159)/(78-85) 159/85  SpO2:  [95 %] 95 %    Gen: NAD, resting comfortably in bed  Neck: No JVD, trachea midline  ENT: EOMI, anicteric  Lungs: CTAB, non-labored breathing  CV: regular rhythm, normal rate  Abd: soft, nt, nd  Ext: no edema, no deformities  Neuro: AOx3    Labs:  Lab Results   Component Value Date    WBC 9.8 12/02/2022    HGB 14.8 12/02/2022    HCT 45.8 12/02/2022     12/02/2022     12/02/2022    POTASSIUM 4.9 12/02/2022    CHLORIDE 104 12/02/2022    CO2 28 12/02/2022    BUN 13 12/02/2022    CR 0.89 12/02/2022     (H) 01/13/2023    AST 18 11/17/2022    ALT 20 11/17/2022    ALKPHOS 74 11/17/2022    BILITOTAL 0.5 11/17/2022    INR 0.99 12/16/2022       Imaging:  Transthoracic echocardiogram (12/5/2022): I have personally reviewed these images  LVEF 60-65%, concentric LVH, mod LA dilation, no significant valvular disease      Coronary angiogram (12/5/2022):  I have personally reviewed these images  pLAD bifurcation at D1 lesion extending to D2  pLCx lesion including OM1  rPL moderate disease

## 2023-01-13 NOTE — ANESTHESIA PROCEDURE NOTES
Arterial Line Procedure Note    Pre-Procedure   Staff -        Anesthesiologist:  Fariba Blackman MD       Performed By: anesthesiologist       Pre-Anesthestic Checklist: patient identified, IV checked, risks and benefits discussed, informed consent, monitors and equipment checked, pre-op evaluation and at physician/surgeon's request  Timeout:       Correct Patient: Yes        Correct Procedure: Yes        Correct Site: Yes        Correct Position: Yes   Line Placement:   This line was placed Pre Induction starting at 1/13/2023 7:29 AM and ending at 1/13/2023 7:31 AM  Procedure   Procedure: arterial line       Laterality: right       Insertion Site: brachial.  Sterile Prep        Standard elements of sterile barrier followed       Skin prep: Chloraprep  Insertion/Injection        Technique: ultrasound guided and Seldinger Technique        1. Ultrasound was used to evaluate the access site.       2. Artery evaluated via ultrasound for patency/adequacy.       3. Using real-time ultrasound the needle/catheter was observed entering the artery/vein.       4. Permanent image was captured and entered into the patient's record.       5. The visualized structures were anatomically normal.       6. There were no apparent abnormal pathologic findings.       Catheter Type/Size: 20 G, 12 cm  Narrative         Secured by: suture       Tegaderm and Biopatch dressing used.       Complications: None apparent,        Arterial waveform: Yes        IBP within 10% of NIBP: Yes

## 2023-01-13 NOTE — PROGRESS NOTES
1250: Patient arrived from OR to ICU. Placed patient on  initial vent setting of 18/ 650/ +8/ 80%. Patient BS diminished; clear and has chest rise.           Star Sharon, RT

## 2023-01-13 NOTE — PHARMACY-ADMISSION MEDICATION HISTORY
Pharmacy Note - Admission Medication History    Pertinent Provider Information:    ______________________________________________________________________    Prior To Admission (PTA) med list completed and updated in EMR.       PTA Med List   Medication Sig Last Dose     aspirin (ASA) 81 MG EC tablet TAKE 1 TABLET BY MOUTH EVERY DAY (Patient taking differently: Take 81 mg by mouth daily) 1/13/2023 at 0400     atorvastatin (LIPITOR) 40 MG tablet TAKE 1 TABLET BY MOUTH EVERY DAY (Patient taking differently: Take 40 mg by mouth daily) 1/12/2023 at am     imiquimod (ALDARA) 5 % cream Apply topically daily To warts Unknown     lisinopril (ZESTRIL) 20 MG tablet Take 1 tablet (20 mg) by mouth daily 1/12/2023 at am     loratadine (CLARITIN) 10 MG tablet Take 10 mg by mouth daily 1/12/2023 at am     metFORMIN (GLUCOPHAGE) 500 MG tablet Take 1 tablet (500 mg) by mouth 3 times daily (with meals) Past Week at am     metoprolol succinate ER (TOPROL XL) 25 MG 24 hr tablet Take 1 tablet (25 mg) by mouth daily 1/13/2023 at 0400     metroNIDAZOLE (METROGEL) 0.75 % gel 2 times daily Apply to face Past Week at pm     multivitamin, therapeutic (THERA-VIT) TABS tablet Take 1 tablet by mouth daily Past Week at am     nitroGLYcerin (NITROSTAT) 0.4 MG sublingual tablet PLACE 1 TABLET (0.4 MG) UNDER THE TONGUE EVERY 5 MINUTES AS NEEDED FOR CHEST PAIN FOR CHEST PAIN PLACE 1 TABLET UNDER THE TONGUE EVERY 5 MINUTES FOR 3 DOSES. IF SYMPTOMS PERSIST 5 MINUTES AFTER 1ST DOSE CALL 911. Unknown     oxymetazoline (RHOFADE) 1 % Crea [OXYMETAZOLINE (RHOFADE) 1 % CREA] Apply topically daily. Past Month     triamcinolone (KENALOG) 0.1 % external cream Apply topically 2 times daily (Patient taking differently: Apply topically daily as needed Apply to neck rash) Past Month     Vitamin D3 (CHOLECALCIFEROL) 25 mcg (1000 units) tablet Take 4 tablets by mouth daily Past Week at am       Information source(s): Patient and CareEverywhere/SureScripts    Method of  interview communication: in-person    Patient was asked about OTC/herbal products specifically.  PTA med list reflects this.    Based on the pharmacist's assessment, the PTA med list information appears reliable    Allergies were reviewed, assessed, and updated with the patient.      Medications available for use during hospital stay: topical products.      Thank you for the opportunity to participate in the care of this patient.      Abigail Cerda [facility]     1/13/2023     6:25 AM

## 2023-01-13 NOTE — OP NOTE
"DATE OF SERVICE: 01/13/23      PREOPERATIVE DIAGNOSES:  1. Severe, multivessel coronary artery disease.  2. Stable angina  3. Hypertension  4. Diabetes mellitus type 2     POSTOPERATIVE DIAGNOSES:  1. Severe, multivessel coronary artery disease.  2. Stable angina  3. Hypertension  4. Diabetes mellitus type 2     PROCEDURE PERFORMED:  1. Coronary artery bypass grafting x 3   A. Composite sequential Radial artery as a T graft from the left internal mammary artery sequnced to the first diagonal branch of the left anterior descending coronary artery first and the first obtuse marginal branch of the left circumflex coronary artery  D. Left internal mammary artery to left anterior descending coronary artery  2. Endoscopic harvest of the radial artery from the left upper extremity.  3. Epiaortic ultrasound     SURGEON: Arturo Bear MD     FIRST ASSISTANT: Lisa Mera CST/BRUNA      ANESTHESIA: General endotracheal anesthesia.     ANESTHESIOLOGIST: Fariba Blackman MD     ESTIMATED BLOOD LOSS: 500 mL     SPECIMEN: None.    CARDIOPULMONARY BYPASS TIME: 110\"    AORTIC CROSS CLAMP TIME: 78\"     INDICATIONS FOR PROCEDURE: Mr. Kash Tavarez is a 62 year old year-old male who presents with symptoms of stable angina. Coronary angiography demonstrates severe multivessel coronary artery disease. Echocardiography demonstrates preserved left ventricular function and no significant valvular abnormality. The patient understands the risks and benefits of the procedure and wishes to undergo the operation.     OPERATIVE FINDINGS: Epiaortic ultrasound showed the ascending aorta was normal. The left internal mammary artery was 2.2 mm in diameter and had excellent, pulsatile flow. The radial artery from the left upper extremity was 4 mm in diameter and suitable for conduit. The first obtuse marginal branch of the left circumflex coronary artery was 2.2 mm in diameter and free of disease at the site of anastomosis. The first diagonal " branch of the left anterior descending coronary artery was 2 mm in diameter and free of disease at the site anastomosis.  The radial artery was sequenced to the first diagonal and then the first obtuse marginal coronary arteries.  The anastomosis to the diagonal artery was performed as a side-to-side perpendicular anastomosis.  The anastomosis to the obtuse marginal was performed as a end-to-side parallel anastomosis.  The left anterior descending coronary artery 2 mm in diameter and free of disease at the site of anastomosis. After reperfusion a sinus rhythm resumed. Left ventricular function was preserved after bypass on low-dose inotropic support.     OPERATIVE DESCRIPTION IN DETAIL:    After obtaining informed consent, the patient was brought to the operating room and placed in the supine position on the operating room table. Appropriate lines and devices for monitoring were placed by the anesthesia team. The patient underwent smooth induction of general anesthesia, and the trachea was intubated orally. Invasive monitoring lines were placed by anesthesia. The patient was prepped and draped, and a timeout was performed to confirm the correct patient identity,as well as the procedure to be performed. A median sternotomy was performed and the left internal mammary artery was harvested in a pedicled fashion. The radial artery was harvested from the left upper extremity using endoscopic vein harvesting by Lisa Mera CST/BRUNA.     The patient was given 400 units/Kg of heparin and a final ACT was greater than 480 seconds. Epiaortic ultrasound was performed and the findings were as above. Cannulation of the distal ascending aorta and the right atrium was performed through pursestrings and cardiopulmonary bypass was commenced. Antegrade and retrograde cardioplegia cannulae were placed, and the heart was arrested with 1 liter of cold antegrade blood cardioplegia. Subsequent maintenance doses of 300 mL of cold retrograde  "blood cardioplegia were given approximately every 20 minutes or after each distal anastomosis. Topical cold saline slush was applied for additional protection.     The radial artery graft was sewn to the mid, first obtuse marginal branch of the left circumflex coronary artery and an end to side parallel fashion with a running 7-0 Prolene suture.  It was then sized to the mid, first diagonal branch of the left anterior descending coronary artery and anastomosed to this in a side-to-side perpendicular fashion with a running 7-0 Prolene suture. The skeletonized left internal mammary artery was sewn to the mid left anterior descending coronary artery in end-to-side fashion using running 8-0 Prolene.  The radial artery was then anastomosed to the side of the left internal mammary artery and a T fashion with a running 8-0 Prolene suture under a single crossclamp. The crossclamp was released, and the heart was resuscitated.     All bleeding points were controlled. A bipolar ventricular pacing lead was placed and brought out through a separate stab incision. A bipolar right atrial pacing lead was placed and brought out through a separate stab incision. The patient weaned from cardiopulmonary bypass successfully, was given protamine and decannulated.  A 28 Ghanaian angled and 28 Ghanaian straight chest tubes were placed in the mediastinum anteriorly and along the inferior margin of the heart.  These were delivered through separate stab incisions on the anterior abdominal wall.    The sternal edges were coated with vancomycin paste. The sternal depth was measured and found to be 18 mm at the manubrium, 16 mm at the mid sternum and 16 mm at the distal sternum. Four #6 single sternal wires were placed in the manubrium, mid sternum and distal sternum using a wire cerclage technique. Sternal plates were then bent to accommodate the curve of the sternum. A \"O\" plate was placed in the manubrium and secured with 6 screws. A \"X\" plate " "was placed in the upper sternum and secured with 6 screws. A \"X\" plate was placed at the inferior sternum and secured with 6 screws. The muscle, fascia, and skin were closed in layers with absorbable Stratafix suture. Surgical skin glue was applied. All sponge, needle and instrument counts were correct at the end of the case.      Arturo Bear MD  Cardiothoracic Surgery  Pager [phone]  "

## 2023-01-13 NOTE — ANESTHESIA PROCEDURE NOTES
Airway       Patient location during procedure: OR       Procedure Start/Stop Times: 1/13/2023 7:38 AM  Staff -        CRNA: Allison Camacho APRN CRNA       Performed By: CRNA  Consent for Airway        Urgency: elective  Indications and Patient Condition       Indications for airway management: mainor-procedural       Induction type:intravenous       Mask difficulty assessment: 3 - difficult mask (inadequate, unstable, or two providers) +/- NMBA    Final Airway Details       Final airway type: endotracheal airway       Successful airway: ETT - single, Oral and Single subglottic suction  Endotracheal Airway Details        ETT size (mm): 8.0       Cuffed: yes       Cuff volume (mL): 7       Successful intubation technique: video laryngoscopy       VL Blade Size: Glidescope 3       Grade View of Cords: 1       Adjucts: stylet       Position: Right       Measured from: gums/teeth       Secured at (cm): 24       Bite block used: None    Post intubation assessment        Placement verified by: capnometry, equal breath sounds and chest rise        Number of attempts at approach: 1       Number of other approaches attempted: 0       Secured with: silk tape       Ease of procedure: easy       Dentition: Intact and Unchanged    Medication(s) Administered   Medication Administration Time: 1/13/2023 7:38 AM

## 2023-01-13 NOTE — PROGRESS NOTES
PROVIDER RESTRAINT FOR NON-VIOLENT BEHAVIOR FACE TO FACE EVALUATION    Patient's Immediate Situation:  Patient demonstrated the following behaviors: recovering from anesthesia; pulls at critical lines and tubes. Does not respond to redirection.     Patient's Reaction to the intervention:  Does patient understand the reason for restraint/seclusion? Unable to express     Medical Condition:  Is there any evidence of compromise of Skin integrity, Respiratory, Cardiovascular, Musculoskeletal, Hydration?   No    Behavioral Condition:  In consultation with the RN, is there a need to continue this restraint or seclusion? Yes    See Restraint Flowsheet for complete restraint documentation and assessment.    Shelley Avila, CNP  [facility] Pulmonary/Critical Care

## 2023-01-13 NOTE — PROGRESS NOTES
Patient met the extubation criteria and was extubated at 1705 to 2L via NC saturating above 94%.Pt was following commands, positive cuff leak, RSBI of <105, Spontaneous breathing trial of 5/5 35% over one hour, BS are diminished bilaterally, maintaining adequate ventilation and oxygen status. RT will cont to monitor pt's cardiopulmonary status.     [facility] RRT

## 2023-01-14 ENCOUNTER — APPOINTMENT (OUTPATIENT)
Dept: RADIOLOGY | Facility: HOSPITAL | Age: 63
End: 2023-01-14
Attending: PHYSICIAN ASSISTANT
Payer: COMMERCIAL

## 2023-01-14 ENCOUNTER — APPOINTMENT (OUTPATIENT)
Dept: OCCUPATIONAL THERAPY | Facility: HOSPITAL | Age: 63
End: 2023-01-14
Attending: THORACIC SURGERY (CARDIOTHORACIC VASCULAR SURGERY)
Payer: COMMERCIAL

## 2023-01-14 LAB
ALBUMIN SERPL BCG-MCNC: 3.6 G/DL (ref 3.5–5.2)
ALP SERPL-CCNC: 52 U/L (ref 40–129)
ALT SERPL W P-5'-P-CCNC: 13 U/L (ref 10–50)
ANION GAP SERPL CALCULATED.3IONS-SCNC: 8 MMOL/L (ref 7–15)
AST SERPL W P-5'-P-CCNC: 21 U/L (ref 10–50)
BASE EXCESS BLDV CALC-SCNC: 0.4 MMOL/L
BILIRUB SERPL-MCNC: 0.2 MG/DL
BUN SERPL-MCNC: 11.9 MG/DL (ref 8–23)
CALCIUM SERPL-MCNC: 8.4 MG/DL (ref 8.8–10.2)
CALCIUM, IONIZED MEASURED: 1.11 MMOL/L (ref 1.11–1.3)
CALCIUM, IONIZED MEASURED: 1.12 MMOL/L (ref 1.11–1.3)
CHLORIDE SERPL-SCNC: 107 MMOL/L (ref 98–107)
CREAT SERPL-MCNC: 0.82 MG/DL (ref 0.67–1.17)
DEPRECATED HCO3 PLAS-SCNC: 23 MMOL/L (ref 22–29)
ERYTHROCYTE [DISTWIDTH] IN BLOOD BY AUTOMATED COUNT: 12.8 % (ref 10–15)
GFR SERPL CREATININE-BSD FRML MDRD: >90 ML/MIN/1.73M2
GLUCOSE BLDC GLUCOMTR-MCNC: 106 MG/DL (ref 70–99)
GLUCOSE BLDC GLUCOMTR-MCNC: 113 MG/DL (ref 70–99)
GLUCOSE BLDC GLUCOMTR-MCNC: 127 MG/DL (ref 70–99)
GLUCOSE BLDC GLUCOMTR-MCNC: 128 MG/DL (ref 70–99)
GLUCOSE BLDC GLUCOMTR-MCNC: 132 MG/DL (ref 70–99)
GLUCOSE BLDC GLUCOMTR-MCNC: 140 MG/DL (ref 70–99)
GLUCOSE BLDC GLUCOMTR-MCNC: 143 MG/DL (ref 70–99)
GLUCOSE BLDC GLUCOMTR-MCNC: 148 MG/DL (ref 70–99)
GLUCOSE BLDC GLUCOMTR-MCNC: 157 MG/DL (ref 70–99)
GLUCOSE BLDC GLUCOMTR-MCNC: 157 MG/DL (ref 70–99)
GLUCOSE BLDC GLUCOMTR-MCNC: 161 MG/DL (ref 70–99)
GLUCOSE BLDC GLUCOMTR-MCNC: 199 MG/DL (ref 70–99)
GLUCOSE BLDC GLUCOMTR-MCNC: 96 MG/DL (ref 70–99)
GLUCOSE SERPL-MCNC: 151 MG/DL (ref 70–99)
HCO3 BLDV-SCNC: 26 MMOL/L (ref 24–30)
HCT VFR BLD AUTO: 32.1 % (ref 40–53)
HGB BLD-MCNC: 10.6 G/DL (ref 13.3–17.7)
ION CA PH 7.4: 1.09 MMOL/L (ref 1.11–1.3)
ION CA PH 7.4: 1.13 MMOL/L (ref 1.11–1.3)
MAGNESIUM SERPL-MCNC: 2.1 MG/DL (ref 1.7–2.3)
MCH RBC QN AUTO: 28.3 PG (ref 26.5–33)
MCHC RBC AUTO-ENTMCNC: 33 G/DL (ref 31.5–36.5)
MCV RBC AUTO: 86 FL (ref 78–100)
OXYHGB MFR BLDV: 74.6 % (ref 70–75)
PCO2 BLDV: 47 MM HG (ref 35–50)
PH BLDV: 7.35 [PH] (ref 7.35–7.45)
PH: 7.38 (ref 7.35–7.45)
PH: 7.42 (ref 7.35–7.45)
PHOSPHATE SERPL-MCNC: 4 MG/DL (ref 2.5–4.5)
PLATELET # BLD AUTO: 171 10E3/UL (ref 150–450)
PO2 BLDV: 42 MM HG (ref 25–47)
POTASSIUM SERPL-SCNC: 4.2 MMOL/L (ref 3.4–5.3)
POTASSIUM SERPL-SCNC: 4.2 MMOL/L (ref 3.4–5.3)
PROT SERPL-MCNC: 5.4 G/DL (ref 6.4–8.3)
RBC # BLD AUTO: 3.75 10E6/UL (ref 4.4–5.9)
SAO2 % BLDV: 75.7 % (ref 70–75)
SODIUM SERPL-SCNC: 138 MMOL/L (ref 136–145)
WBC # BLD AUTO: 17.6 10E3/UL (ref 4–11)

## 2023-01-14 PROCEDURE — 83735 ASSAY OF MAGNESIUM: CPT | Performed by: PHYSICIAN ASSISTANT

## 2023-01-14 PROCEDURE — 82330 ASSAY OF CALCIUM: CPT | Performed by: THORACIC SURGERY (CARDIOTHORACIC VASCULAR SURGERY)

## 2023-01-14 PROCEDURE — 258N000003 HC RX IP 258 OP 636: Performed by: PHYSICIAN ASSISTANT

## 2023-01-14 PROCEDURE — 85027 COMPLETE CBC AUTOMATED: CPT | Performed by: PHYSICIAN ASSISTANT

## 2023-01-14 PROCEDURE — 97535 SELF CARE MNGMENT TRAINING: CPT | Mod: GO

## 2023-01-14 PROCEDURE — 250N000013 HC RX MED GY IP 250 OP 250 PS 637: Performed by: NURSE PRACTITIONER

## 2023-01-14 PROCEDURE — 80053 COMPREHEN METABOLIC PANEL: CPT | Performed by: PHYSICIAN ASSISTANT

## 2023-01-14 PROCEDURE — 97165 OT EVAL LOW COMPLEX 30 MIN: CPT | Mod: GO

## 2023-01-14 PROCEDURE — 250N000012 HC RX MED GY IP 250 OP 636 PS 637: Performed by: NURSE PRACTITIONER

## 2023-01-14 PROCEDURE — 82330 ASSAY OF CALCIUM: CPT | Performed by: PHYSICIAN ASSISTANT

## 2023-01-14 PROCEDURE — 250N000011 HC RX IP 250 OP 636: Performed by: THORACIC SURGERY (CARDIOTHORACIC VASCULAR SURGERY)

## 2023-01-14 PROCEDURE — 999N000157 HC STATISTIC RCP TIME EA 10 MIN

## 2023-01-14 PROCEDURE — 250N000011 HC RX IP 250 OP 636: Performed by: PHYSICIAN ASSISTANT

## 2023-01-14 PROCEDURE — 71045 X-RAY EXAM CHEST 1 VIEW: CPT

## 2023-01-14 PROCEDURE — 84100 ASSAY OF PHOSPHORUS: CPT | Performed by: PHYSICIAN ASSISTANT

## 2023-01-14 PROCEDURE — 200N000001 HC R&B ICU

## 2023-01-14 PROCEDURE — 93005 ELECTROCARDIOGRAM TRACING: CPT | Performed by: PHYSICIAN ASSISTANT

## 2023-01-14 PROCEDURE — 94799 UNLISTED PULMONARY SVC/PX: CPT

## 2023-01-14 PROCEDURE — 250N000011 HC RX IP 250 OP 636: Performed by: NURSE PRACTITIONER

## 2023-01-14 PROCEDURE — 250N000013 HC RX MED GY IP 250 OP 250 PS 637: Performed by: PHYSICIAN ASSISTANT

## 2023-01-14 PROCEDURE — 84132 ASSAY OF SERUM POTASSIUM: CPT | Performed by: THORACIC SURGERY (CARDIOTHORACIC VASCULAR SURGERY)

## 2023-01-14 PROCEDURE — 82805 BLOOD GASES W/O2 SATURATION: CPT | Performed by: THORACIC SURGERY (CARDIOTHORACIC VASCULAR SURGERY)

## 2023-01-14 PROCEDURE — 97110 THERAPEUTIC EXERCISES: CPT | Mod: GO

## 2023-01-14 RX ORDER — NICOTINE POLACRILEX 4 MG
15-30 LOZENGE BUCCAL
Status: DISCONTINUED | OUTPATIENT
Start: 2023-01-14 | End: 2023-01-17 | Stop reason: HOSPADM

## 2023-01-14 RX ORDER — DEXTROSE MONOHYDRATE 25 G/50ML
25-50 INJECTION, SOLUTION INTRAVENOUS
Status: DISCONTINUED | OUTPATIENT
Start: 2023-01-14 | End: 2023-01-17 | Stop reason: HOSPADM

## 2023-01-14 RX ORDER — AMLODIPINE BESYLATE 2.5 MG/1
2.5 TABLET ORAL DAILY
Status: DISCONTINUED | OUTPATIENT
Start: 2023-01-14 | End: 2023-01-14

## 2023-01-14 RX ORDER — TAMSULOSIN HYDROCHLORIDE 0.4 MG/1
0.4 CAPSULE ORAL DAILY
Status: DISCONTINUED | OUTPATIENT
Start: 2023-01-14 | End: 2023-01-17 | Stop reason: HOSPADM

## 2023-01-14 RX ORDER — FUROSEMIDE 10 MG/ML
40 INJECTION INTRAMUSCULAR; INTRAVENOUS
Status: DISCONTINUED | OUTPATIENT
Start: 2023-01-14 | End: 2023-01-15

## 2023-01-14 RX ADMIN — CEFAZOLIN SODIUM 2 G: 2 INJECTION, SOLUTION INTRAVENOUS at 06:48

## 2023-01-14 RX ADMIN — LORATADINE 10 MG: 10 TABLET ORAL at 10:06

## 2023-01-14 RX ADMIN — Medication 100 MCG: at 10:15

## 2023-01-14 RX ADMIN — CALCIUM GLUCONATE 1 G: 20 INJECTION, SOLUTION INTRAVENOUS at 05:39

## 2023-01-14 RX ADMIN — HYDROMORPHONE HYDROCHLORIDE 0.2 MG: 1 INJECTION, SOLUTION INTRAMUSCULAR; INTRAVENOUS; SUBCUTANEOUS at 09:22

## 2023-01-14 RX ADMIN — SODIUM CHLORIDE, POTASSIUM CHLORIDE, SODIUM LACTATE AND CALCIUM CHLORIDE 250 ML: 600; 310; 30; 20 INJECTION, SOLUTION INTRAVENOUS at 01:18

## 2023-01-14 RX ADMIN — FUROSEMIDE 40 MG: 10 INJECTION, SOLUTION INTRAVENOUS at 18:14

## 2023-01-14 RX ADMIN — HEPARIN SODIUM 5000 UNITS: 10000 INJECTION, SOLUTION INTRAVENOUS; SUBCUTANEOUS at 13:39

## 2023-01-14 RX ADMIN — CHLORHEXIDINE GLUCONATE 15 ML: 1.2 SOLUTION ORAL at 21:14

## 2023-01-14 RX ADMIN — HYDROMORPHONE HYDROCHLORIDE 0.2 MG: 1 INJECTION, SOLUTION INTRAMUSCULAR; INTRAVENOUS; SUBCUTANEOUS at 05:57

## 2023-01-14 RX ADMIN — METOPROLOL TARTRATE 12.5 MG: 25 TABLET, FILM COATED ORAL at 21:13

## 2023-01-14 RX ADMIN — MUPIROCIN 0.5 G: 20 OINTMENT TOPICAL at 10:15

## 2023-01-14 RX ADMIN — LIDOCAINE PATCH 4% 1 PATCH: 40 PATCH TOPICAL at 13:47

## 2023-01-14 RX ADMIN — ACETAMINOPHEN 975 MG: 325 TABLET ORAL at 05:03

## 2023-01-14 RX ADMIN — PANTOPRAZOLE SODIUM 40 MG: 40 TABLET, DELAYED RELEASE ORAL at 10:14

## 2023-01-14 RX ADMIN — ASPIRIN 162 MG: 81 TABLET, CHEWABLE ORAL at 10:07

## 2023-01-14 RX ADMIN — TAMSULOSIN HYDROCHLORIDE 0.4 MG: 0.4 CAPSULE ORAL at 10:18

## 2023-01-14 RX ADMIN — INSULIN ASPART 2 UNITS: 100 INJECTION, SOLUTION INTRAVENOUS; SUBCUTANEOUS at 17:04

## 2023-01-14 RX ADMIN — POLYETHYLENE GLYCOL 3350 17 G: 17 POWDER, FOR SOLUTION ORAL at 10:15

## 2023-01-14 RX ADMIN — SODIUM CHLORIDE, POTASSIUM CHLORIDE, SODIUM LACTATE AND CALCIUM CHLORIDE 250 ML: 600; 310; 30; 20 INJECTION, SOLUTION INTRAVENOUS at 01:35

## 2023-01-14 RX ADMIN — FUROSEMIDE 40 MG: 10 INJECTION, SOLUTION INTRAVENOUS at 10:19

## 2023-01-14 RX ADMIN — HYDROMORPHONE HYDROCHLORIDE 0.2 MG: 1 INJECTION, SOLUTION INTRAMUSCULAR; INTRAVENOUS; SUBCUTANEOUS at 03:32

## 2023-01-14 RX ADMIN — MUPIROCIN 0.5 G: 20 OINTMENT TOPICAL at 21:15

## 2023-01-14 RX ADMIN — HYDRALAZINE HYDROCHLORIDE 10 MG: 20 INJECTION INTRAMUSCULAR; INTRAVENOUS at 11:32

## 2023-01-14 RX ADMIN — ACETAMINOPHEN 975 MG: 325 TABLET ORAL at 13:38

## 2023-01-14 RX ADMIN — ATORVASTATIN CALCIUM 40 MG: 40 TABLET, FILM COATED ORAL at 10:14

## 2023-01-14 RX ADMIN — ONDANSETRON 4 MG: 2 INJECTION INTRAMUSCULAR; INTRAVENOUS at 06:39

## 2023-01-14 RX ADMIN — ACETAMINOPHEN 975 MG: 325 TABLET ORAL at 21:12

## 2023-01-14 RX ADMIN — OXYCODONE HYDROCHLORIDE 5 MG: 5 TABLET ORAL at 10:08

## 2023-01-14 RX ADMIN — THERA TABS 1 TABLET: TAB at 10:07

## 2023-01-14 RX ADMIN — SODIUM CHLORIDE, POTASSIUM CHLORIDE, SODIUM LACTATE AND CALCIUM CHLORIDE 500 ML: 600; 310; 30; 20 INJECTION, SOLUTION INTRAVENOUS at 05:35

## 2023-01-14 RX ADMIN — HYDROMORPHONE HYDROCHLORIDE 0.2 MG: 1 INJECTION, SOLUTION INTRAMUSCULAR; INTRAVENOUS; SUBCUTANEOUS at 01:34

## 2023-01-14 RX ADMIN — HEPARIN SODIUM 5000 UNITS: 10000 INJECTION, SOLUTION INTRAVENOUS; SUBCUTANEOUS at 21:13

## 2023-01-14 RX ADMIN — SENNOSIDES AND DOCUSATE SODIUM 1 TABLET: 8.6; 5 TABLET ORAL at 10:19

## 2023-01-14 RX ADMIN — SENNOSIDES AND DOCUSATE SODIUM 1 TABLET: 8.6; 5 TABLET ORAL at 21:14

## 2023-01-14 ASSESSMENT — ACTIVITIES OF DAILY LIVING (ADL)
ADLS_ACUITY_SCORE: 27
ADLS_ACUITY_SCORE: 30
ADLS_ACUITY_SCORE: 27
ADLS_ACUITY_SCORE: 27
ADLS_ACUITY_SCORE: 30
ADLS_ACUITY_SCORE: 30
ADLS_ACUITY_SCORE: 27
DEPENDENT_IADLS:: INDEPENDENT
ADLS_ACUITY_SCORE: 27
ADLS_ACUITY_SCORE: 30
ADLS_ACUITY_SCORE: 27

## 2023-01-14 NOTE — PROGRESS NOTES
RCAT Treatment Plan    Patient Score: 3    Clinical Indication for Therapy: CABGX3 and post extubation assessment    Therapy Ordered: Aerizabella and IS PRN    Ahmed Nuh, RRT

## 2023-01-14 NOTE — PROGRESS NOTES
CVTS Daily Progress Note   POD#1 s/p CABG X3   Attending: Chema  LOS: 1    SUBJECTIVE/INTERVAL EVENTS:  Patient arrived to ICU from OR yesterday afternoon. He was subsequently extubated and weaned from pressors. No acute events overnight. Had low CVP last evening and was given IVF's and albumin. Was light headedness when he first got up this am, b/p on the low side. Has since been up walking at the bedside and tolerated. Also had some nausea this am and was given Zofran. Nausea resolved and taking liquids. Patient progressing well. Maintaining oxygen saturations on 2 l/nc. Ambulating with therapy. Pain well controlled. -BM / flatus. UOP adequate but volume up, starting IV lasix. Chest tube output appropriate. Hgb 10.6. Patient denies new chest pain, shortness of breath, abdominal pain, calf pain, nausea. Patient has no questions today.    OBJECTIVE:  Temp:  [97.7  F (36.5  C)-99  F (37.2  C)] 98.4  F (36.9  C)  Pulse:  [69-98] 74  Resp:  [0-31] 17  BP: (110-128)/(61-76) 128/76  MAP:  [50 mmHg-122 mmHg] 83 mmHg  Arterial Line BP: ()/(40-86) 121/59  FiO2 (%):  [80 %] 80 %  SpO2:  [93 %-98 %] 93 %  Vent Mode: CPAP/PS  (Continuous positive airway pressure with Pressure Support)  FiO2 (%): 80 %  Resp Rate (Set): 18 breaths/min  Tidal Volume (Set, mL): 650 mL  PEEP (cm H2O): (S) 5 cmH2O  Pressure Support (cm H2O): (S) 5 cmH2O  Resp: 17    Vitals:    01/13/23 0523 01/14/23 0630   Weight: 111.4 kg (245 lb 9.6 oz) 115.6 kg (254 lb 14.4 oz)       Current Medications:    Scheduled Meds:    acetaminophen  975 mg Oral Q8H     amLODIPine  2.5 mg Oral Daily     aspirin  162 mg Oral or NG Tube Daily     atorvastatin  40 mg Oral Daily     chlorhexidine  15 mL Mouth/Throat Q12H     heparin ANTICOAGULANT  5,000 Units Subcutaneous Q8H     lidocaine   Transdermal Q8H ZABRINA     loratadine  10 mg Oral Daily     [Held by provider] metFORMIN  500 mg Oral TID w/meals     multivitamin, therapeutic  1 tablet Oral Daily     mupirocin  0.5  g Both Nostrils BID     pantoprazole  40 mg Oral or NG Tube Daily    Or     pantoprazole  40 mg Oral Daily     polyethylene glycol  17 g Oral Daily     senna-docusate  1 tablet Oral BID     Vitamin D3  100 mcg Oral Daily     Continuous Infusions:    EPINEPHrine Stopped (01/14/23 0507)     insulin regular 2 Units/hr (01/14/23 0700)     niCARdipine Stopped (01/13/23 1510)     phenylephrine Stopped (01/14/23 0040)     PRN Meds:.[START ON 1/23/2023] acetaminophen, bisacodyl, calcium gluconate, calcium gluconate, calcium gluconate, glucose **OR** dextrose **OR** glucagon, EPINEPHrine, fentaNYL, hydrALAZINE, HYDROmorphone **OR** HYDROmorphone, lactated ringers, Lidocaine, magnesium hydroxide, naloxone **OR** naloxone **OR** naloxone **OR** naloxone, niCARdipine, ondansetron **OR** ondansetron, oxyCODONE **OR** oxyCODONE, phenylephrine, prochlorperazine **OR** prochlorperazine    Cardiographics:    Telemetry monitoring demonstrates SR with rates in the 60's per my personal review.    Imaging:  Results for orders placed or performed during the hospital encounter of 01/13/23   XR Chest Port 1 View    Impression    IMPRESSION:     Endotracheal tube is in satisfactory position. Right jugular sheath and PA catheter are new. Tip of the PA catheter is at the junction of the main and right pulmonary arteries. There are mediastinal drains.    Perihilar atelectasis as well as subsegmental atelectasis in the medial lower lobes.. There are no findings of interstitial lung edema. No airspace consolidation.    No menisci to indicate visible pleural fluid. No pneumothorax.   XR Chest Port 1 View    Impression    IMPRESSION: Extubation. Right IJ [place] catheter tip is in the main pulmonary artery. Mediastinal drains remain. No pleural effusion or pneumothorax. Median sternotomy and mediastinal surgical clips. Mild bibasilar atelectasis is stable.       Labs, personally reviewed.  Hemoglobin   Date Value Ref Range Status   01/14/2023  10.6 (L) 13.3 - 17.7 g/dL Final   01/13/2023 12.7 (L) 13.3 - 17.7 g/dL Final   01/13/2023 11.7 (L) 13.3 - 17.7 g/dL Final     Hemoglobin POCT   Date Value Ref Range Status   01/13/2023 11.9 (L) 13.3 - 17.7 g/dL Final   01/13/2023 11.9 (L) 13.3 - 17.7 g/dL Final   01/13/2023 11.7 (L) 13.3 - 17.7 g/dL Final     WBC Count   Date Value Ref Range Status   01/14/2023 17.6 (H) 4.0 - 11.0 10e3/uL Final   01/13/2023 32.3 (H) 4.0 - 11.0 10e3/uL Final   01/13/2023 26.1 (H) 4.0 - 11.0 10e3/uL Final     Platelet Count   Date Value Ref Range Status   01/14/2023 171 150 - 450 10e3/uL Final   01/13/2023 241 150 - 450 10e3/uL Final   01/13/2023 245 150 - 450 10e3/uL Final     Creatinine   Date Value Ref Range Status   01/14/2023 0.82 0.67 - 1.17 mg/dL Final   01/13/2023 0.91 0.67 - 1.17 mg/dL Final   12/02/2022 0.89 0.70 - 1.30 mg/dL Final     Potassium   Date Value Ref Range Status   01/14/2023 4.2 3.4 - 5.3 mmol/L Final   01/14/2023 4.2 3.4 - 5.3 mmol/L Final   01/13/2023 4.2 3.4 - 5.3 mmol/L Final   01/13/2023 4.2 3.4 - 5.3 mmol/L Final   12/02/2022 4.9 3.5 - 5.0 mmol/L Final   05/09/2022 4.6 3.5 - 5.0 mmol/L Final   11/11/2021 4.8 3.5 - 5.0 mmol/L Final     Potassium POCT   Date Value Ref Range Status   01/13/2023 4.5 3.5 - 5.0 mmol/L Final   01/13/2023 5.0 3.5 - 5.0 mmol/L Final   01/13/2023 5.9 (H) 3.5 - 5.0 mmol/L Final     Magnesium   Date Value Ref Range Status   01/14/2023 2.1 1.7 - 2.3 mg/dL Final   01/13/2023 2.9 (H) 1.7 - 2.3 mg/dL Final   12/16/2022 1.6 (L) 1.7 - 2.3 mg/dL Final          I/O:  I/O last 3 completed shifts:  In: 5954.11 [I.V.:3404.11; Other:300; IV Piggyback:2250]  Out: 3720 [Urine:1870; Emesis/NG output:500; Blood:900; Chest Tube:450]       Physical Exam:    General: Patient seen in bed in chair, but had been standing at the bedside. NAD. Conversant. Pleasant, very talkative.   HEENT: YAIMA, no sclera icterus, moist mucosa. [place] in RIJ.   CV: RRR on monitor. 2+ peripheral pulses in all extremities. Mild  edema.   Pulm: Non-labored effort on 2 L/nc. Chest tubes in place, serosanguinous output, no air leak.  Incision C/D/I.  Abd: Soft, NT, ND  : Arroyo with carlos urine  Ext: Mild pedal edema, SCDs in place, warm, distal pulses intact  Neuro: CNs grossly intact.       ASSESSMENT/PLAN:    Kash Tavarez is a 62 year old male with a history of CAD who is POD#1 s/p CABG X3 (radial graft as T graft from LIMA to first diagonal branch of the LAD and first OM, JUÁREZ to LAD)  No need for amlodipine for radial graft with being T'd with BACILIO as per Chema.       Principal Problem:    Coronary artery disease of native artery of native heart with stable angina pectoris (H)      NEURO:   - Scheduled Tylenol/lidocaine patches and PRN Tylenol/oxycodone/dilaudid for pain    CV:   - Normotensive  - Pressors weaned off 1/13  - Remove Key[place] catheter and arterial line today   - Metoprolol 12.5mg two times a day, start this evening with some light headedness this am.    - ASA 162mg  - Atorvastatin 40mg daily  - Chest tubes to remain today    PULM:   Extubated POD #0 to 2 l/nc   - Vent weaning as able  - Maintaining oxygen saturations on 2L/nc  - Encourage pulmonary toilet    FEN/GI:  - Starting CL this am, had some nausea earlier   - Continue electrolyte replacement protocol  - Diet: Cardiac, ADAT   - Bowel regimen    RENAL:  - Adequate UOP/hr.   - Cr 0.82  - D/C Arroyo later this afternoon or in am   - Diuresis with 40mg IV Lasix BID  - Patient reports that he has had trouble with urinar retention with his BPH.   - start Flomax 0.4 mg daily, can stop at discharge if voiding well     HEME:  - Acute blood loss anemia post-op.   - Hgb stable, no bleeding concerns. Hep SQ, ASA  - Hgb 10.6    ID:  - Adilia op ppx complete, afebrile . No concerns for infection  - WBC 17.6 (32)    ENDO:   DM II  Hgb A1c 7.3, 11/17/22  - Transition to sliding scale insulin  - Holding PTA metformin    PPx:   - DVT: SCDs, SQ heparin TID, ambulation   - GI: Protonix  40mg PO daily    DISPO:   - Continue critical care in ICU this am, could consider transferring to general telemetry status this afternoon       Patient discussed with Dr. Bear.      Yvette Lopez, GABRIELA, CNP  [facility] Cardiothoracic Surgery  Pgr [phone]

## 2023-01-14 NOTE — CONSULTS
Care Management Initial Consult    General Information  Assessment completed with: Kash Francisco  Type of CM/SW Visit: Initial Assessment    Primary Care Provider verified and updated as needed: Yes   Readmission within the last 30 days: no previous admission in last 30 days      Reason for Consult: discharge planning, other (see comments) (Elevated RISK score)  Advance Care Planning: Advance Care Planning Reviewed: present on chart, no concerns identified          Communication Assessment  Patient's communication style: spoken language (English or Bilingual)    Hearing Difficulty or Deaf: no   Wear Glasses or Blind: yes    Cognitive  Cognitive/Neuro/Behavioral: (P) WDL  Level of Consciousness: (P) alert  Arousal Level: (P) opens eyes spontaneously  Orientation: (P) oriented x 4  Mood/Behavior: (P) calm, cooperative  Best Language: (P) 0 - No aphasia  Speech: (P) clear    Living Environment:   People in home: child(cherelle), dependent, spouse (13 year old child)  Spouse Chelsie  Current living Arrangements: house (2-3 steps into home, steps to bedroom, can live on main level if needed)      Able to return to prior arrangements: yes       Family/Social Support:  Care provided by: self  Provides care for: no one  Marital Status:   Wife, Sibling(s)  Chelsie       Description of Support System: Supportive, Involved    Support Assessment: Adequate family and caregiver support    Current Resources:   Patient receiving home care services: No     Community Resources: None  Equipment currently used at home: glucometer  Supplies currently used at home: None    Employment/Financial:  Employment Status: retired     Employment/ Comments: No  Financial Concerns: No concerns identified   Referral to Financial Worker: No       Lifestyle & Psychosocial Needs:  Social Determinants of Health     Tobacco Use: Low Risk      Smoking Tobacco Use: Never     Smokeless Tobacco Use: Never     Passive Exposure: Not on file   Alcohol  Use: Not on file   Financial Resource Strain: Not on file   Food Insecurity: Not on file   Transportation Needs: Not on file   Physical Activity: Not on file   Stress: Not on file   Social Connections: Not on file   Intimate Partner Violence: Not on file   Depression: Not at risk     PHQ-2 Score: 0   Housing Stability: Not on file       Functional Status:  Prior to admission patient needed assistance:   Dependent ADLs:: Independent  Dependent IADLs:: Independent       Mental Health Status:  Mental Health Status: No Current Concerns       Chemical Dependency Status:  Chemical Dependency Status: No Current Concerns             Values/Beliefs:  Spiritual, Cultural Beliefs, Church Practices, Values that affect care: no               Additional Information:  Met with patient in room to introduce care manager role and discuss discharge planning. From home with spouse and 13 year old child in single family home; independent at baseline. No needs anticipated. Outpatient cardiac rehab recommended at discharge; patient hoping to do this at [facility]. Spouse to transport. Care manager to follow as needed.     Keke Wells RN

## 2023-01-14 NOTE — PROGRESS NOTES
[facility] - ICU    RN Progress Note:            Pertinent Assessments:      Please refer to flowsheet rows for full assessment     Patient arrived to ICU at 1250 from OR, plan to fastrack extubation, extubated at 1705.    PRN LR given for a total of 1250 mL, CVP remained 2-3. Dr. Bear updated, additional 500mL bolus given. Will continue to monitor. Allen and Epi titrated as ordered.           Mobility Level:     2 - Tilt      Barriers to Progression         Key Events - This Shift:     LISY SAT (Sedation Awakening Trial): For use ONLY if intubated    SAT Safety Screen PASSED   If FAILED why?    SAT Performed PASSED   If FAILED why?               Barriers to Discharge / Downgrade:     Hemodynamic stability, remains of pressors and insulin drip         Point of Contact Update YES-OR-NO: Yes    Name: Chelsie    Summary of Conversation: Wife updated at bedside.

## 2023-01-14 NOTE — PROGRESS NOTES
01/14/23 0840   Appointment Info   Signing Clinician's Name / Credentials (OT) Aminah Anderson, OTR/L   Living Environment   People in Home spouse;child(cherelle), dependent   Current Living Arrangements house   Home Accessibility stairs to enter home;stairs within home   Number of Stairs, Main Entrance 2   Stair Railings, Main Entrance railings safe and in good condition   Number of Stairs, Within Home, Primary greater than 10 stairs   Stair Railings, Within Home, Primary railings safe and in good condition   Transportation Anticipated car, drives self;family or friend will provide   Living Environment Comments lives in2 level home with wife and 13 year old son.  both patient and wife are retired. Can sleep on main level but shower is on upstairs level with master bedroom   Self-Care   Usual Activity Tolerance good   Current Activity Tolerance moderate   Regular Exercise Yes   Activity/Exercise Type walking  (has been less active more recently)   Equipment Currently Used at Home glucometer;other (see comments)   Fall history within last six months no   Activity/Exercise/Self-Care Comment independent with ADLs and IADLs   Instrumental Activities of Daily Living (IADL)   Previous Responsibilities meal prep;housekeeping;laundry;shopping;yardwork;finances;medication management;driving;   IADL Comments shares responsibilites with family   General Information   Onset of Illness/Injury or Date of Surgery 01/13/23   Referring Physician Kristy White PA-C   Patient/Family Therapy Goal Statement (OT) return home with family   Additional Occupational Profile Info/Pertinent History of Current Problem 62 year old male with with CAD, DM2, and htn. 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   Existing Precautions/Restrictions sternal;cardiac   Left Upper Extremity (Weight-bearing Status) partial weight-bearing (PWB)   Right Upper Extremity  (Weight-bearing Status) partial weight-bearing (PWB)   General Observations and Info lines in ,tubes in   Cognitive Status Examination   Follows Commands WNL   Strength Comprehensive (MMT)   Comment, General Manual Muscle Testing (MMT) Assessment WNL   Bed Mobility   Bed Mobility sit-supine   Sit-Supine [place] (Bed Mobility) moderate assist (50% patient effort);2 person assist;1 person to manage equipment   Transfers   Transfers bed-chair transfer   Transfer Skill: Bed to Chair/Chair to Bed   Bed-Chair [place] (Transfers) minimum assist (75% patient effort)   Transfer Comments added assist due to mulitple lines. vitals stable during transfer   Balance   Balance Assessment no deficits were identified   Clinical Impression   Criteria for Skilled Therapeutic Interventions Met (OT) Yes, treatment indicated   OT Diagnosis reduced functional moblity and endurance   Influenced by the following impairments CABG   OT Problem List-Impairments impacting ADL problems related to;activity tolerance impaired;post-surgical precautions   Assessment of Occupational Performance 1-3 Performance Deficits   Identified Performance Deficits transfers, endurance, bed moblity   Planned Therapy Interventions (OT) ADL retraining;bed mobility training;strengthening;home program guidelines;progressive activity/exercise;transfer training   Clinical Decision Making Complexity (OT) low complexity   Risk & Benefits of therapy have been explained evaluation/treatment results reviewed;care plan/treatment goals reviewed;risks/benefits reviewed;current/potential barriers reviewed;participants voiced agreement with care plan;participants included;patient   OT Total Evaluation Time   OT Eval, Low Complexity Minutes (70396) 14   OT Goals   Therapy Frequency (OT) 2 times/day   OT Predicted Duration/Target Date for Goal Attainment 01/20/23   OT Goals Cardiac Phase 1   OT: Understanding of cardiac education to maximize quality of life, condition  management, and health outcomes Patient   OT: Perform aerobic activity with stable cardiovascular response intermittent;10 minutes;ambulation;NuStep   OT: Functional/aerobic ambulation tolerance with stable cardiovascular response in order to return to home and community environment Rolling walker;Greater than 300 feet;Within precautions;Supervision/SBA   OT: Navigation of stairs simulating home set up with stable cardiovascular response in order to return to home and community environment Modified independent;Supervision/SBA;Greater than 10 stairs   OT Discharge Planning   OT Plan ferreira walk when lines out.  LE ex   OT Discharge Recommendation (DC Rec) home with outpatient cardiac rehab   OT Rationale for DC Rec family can offer 24 hour assist first week +   OT Brief overview of current status min A transfer with added assist for lines.  LE exercise.   Total Session Time   Total Session Time (sum of timed and untimed services) 14

## 2023-01-14 NOTE — PLAN OF CARE
Problem: Hypertension Comorbidity  Goal: Blood Pressure in Desired Range  Outcome: Progressing  Intervention: Maintain Blood Pressure Management  Recent Flowsheet Documentation  Taken 1/14/2023 1600 by Emliiano Templeton, RN  Medication Review/Management: medications reviewed     Problem: Cardiovascular Surgery  Goal: Blood Glucose Level Within Targeted Range  Outcome: Progressing     Problem: Cardiovascular Surgery  Goal: Acceptable Pain Control  Outcome: Progressing   Goal Outcome Evaluation:      [facility] - ICU    RN Progress Note:            Pertinent Assessments:      Please refer to flowsheet rows for full assessment     Pt is alert and oriented x3, follows commands, and calls appropriately. Pt denies CP during this shift.         Mobility Level:     4 - Stand      Barriers to Progression: None          Key Events - This Shift:     Uneventful shift.           Barriers to Discharge / Downgrade:     None; possible downgrade general telemetry tomorrow.

## 2023-01-14 NOTE — PLAN OF CARE
[facility] - ICU    RN Progress Note:            Pertinent Assessments:      Please refer to flowsheet rows for full assessment     Neuro intact.    NSR with rare PACs, HR 80s. PM on standby. Pulses palpable except left radial.    Pt on 2L via NC, clear lung sounds. Pt has cough, productive. Chest tubes in place.    Nausea with activity. Hypoactive bowel sounds.    Colindres in place.          Mobility Level:     3 - Sit    Dangled and stood around 2115, tolerated sitting ok but then vomited, zofran given, felt better after vomiting then attempted to stand and BP dropped to systolic of 60, helped back into bed.    Barriers to Progression: LDAs and tolerance of activity, will attempt to stand again in the AM.         Key Events - This Shift:     Epi off at 2227, oscar infusing at 0.25.    Insulin infusing at 8.    Pain tolerable.    80 out from chest tubes in 4 hours (330 in atrium).    200 out from colindres in 4 hours.           Barriers to Discharge / Downgrade:     LDAs and pressors.       Problem: Cardiovascular Surgery  Goal: Effective Oxygenation and Ventilation  Outcome: Progressing  Intervention: Promote Airway Secretion Clearance     Problem: Cardiovascular Surgery  Goal: Nausea and Vomiting Relief  Outcome: Progressing  Intervention: Prevent or Manage Nausea and Vomiting     Problem: Cardiovascular Surgery  Goal: Acceptable Pain Control  Outcome: Progressing  Intervention: Prevent or Manage Pain     Problem: Cardiovascular Surgery  Goal: Blood Glucose Level Within Targeted Range  Outcome: Progressing     Problem: Cardiovascular Surgery  Goal: Fluid and Electrolyte Balance  Outcome: Progressing     Problem: Cardiovascular Surgery  Goal: Effective Cardiac Function  Outcome: Progressing     Problem: Cardiovascular Surgery  Goal: Absence of Bleeding  Outcome: Progressing     Problem: Cardiovascular Surgery  Goal: Improved Activity Tolerance  Outcome: Progressing

## 2023-01-14 NOTE — PLAN OF CARE
[facility] - ICU    RN Progress Note:            Pertinent Assessments:      Please refer to flowsheet rows for full assessment     Overnight, pt afebrile, BP maintained within goal parameters, and CI over 2.5. PA pressure and CVP were low and required LR bollus.           Mobility Level:     3. Pt was able to dangle last evening but was complicated by low BP, nausea and vomiting.          Key Events - This Shift:     Both EPI and Allen off.             Barriers to Discharge / Downgrade:     Possible labile BP.           Problem: Cardiovascular Surgery  Goal: Improved Activity Tolerance  Outcome: Progressing     Problem: Cardiovascular Surgery  Goal: Optimal Coping with Heart Surgery  Outcome: Progressing     Problem: Cardiovascular Surgery  Goal: Effective Cardiac Function  Outcome: Progressing     Problem: Cardiovascular Surgery  Goal: Fluid and Electrolyte Balance  Outcome: Progressing     Problem: Cardiovascular Surgery  Goal: Acceptable Pain Control  Outcome: Progressing

## 2023-01-15 ENCOUNTER — APPOINTMENT (OUTPATIENT)
Dept: RADIOLOGY | Facility: HOSPITAL | Age: 63
End: 2023-01-15
Attending: NURSE PRACTITIONER
Payer: COMMERCIAL

## 2023-01-15 ENCOUNTER — APPOINTMENT (OUTPATIENT)
Dept: OCCUPATIONAL THERAPY | Facility: HOSPITAL | Age: 63
End: 2023-01-15
Attending: THORACIC SURGERY (CARDIOTHORACIC VASCULAR SURGERY)
Payer: COMMERCIAL

## 2023-01-15 LAB
ANION GAP SERPL CALCULATED.3IONS-SCNC: 8 MMOL/L (ref 7–15)
BASE EXCESS BLDV CALC-SCNC: 5.9 MMOL/L
BUN SERPL-MCNC: 12.3 MG/DL (ref 8–23)
CALCIUM SERPL-MCNC: 8.5 MG/DL (ref 8.8–10.2)
CALCIUM, IONIZED MEASURED: 1.11 MMOL/L (ref 1.11–1.3)
CHLORIDE SERPL-SCNC: 96 MMOL/L (ref 98–107)
CREAT SERPL-MCNC: 0.88 MG/DL (ref 0.67–1.17)
DEPRECATED HCO3 PLAS-SCNC: 27 MMOL/L (ref 22–29)
ERYTHROCYTE [DISTWIDTH] IN BLOOD BY AUTOMATED COUNT: 12.9 % (ref 10–15)
GFR SERPL CREATININE-BSD FRML MDRD: >90 ML/MIN/1.73M2
GLUCOSE BLDC GLUCOMTR-MCNC: 182 MG/DL (ref 70–99)
GLUCOSE BLDC GLUCOMTR-MCNC: 199 MG/DL (ref 70–99)
GLUCOSE BLDC GLUCOMTR-MCNC: 261 MG/DL (ref 70–99)
GLUCOSE BLDC GLUCOMTR-MCNC: 262 MG/DL (ref 70–99)
GLUCOSE SERPL-MCNC: 163 MG/DL (ref 70–99)
HCO3 BLDV-SCNC: 31 MMOL/L (ref 24–30)
HCT VFR BLD AUTO: 35.2 % (ref 40–53)
HGB BLD-MCNC: 11.7 G/DL (ref 13.3–17.7)
ION CA PH 7.4: 1.08 MMOL/L (ref 1.11–1.3)
MAGNESIUM SERPL-MCNC: 1.8 MG/DL (ref 1.7–2.3)
MCH RBC QN AUTO: 29 PG (ref 26.5–33)
MCHC RBC AUTO-ENTMCNC: 33.2 G/DL (ref 31.5–36.5)
MCV RBC AUTO: 87 FL (ref 78–100)
OXYHGB MFR BLDV: 33.2 % (ref 70–75)
PCO2 BLDV: 56 MM HG (ref 35–50)
PH BLDV: 7.36 [PH] (ref 7.35–7.45)
PH: 7.34 (ref 7.35–7.45)
PHOSPHATE SERPL-MCNC: 2.8 MG/DL (ref 2.5–4.5)
PLATELET # BLD AUTO: 129 10E3/UL (ref 150–450)
PO2 BLDV: 22 MM HG (ref 25–47)
POTASSIUM SERPL-SCNC: 4 MMOL/L (ref 3.4–5.3)
POTASSIUM SERPL-SCNC: 4 MMOL/L (ref 3.4–5.3)
RBC # BLD AUTO: 4.03 10E6/UL (ref 4.4–5.9)
SAO2 % BLDV: 33.6 % (ref 70–75)
SODIUM SERPL-SCNC: 131 MMOL/L (ref 136–145)
WBC # BLD AUTO: 13.7 10E3/UL (ref 4–11)

## 2023-01-15 PROCEDURE — 85027 COMPLETE CBC AUTOMATED: CPT | Performed by: NURSE PRACTITIONER

## 2023-01-15 PROCEDURE — 250N000013 HC RX MED GY IP 250 OP 250 PS 637: Performed by: NURSE PRACTITIONER

## 2023-01-15 PROCEDURE — 71045 X-RAY EXAM CHEST 1 VIEW: CPT

## 2023-01-15 PROCEDURE — 84132 ASSAY OF SERUM POTASSIUM: CPT | Performed by: THORACIC SURGERY (CARDIOTHORACIC VASCULAR SURGERY)

## 2023-01-15 PROCEDURE — 84100 ASSAY OF PHOSPHORUS: CPT | Performed by: THORACIC SURGERY (CARDIOTHORACIC VASCULAR SURGERY)

## 2023-01-15 PROCEDURE — 36415 COLL VENOUS BLD VENIPUNCTURE: CPT | Performed by: PHYSICIAN ASSISTANT

## 2023-01-15 PROCEDURE — 250N000011 HC RX IP 250 OP 636: Performed by: INTERNAL MEDICINE

## 2023-01-15 PROCEDURE — 250N000013 HC RX MED GY IP 250 OP 250 PS 637: Performed by: PHYSICIAN ASSISTANT

## 2023-01-15 PROCEDURE — 250N000011 HC RX IP 250 OP 636: Performed by: PHYSICIAN ASSISTANT

## 2023-01-15 PROCEDURE — 80048 BASIC METABOLIC PNL TOTAL CA: CPT | Performed by: NURSE PRACTITIONER

## 2023-01-15 PROCEDURE — 200N000001 HC R&B ICU

## 2023-01-15 PROCEDURE — 83735 ASSAY OF MAGNESIUM: CPT | Performed by: THORACIC SURGERY (CARDIOTHORACIC VASCULAR SURGERY)

## 2023-01-15 PROCEDURE — 36415 COLL VENOUS BLD VENIPUNCTURE: CPT | Performed by: NURSE PRACTITIONER

## 2023-01-15 PROCEDURE — 82805 BLOOD GASES W/O2 SATURATION: CPT | Performed by: PHYSICIAN ASSISTANT

## 2023-01-15 PROCEDURE — 250N000011 HC RX IP 250 OP 636: Performed by: NURSE PRACTITIONER

## 2023-01-15 PROCEDURE — 82330 ASSAY OF CALCIUM: CPT | Performed by: PHYSICIAN ASSISTANT

## 2023-01-15 PROCEDURE — 97535 SELF CARE MNGMENT TRAINING: CPT | Mod: GO

## 2023-01-15 PROCEDURE — 97110 THERAPEUTIC EXERCISES: CPT | Mod: GO

## 2023-01-15 RX ORDER — DEXTROSE MONOHYDRATE 25 G/50ML
25-50 INJECTION, SOLUTION INTRAVENOUS
Status: DISCONTINUED | OUTPATIENT
Start: 2023-01-15 | End: 2023-01-15

## 2023-01-15 RX ORDER — NICOTINE POLACRILEX 4 MG
15-30 LOZENGE BUCCAL
Status: DISCONTINUED | OUTPATIENT
Start: 2023-01-15 | End: 2023-01-15

## 2023-01-15 RX ORDER — MAGNESIUM SULFATE HEPTAHYDRATE 40 MG/ML
2 INJECTION, SOLUTION INTRAVENOUS ONCE
Status: COMPLETED | OUTPATIENT
Start: 2023-01-15 | End: 2023-01-15

## 2023-01-15 RX ORDER — FUROSEMIDE 10 MG/ML
20 INJECTION INTRAMUSCULAR; INTRAVENOUS
Status: DISCONTINUED | OUTPATIENT
Start: 2023-01-15 | End: 2023-01-17 | Stop reason: HOSPADM

## 2023-01-15 RX ADMIN — INSULIN ASPART 2 UNITS: 100 INJECTION, SOLUTION INTRAVENOUS; SUBCUTANEOUS at 08:13

## 2023-01-15 RX ADMIN — LORATADINE 10 MG: 10 TABLET ORAL at 08:10

## 2023-01-15 RX ADMIN — HEPARIN SODIUM 5000 UNITS: 10000 INJECTION, SOLUTION INTRAVENOUS; SUBCUTANEOUS at 21:01

## 2023-01-15 RX ADMIN — FUROSEMIDE 20 MG: 10 INJECTION, SOLUTION INTRAMUSCULAR; INTRAVENOUS at 08:09

## 2023-01-15 RX ADMIN — FUROSEMIDE 20 MG: 10 INJECTION, SOLUTION INTRAMUSCULAR; INTRAVENOUS at 18:11

## 2023-01-15 RX ADMIN — THERA TABS 1 TABLET: TAB at 08:09

## 2023-01-15 RX ADMIN — SENNOSIDES AND DOCUSATE SODIUM 1 TABLET: 8.6; 5 TABLET ORAL at 21:00

## 2023-01-15 RX ADMIN — ACETAMINOPHEN 975 MG: 325 TABLET ORAL at 07:22

## 2023-01-15 RX ADMIN — Medication 100 MCG: at 08:16

## 2023-01-15 RX ADMIN — ACETAMINOPHEN 975 MG: 325 TABLET ORAL at 21:00

## 2023-01-15 RX ADMIN — MAGNESIUM SULFATE HEPTAHYDRATE 2 G: 40 INJECTION, SOLUTION INTRAVENOUS at 08:08

## 2023-01-15 RX ADMIN — METOPROLOL TARTRATE 12.5 MG: 25 TABLET, FILM COATED ORAL at 08:10

## 2023-01-15 RX ADMIN — METOPROLOL TARTRATE 12.5 MG: 25 TABLET, FILM COATED ORAL at 21:00

## 2023-01-15 RX ADMIN — TAMSULOSIN HYDROCHLORIDE 0.4 MG: 0.4 CAPSULE ORAL at 08:10

## 2023-01-15 RX ADMIN — POLYETHYLENE GLYCOL 3350 17 G: 17 POWDER, FOR SOLUTION ORAL at 08:10

## 2023-01-15 RX ADMIN — ACETAMINOPHEN 975 MG: 325 TABLET ORAL at 12:30

## 2023-01-15 RX ADMIN — SENNOSIDES AND DOCUSATE SODIUM 1 TABLET: 8.6; 5 TABLET ORAL at 08:16

## 2023-01-15 RX ADMIN — ASPIRIN 162 MG: 81 TABLET, CHEWABLE ORAL at 08:10

## 2023-01-15 RX ADMIN — PANTOPRAZOLE SODIUM 40 MG: 40 TABLET, DELAYED RELEASE ORAL at 08:09

## 2023-01-15 RX ADMIN — HEPARIN SODIUM 5000 UNITS: 10000 INJECTION, SOLUTION INTRAVENOUS; SUBCUTANEOUS at 07:25

## 2023-01-15 RX ADMIN — HEPARIN SODIUM 5000 UNITS: 10000 INJECTION, SOLUTION INTRAVENOUS; SUBCUTANEOUS at 13:59

## 2023-01-15 RX ADMIN — ATORVASTATIN CALCIUM 40 MG: 40 TABLET, FILM COATED ORAL at 08:10

## 2023-01-15 ASSESSMENT — ACTIVITIES OF DAILY LIVING (ADL)
ADLS_ACUITY_SCORE: 29
ADLS_ACUITY_SCORE: 26
ADLS_ACUITY_SCORE: 30
ADLS_ACUITY_SCORE: 29
ADLS_ACUITY_SCORE: 26
ADLS_ACUITY_SCORE: 29
ADLS_ACUITY_SCORE: 29
ADLS_ACUITY_SCORE: 26
ADLS_ACUITY_SCORE: 29

## 2023-01-15 NOTE — PROGRESS NOTES
CVTS Daily Progress Note   POD#1 s/p CABG X3   Attending: Chema  LOS: 2    SUBJECTIVE/INTERVAL EVENTS:  Patient arrived to ICU from OR 1/13 afternoon. He was subsequently extubated and weaned from pressors 1/14. No acute events overnight. Had low CVP POD#0 and was given IVF's and albumin. Patient progressing well. Maintaining oxygen saturations on 2 l/nc. Ambulating with therapy. Pain well controlled. -BM / flatus. UOP adequate wtih IV lasix. Chest tube output appropriate. Hgb 10.6 1/14. Patient denies new chest pain, shortness of breath, abdominal pain, calf pain, nausea. Patient has no questions today.    OBJECTIVE:  Temp:  [98.2  F (36.8  C)-99  F (37.2  C)] 98.4  F (36.9  C)  Pulse:  [] 78  Resp:  [8-20] 18  BP: (110-143)/(57-74) 114/64  MAP:  [72 mmHg-87 mmHg] 80 mmHg  Arterial Line BP: (107-135)/(52-61) 125/54  SpO2:  [91 %-99 %] 95 %  Resp: 18    Vitals:    01/13/23 0523 01/14/23 0630 01/15/23 0600   Weight: 111.4 kg (245 lb 9.6 oz) 115.6 kg (254 lb 14.4 oz) 112.3 kg (247 lb 8 oz)       Current Medications:    Scheduled Meds:    acetaminophen  975 mg Oral Q8H     aspirin  162 mg Oral or NG Tube Daily     atorvastatin  40 mg Oral Daily     chlorhexidine  15 mL Mouth/Throat Q12H     furosemide  20 mg Intravenous BID     heparin ANTICOAGULANT  5,000 Units Subcutaneous Q8H     insulin aspart  1-7 Units Subcutaneous TID AC     insulin aspart  1-5 Units Subcutaneous At Bedtime     lidocaine   Transdermal Q8H ZABRINA     loratadine  10 mg Oral Daily     magnesium sulfate  2 g Intravenous Once     [Held by provider] metFORMIN  500 mg Oral TID w/meals     metoprolol tartrate  12.5 mg Oral BID     multivitamin, therapeutic  1 tablet Oral Daily     mupirocin  0.5 g Both Nostrils BID     pantoprazole  40 mg Oral or NG Tube Daily    Or     pantoprazole  40 mg Oral Daily     polyethylene glycol  17 g Oral Daily     senna-docusate  1 tablet Oral BID     tamsulosin  0.4 mg Oral Daily     Vitamin D3  100 mcg Oral Daily      Continuous Infusions:    EPINEPHrine Stopped (01/14/23 0507)     niCARdipine Stopped (01/13/23 1510)     phenylephrine Stopped (01/14/23 0040)     PRN Meds:.[START ON 1/23/2023] acetaminophen, bisacodyl, calcium gluconate, calcium gluconate, calcium gluconate, glucose **OR** dextrose **OR** glucagon, glucose **OR** dextrose **OR** glucagon, EPINEPHrine, fentaNYL, hydrALAZINE, HYDROmorphone **OR** HYDROmorphone, lactated ringers, Lidocaine, magnesium hydroxide, naloxone **OR** naloxone **OR** naloxone **OR** naloxone, niCARdipine, ondansetron **OR** ondansetron, oxyCODONE **OR** oxyCODONE, phenylephrine, prochlorperazine **OR** prochlorperazine    Cardiographics:    Telemetry monitoring demonstrates SR with rates in the 70's per my personal review.    Imaging:  Results for orders placed or performed during the hospital encounter of 01/13/23   XR Chest Port 1 View    Impression    IMPRESSION:     Endotracheal tube is in satisfactory position. Right jugular sheath and PA catheter are new. Tip of the PA catheter is at the junction of the main and right pulmonary arteries. There are mediastinal drains.    Perihilar atelectasis as well as subsegmental atelectasis in the medial lower lobes.. There are no findings of interstitial lung edema. No airspace consolidation.    No menisci to indicate visible pleural fluid. No pneumothorax.   XR Chest Port 1 View    Impression    IMPRESSION: Extubation. Right IJ [place]-Sandeep catheter tip is in the main pulmonary artery. Mediastinal drains remain. No pleural effusion or pneumothorax. Median sternotomy and mediastinal surgical clips. Mild bibasilar atelectasis is stable.       Labs, personally reviewed.  Hemoglobin   Date Value Ref Range Status   01/14/2023 10.6 (L) 13.3 - 17.7 g/dL Final   01/13/2023 12.7 (L) 13.3 - 17.7 g/dL Final   01/13/2023 11.7 (L) 13.3 - 17.7 g/dL Final     Hemoglobin POCT   Date Value Ref Range Status   01/13/2023 11.9 (L) 13.3 - 17.7 g/dL Final   01/13/2023  11.9 (L) 13.3 - 17.7 g/dL Final   01/13/2023 11.7 (L) 13.3 - 17.7 g/dL Final     WBC Count   Date Value Ref Range Status   01/14/2023 17.6 (H) 4.0 - 11.0 10e3/uL Final   01/13/2023 32.3 (H) 4.0 - 11.0 10e3/uL Final   01/13/2023 26.1 (H) 4.0 - 11.0 10e3/uL Final     Platelet Count   Date Value Ref Range Status   01/14/2023 171 150 - 450 10e3/uL Final   01/13/2023 241 150 - 450 10e3/uL Final   01/13/2023 245 150 - 450 10e3/uL Final     Creatinine   Date Value Ref Range Status   01/14/2023 0.82 0.67 - 1.17 mg/dL Final   01/13/2023 0.91 0.67 - 1.17 mg/dL Final   12/02/2022 0.89 0.70 - 1.30 mg/dL Final     Potassium   Date Value Ref Range Status   01/15/2023 4.0 3.4 - 5.3 mmol/L Final   01/14/2023 4.2 3.4 - 5.3 mmol/L Final   01/14/2023 4.2 3.4 - 5.3 mmol/L Final   12/02/2022 4.9 3.5 - 5.0 mmol/L Final   05/09/2022 4.6 3.5 - 5.0 mmol/L Final   11/11/2021 4.8 3.5 - 5.0 mmol/L Final     Potassium POCT   Date Value Ref Range Status   01/13/2023 4.5 3.5 - 5.0 mmol/L Final   01/13/2023 5.0 3.5 - 5.0 mmol/L Final   01/13/2023 5.9 (H) 3.5 - 5.0 mmol/L Final     Magnesium   Date Value Ref Range Status   01/15/2023 1.8 1.7 - 2.3 mg/dL Final   01/14/2023 2.1 1.7 - 2.3 mg/dL Final   01/13/2023 2.9 (H) 1.7 - 2.3 mg/dL Final          I/O:  I/O last 3 completed shifts:  In: 480 [P.O.:480]  Out: 4640 [Urine:4400; Chest Tube:240]       Physical Exam:    General: Patient seen in bed in chair, but had been standing at the bedside. NAD. Conversant. Pleasant, very talkative.   HEENT: YAIMA, no sclera icterus, moist mucosa. Holley in RIJ.   CV: RRR on monitor. 2+ peripheral pulses in all extremities. Mild edema.   Pulm: Non-labored effort on 2 L/nc. Chest tubes in place, serosanguinous output, no air leak.  Incision C/D/I.  Abd: Soft, NT, ND  : Arroyo with carlos urine  Ext: Mild pedal edema, SCDs in place, warm, distal pulses intact  Neuro: CNs grossly intact.       ASSESSMENT/PLAN:    Kash Tavarez is a 62 year old male with a history of  CAD who is POD#2 s/p CABG X3 (radial graft as T graft from LIMA to first diagonal branch of the LAD and first OM, JUÁREZ to LAD)  No need for amlodipine for radial graft with being T'd with BACILIO as per Chema.       Principal Problem:    Coronary artery disease of native artery of native heart with stable angina pectoris (H)      NEURO:   - Scheduled Tylenol/lidocaine patches and PRN Tylenol/oxycodone/dilaudid for pain    CV:   - Normotensive  - Pressors weaned off 1/13  - Metoprolol 12.5mg two times a day started 1/14 pm, will increase to 25 mg BID this am    - ASA 162mg  - Atorvastatin 40mg daily  - Chest tubes to remain today  - TPW capped     PULM:   Extubated POD #0 to 2 l/nc   - Vent weaning as able  - Maintaining oxygen saturations on 2L/nc  - Encourage pulmonary toilet    FEN/GI:  - Starting CL this am, had some nausea earlier   - Continue electrolyte replacement protocol  - Diet: Cardiac, ADAT   - Bowel regimen    RENAL:  - Adequate UOP/hr.   - Cr 0.82  - D/C Arroyo today  - Diuresis started Lasix 40mg IV BID 1/14, 24 hour I/O net -4L  - Decrease Lasix to 20 mg BID today, reevaluate in am.   - Hx of urinary retention with his BPH.   - started Flomax 0.4 mg daily 1/14, can stop at discharge if voiding well     HEME:  - Acute blood loss anemia post-op.   - Hgb stable, no bleeding concerns. Hep SQ, ASA  - Hgb 10.6 1/14    ID:  - Adilia op ppx complete, afebrile . No concerns for infection  - WBC 17.6 1/14 (32), pending today     ENDO:   DM II  Hgb A1c 7.3, 11/17/22  - Transitioned to sliding scale insulin 1/14, increase to High intensity sliding scale insulin   - Holding PTA metformin  - -199  - consider adding lantus with goal BG < 180    PPx:   - DVT: SCDs, SQ heparin TID, ambulation   - GI: Protonix 40mg PO daily    DISPO:   - Okay to transfer to telemetry today       Patient discussed with Dr. Bear.      Yvette Lopez, DNP, CNP  P Cardiothoracic Surgery  Pgr [phone]

## 2023-01-15 NOTE — PLAN OF CARE
[facility] - ICU    RN Progress Note:    Pt is alert, oriented x 4, able to make needs known, and using call light appropriately. Vital signs stable. SaO2 maintained within ordered parameters with supplemental O2 via nasal cannula at 2 LPM. Pt achieving 1,750 mL - 2,000 mL with incentive spirometer. Surgical incisions are CD & I. Post-surgical pain effectively managed scheduled analgesics. Pacer wires capped. Chest tubes remain in place, without air leak, and to - 20 mmHg suction, as ordered. Over NOC, chest tubes produced 90 mL serosanguinous output. Blood glucose well managed with sliding scale insulin administration, as ordered. Bowel sounds hypoactive. No BM. Flatus present. Pt sitting upright in chair. Arroyo removed, per Protocol, at 06:00. Pt has yet to void urine. Below the knee sequential compression devices in place, bilaterally. Will continue to monitor. Trell Vaz RN              Pertinent Assessments:      Please refer to flowsheet rows for full assessment     Vital signs         Mobility Level:     4 - Stand             Key Events - This Shift:     No overnight events.         Barriers to Discharge / Downgrade:     None noted.

## 2023-01-15 NOTE — PROGRESS NOTES
Pt obtained [id number] on IS, tolerates well.  Does flutter well.  BS clear/dim.  On 2L O2 sats 98%

## 2023-01-15 NOTE — PLAN OF CARE
Transitioned to tele today. Chest tubes and wires removed today. Walking in the halls with cardiac rehab. Cardiac monitor reading SR with PAC. Able to void post colindres removal.

## 2023-01-16 ENCOUNTER — APPOINTMENT (OUTPATIENT)
Dept: OCCUPATIONAL THERAPY | Facility: HOSPITAL | Age: 63
End: 2023-01-16
Attending: THORACIC SURGERY (CARDIOTHORACIC VASCULAR SURGERY)
Payer: COMMERCIAL

## 2023-01-16 LAB
CALCIUM, IONIZED MEASURED: 1.1 MMOL/L (ref 1.11–1.3)
CALCIUM, IONIZED MEASURED: 1.15 MMOL/L (ref 1.11–1.3)
GLUCOSE BLDC GLUCOMTR-MCNC: 152 MG/DL (ref 70–99)
GLUCOSE BLDC GLUCOMTR-MCNC: 221 MG/DL (ref 70–99)
GLUCOSE BLDC GLUCOMTR-MCNC: 226 MG/DL (ref 70–99)
GLUCOSE BLDC GLUCOMTR-MCNC: 229 MG/DL (ref 70–99)
HOLD SPECIMEN: NORMAL
ION CA PH 7.4: 1.09 MMOL/L (ref 1.11–1.3)
ION CA PH 7.4: 1.1 MMOL/L (ref 1.11–1.3)
MAGNESIUM SERPL-MCNC: 2 MG/DL (ref 1.7–2.3)
PH: 7.33 (ref 7.35–7.45)
PH: 7.39 (ref 7.35–7.45)
PHOSPHATE SERPL-MCNC: 2.8 MG/DL (ref 2.5–4.5)
POTASSIUM SERPL-SCNC: 3.6 MMOL/L (ref 3.4–5.3)

## 2023-01-16 PROCEDURE — 97535 SELF CARE MNGMENT TRAINING: CPT | Mod: GO

## 2023-01-16 PROCEDURE — 250N000013 HC RX MED GY IP 250 OP 250 PS 637: Performed by: PHYSICIAN ASSISTANT

## 2023-01-16 PROCEDURE — 36415 COLL VENOUS BLD VENIPUNCTURE: CPT | Performed by: INTERNAL MEDICINE

## 2023-01-16 PROCEDURE — 84100 ASSAY OF PHOSPHORUS: CPT | Performed by: THORACIC SURGERY (CARDIOTHORACIC VASCULAR SURGERY)

## 2023-01-16 PROCEDURE — 250N000013 HC RX MED GY IP 250 OP 250 PS 637: Performed by: THORACIC SURGERY (CARDIOTHORACIC VASCULAR SURGERY)

## 2023-01-16 PROCEDURE — 250N000011 HC RX IP 250 OP 636: Performed by: PHYSICIAN ASSISTANT

## 2023-01-16 PROCEDURE — 82330 ASSAY OF CALCIUM: CPT | Performed by: PHYSICIAN ASSISTANT

## 2023-01-16 PROCEDURE — 84132 ASSAY OF SERUM POTASSIUM: CPT | Performed by: INTERNAL MEDICINE

## 2023-01-16 PROCEDURE — 250N000011 HC RX IP 250 OP 636: Performed by: NURSE PRACTITIONER

## 2023-01-16 PROCEDURE — 36415 COLL VENOUS BLD VENIPUNCTURE: CPT | Performed by: THORACIC SURGERY (CARDIOTHORACIC VASCULAR SURGERY)

## 2023-01-16 PROCEDURE — 97110 THERAPEUTIC EXERCISES: CPT | Mod: GO

## 2023-01-16 PROCEDURE — 250N000013 HC RX MED GY IP 250 OP 250 PS 637: Performed by: NURSE PRACTITIONER

## 2023-01-16 PROCEDURE — 83735 ASSAY OF MAGNESIUM: CPT | Performed by: INTERNAL MEDICINE

## 2023-01-16 PROCEDURE — 210N000001 HC R&B IMCU HEART CARE

## 2023-01-16 PROCEDURE — 82330 ASSAY OF CALCIUM: CPT | Performed by: THORACIC SURGERY (CARDIOTHORACIC VASCULAR SURGERY)

## 2023-01-16 RX ORDER — MAGNESIUM OXIDE 400 MG/1
400 TABLET ORAL EVERY 4 HOURS
Status: COMPLETED | OUTPATIENT
Start: 2023-01-16 | End: 2023-01-16

## 2023-01-16 RX ORDER — METOPROLOL TARTRATE 25 MG/1
25 TABLET, FILM COATED ORAL 2 TIMES DAILY
Status: DISCONTINUED | OUTPATIENT
Start: 2023-01-16 | End: 2023-01-16

## 2023-01-16 RX ORDER — POTASSIUM CHLORIDE 1500 MG/1
20 TABLET, EXTENDED RELEASE ORAL ONCE
Status: COMPLETED | OUTPATIENT
Start: 2023-01-16 | End: 2023-01-16

## 2023-01-16 RX ORDER — METOPROLOL TARTRATE 25 MG/1
50 TABLET, FILM COATED ORAL 2 TIMES DAILY
Status: DISCONTINUED | OUTPATIENT
Start: 2023-01-16 | End: 2023-01-17

## 2023-01-16 RX ADMIN — LORATADINE 10 MG: 10 TABLET ORAL at 07:50

## 2023-01-16 RX ADMIN — METOPROLOL TARTRATE 12.5 MG: 25 TABLET, FILM COATED ORAL at 11:33

## 2023-01-16 RX ADMIN — THERA TABS 1 TABLET: TAB at 07:47

## 2023-01-16 RX ADMIN — LIDOCAINE PATCH 4% 1 PATCH: 40 PATCH TOPICAL at 07:47

## 2023-01-16 RX ADMIN — HEPARIN SODIUM 5000 UNITS: 10000 INJECTION, SOLUTION INTRAVENOUS; SUBCUTANEOUS at 05:26

## 2023-01-16 RX ADMIN — ACETAMINOPHEN 975 MG: 325 TABLET ORAL at 20:26

## 2023-01-16 RX ADMIN — POLYETHYLENE GLYCOL 3350 17 G: 17 POWDER, FOR SOLUTION ORAL at 07:47

## 2023-01-16 RX ADMIN — METOPROLOL TARTRATE 50 MG: 25 TABLET, FILM COATED ORAL at 21:33

## 2023-01-16 RX ADMIN — SENNOSIDES AND DOCUSATE SODIUM 1 TABLET: 8.6; 5 TABLET ORAL at 07:47

## 2023-01-16 RX ADMIN — HYDROMORPHONE HYDROCHLORIDE 0.2 MG: 1 INJECTION, SOLUTION INTRAMUSCULAR; INTRAVENOUS; SUBCUTANEOUS at 05:26

## 2023-01-16 RX ADMIN — ACETAMINOPHEN 975 MG: 325 TABLET ORAL at 04:02

## 2023-01-16 RX ADMIN — ACETAMINOPHEN 975 MG: 325 TABLET ORAL at 14:08

## 2023-01-16 RX ADMIN — Medication 100 MCG: at 07:47

## 2023-01-16 RX ADMIN — HEPARIN SODIUM 5000 UNITS: 10000 INJECTION, SOLUTION INTRAVENOUS; SUBCUTANEOUS at 14:08

## 2023-01-16 RX ADMIN — SENNOSIDES AND DOCUSATE SODIUM 1 TABLET: 8.6; 5 TABLET ORAL at 20:28

## 2023-01-16 RX ADMIN — HYDROMORPHONE HYDROCHLORIDE 0.2 MG: 1 INJECTION, SOLUTION INTRAMUSCULAR; INTRAVENOUS; SUBCUTANEOUS at 00:27

## 2023-01-16 RX ADMIN — TAMSULOSIN HYDROCHLORIDE 0.4 MG: 0.4 CAPSULE ORAL at 08:00

## 2023-01-16 RX ADMIN — FUROSEMIDE 20 MG: 10 INJECTION, SOLUTION INTRAMUSCULAR; INTRAVENOUS at 08:00

## 2023-01-16 RX ADMIN — PANTOPRAZOLE SODIUM 40 MG: 40 TABLET, DELAYED RELEASE ORAL at 07:49

## 2023-01-16 RX ADMIN — HYDROMORPHONE HYDROCHLORIDE 0.2 MG: 1 INJECTION, SOLUTION INTRAMUSCULAR; INTRAVENOUS; SUBCUTANEOUS at 03:26

## 2023-01-16 RX ADMIN — ASPIRIN 162 MG: 81 TABLET, CHEWABLE ORAL at 07:47

## 2023-01-16 RX ADMIN — METOPROLOL TARTRATE 25 MG: 25 TABLET, FILM COATED ORAL at 08:00

## 2023-01-16 RX ADMIN — METFORMIN HYDROCHLORIDE 500 MG: 500 TABLET, FILM COATED ORAL at 17:32

## 2023-01-16 RX ADMIN — HEPARIN SODIUM 5000 UNITS: 10000 INJECTION, SOLUTION INTRAVENOUS; SUBCUTANEOUS at 21:30

## 2023-01-16 RX ADMIN — MAGNESIUM OXIDE TAB 400 MG (241.3 MG ELEMENTAL MG) 400 MG: 400 (241.3 MG) TAB at 11:35

## 2023-01-16 RX ADMIN — OXYCODONE HYDROCHLORIDE 5 MG: 5 TABLET ORAL at 20:27

## 2023-01-16 RX ADMIN — POTASSIUM CHLORIDE 20 MEQ: 1500 TABLET, EXTENDED RELEASE ORAL at 07:49

## 2023-01-16 RX ADMIN — MAGNESIUM OXIDE TAB 400 MG (241.3 MG ELEMENTAL MG) 400 MG: 400 (241.3 MG) TAB at 07:48

## 2023-01-16 RX ADMIN — CALCIUM GLUCONATE 1 G: 20 INJECTION, SOLUTION INTRAVENOUS at 07:47

## 2023-01-16 RX ADMIN — ATORVASTATIN CALCIUM 40 MG: 40 TABLET, FILM COATED ORAL at 07:49

## 2023-01-16 RX ADMIN — FUROSEMIDE 20 MG: 10 INJECTION, SOLUTION INTRAMUSCULAR; INTRAVENOUS at 17:31

## 2023-01-16 RX ADMIN — MUPIROCIN 0.5 G: 20 OINTMENT TOPICAL at 07:49

## 2023-01-16 ASSESSMENT — ACTIVITIES OF DAILY LIVING (ADL)
ADLS_ACUITY_SCORE: 22
ADLS_ACUITY_SCORE: 22
ADLS_ACUITY_SCORE: 26
ADLS_ACUITY_SCORE: 22
ADLS_ACUITY_SCORE: 26
ADLS_ACUITY_SCORE: 22

## 2023-01-16 NOTE — PLAN OF CARE
[facility] - ICU    RN Progress Note:            Pertinent Assessments:      Please refer to flowsheet rows for full assessment   Afebrile and BP WNL.        Mobility Level:     4        Key Events - This Shift:     Pt reports wrist more painful. Low dose dilaudid given several times.             Barriers to Discharge / Downgrade:     Progressing on post-op pathway.         Problem: Risk for Delirium  Goal: Optimal Coping  Outcome: Progressing     Problem: Risk for Delirium  Goal: Improved Sleep  Outcome: Progressing     Problem: Cardiovascular Surgery  Goal: Improved Activity Tolerance  Outcome: Progressing

## 2023-01-16 NOTE — PLAN OF CARE
Report called to P3. Possible discharge in a few days. Walking with cardiac rehab. Plan for shower and to do stairs.

## 2023-01-16 NOTE — PLAN OF CARE
Problem: Risk for Delirium  Goal: Improved Behavioral Control  Intervention: Minimize Safety Risk  Recent Flowsheet Documentation  Taken 1/15/2023 2000 by Tasha Avila RN  Enhanced Safety Measures: bed alarm set  Trust Relationship/Rapport: (no family present at this time) other (see comments)  Taken 1/15/2023 1600 by Tasha Avila RN  Enhanced Safety Measures: bed alarm set  Trust Relationship/Rapport: (no family present at this time) other (see comments)     Problem: Restraint, Nonviolent  Goal: Absence of Harm or Injury  Intervention: Protect Dignity, Rights and Personal Wellbeing  Recent Flowsheet Documentation  Taken 1/15/2023 2000 by Tasha Avila RN  Trust Relationship/Rapport: (no family present at this time) other (see comments)  Taken 1/15/2023 1600 by Tasha Avila RN  Trust Relationship/Rapport: (no family present at this time) other (see comments)  Intervention: Protect Skin and Joint Integrity  Recent Flowsheet Documentation  Taken 1/15/2023 2000 by Tasha Avila RN  Body Position: position changed independently  Taken 1/15/2023 1600 by Tasha Avila RN  Body Position: position changed independently     Problem: Hypertension Comorbidity  Goal: Blood Pressure in Desired Range  Intervention: Maintain Blood Pressure Management  Recent Flowsheet Documentation  Taken 1/15/2023 2000 by Tasha Avila RN  Medication Review/Management: high-risk medications identified  Taken 1/15/2023 1600 by Tasha Avila RN  Medication Review/Management: high-risk medications identified     Problem: Cardiovascular Surgery  Goal: Optimal Cerebral Tissue Perfusion  Intervention: Protect and Optimize Cerebral Perfusion  Recent Flowsheet Documentation  Taken 1/15/2023 2000 by Tasha Avila RN  Head of Bed (HOB) Positioning: HOB at 30 degrees  Taken 1/15/2023 1600 by Tasha Avila RN  Head of Bed (HOB) Positioning: HOB at 30 degrees  Goal: Anesthesia/Sedation  Recovery  Intervention: Optimize Anesthesia Recovery  Recent Flowsheet Documentation  Taken 1/15/2023 2000 by Tasha Avila RN  Safety Promotion/Fall Prevention:   activity supervised   fall prevention program maintained   lighting adjusted   patient and family education  Taken 1/15/2023 1600 by Tasha Avila RN  Safety Promotion/Fall Prevention:   activity supervised   fall prevention program maintained   lighting adjusted   patient and family education  Goal: Effective Oxygenation and Ventilation  Intervention: Promote Airway Secretion Clearance  Recent Flowsheet Documentation  Taken 1/15/2023 2000 by Tasha Avila RN  Cough And Deep Breathing: done independently per patient  Taken 1/15/2023 1600 by Tasha Avila RN  Cough And Deep Breathing: done independently per patient   Goal Outcome Evaluation:  Pt denies pain this shift. Vital sign within acceptable limit. Adequate uop.

## 2023-01-16 NOTE — PLAN OF CARE
Goal Outcome Evaluation:         sc  Patient Name: Kash Tavarez   MRN: [id number]   Date of Admission: 1/13/2023    Procedure: Procedure(s):  CORONARY ARTERY BYPASS GRAFT (CABG) x 3, LEFT INTERNAL MAMMARY ARTERY HARVEST, LEFT RADIAL ARTERY HARVEST, ANESTHESIA TRANSESOPHAGEAL ECHOCARDIOGRAM, EPI-AORTIC ULTRASOUND    Post Op day #:3    Subjective (Patient focus/Primary Problem for shift): bowel movement          Pain Goal2 Pain Rating2           Pain Medication/ Regime effective to reduce patient pain tylenol    Objective (Physical assessment):           Rhythm: normal sinus rhythm            Bowel Activity: no if Yes indicate when: miralax and stool softener           Bowel Medications: yes            Incision: healing well          Incentive Spirometry Q 1-2 hour when awake:  yes Volume: 1100          Epicardial Pacing Wires:  no            Patient Activity:           Up to chair for meals: yes          Ambulation with RN x2 (Not including CR): yes            Is patient in home clothes:no             Chest Tubes   Pleural: not applicable Draining: not applicable               Suction: not applicable              Mediastinal: not applicable Draining: not applicable               Suction: not applicable   Dressing Change Daily:yes If No, why?open to air                      Urinary Catheter: no           Preventative WOC consult (need MD order): no       Assessment (Nursing primary shift focus): Transfer from ICU, has not had a bowel movement, since surgery    Plan (Patient Care Plan/focus): bowel medication per orders, encourage activity as tolerated       hCloe Reaves RN   1/16/2023   11:07 AM

## 2023-01-16 NOTE — PROGRESS NOTES
CVTS Daily Progress Note   POD#3 s/p CABG X3   Attending: Chema  LOS: 3    SUBJECTIVE/INTERVAL EVENTS:  No acute events overnight.  Patient progressing well. Maintaining oxygen saturations on 2 l/nc. Normotensive/hypertensive this am. Ambulating with therapy. Pain better controlled. -BM / +flatus. UOP adequate. Hgb 11.7. Patient denies new chest pain, shortness of breath, abdominal pain, calf pain, nausea. Patient has no questions today.    OBJECTIVE:  Temp:  [98  F (36.7  C)-99  F (37.2  C)] 98  F (36.7  C)  Pulse:  [] 119  Resp:  [16-20] 18  BP: (101-149)/(58-83) 149/81  SpO2:  [90 %-99 %] 98 %  Resp: 18    Vitals:    01/13/23 0523 01/14/23 0630 01/15/23 0600 01/16/23 0600   Weight: 111.4 kg (245 lb 9.6 oz) 115.6 kg (254 lb 14.4 oz) 112.3 kg (247 lb 8 oz) 111.6 kg (246 lb)       Current Medications:    Scheduled Meds:    acetaminophen  975 mg Oral Q8H     aspirin  162 mg Oral or NG Tube Daily     atorvastatin  40 mg Oral Daily     furosemide  20 mg Intravenous BID     heparin ANTICOAGULANT  5,000 Units Subcutaneous Q8H     insulin aspart  1-10 Units Subcutaneous TID AC     insulin aspart  1-7 Units Subcutaneous At Bedtime     lidocaine   Transdermal Q8H ZABRINA     loratadine  10 mg Oral Daily     magnesium oxide  400 mg Oral Q4H     [Held by provider] metFORMIN  500 mg Oral TID w/meals     metoprolol tartrate  25 mg Oral BID     multivitamin, therapeutic  1 tablet Oral Daily     mupirocin  0.5 g Both Nostrils BID     pantoprazole  40 mg Oral or NG Tube Daily    Or     pantoprazole  40 mg Oral Daily     polyethylene glycol  17 g Oral Daily     senna-docusate  1 tablet Oral BID     tamsulosin  0.4 mg Oral Daily     Vitamin D3  100 mcg Oral Daily     Continuous Infusions:    EPINEPHrine Stopped (01/14/23 0507)     niCARdipine Stopped (01/13/23 1510)     phenylephrine Stopped (01/14/23 0040)     PRN Meds:.[START ON 1/23/2023] acetaminophen, bisacodyl, calcium gluconate, calcium gluconate, calcium gluconate,  glucose **OR** dextrose **OR** glucagon, glucose **OR** dextrose **OR** glucagon, EPINEPHrine, fentaNYL, hydrALAZINE, HYDROmorphone **OR** HYDROmorphone, lactated ringers, Lidocaine, magnesium hydroxide, naloxone **OR** naloxone **OR** naloxone **OR** naloxone, niCARdipine, ondansetron **OR** ondansetron, oxyCODONE **OR** oxyCODONE, phenylephrine, prochlorperazine **OR** prochlorperazine    Cardiographics:    Telemetry monitoring demonstrates SR with rates in the 100's per my personal review.    Imaging:  Results for orders placed or performed during the hospital encounter of 01/13/23   XR Chest Port 1 View    Impression    IMPRESSION:     Endotracheal tube is in satisfactory position. Right jugular sheath and PA catheter are new. Tip of the PA catheter is at the junction of the main and right pulmonary arteries. There are mediastinal drains.    Perihilar atelectasis as well as subsegmental atelectasis in the medial lower lobes.. There are no findings of interstitial lung edema. No airspace consolidation.    No menisci to indicate visible pleural fluid. No pneumothorax.   XR Chest Port 1 View    Impression    IMPRESSION: Extubation. Right IJ [place]-Sandeep catheter tip is in the main pulmonary artery. Mediastinal drains remain. No pleural effusion or pneumothorax. Median sternotomy and mediastinal surgical clips. Mild bibasilar atelectasis is stable.       Labs, personally reviewed.  Hemoglobin   Date Value Ref Range Status   01/15/2023 11.7 (L) 13.3 - 17.7 g/dL Final   01/14/2023 10.6 (L) 13.3 - 17.7 g/dL Final   01/13/2023 12.7 (L) 13.3 - 17.7 g/dL Final     Hemoglobin POCT   Date Value Ref Range Status   01/13/2023 11.9 (L) 13.3 - 17.7 g/dL Final   01/13/2023 11.9 (L) 13.3 - 17.7 g/dL Final   01/13/2023 11.7 (L) 13.3 - 17.7 g/dL Final     WBC Count   Date Value Ref Range Status   01/15/2023 13.7 (H) 4.0 - 11.0 10e3/uL Final   01/14/2023 17.6 (H) 4.0 - 11.0 10e3/uL Final   01/13/2023 32.3 (H) 4.0 - 11.0 10e3/uL Final      Platelet Count   Date Value Ref Range Status   01/15/2023 129 (L) 150 - 450 10e3/uL Final   01/14/2023 171 150 - 450 10e3/uL Final   01/13/2023 241 150 - 450 10e3/uL Final     Creatinine   Date Value Ref Range Status   01/15/2023 0.88 0.67 - 1.17 mg/dL Final   01/14/2023 0.82 0.67 - 1.17 mg/dL Final   01/13/2023 0.91 0.67 - 1.17 mg/dL Final     Potassium   Date Value Ref Range Status   01/16/2023 3.6 3.4 - 5.3 mmol/L Final   01/15/2023 4.0 3.4 - 5.3 mmol/L Final   01/15/2023 4.0 3.4 - 5.3 mmol/L Final   12/02/2022 4.9 3.5 - 5.0 mmol/L Final   05/09/2022 4.6 3.5 - 5.0 mmol/L Final   11/11/2021 4.8 3.5 - 5.0 mmol/L Final     Potassium POCT   Date Value Ref Range Status   01/13/2023 4.5 3.5 - 5.0 mmol/L Final   01/13/2023 5.0 3.5 - 5.0 mmol/L Final   01/13/2023 5.9 (H) 3.5 - 5.0 mmol/L Final     Magnesium   Date Value Ref Range Status   01/16/2023 2.0 1.7 - 2.3 mg/dL Final   01/15/2023 1.8 1.7 - 2.3 mg/dL Final   01/14/2023 2.1 1.7 - 2.3 mg/dL Final          I/O:  I/O last 3 completed shifts:  In: 1200 [P.O.:1200]  Out: 2300 [Urine:2300]       Physical Exam:    General: Patient seen in bed in chair,NAD. Conversant. Pleasant, very talkative.   HEENT: YAIMA   CV: RRR on monitor. 2+ peripheral pulses in all extremities. Mild edema.   Pulm: Non-labored effort on 2 L/nc. Incision C/D/I.  Abd: Soft, NT, ND  : Arroyo removed  Ext: Mild pedal edema, SCDs in place, warm, distal pulses intact  Neuro: CNs grossly intact.       ASSESSMENT/PLAN:    Kash Tavarez is a 62 year old male with a history of CAD who is POD#3 s/p CABG X3 (radial graft as T graft from LIMA to first diagonal branch of the LAD and first OM, JUÁREZ to LAD)  No need for amlodipine for radial graft with being T'd with BACILIO as per Chema.       Principal Problem:    Coronary artery disease of native artery of native heart with stable angina pectoris (H)      NEURO:   - Scheduled Tylenol/lidocaine patches and PRN Tylenol/oxycodone for pain    CV:   -  Normotensive/hypertensive  - Metoprolol 25 mg PO BID 1/16, Metoprolol 12.5 mg PO given now for HTN  - ASA 162mg  - Atorvastatin 40mg daily      PULM:  - Maintaining oxygen saturations on 2L/nc  - Encourage pulmonary toilet    FEN/GI:  - Starting CL this am, had some nausea earlier   - Continue electrolyte replacement protocol  - Diet: Cardiac, ADAT   - Bowel regimen    RENAL:  - Adequate UOP/hr.   - Cr 0.82  - Lasix to 20 mg BID today   - Hx of urinary retention with his BPH.   - Started Flomax 0.4 mg daily 1/14, can stop at discharge if voiding well     HEME:  - Acute blood loss anemia post-op.   - Hgb stable, no bleeding concerns. Hep SQ, ASA  - Hgb 11.7    ID:  - Adilia op ppx complete, afebrile . No concerns for infection  - WBC 13.7 and downtrending    ENDO:   DM II  - Hgb A1c 7.3, 11/17/22  - Transitioned to sliding scale insulin 1/14, increase to High intensity sliding scale insulin   - Holding PTA metformin  - -199  - consider adding lantus with goal BG < 180    PPx:   - DVT: SCDs, SQ heparin TID, ambulation   - GI: Protonix 40mg PO daily    DISPO:   - General telemetry status   - Home 24-48hrs    Patient discussed with Dr. Bear.    Anais Gray PA-C  Cardiothoracic Surgery  [phone]

## 2023-01-16 NOTE — PLAN OF CARE
Goal Outcome Evaluation:             sc  Patient Name: Kash Tavarez   MRN: [id number]   Date of Admission: 1/13/2023    Procedure: Procedure(s):  CORONARY ARTERY BYPASS GRAFT (CABG) x 3, LEFT INTERNAL MAMMARY ARTERY HARVEST, LEFT RADIAL ARTERY HARVEST, ANESTHESIA TRANSESOPHAGEAL ECHOCARDIOGRAM, EPI-AORTIC ULTRASOUND    Post Op day #:3    Subjective (Patient focus/Primary Problem for shift): 3          Pain Goal0 Pain Rating0           Pain Medication/ Regime effective to reduce patient pain0    Objective (Physical assessment):           Rhythm: sinus tachycardia            Bowel Activity: no if Yes indicate when:           Bowel Medications: yes            Incision: healing well          Incentive Spirometry Q 1-2 hour when awake:  yes Volume: 1150          Epicardial Pacing Wires:  no            Patient Activity:           Up to chair for meals: yes          Ambulation with RN x2 (Not including CR): yes            Is patient in home clothes:no             Chest Tubes   Pleural: no Draining: no               Suction: not applicable              Mediastinal: not applicable Draining: not applicable               Suction: not applicable   Dressing Change Daily:no If No, why?open to air                      Urinary Catheter: no           Preventative WOC consult (need MD order): not applicable       Assessment (Nursing primary shift focus): showered , still no bowel movement    Plan (Patient Care Plan/focus): offerd more medications but he refused passing a lot of gas, ambulating and showered as ordered       Chloe Reaves RN   1/16/2023   4:57 PM

## 2023-01-16 NOTE — PROGRESS NOTES
Care Management Follow Up    Length of Stay (days): 3    Expected Discharge Date: 01/18/2023     Concerns to be Addressed:     POD #3 s/p CABG x 3, wean oxygen, IV lasix, ADAT, pain control   Patient plan of care discussed at interdisciplinary rounds: Yes    Anticipated Discharge Disposition: Home, Outpatient Rehab (PT, OT, SLP, Cardiac or Pulmonary)     Anticipated Discharge Services:  (Outpatient Cardiac Rehab)  Anticipated Discharge DME:    Education Provided on the Discharge Plan:Per Care Team    Patient/Family in Agreement with the Plan: yes    Referrals Placed by CM/SW:  None at this time  Private pay costs discussed: Not applicable    Additional Information:  Chart reviewed.  Patient downgrading to telemetry unit today.  Patient progressing well post CABG x 3 on 1/13/23.  Per Cardiothoracic Surgery anticipate discharge in 24-48 hours.  Patient is planning for outpatient cardiac rehab when discharged home.    Patient will return home with spouse and 13 year old child.  Patient is independent at baseline with all ADL's/IADL's.  No Care Management needs identified at this time or anticipated when discharged.  CM will remain available as needed.  Spouse to transport when discharged.    Vidya Davey, RN

## 2023-01-17 ENCOUNTER — APPOINTMENT (OUTPATIENT)
Dept: OCCUPATIONAL THERAPY | Facility: HOSPITAL | Age: 63
End: 2023-01-17
Attending: THORACIC SURGERY (CARDIOTHORACIC VASCULAR SURGERY)
Payer: COMMERCIAL

## 2023-01-17 ENCOUNTER — TELEPHONE (OUTPATIENT)
Dept: FAMILY MEDICINE | Facility: CLINIC | Age: 63
End: 2023-01-17

## 2023-01-17 VITALS
HEART RATE: 111 BPM | TEMPERATURE: 99 F | DIASTOLIC BLOOD PRESSURE: 70 MMHG | OXYGEN SATURATION: 95 % | BODY MASS INDEX: 32.34 KG/M2 | SYSTOLIC BLOOD PRESSURE: 115 MMHG | WEIGHT: 244 LBS | RESPIRATION RATE: 20 BRPM | HEIGHT: 73 IN

## 2023-01-17 LAB
CALCIUM, IONIZED MEASURED: 1.13 MMOL/L (ref 1.11–1.3)
GLUCOSE BLDC GLUCOMTR-MCNC: 164 MG/DL (ref 70–99)
GLUCOSE BLDC GLUCOMTR-MCNC: 166 MG/DL (ref 70–99)
HOLD SPECIMEN: NORMAL
HOLD SPECIMEN: NORMAL
ION CA PH 7.4: 1.12 MMOL/L (ref 1.11–1.3)
MAGNESIUM SERPL-MCNC: 1.9 MG/DL (ref 1.7–2.3)
PH: 7.39 (ref 7.35–7.45)
PHOSPHATE SERPL-MCNC: 3.6 MG/DL (ref 2.5–4.5)
PLATELET # BLD AUTO: 160 10E3/UL (ref 150–450)
POTASSIUM SERPL-SCNC: 3.6 MMOL/L (ref 3.4–5.3)

## 2023-01-17 PROCEDURE — 83735 ASSAY OF MAGNESIUM: CPT | Performed by: THORACIC SURGERY (CARDIOTHORACIC VASCULAR SURGERY)

## 2023-01-17 PROCEDURE — 97110 THERAPEUTIC EXERCISES: CPT | Mod: GO

## 2023-01-17 PROCEDURE — 82330 ASSAY OF CALCIUM: CPT | Performed by: PHYSICIAN ASSISTANT

## 2023-01-17 PROCEDURE — 250N000011 HC RX IP 250 OP 636: Performed by: NURSE PRACTITIONER

## 2023-01-17 PROCEDURE — 250N000013 HC RX MED GY IP 250 OP 250 PS 637: Performed by: PHYSICIAN ASSISTANT

## 2023-01-17 PROCEDURE — 84100 ASSAY OF PHOSPHORUS: CPT | Performed by: THORACIC SURGERY (CARDIOTHORACIC VASCULAR SURGERY)

## 2023-01-17 PROCEDURE — 84132 ASSAY OF SERUM POTASSIUM: CPT | Performed by: THORACIC SURGERY (CARDIOTHORACIC VASCULAR SURGERY)

## 2023-01-17 PROCEDURE — 250N000013 HC RX MED GY IP 250 OP 250 PS 637: Performed by: THORACIC SURGERY (CARDIOTHORACIC VASCULAR SURGERY)

## 2023-01-17 PROCEDURE — 36415 COLL VENOUS BLD VENIPUNCTURE: CPT | Performed by: PHYSICIAN ASSISTANT

## 2023-01-17 PROCEDURE — 85049 AUTOMATED PLATELET COUNT: CPT | Performed by: PHYSICIAN ASSISTANT

## 2023-01-17 PROCEDURE — 250N000011 HC RX IP 250 OP 636: Performed by: PHYSICIAN ASSISTANT

## 2023-01-17 PROCEDURE — 97535 SELF CARE MNGMENT TRAINING: CPT | Mod: GO

## 2023-01-17 PROCEDURE — 250N000013 HC RX MED GY IP 250 OP 250 PS 637: Performed by: NURSE PRACTITIONER

## 2023-01-17 RX ORDER — MAGNESIUM OXIDE 400 MG/1
400 TABLET ORAL EVERY 4 HOURS
Status: COMPLETED | OUTPATIENT
Start: 2023-01-17 | End: 2023-01-17

## 2023-01-17 RX ORDER — METOPROLOL TARTRATE 25 MG/1
75 TABLET, FILM COATED ORAL 2 TIMES DAILY
Status: DISCONTINUED | OUTPATIENT
Start: 2023-01-17 | End: 2023-01-17 | Stop reason: HOSPADM

## 2023-01-17 RX ORDER — AMOXICILLIN 250 MG
1 CAPSULE ORAL 2 TIMES DAILY
Qty: 14 TABLET | Refills: 0 | Status: SHIPPED | OUTPATIENT
Start: 2023-01-17 | End: 2023-01-24

## 2023-01-17 RX ORDER — OXYCODONE HYDROCHLORIDE 5 MG/1
5 TABLET ORAL EVERY 4 HOURS PRN
Qty: 10 TABLET | Refills: 0 | Status: SHIPPED | OUTPATIENT
Start: 2023-01-17 | End: 2023-01-24

## 2023-01-17 RX ORDER — ASPIRIN 81 MG/1
162 TABLET, CHEWABLE ORAL DAILY
COMMUNITY
Start: 2023-01-18 | End: 2023-02-27

## 2023-01-17 RX ORDER — POTASSIUM CHLORIDE 1500 MG/1
20 TABLET, EXTENDED RELEASE ORAL ONCE
Status: COMPLETED | OUTPATIENT
Start: 2023-01-17 | End: 2023-01-17

## 2023-01-17 RX ORDER — PANTOPRAZOLE SODIUM 40 MG/1
40 TABLET, DELAYED RELEASE ORAL DAILY
Qty: 30 TABLET | Refills: 0 | Status: SHIPPED | OUTPATIENT
Start: 2023-01-18 | End: 2023-02-10

## 2023-01-17 RX ORDER — ACETAMINOPHEN 325 MG/1
650 TABLET ORAL EVERY 4 HOURS PRN
COMMUNITY
Start: 2023-01-23

## 2023-01-17 RX ORDER — METOPROLOL TARTRATE 75 MG/1
75 TABLET, FILM COATED ORAL 2 TIMES DAILY
Qty: 60 TABLET | Refills: 3 | Status: SHIPPED | OUTPATIENT
Start: 2023-01-17 | End: 2023-03-13

## 2023-01-17 RX ADMIN — ATORVASTATIN CALCIUM 40 MG: 40 TABLET, FILM COATED ORAL at 07:44

## 2023-01-17 RX ADMIN — POTASSIUM CHLORIDE 20 MEQ: 1500 TABLET, EXTENDED RELEASE ORAL at 07:43

## 2023-01-17 RX ADMIN — SENNOSIDES AND DOCUSATE SODIUM 1 TABLET: 8.6; 5 TABLET ORAL at 07:43

## 2023-01-17 RX ADMIN — Medication 400 MG: at 07:43

## 2023-01-17 RX ADMIN — METFORMIN HYDROCHLORIDE 500 MG: 500 TABLET, FILM COATED ORAL at 07:44

## 2023-01-17 RX ADMIN — ASPIRIN 162 MG: 81 TABLET, CHEWABLE ORAL at 07:43

## 2023-01-17 RX ADMIN — ACETAMINOPHEN 975 MG: 325 TABLET ORAL at 12:15

## 2023-01-17 RX ADMIN — METOPROLOL TARTRATE 12.5 MG: 25 TABLET, FILM COATED ORAL at 12:15

## 2023-01-17 RX ADMIN — HEPARIN SODIUM 5000 UNITS: 10000 INJECTION, SOLUTION INTRAVENOUS; SUBCUTANEOUS at 05:30

## 2023-01-17 RX ADMIN — Medication 400 MG: at 12:16

## 2023-01-17 RX ADMIN — LORATADINE 10 MG: 10 TABLET ORAL at 07:44

## 2023-01-17 RX ADMIN — METFORMIN HYDROCHLORIDE 500 MG: 500 TABLET, FILM COATED ORAL at 12:16

## 2023-01-17 RX ADMIN — FUROSEMIDE 20 MG: 10 INJECTION, SOLUTION INTRAMUSCULAR; INTRAVENOUS at 07:49

## 2023-01-17 RX ADMIN — POLYETHYLENE GLYCOL 3350 17 G: 17 POWDER, FOR SOLUTION ORAL at 07:48

## 2023-01-17 RX ADMIN — MUPIROCIN 0.5 G: 20 OINTMENT TOPICAL at 07:48

## 2023-01-17 RX ADMIN — METOPROLOL TARTRATE 50 MG: 25 TABLET, FILM COATED ORAL at 07:48

## 2023-01-17 RX ADMIN — PANTOPRAZOLE SODIUM 40 MG: 40 TABLET, DELAYED RELEASE ORAL at 07:43

## 2023-01-17 RX ADMIN — ACETAMINOPHEN 975 MG: 325 TABLET ORAL at 05:26

## 2023-01-17 RX ADMIN — Medication 100 MCG: at 07:44

## 2023-01-17 RX ADMIN — THERA TABS 1 TABLET: TAB at 07:44

## 2023-01-17 RX ADMIN — TAMSULOSIN HYDROCHLORIDE 0.4 MG: 0.4 CAPSULE ORAL at 07:49

## 2023-01-17 ASSESSMENT — ACTIVITIES OF DAILY LIVING (ADL)
ADLS_ACUITY_SCORE: 22

## 2023-01-17 NOTE — TELEPHONE ENCOUNTER
Reason for Call:  Appointment Request    Patient requesting this type of appt:  Hospital/ED Follow-Up     Requested provider: Catalino Nuñez    Reason patient unable to be scheduled: Not within requested timeframe    When does patient want to be seen/preferred time: 3-7 days    Comments: Pt is out of Hospital from Heart Surgery and needs to f/u withion 7 days, PCP able to DB?    Could we send this information to you in [facility] or would you prefer to receive a phone call?:   Patient would prefer a phone call   Okay to leave a detailed message?: Yes at Home number on file [phone] (home)    Call taken on 1/17/2023 at 8:36 AM by Kris Negrete

## 2023-01-17 NOTE — TELEPHONE ENCOUNTER
Dr. Nuñez- pt is requesting a hospital follow-up within 7 days. Your soonest 40 min hospital follow up slot is on 1/31/23. Can pt be seen sooner?      Boris Garcia, BSN RN  [facility]

## 2023-01-17 NOTE — PLAN OF CARE
Goal Outcome Evaluation:                      sc  Patient Name: Kash Tavarez   MRN: [id number]   Date of Admission: 1/13/2023    Procedure: Procedure(s):  CORONARY ARTERY BYPASS GRAFT (CABG) x 3, LEFT INTERNAL MAMMARY ARTERY HARVEST, LEFT RADIAL ARTERY HARVEST, ANESTHESIA TRANSESOPHAGEAL ECHOCARDIOGRAM, EPI-AORTIC ULTRASOUND    Post Op day #:4    Subjective (Patient focus/Primary Problem for shift): bowels          Pain Goal0 Pain Rating0           Pain Medication/ Regime effective to reduce patient pain0    Objective (Physical assessment):           Rhythm: sinus tachycardia            Bowel Activity: no if Yes indicate when:           Bowel Medications: yes            Incision: healing well          Incentive Spirometry Q 1-2 hour when awake:  yes Volume: 1250          Epicardial Pacing Wires:  not applicable            Patient Activity:           Up to chair for meals: yes          Ambulation with RN x2 (Not including CR): yes            Is patient in home clothes:no             Chest Tubes   Pleural: not applicable Draining: not applicable               Suction: not applicable              Mediastinal: not applicable Draining: not applicable               Suction: not applicable   Dressing Change Daily:not applicable If No, why?open to air                     Urinary Catheter: not applicable           Preventative WOC consult (need MD order): not applicable       Assessment (Nursing primary shift focus): bowel activity    Plan (Patient Care Plan/focus): has not had a bowel movement, given miralax and senna this am, and prune juice this afternoon, anxious about taking to much as it will cause him to have diarrhea           Chloe Reaves RN   1/17/2023   12:31 PM

## 2023-01-17 NOTE — PLAN OF CARE
sc  Patient Name: Kash Tavarez   MRN: [id number]   Date of Admission: 1/13/2023    Procedure: Procedure(s):  CORONARY ARTERY BYPASS GRAFT (CABG) x 3, LEFT INTERNAL MAMMARY ARTERY HARVEST, LEFT RADIAL ARTERY HARVEST, ANESTHESIA TRANSESOPHAGEAL ECHOCARDIOGRAM, EPI-AORTIC ULTRASOUND    Post Op day #: 4    Subjective (Patient focus/Primary Problem for shift): Pain control          Pain Goal 2  Pain Rating 5          Pain Medication/ Regime effective to reduce patient pain: Yes    Objective (Physical assessment):           Rhythm: NSR w/ BBB            Bowel Activity: No if Yes indicate when: none since admit.          Bowel Medications: yes            Incision: healing well          Incentive Spirometry Q 1-2 hour when awake:  yes Volume: 2250          Epicardial Pacing Wires:  no            Patient Activity:           Up to chair for meals: yes          Ambulation with RN x2 (Not including CR): not applicable            Is patient in home clothes:no             Chest Tubes   Pleural: no Draining: not applicable               Suction: not applicable              Mediastinal: no Draining: not applicable               Suction: not applicable   Dressing Change Daily:not applicable If No, why? Open to air                     Urinary Catheter: no           Preventative WOC consult (need MD order): no       Assessment (Nursing primary shift focus): Left arm, harvest site with throbbing pain. On scheduled tylenol. Did give 1 dose of oxycodone tonight. Also using warm blanket around left arm.  Pain down to 2 per patient. No bowel movement yet.    Plan (Patient Care Plan/focus): As needed pain meds. On scheduled laxatives as well.      Cee Kovacs RN   1/17/2023   5:56 AM

## 2023-01-17 NOTE — PLAN OF CARE
Cardiac Rehab Discharge Summary    Reason for therapy discharge:    Discharged to home with outpatient therapy.    Progress towards therapy goal(s). See goals on Care Plan in [facility] electronic health record for goal details.  Goals met    Therapy recommendation(s):    Continue home exercise program.    Hali JARA, OTR/L, CLT 1/17/2023 , 3:52 PM

## 2023-01-17 NOTE — PROGRESS NOTES
NUTRITION EDUCATION      REASON FOR ASSESSMENT:  To educate on heart healthy diet    NUTRITION HISTORY:  Information obtained from pt    Pt states he does follow a consistent CHO diet at home. He does like fish and plans to try and increase his intake to twice per week.    CURRENT DIET:  Moderate consistent CHO (60 g CHO per meal), 2 gram Na, low saturated fat    NUTRITION DIAGNOSIS:  Food- and nutrition-related knowledge deficit R/t CABG as evidenced by need for therapeutic diet    INTERVENTIONS:    Nutrition Prescription:  Heart healthy (low fat, sat fat, low sodium)    Implementation:      *  Nutrition Education (Content):   A)  Provided handout Heart healthy eating nutrition therapy and food choice guidelines for heart healthy eating with diabetes   B)  Discussed fats to avoid/include in diet, adequate fiber, low sodium      *  Nutrition Education (Application):   A)  Discussed current eating habits and recommended alternative food choices      *  Anticipate good compliance      *  Diet Education - refer to Education Flowsheet    Goals:      *  Patient will verbalize understanding of diet met      *  All of the above goals met during the education session    Follow Up/Monitoring:      *  Provided RD contact information for future questions      *  Recommended Out-Patient Nutrition Referral, if further diet instructions are needed

## 2023-01-17 NOTE — DISCHARGE SUMMARY
Cardiovascular Surgery Discharge Summary    Primary Care Physician:  Catalino Nuñez  Discharge Provider: Anais Gray PA-C  Admission Date: 1/13/2023  Admission Diagnoses: CAD (coronary artery disease) [I25.10]  Coronary artery disease of native artery of native heart with stable angina pectoris (H) [I25.118]  Discharge Date: 1/17/2023  Disposition: Home   Condition at Discharge: Good  Code Status: Full Code     Principal Diagnosis:   Coronary artery disease of native artery of native heart with stable angina pectoris (H) s/p coronary artery bypass grafting x 3    Discharge Diagnoses:    Active Problems:      Patient Active Problem List   Diagnosis     Nephrolithiasis     Obesity     Allergic Rhinitis     Mixed hyperlipidemia     Essential Hypertension     Obesity (BMI 35.0-39.9) with comorbidity (H)     Type 2 diabetes mellitus without complication, without long-term current use of insulin (H)     Calculus of ureter     Hydronephrosis with urinary obstruction due to ureteral calculus     Coronary artery disease of native artery of native heart with stable angina pectoris (H)         Consult/s: Dietary, critical care medicine, cardiology    Surgery:   1/13/22 with Dr. Bear  1. Coronary artery bypass grafting x 3   A. Composite sequential Radial artery as a T graft from the left internal mammary artery sequnced to the first diagonal branch of the left anterior descending coronary artery first and the first obtuse marginal branch of the left circumflex coronary artery  D. Left internal mammary artery to left anterior descending coronary artery  2. Endoscopic harvest of the radial artery from the left upper extremity.  3. Epiaortic ultrasound      Discharge Medications:      Review of your medicines      UNREVIEWED medicines. Ask your doctor about these medicines      Dose / Directions   triamcinolone 0.1 % external cream  Commonly known as: KENALOG  Used for: Dermatitis      Apply topically 2 times daily  Quantity:  80 g  Refills: 0        START taking      Dose / Directions   acetaminophen 325 MG tablet  Commonly known as: TYLENOL  Used for: S/P CABG (coronary artery bypass graft)      Dose: 650 mg  Start taking on: January 23, 2023  Take 2 tablets (650 mg) by mouth every 4 hours as needed for other (For optimal non-opioid multimodal pain management to improve pain control.)  Refills: 0     aspirin 81 MG chewable tablet  Commonly known as: ASA  Used for: S/P CABG (coronary artery bypass graft)  Replaces: aspirin 81 MG EC tablet      Dose: 162 mg  Start taking on: January 18, 2023  2 tablets (162 mg) by Oral or NG Tube route daily  Refills: 0     Metoprolol Tartrate 75 MG Tabs  Used for: S/P CABG (coronary artery bypass graft)      Dose: 75 mg  Take 75 mg by mouth 2 times daily  Quantity: 60 tablet  Refills: 3     oxyCODONE 5 MG tablet  Commonly known as: ROXICODONE  Used for: S/P CABG (coronary artery bypass graft)      Dose: 5 mg  Take 1 tablet (5 mg) by mouth every 4 hours as needed for moderate pain (4-6)  Quantity: 10 tablet  Refills: 0     pantoprazole 40 MG EC tablet  Commonly known as: PROTONIX  Used for: S/P CABG (coronary artery bypass graft)      Dose: 40 mg  Start taking on: January 18, 2023  Take 1 tablet (40 mg) by mouth daily  Quantity: 30 tablet  Refills: 0     senna-docusate 8.6-50 MG tablet  Commonly known as: SENOKOT-S/PERICOLACE  Used for: S/P CABG (coronary artery bypass graft)      Dose: 1 tablet  Take 1 tablet by mouth 2 times daily  Quantity: 14 tablet  Refills: 0        CONTINUE these medicines which have NOT CHANGED      Dose / Directions   atorvastatin 40 MG tablet  Commonly known as: LIPITOR  Used for: Encounter for medication refill      TAKE 1 TABLET BY MOUTH EVERY DAY  Quantity: 90 tablet  Refills: 3     blood glucose lancing device  Used for: Type II diabetes mellitus (H)      [ACCU-CHEK FASTCLIX MISC] TEST TWICE DAILY  Quantity: 100 each  Refills: 0     blood glucose monitoring lancets  Used  for: Diabetes mellitus, type 2 (H), Encounter for medication refill      USE TWICE A DAY (AT 7:30 AM & 4:30 PM) AS DIRECTED  Quantity: 204 each  Refills: 3     * GLUCOSE METER TEST VI      Dose: 1 Device  [BLOOD-GLUCOSE METER MISC] Use 1 Device As Directed 2 (two) times a day at 9am and 6pm.  Quantity: 1 each  Refills: 0     * Accu-Chek Guide test strip  Used for: Encounter for medication refill  Generic drug: blood glucose      USE 1 EACH AS DIRECTED 2 (TWO) TIMES A DAY AT 7:30AM AND 4:30PM.  Quantity: 200 strip  Refills: 3     imiquimod 5 % external cream  Commonly known as: ALDARA      Apply topically daily To warts  Refills: 3     loratadine 10 MG tablet  Commonly known as: CLARITIN      Dose: 10 mg  Take 10 mg by mouth daily  Refills: 0     metFORMIN 500 MG tablet  Commonly known as: GLUCOPHAGE  Used for: Type 2 diabetes mellitus without complication, without long-term current use of insulin (H)      Dose: 500 mg  Take 1 tablet (500 mg) by mouth 3 times daily (with meals)  Quantity: 270 tablet  Refills: 3     metroNIDAZOLE 0.75 % external gel  Commonly known as: METROGEL      2 times daily Apply to face  Refills: 10     multivitamin, therapeutic Tabs tablet      Dose: 1 tablet  Take 1 tablet by mouth daily  Refills: 0     nitroGLYcerin 0.4 MG sublingual tablet  Commonly known as: NITROSTAT  Used for: Abnormal cardiovascular stress test, Chest pain, unspecified type, Encounter for medication refill      Dose: 0.4 mg  PLACE 1 TABLET (0.4 MG) UNDER THE TONGUE EVERY 5 MINUTES AS NEEDED FOR CHEST PAIN FOR CHEST PAIN PLACE 1 TABLET UNDER THE TONGUE EVERY 5 MINUTES FOR 3 DOSES. IF SYMPTOMS PERSIST 5 MINUTES AFTER 1ST DOSE CALL 911.  Quantity: 90 tablet  Refills: 3     Rhofade 1 % cream  Generic drug: oxymetazoline HCl      [OXYMETAZOLINE (RHOFADE) 1 % CREA] Apply topically daily.  Refills: 0     Vitamin D3 25 mcg (1000 units) tablet  Commonly known as: CHOLECALCIFEROL      Dose: 4 tablet  Take 4 tablets by mouth  daily  Refills: 0         * This list has 2 medication(s) that are the same as other medications prescribed for you. Read the directions carefully, and ask your doctor or other care provider to review them with you.            STOP taking    aspirin 81 MG EC tablet  Commonly known as: ASA  Replaced by: aspirin 81 MG chewable tablet        lisinopril 20 MG tablet  Commonly known as: ZESTRIL        metoprolol succinate ER 25 MG 24 hr tablet  Commonly known as: TOPROL XL              Where to get your medicines      These medications were sent to [facility] [place] [id number] [facility]  [address] [place] [address], [facility] [address]    Phone: [phone]     Metoprolol Tartrate 75 MG Tabs    oxyCODONE 5 MG tablet    pantoprazole 40 MG EC tablet    senna-docusate 8.6-50 MG tablet     Some of these will need a paper prescription and others can be bought over the counter. Ask your nurse if you have questions.    You don't need a prescription for these medications    acetaminophen 325 MG tablet    aspirin 81 MG chewable tablet         Discharge Instructions:    Follow up appointment with Primary Care Physician: Catalino Nuñez within 7 days of discharge.  Follow up appointment with Specialist:    Follow with CV Surgery as scheduled 2/21/23 at 12:00   Follow-up with cardiology as scheduled with Dr Pereira 2/22/23 at 1:20    Diet: Cardiac    Activity/Restrictions: As tolerated with sternal precautions in mind (see below). No driving for 4 weeks or while on pain medication.     - Shower and wash your incisions daily with soap and water. No tub baths/hot tubs for 4 weeks. An antibacterial soap such as Dial or Safeguard is recommended.    - Check your incisions every day. If you notice any redness, drainage, or anything unusual, please call the surgeons office.    - No driving for 4 weeks after surgery or while on pain medication     - Do not lift anything more than 10 pounds for 6 weeks  "after surgery. After 6 weeks, advance lifting is tolerated.    - You may have watery drainage from your chest tube site for 2-3 weeks after surgery. Your may cover with a Band-Aid to protect your clothing. Remove the Band-Aid every day and wash the site.    - If you have a leg lesion, you may have swelling for 2-3 months. Elevate your leg any time you are not walking.    - If you feel any \"popping\" or \"clicking\" sensations in your chest, your arms are out too far or you are putting too much weight into arm movements. Do not reach over your head or out to the side to pull something. Do not do any arm exercises or use any exercise equipment that involves arm movement. If you feel your sternum moving, call the surgeon's office.    - Increase your daily activity as explained by Cardiac Rehab. You are encouraged to enroll in an Outpatient Cardiac Rehab Program.    - No active sports using your upper arms for 3 months. This includes fishing, hunting, bowling, swimming, tennis or golf.    - No physical activity such as cutting the grass, raking, vacuuming, changing sheets on your bed, snow shoveling, or using a  for 3 months.    - Use incentive spirometer 6-8 times per day for 2 weeks.       Hospital Summary:   Mr. Kash Tavarez is a 62 year old year-old male who presents with symptoms of stable angina. Coronary angiography demonstrates severe multivessel coronary artery disease. Echocardiography  demonstrates preserved left ventricular function and no significant valvular abnormality.    Patient was deemed a candidate for coronary artery bypass surgery, and was taken to the operating room on 1/13/23where patient underwent 3 vessel coronary artery bypass and endoscopic harvest of the radial artery from the left upper extremity. Surgery was uneventful and patient was brought to the ICU post-operatively. He was extubated on POD# 0 and weaned from pressors. Patient was awake and alert, afebrile, and with stable " "vitals. Insulin drip was discontinued and he was transitioned to a sliding scale. He was transferred to general telemetry status on POD# 1 where patient has had return of bowel function, is maintaining oxygen saturations on room air, had his chest tubes removed, and has no complaints of chest pain or shortness of breath. On 01/17/23, patient was stable enough to be discharged to home.    Of note, he was euvolemic on discharge therefore no diuresis needed. No need for amlodipine for radial graft with being T'd with BACILIO as per [place].     Vital signs:  Temp: 99  F (37.2  C) Temp src: Oral BP: 115/70 Pulse: 111   Resp: 20 SpO2: 95 % O2 Device: None (Room air) Oxygen Delivery: 2 LPM Height: 185.4 cm (6' 1\") (stated) Weight: 110.7 kg (244 lb)  Estimated body mass index is 32.19 kg/m  as calculated from the following:    Height as of this encounter: 1.854 m (6' 1\").    Weight as of this encounter: 110.7 kg (244 lb).          Physical Exam:    Pertinent exam findings on day of discharge include:  Gen: Seen up in chair. NAD. Pleasant and conversant.   CV: RRR on monitor. No edema.  Pulm: Non-labored breathing on room air.  Abd: Soft, non-tender, non-distended  Neuro: CNs grossly intact  Inc: C/D/I, left upper extremity with some bruising,no numbness or tingling.    Anais Gray PA-C  Cardiothoracic Surgery  [phone]      "

## 2023-01-17 NOTE — PROGRESS NOTES
CVTS Daily Progress Note   POD#4 s/p CABG X3   Attending: Chema  LOS: 4    SUBJECTIVE/INTERVAL EVENTS:  No acute events overnight.  Patient progressing well. Maintaining oxygen saturations on RA. Normotensive/ST. Ambulating with therapy. Pain better controlled. -BM / +flatus. UOP adequate. Hgb 11.7. Patient denies new chest pain, shortness of breath, abdominal pain, calf pain, nausea. Patient has no questions today.    OBJECTIVE:  Temp:  [98.1  F (36.7  C)-99.5  F (37.5  C)] 98.9  F (37.2  C)  Pulse:  [] 119  Resp:  [18-20] 18  BP: (124-138)/(67-89) 133/89  SpO2:  [92 %-95 %] 95 %  Resp: 18    Vitals:    01/13/23 0523 01/14/23 0630 01/15/23 0600 01/16/23 0600   Weight: 111.4 kg (245 lb 9.6 oz) 115.6 kg (254 lb 14.4 oz) 112.3 kg (247 lb 8 oz) 111.6 kg (246 lb)    01/17/23 0552   Weight: 110.7 kg (244 lb)       Current Medications:    Scheduled Meds:    acetaminophen  975 mg Oral Q8H     aspirin  162 mg Oral or NG Tube Daily     atorvastatin  40 mg Oral Daily     furosemide  20 mg Intravenous BID     heparin ANTICOAGULANT  5,000 Units Subcutaneous Q8H     insulin aspart  1-10 Units Subcutaneous TID AC     insulin aspart  1-7 Units Subcutaneous At Bedtime     lidocaine   Transdermal Q8H ZABRINA     loratadine  10 mg Oral Daily     magnesium oxide  400 mg Oral Q4H     metFORMIN  500 mg Oral TID w/meals     metoprolol tartrate  50 mg Oral BID     multivitamin, therapeutic  1 tablet Oral Daily     mupirocin  0.5 g Both Nostrils BID     pantoprazole  40 mg Oral or NG Tube Daily    Or     pantoprazole  40 mg Oral Daily     polyethylene glycol  17 g Oral Daily     senna-docusate  1 tablet Oral BID     tamsulosin  0.4 mg Oral Daily     Vitamin D3  100 mcg Oral Daily     Continuous Infusions:    PRN Meds:.[START ON 1/23/2023] acetaminophen, bisacodyl, calcium gluconate, calcium gluconate, calcium gluconate, glucose **OR** dextrose **OR** glucagon, glucose **OR** dextrose **OR** glucagon, hydrALAZINE, lactated ringers,  Lidocaine, magnesium hydroxide, naloxone **OR** naloxone **OR** naloxone **OR** naloxone, ondansetron **OR** ondansetron, oxyCODONE **OR** oxyCODONE, prochlorperazine **OR** prochlorperazine    Cardiographics:    Telemetry monitoring demonstrates NSR/ST with rates in the 100's per my personal review.    Imaging:  Results for orders placed or performed during the hospital encounter of 01/13/23   XR Chest Port 1 View    Impression    IMPRESSION:     Endotracheal tube is in satisfactory position. Right jugular sheath and PA catheter are new. Tip of the PA catheter is at the junction of the main and right pulmonary arteries. There are mediastinal drains.    Perihilar atelectasis as well as subsegmental atelectasis in the medial lower lobes.. There are no findings of interstitial lung edema. No airspace consolidation.    No menisci to indicate visible pleural fluid. No pneumothorax.   XR Chest Port 1 View    Impression    IMPRESSION: Extubation. Right IJ Hana-Sandeep catheter tip is in the main pulmonary artery. Mediastinal drains remain. No pleural effusion or pneumothorax. Median sternotomy and mediastinal surgical clips. Mild bibasilar atelectasis is stable.       Labs, personally reviewed.  Hemoglobin   Date Value Ref Range Status   01/15/2023 11.7 (L) 13.3 - 17.7 g/dL Final   01/14/2023 10.6 (L) 13.3 - 17.7 g/dL Final   01/13/2023 12.7 (L) 13.3 - 17.7 g/dL Final     Hemoglobin POCT   Date Value Ref Range Status   01/13/2023 11.9 (L) 13.3 - 17.7 g/dL Final   01/13/2023 11.9 (L) 13.3 - 17.7 g/dL Final   01/13/2023 11.7 (L) 13.3 - 17.7 g/dL Final     WBC Count   Date Value Ref Range Status   01/15/2023 13.7 (H) 4.0 - 11.0 10e3/uL Final   01/14/2023 17.6 (H) 4.0 - 11.0 10e3/uL Final   01/13/2023 32.3 (H) 4.0 - 11.0 10e3/uL Final     Platelet Count   Date Value Ref Range Status   01/17/2023 160 150 - 450 10e3/uL Final   01/15/2023 129 (L) 150 - 450 10e3/uL Final   01/14/2023 171 150 - 450 10e3/uL Final     Creatinine   Date  Value Ref Range Status   01/15/2023 0.88 0.67 - 1.17 mg/dL Final   01/14/2023 0.82 0.67 - 1.17 mg/dL Final   01/13/2023 0.91 0.67 - 1.17 mg/dL Final     Potassium   Date Value Ref Range Status   01/17/2023 3.6 3.4 - 5.3 mmol/L Final   01/16/2023 3.6 3.4 - 5.3 mmol/L Final   01/15/2023 4.0 3.4 - 5.3 mmol/L Final   01/15/2023 4.0 3.4 - 5.3 mmol/L Final   12/02/2022 4.9 3.5 - 5.0 mmol/L Final   05/09/2022 4.6 3.5 - 5.0 mmol/L Final   11/11/2021 4.8 3.5 - 5.0 mmol/L Final     Potassium POCT   Date Value Ref Range Status   01/13/2023 4.5 3.5 - 5.0 mmol/L Final   01/13/2023 5.0 3.5 - 5.0 mmol/L Final   01/13/2023 5.9 (H) 3.5 - 5.0 mmol/L Final     Magnesium   Date Value Ref Range Status   01/17/2023 1.9 1.7 - 2.3 mg/dL Final   01/16/2023 2.0 1.7 - 2.3 mg/dL Final   01/15/2023 1.8 1.7 - 2.3 mg/dL Final          I/O:  I/O last 3 completed shifts:  In: 1480 [P.O.:1480]  Out: 2800 [Urine:2800]       Physical Exam:    General: Patient seen in bed in chair,NAD. Conversant. Pleasant, very talkative.   HEENT: YAIMA   CV: RRR on monitor. 2+ peripheral pulses in all extremities. Mild edema.   Pulm: Non-labored effort on RA. Incision C/D/I.  Abd: Soft, NT, ND  : Arroyo removed  Ext: Mild pedal edema, SCDs in place, warm, distal pulses intact  Neuro: CNs grossly intact.       ASSESSMENT/PLAN:    Kash Tavarez is a 62 year old male with a history of CAD who is POD#4 s/p CABG X3 (radial graft as T graft from LIMA to first diagonal branch of the LAD and first OM, JUÁREZ to LAD)  No need for amlodipine for radial graft with being T'd with BACILIO as per Chema.       Principal Problem:    Coronary artery disease of native artery of native heart with stable angina pectoris (H)      NEURO:   - Scheduled Tylenol/lidocaine patches and PRN Tylenol/oxycodone for pain    CV:   - Normotensive/hypertensive  - Metoprolol 50 mg PO BID- increased to 75 mg PO BID today  - ASA 162mg  - Atorvastatin 40mg daily      PULM:  - Maintaining oxygen saturations  on RA  - Encourage pulmonary toilet    FEN/GI:  - Starting CL this am, had some nausea earlier   - Continue electrolyte replacement protocol  - Diet: Cardiac, ADAT   - Bowel regimen    RENAL:  - Adequate UOP/hr.   - Cr 0.82  - Lasix to 20 mg BID today   - Hx of urinary retention with his BPH.   - Started Flomax 0.4 mg daily 1/14, can stop at discharge if voiding well     HEME:  - Acute blood loss anemia post-op.   - Hgb stable, no bleeding concerns. Hep SQ, ASA  - Hgb 11.7    ID:  - Adilia op ppx complete, afebrile . No concerns for infection  - WBC 13.7 and downtrending    ENDO:   DM II  - Hgb A1c 7.3, 11/17/22  - Transitioned to sliding scale insulin 1/14, increase to High intensity sliding scale insulin   - Holding PTA metformin  - -199  - consider adding lantus with goal BG < 180    PPx:   - DVT: SCDs, SQ heparin TID, ambulation   - GI: Protonix 40mg PO daily    DISPO:   - General telemetry status   - Home if pt has a BM today    Patient discussed with Dr. Kaur.    Anais Gray PA-C  Cardiothoracic Surgery  [phone]

## 2023-01-18 ENCOUNTER — PATIENT OUTREACH (OUTPATIENT)
Dept: CARE COORDINATION | Facility: CLINIC | Age: 63
End: 2023-01-18
Payer: COMMERCIAL

## 2023-01-18 ENCOUNTER — TELEPHONE (OUTPATIENT)
Dept: CARDIOLOGY | Facility: CLINIC | Age: 63
End: 2023-01-18
Payer: COMMERCIAL

## 2023-01-18 NOTE — PROGRESS NOTES
[facility]    Background: Transitional Care Management program identified per system criteria and reviewed by [facility] team for possible outreach.    Assessment: Upon chart review, CCR Team member will not proceed with patient outreach related to this episode of Transitional Care Management program due to reason below:    Patient has active communication with a nurse, provider or care team for reason of post-hospital follow up plan.  Outreach call by CCR team not indicated to minimize duplicative efforts.     Plan: Transitional Care Management episode addressed appropriately per reason noted above.      Holly Jerry RN  [facility] Resource [place], [facility]    *Connected Care Resource Team does NOT follow patient ongoing. Referrals are identified based on internal discharge reports and the outreach is to ensure patient has an understanding of their discharge instructions.

## 2023-01-18 NOTE — TELEPHONE ENCOUNTER
Cardiovascular Surgery  [facility]    DISCHARGE FOLLOW UP PHONE CALL      POST OP MONITORING  How is your pain on a 0-10 scale, how are you managing your pain? Pain is being managed with tylenol.    ACTIVITY  How is your activity tolerance? Walking and tolerating it well.  Are you still doing sternal precautions? Yes.  Do you hear any clicking when you are moving or taking a deep breath? No.    Are you weighing yourself daily? Weight has been stable. Spouse knows to call if +3 lbs in a day, or +5 lbs in a week.    SIGNS AND SYMPTOMS OF INFECTION  1. INCREASE IN PAIN - No  2. FEVER - No  3. DRAINAGE - No If so, color: NA  4. REDNESS - No  5. SWELLING - No    ASSISTANCE  Do you have someone at home to assist you with your daily activities? Spouse helping pt at home.    MEDICATIONS  Is someone helping you to set up your medications? Pt and spouse.  Do you have any questions about your medications? Medications questions addressed.    Are you on a blood thinner? No.  Who is managing your INRs? NA    FOLLOW UP  You are scheduled to see your primary care physician on 1/24.  You are scheduled to see our surgery advanced practive provider for post operative follow up on 2/21.    You are scheduled for cardiac rehab on 1/24.  You are scheduled to see your cardiologist on 2/22.    CONTACT INFORMATION  Please feel free to call us with any questions or symptoms that are concerning for you at [phone]. If it is after 4:00 in the afternoon, or a weekend please call [phone] and ask for the on call specialist. We want to do everything we can to help prevent you needing to return to the ED, so please do not hesitate to call us.    Yumiko Smith, RNCC  Cardiovascular Surgery  [phone]      ----- Message from Anais Gray PA-C sent at 1/17/2023  3:21 PM CST -----  Hi,     Mr Tavarez is discharged today.  CAB with Chema Lorenzo,    Anais

## 2023-01-19 ENCOUNTER — TELEPHONE (OUTPATIENT)
Dept: CARDIOLOGY | Facility: CLINIC | Age: 63
End: 2023-01-19
Payer: COMMERCIAL

## 2023-01-19 NOTE — TELEPHONE ENCOUNTER
Returned patient's message and addressed his questions about his arm pain from the radial artery harvest site. Pain has been manageable, feels a little stiff in his arm, but advised him to exercise his arm and hand as much as possible. Occupational therapy referral offered to him in the future if he requests. Patient doesn't feel that is necessary at this time. Patient verbalized understanding, all questions/concerns addressed.

## 2023-01-24 ENCOUNTER — OFFICE VISIT (OUTPATIENT)
Dept: FAMILY MEDICINE | Facility: CLINIC | Age: 63
End: 2023-01-24
Payer: COMMERCIAL

## 2023-01-24 ENCOUNTER — HOSPITAL ENCOUNTER (OUTPATIENT)
Dept: CARDIAC REHAB | Facility: CLINIC | Age: 63
Discharge: HOME | End: 2023-01-24
Attending: PHYSICIAN ASSISTANT
Payer: COMMERCIAL

## 2023-01-24 VITALS
DIASTOLIC BLOOD PRESSURE: 70 MMHG | TEMPERATURE: 98.6 F | OXYGEN SATURATION: 97 % | SYSTOLIC BLOOD PRESSURE: 124 MMHG | BODY MASS INDEX: 32.6 KG/M2 | WEIGHT: 246 LBS | RESPIRATION RATE: 20 BRPM | HEART RATE: 123 BPM | HEIGHT: 73 IN

## 2023-01-24 DIAGNOSIS — E11.9 TYPE 2 DIABETES MELLITUS WITHOUT COMPLICATION, WITHOUT LONG-TERM CURRENT USE OF INSULIN (H): ICD-10-CM

## 2023-01-24 DIAGNOSIS — I25.118 CORONARY ARTERY DISEASE OF NATIVE ARTERY OF NATIVE HEART WITH STABLE ANGINA PECTORIS (H): Primary | ICD-10-CM

## 2023-01-24 DIAGNOSIS — R00.0 TACHYCARDIA: ICD-10-CM

## 2023-01-24 DIAGNOSIS — L03.114 CELLULITIS OF LEFT UPPER EXTREMITY: ICD-10-CM

## 2023-01-24 DIAGNOSIS — E66.01 MORBID OBESITY (H): ICD-10-CM

## 2023-01-24 DIAGNOSIS — Z95.1 S/P CABG (CORONARY ARTERY BYPASS GRAFT): ICD-10-CM

## 2023-01-24 PROBLEM — I50.21 ACUTE HFREF (HEART FAILURE WITH REDUCED EJECTION FRACTION) (H): Status: ACTIVE | Noted: 2023-01-24

## 2023-01-24 PROBLEM — I50.21 ACUTE HFREF (HEART FAILURE WITH REDUCED EJECTION FRACTION) (H): Status: RESOLVED | Noted: 2023-01-24 | Resolved: 2023-01-24

## 2023-01-24 PROCEDURE — 93005 ELECTROCARDIOGRAM TRACING: CPT | Performed by: FAMILY MEDICINE

## 2023-01-24 PROCEDURE — 93798 PHYS/QHP OP CAR RHAB W/ECG: CPT

## 2023-01-24 PROCEDURE — 93797 PHYS/QHP OP CAR RHAB WO ECG: CPT | Mod: XU

## 2023-01-24 PROCEDURE — 99496 TRANSJ CARE MGMT HIGH F2F 7D: CPT | Performed by: FAMILY MEDICINE

## 2023-01-24 PROCEDURE — 93010 ELECTROCARDIOGRAM REPORT: CPT | Performed by: INTERNAL MEDICINE

## 2023-01-24 RX ORDER — CEPHALEXIN 500 MG/1
500 CAPSULE ORAL 3 TIMES DAILY
Qty: 21 CAPSULE | Refills: 0 | Status: SHIPPED | OUTPATIENT
Start: 2023-01-24 | End: 2023-01-31

## 2023-01-24 NOTE — PROGRESS NOTES
Hospital Follow-up Visit:    Hospital/Nursing Home/IP Rehab Facility: [facility]  Date of Admission: 1/13/2023  Date of Discharge: 1/17/2023  Reason(s) for Admission: Heart surgery    Was your hospitalization related to COVID-19? No   Problems taking medications regularly:  None  Medication changes since discharge: None  Problems adhering to non-medication therapy:  None    Summary of hospitalization:  [facility] discharge summary reviewed  Diagnostic Tests/Treatments reviewed.  Follow up needed: CARDIOLOGY AND CV SURG  Other Healthcare Providers Involved in Patient s Care:         Specialist appointment - cardiology and Surgical follow-up appointment - cv SURG  Update since discharge: improved. MED REC REQUIRED  Post Medication Reconciliation Status: discharge medications reconciled, continue medications without change    Plan of care communicated with patient and family    ASSESMENT AND PLAN:  Diagnoses and all orders for this visit:  Coronary artery disease of native artery of native heart with stable angina pectoris (H)  Status post coronary artery bypass grafting surgery.  Reviewed discharge summary with the patient, he will follow-up with cardiology and CV surgery as directed.  Medication reconciliation and review and counseling done.  Continue his medical management.  Tachycardia  -     EKG 12-lead, tracing only done today in clinic and reviewed by me personally shows sinus tachycardia.  This is likely multifactorial and is asymptomatic for the patient.  Patient counseled on the importance of taking his metoprolol twice per day and this can be rechecked later this week at cardiac rehab.  I will wait for the cardiology over read on his EKG to see if anything additional would be required and we discussed indications for follow-up.  Type 2 diabetes mellitus without complication, without long-term current use of insulin (H)  Stable.  Continue current plan.  Morbid  obesity (H)  Patient has made good progress with his weight loss plan.  Counseling the importance of continued diet and exercise.  Cellulitis of left upper extremity at the site of his vein harvest.  I marked out the area with a permanent black marker and discussed with the patient and his wife the importance of close observation of this and indications for follow-up.  Starting antibiotic tonight.  He will take a probiotic for the next 2 weeks.  -     cephALEXin (KEFLEX) 500 MG capsule; Take 1 capsule (500 mg) by mouth 3 times daily for 7 days      Reviewed the risks and benefits of the treatment plan with the patient and/or caregiver and we discussed indications for routine and emergent follow-up.        SUBJECTIVE: Patient here for follow-up on his hospitalization where he underwent CABG surgery.  Since discharge he reports that he has been tired but his energy levels and exercise tolerance have been increasing.  He has started cardiac rehab.  He is noted to have tachycardia today, he reports he has not been feeling any palpitations or chest pain.  He has no shortness of breath at rest, he does get some shortness of breath with exertion but his exercise tolerance has been improving.  No near syncope.  Patient has noticed an area that is mildly painful and red and firm around the distal aspect of the surgical wounds from his vein harvest in his left arm.  No fevers or chills.  His wife noticed some yellowish drainage from this spot earlier today.    Past Medical History:   Diagnosis Date     Coronary artery disease of native artery of native heart with stable angina pectoris (H)      Hypertension      Nephrolithiasis      Type 2 diabetes mellitus (H)      Patient Active Problem List   Diagnosis     Nephrolithiasis     Obesity     Allergic Rhinitis     Mixed hyperlipidemia     Essential Hypertension     Obesity (BMI 35.0-39.9) with comorbidity (H)     Type 2 diabetes mellitus without complication, without long-term  current use of insulin (H)     Calculus of ureter     Hydronephrosis with urinary obstruction due to ureteral calculus     Coronary artery disease of native artery of native heart with stable angina pectoris (H)     Current Outpatient Medications   Medication Sig Dispense Refill     ACCU-CHEK FASTCLIX Misc [ACCU-CHEK FASTCLIX MISC] TEST TWICE DAILY 100 each 0     ACCU-CHEK GUIDE test strip USE 1 EACH AS DIRECTED 2 (TWO) TIMES A DAY AT 7:30AM AND 4:30PM. 200 strip 3     acetaminophen (TYLENOL) 325 MG tablet Take 2 tablets (650 mg) by mouth every 4 hours as needed for other (For optimal non-opioid multimodal pain management to improve pain control.)       aspirin (ASA) 81 MG chewable tablet 2 tablets (162 mg) by Oral or NG Tube route daily       atorvastatin (LIPITOR) 40 MG tablet TAKE 1 TABLET BY MOUTH EVERY DAY (Patient taking differently: Take 40 mg by mouth daily) 90 tablet 3     blood glucose monitoring (ACCU-CHEK FASTCLIX) lancets USE TWICE A DAY (AT 7:30 AM & 4:30 PM) AS DIRECTED 204 each 3     blood-glucose meter Misc [BLOOD-GLUCOSE METER MISC] Use 1 Device As Directed 2 (two) times a day at 9am and 6pm. 1 each 0     cephALEXin (KEFLEX) 500 MG capsule Take 1 capsule (500 mg) by mouth 3 times daily for 7 days 21 capsule 0     imiquimod (ALDARA) 5 % cream Apply topically daily To warts  3     loratadine (CLARITIN) 10 MG tablet Take 10 mg by mouth daily       metFORMIN (GLUCOPHAGE) 500 MG tablet Take 1 tablet (500 mg) by mouth 3 times daily (with meals) 270 tablet 3     metoprolol tartrate 75 MG TABS Take 75 mg by mouth 2 times daily 60 tablet 3     metroNIDAZOLE (METROGEL) 0.75 % gel 2 times daily Apply to face  10     multivitamin, therapeutic (THERA-VIT) TABS tablet Take 1 tablet by mouth daily       nitroGLYcerin (NITROSTAT) 0.4 MG sublingual tablet PLACE 1 TABLET (0.4 MG) UNDER THE TONGUE EVERY 5 MINUTES AS NEEDED FOR CHEST PAIN FOR CHEST PAIN PLACE 1 TABLET UNDER THE TONGUE EVERY 5 MINUTES FOR 3 DOSES. IF  "SYMPTOMS PERSIST 5 MINUTES AFTER 1ST DOSE CALL 911. 90 tablet 3     oxymetazoline (RHOFADE) 1 % Crea [OXYMETAZOLINE (RHOFADE) 1 % CREA] Apply topically daily.       pantoprazole (PROTONIX) 40 MG EC tablet Take 1 tablet (40 mg) by mouth daily 30 tablet 0     triamcinolone (KENALOG) 0.1 % external cream Apply topically 2 times daily (Patient taking differently: Apply topically daily as needed Apply to neck rash) 80 g 0     Vitamin D3 (CHOLECALCIFEROL) 25 mcg (1000 units) tablet Take 4 tablets by mouth daily       History   Smoking Status     Never   Smokeless Tobacco     Never       OBJECTICE: /70 (BP Location: Right arm)   Pulse (!) 123   Temp 98.6  F (37  C) (Oral)   Resp 20   Ht 1.854 m (6' 1\")   Wt 111.6 kg (246 lb)   SpO2 97%   BMI 32.46 kg/m       Recent Results (from the past 24 hour(s))   EKG 12-lead, tracing only    Collection Time: 01/24/23  4:49 PM   Result Value Ref Range    Systolic Blood Pressure  mmHg    Diastolic Blood Pressure  mmHg    Ventricular Rate 118 BPM    Atrial Rate 118 BPM    PA Interval 124 ms    QRS Duration 108 ms     ms    QTc 482 ms    P Axis 68 degrees    R AXIS 12 degrees    T Axis 112 degrees    Interpretation ECG       Sinus tachycardia  Nonspecific ST and T wave abnormality  Abnormal ECG  When compared with ECG of 13-JAN-2023 13:15,  No significant change was found          GEN-alert, appropriate, in no apparent distress   CV-regular tachycardia   RESP-lungs clear to auscultation   EXTREM-no pitting edema of the arms or legs.  No calf tenderness.   SKIN-there is a healing surgical wound of the distal left arm with proximal induration and redness approximately 3 cm wide and 5 cm long (area was marked out with permanent marker as detailed above).  No fluctuance.      Catalino Nuñez MD      "

## 2023-01-26 ENCOUNTER — HOSPITAL ENCOUNTER (OUTPATIENT)
Dept: CARDIAC REHAB | Facility: CLINIC | Age: 63
Discharge: HOME | End: 2023-01-26
Attending: INTERNAL MEDICINE
Payer: COMMERCIAL

## 2023-01-26 LAB
ATRIAL RATE - MUSE: 118 BPM
DIASTOLIC BLOOD PRESSURE - MUSE: NORMAL MMHG
INTERPRETATION ECG - MUSE: NORMAL
P AXIS - MUSE: 68 DEGREES
PR INTERVAL - MUSE: 124 MS
QRS DURATION - MUSE: 108 MS
QT - MUSE: 344 MS
QTC - MUSE: 482 MS
R AXIS - MUSE: 12 DEGREES
SYSTOLIC BLOOD PRESSURE - MUSE: NORMAL MMHG
T AXIS - MUSE: 112 DEGREES
VENTRICULAR RATE- MUSE: 118 BPM

## 2023-01-26 PROCEDURE — 93798 PHYS/QHP OP CAR RHAB W/ECG: CPT

## 2023-01-30 ENCOUNTER — HOSPITAL ENCOUNTER (OUTPATIENT)
Dept: CARDIAC REHAB | Facility: CLINIC | Age: 63
Discharge: HOME | End: 2023-01-30
Attending: INTERNAL MEDICINE
Payer: COMMERCIAL

## 2023-01-30 PROCEDURE — 93798 PHYS/QHP OP CAR RHAB W/ECG: CPT

## 2023-01-31 ENCOUNTER — HOSPITAL ENCOUNTER (OUTPATIENT)
Dept: CARDIAC REHAB | Facility: CLINIC | Age: 63
Discharge: HOME | End: 2023-01-31
Attending: INTERNAL MEDICINE
Payer: COMMERCIAL

## 2023-01-31 PROCEDURE — 93798 PHYS/QHP OP CAR RHAB W/ECG: CPT

## 2023-02-02 ENCOUNTER — HOSPITAL ENCOUNTER (OUTPATIENT)
Dept: CARDIAC REHAB | Facility: CLINIC | Age: 63
Discharge: HOME | End: 2023-02-02
Attending: INTERNAL MEDICINE
Payer: COMMERCIAL

## 2023-02-02 PROCEDURE — 93798 PHYS/QHP OP CAR RHAB W/ECG: CPT

## 2023-02-06 ENCOUNTER — HOSPITAL ENCOUNTER (OUTPATIENT)
Dept: CARDIAC REHAB | Facility: CLINIC | Age: 63
Discharge: HOME | End: 2023-02-06
Attending: INTERNAL MEDICINE
Payer: COMMERCIAL

## 2023-02-06 PROCEDURE — 93798 PHYS/QHP OP CAR RHAB W/ECG: CPT

## 2023-02-07 ENCOUNTER — HOSPITAL ENCOUNTER (OUTPATIENT)
Dept: CARDIAC REHAB | Facility: CLINIC | Age: 63
Discharge: HOME | End: 2023-02-07
Attending: INTERNAL MEDICINE
Payer: COMMERCIAL

## 2023-02-07 PROCEDURE — 93798 PHYS/QHP OP CAR RHAB W/ECG: CPT

## 2023-02-09 ENCOUNTER — HOSPITAL ENCOUNTER (OUTPATIENT)
Dept: CARDIAC REHAB | Facility: CLINIC | Age: 63
Discharge: HOME | End: 2023-02-09
Attending: INTERNAL MEDICINE
Payer: COMMERCIAL

## 2023-02-09 PROCEDURE — 93798 PHYS/QHP OP CAR RHAB W/ECG: CPT

## 2023-02-10 DIAGNOSIS — Z95.1 S/P CABG (CORONARY ARTERY BYPASS GRAFT): ICD-10-CM

## 2023-02-10 DIAGNOSIS — Z76.0 ENCOUNTER FOR MEDICATION REFILL: Primary | ICD-10-CM

## 2023-02-10 DIAGNOSIS — Z76.0 ENCOUNTER FOR MEDICATION REFILL: ICD-10-CM

## 2023-02-10 RX ORDER — PANTOPRAZOLE SODIUM 40 MG/1
40 TABLET, DELAYED RELEASE ORAL DAILY
Qty: 90 TABLET | Refills: 3 | Status: SHIPPED | OUTPATIENT
Start: 2023-02-10 | End: 2023-09-28

## 2023-02-10 NOTE — TELEPHONE ENCOUNTER
Medication Question or Refill    Contacts       Type Contact Phone/Fax    02/10/2023 12:45 PM CST Phone (Incoming) Kash Tavarez (Self) [phone] (M)          What medication are you calling about (include dose and sig)?: pantoprazole (PROTONIX) 40 MG EC tablet    Preferred Pharmacy:   [facility]/pharmacy #[id number] - [place], [place] - [address] AT [facility] & [address] [address]  Phone: [phone] Fax: [phone]    Controlled Substance Agreement on file:   CSA -- Patient Level:    CSA: None found at the patient level.       Who prescribed the medication?:     Do you need a refill? Yes    When did you use the medication last? 02/10/23    Patient offered an appointment? Yes: Pt is not sure when PCP would like for him to F/U. PCP please advise.    Do you have any questions or concerns?  No      Could we send this information to you in [place] or would you prefer to receive a phone call?:   Patient would prefer a phone call   Okay to leave a detailed message?: Yes at Home number on file [phone] (home)

## 2023-02-12 RX ORDER — LANSOPRAZOLE 30 MG/1
CAPSULE, DELAYED RELEASE ORAL
Refills: 0 | OUTPATIENT
Start: 2023-02-12

## 2023-02-13 ENCOUNTER — HOSPITAL ENCOUNTER (OUTPATIENT)
Dept: CARDIAC REHAB | Facility: CLINIC | Age: 63
Discharge: HOME | End: 2023-02-13
Attending: INTERNAL MEDICINE
Payer: COMMERCIAL

## 2023-02-13 PROCEDURE — 93798 PHYS/QHP OP CAR RHAB W/ECG: CPT

## 2023-02-13 NOTE — TELEPHONE ENCOUNTER
Message from pharmacy rec'd regarding change of medication.   Noted Pantoprazole has already been ordered on 2/10/2023 by Dr ARLIN Silveira.    Message sent to pharmacy.    Beatriz Cortes RN Triage Nurse Advisor 9:36 PM 2/12/2023

## 2023-02-14 ENCOUNTER — OFFICE VISIT (OUTPATIENT)
Dept: CARDIOLOGY | Facility: CLINIC | Age: 63
End: 2023-02-14
Payer: COMMERCIAL

## 2023-02-14 ENCOUNTER — HOSPITAL ENCOUNTER (OUTPATIENT)
Dept: CARDIAC REHAB | Facility: CLINIC | Age: 63
Discharge: HOME | End: 2023-02-14
Attending: INTERNAL MEDICINE
Payer: COMMERCIAL

## 2023-02-14 ENCOUNTER — TELEPHONE (OUTPATIENT)
Dept: CARDIOLOGY | Facility: CLINIC | Age: 63
End: 2023-02-14

## 2023-02-14 VITALS
BODY MASS INDEX: 32.2 KG/M2 | DIASTOLIC BLOOD PRESSURE: 74 MMHG | RESPIRATION RATE: 16 BRPM | WEIGHT: 243 LBS | HEART RATE: 63 BPM | SYSTOLIC BLOOD PRESSURE: 138 MMHG | HEIGHT: 73 IN

## 2023-02-14 DIAGNOSIS — L08.9 STERNAL WOUND INFECTION: Primary | ICD-10-CM

## 2023-02-14 DIAGNOSIS — Z95.1 S/P CABG (CORONARY ARTERY BYPASS GRAFT): ICD-10-CM

## 2023-02-14 DIAGNOSIS — S21.101A STERNAL WOUND INFECTION: Primary | ICD-10-CM

## 2023-02-14 PROCEDURE — 99024 POSTOP FOLLOW-UP VISIT: CPT | Performed by: PHYSICIAN ASSISTANT

## 2023-02-14 PROCEDURE — 93798 PHYS/QHP OP CAR RHAB W/ECG: CPT

## 2023-02-14 RX ORDER — CEPHALEXIN 500 MG/1
500 CAPSULE ORAL 2 TIMES DAILY
Qty: 10 CAPSULE | Refills: 0 | Status: SHIPPED | OUTPATIENT
Start: 2023-02-14 | End: 2023-02-22

## 2023-02-14 NOTE — LETTER
2/14/2023    Catalino Nuñez MD  [address] [place] [address] [address]  [place] [address]    RE: Kash Tavarez       Dear Colleague,     I had the pleasure of seeing Kash Tavarez in the [facility].  CARDIOTHORACIC SURGERY FOLLOW-UP VISIT     Kash Tavarez   1960   [id number]      Reason for visit: Post operative clinic visit. Patient underwent coronary artery bypass grafting x 3 with Dr. Bear on 1/13/23.    HPI: Kash Tavarez is a 62 year old year old male seen in clinic for a  follow-up appointment regarding a possible sternal infection after surgery. Patient has past medical history as below. Hospital course was uneventful. Patient was discharged to home.    Patient has been doing well since discharge. Patient did follow-up with primary care since leaving the hospital. Reports that chest incision has  2 small openings at the top. No drainage noted or erythema. Denies fevers, peripheral edema. Appetite is improving and patient is voiding without difficulty. Weight has been stable. Pain management at this point with tylenol. Patient has been attending cardiac rehab and this is going well. Patient is  not on anti-coagulation.     PAST MEDICAL HISTORY:  Past Medical History:   Diagnosis Date     Coronary artery disease of native artery of native heart with stable angina pectoris (H)      Hypertension      Nephrolithiasis      Type 2 diabetes mellitus (H)        PAST SURGICAL HISTORY:  Past Surgical History:   Procedure Laterality Date     BYPASS GRAFT ARTERY CORONARY N/A 1/13/2023    Procedure: CORONARY ARTERY BYPASS GRAFT (CABG) x 3, LEFT INTERNAL MAMMARY ARTERY HARVEST, LEFT RADIAL ARTERY HARVEST, ANESTHESIA TRANSESOPHAGEAL ECHOCARDIOGRAM, EPI-AORTIC ULTRASOUND;  Surgeon: Arturo Bear MD;  Location: [facility]     COLONOSCOPY       COMBINED CYSTOSCOPY, INSERT STENT URETER(S) Right 5/12/2022    Procedure: CYSTOURETEROSCOPY, RETROGRADE PYELOGRAM, LASER LITHOTRIPSY WITH CALCULUS REMOVAL  AND URETERAL STENT INSERTION;  Surgeon: James Mosley MD;  Location: [facility]     CV CORONARY ANGIOGRAM N/A 12/5/2022    Procedure: Coronary Angiogram;  Surgeon: Aristides North MD;  Location: [facility] CATH LAB CV     CV LEFT HEART CATH N/A 12/5/2022    Procedure: Left Heart Catheterization;  Surgeon: Aristides North MD;  Location: [facility] CATH LAB CV     KIDNEY STONE SURGERY       ZZC APPENDECTOMY      Description: Appendectomy;  Recorded: 03/04/2008;       CURRENT MEDICATIONS:     Current Outpatient Medications:      ACCU-CHEK FASTCLIX Misc, [ACCU-CHEK FASTCLIX MISC] TEST TWICE DAILY, Disp: 100 each, Rfl: 0     ACCU-CHEK GUIDE test strip, USE 1 EACH AS DIRECTED 2 (TWO) TIMES A DAY AT 7:30AM AND 4:30PM., Disp: 200 strip, Rfl: 3     acetaminophen (TYLENOL) 325 MG tablet, Take 2 tablets (650 mg) by mouth every 4 hours as needed for other (For optimal non-opioid multimodal pain management to improve pain control.), Disp: , Rfl:      aspirin (ASA) 81 MG chewable tablet, 2 tablets (162 mg) by Oral or NG Tube route daily, Disp: , Rfl:      atorvastatin (LIPITOR) 40 MG tablet, TAKE 1 TABLET BY MOUTH EVERY DAY (Patient taking differently: Take 40 mg by mouth daily), Disp: 90 tablet, Rfl: 3     blood glucose monitoring (ACCU-CHEK FASTCLIX) lancets, USE TWICE A DAY (AT 7:30 AM & 4:30 PM) AS DIRECTED, Disp: 204 each, Rfl: 3     blood-glucose meter Misc, [BLOOD-GLUCOSE METER MISC] Use 1 Device As Directed 2 (two) times a day at 9am and 6pm., Disp: 1 each, Rfl: 0     imiquimod (ALDARA) 5 % cream, Apply topically daily To warts, Disp: , Rfl: 3     loratadine (CLARITIN) 10 MG tablet, Take 10 mg by mouth daily, Disp: , Rfl:      metFORMIN (GLUCOPHAGE) 500 MG tablet, Take 1 tablet (500 mg) by mouth 3 times daily (with meals), Disp: 270 tablet, Rfl: 3     metoprolol tartrate 75 MG TABS, Take 75 mg by mouth 2 times daily, Disp: 60 tablet, Rfl: 3     metroNIDAZOLE (METROGEL) 0.75 % gel, 2 times daily Apply to face,  "Disp: , Rfl: 10     multivitamin, therapeutic (THERA-VIT) TABS tablet, Take 1 tablet by mouth daily, Disp: , Rfl:      nitroGLYcerin (NITROSTAT) 0.4 MG sublingual tablet, PLACE 1 TABLET (0.4 MG) UNDER THE TONGUE EVERY 5 MINUTES AS NEEDED FOR CHEST PAIN FOR CHEST PAIN PLACE 1 TABLET UNDER THE TONGUE EVERY 5 MINUTES FOR 3 DOSES. IF SYMPTOMS PERSIST 5 MINUTES AFTER 1ST DOSE CALL 911., Disp: 90 tablet, Rfl: 3     oxymetazoline (RHOFADE) 1 % Crea, [OXYMETAZOLINE (RHOFADE) 1 % CREA] Apply topically daily., Disp: , Rfl:      pantoprazole (PROTONIX) 40 MG EC tablet, Take 1 tablet (40 mg) by mouth daily, Disp: 90 tablet, Rfl: 3     triamcinolone (KENALOG) 0.1 % external cream, Apply topically 2 times daily (Patient taking differently: Apply topically daily as needed Apply to neck rash), Disp: 80 g, Rfl: 0     Vitamin D3 (CHOLECALCIFEROL) 25 mcg (1000 units) tablet, Take 4 tablets by mouth daily, Disp: , Rfl:     ALLERGIES:    No Known Allergies    ROS:  Gen: No fevers, weight loss/gain  CV: Denies chest pain, peripheral edema  Pulm: Denies SOB  GI/: Voiding without problems, appetite improving.     LABS:  None    IMAGING:  None    PHYSICAL EXAM:   /74 (BP Location: Left arm, Patient Position: Sitting, Cuff Size: Adult Large)   Pulse 63   Resp 16   Ht 1.854 m (6' 1\")   Wt 110.2 kg (243 lb)   BMI 32.06 kg/m    General: Alert and oriented, pleasant, no acute distress.  CV:  No peripheral edema.  Pulm: Easy work of breathing on room air.   GI: Soft, non-tender, and non-distended  Incision: Chest incision is without erythema, swelling or drainage. 2 small openings at the top of the incision.  Neuro: CNs grossly intact.      ASSESSMENT/PLAN:  Kash Tavarez is a 62 year old year old male status post CAB x 3 who returns to clinic for postop visit.     - Surgically doing well. Left arm incision is healing well with no signs of infection. Chest incision with 2 small open areas. Sternum is stable.  - Hemodynamics are " stable.   - Keflex 500 mg BID x 5 days.  - Follow up with your cardiologist as scheduled Dr Pereira 2/22/23  - Continue Cardiac Rehab until completed.   - May start driving  (4 weeks post-op) if not using narcotic pain medications.  - Continue strict sternal precautions. No lifting >10bs; may gradually increase at this point (6 weeks post-op).   - Follow-up with CV surgery 2/21 regarding chest incision  - Feel free to call our office with questions. [phone]       Anais Gray PA-C  Cardiothoracic Surgery  [phone]          Thank you for allowing me to participate in the care of your patient.      Sincerely,     Anais Gray PA-C     [facility]  cc:   No referring provider defined for this encounter.

## 2023-02-14 NOTE — PROGRESS NOTES
CARDIOTHORACIC SURGERY FOLLOW-UP VISIT     Kash Tavarez   1960   [id number]      Reason for visit: Post operative clinic visit. Patient underwent coronary artery bypass grafting x 3 with Dr. Bear on 1/13/23.    HPI: Kash Tavarez is a 62 year old year old male seen in clinic for a  follow-up appointment regarding a possible sternal infection after surgery. Patient has past medical history as below. Hospital course was uneventful. Patient was discharged to home.    Patient has been doing well since discharge. Patient did follow-up with primary care since leaving the hospital. Reports that chest incision has  2 small openings at the top. No drainage noted or erythema. Denies fevers, peripheral edema. Appetite is improving and patient is voiding without difficulty. Weight has been stable. Pain management at this point with tylenol. Patient has been attending cardiac rehab and this is going well. Patient is  not on anti-coagulation.     PAST MEDICAL HISTORY:  Past Medical History:   Diagnosis Date     Coronary artery disease of native artery of native heart with stable angina pectoris (H)      Hypertension      Nephrolithiasis      Type 2 diabetes mellitus (H)        PAST SURGICAL HISTORY:  Past Surgical History:   Procedure Laterality Date     BYPASS GRAFT ARTERY CORONARY N/A 1/13/2023    Procedure: CORONARY ARTERY BYPASS GRAFT (CABG) x 3, LEFT INTERNAL MAMMARY ARTERY HARVEST, LEFT RADIAL ARTERY HARVEST, ANESTHESIA TRANSESOPHAGEAL ECHOCARDIOGRAM, EPI-AORTIC ULTRASOUND;  Surgeon: Arturo Bear MD;  Location: [facility]     COLONOSCOPY       COMBINED CYSTOSCOPY, INSERT STENT URETER(S) Right 5/12/2022    Procedure: CYSTOURETEROSCOPY, RETROGRADE PYELOGRAM, LASER LITHOTRIPSY WITH CALCULUS REMOVAL AND URETERAL STENT INSERTION;  Surgeon: James Mosley MD;  Location: [facility] OR     CV CORONARY ANGIOGRAM N/A 12/5/2022    Procedure: Coronary Angiogram;  Surgeon: Aristides North MD;  Location:   [facility] CV     CV LEFT HEART CATH N/A 12/5/2022    Procedure: Left Heart Catheterization;  Surgeon: Aristides North MD;  Location: [facility] CV     KIDNEY STONE SURGERY       ZZC APPENDECTOMY      Description: Appendectomy;  Recorded: 03/04/2008;       CURRENT MEDICATIONS:     Current Outpatient Medications:      ACCU-CHEK FASTCLIX Misc, [ACCU-CHEK FASTCLIX MISC] TEST TWICE DAILY, Disp: 100 each, Rfl: 0     ACCU-CHEK GUIDE test strip, USE 1 EACH AS DIRECTED 2 (TWO) TIMES A DAY AT 7:30AM AND 4:30PM., Disp: 200 strip, Rfl: 3     acetaminophen (TYLENOL) 325 MG tablet, Take 2 tablets (650 mg) by mouth every 4 hours as needed for other (For optimal non-opioid multimodal pain management to improve pain control.), Disp: , Rfl:      aspirin (ASA) 81 MG chewable tablet, 2 tablets (162 mg) by Oral or NG Tube route daily, Disp: , Rfl:      atorvastatin (LIPITOR) 40 MG tablet, TAKE 1 TABLET BY MOUTH EVERY DAY (Patient taking differently: Take 40 mg by mouth daily), Disp: 90 tablet, Rfl: 3     blood glucose monitoring (ACCU-CHEK FASTCLIX) lancets, USE TWICE A DAY (AT 7:30 AM & 4:30 PM) AS DIRECTED, Disp: 204 each, Rfl: 3     blood-glucose meter Misc, [BLOOD-GLUCOSE METER MISC] Use 1 Device As Directed 2 (two) times a day at 9am and 6pm., Disp: 1 each, Rfl: 0     imiquimod (ALDARA) 5 % cream, Apply topically daily To warts, Disp: , Rfl: 3     loratadine (CLARITIN) 10 MG tablet, Take 10 mg by mouth daily, Disp: , Rfl:      metFORMIN (GLUCOPHAGE) 500 MG tablet, Take 1 tablet (500 mg) by mouth 3 times daily (with meals), Disp: 270 tablet, Rfl: 3     metoprolol tartrate 75 MG TABS, Take 75 mg by mouth 2 times daily, Disp: 60 tablet, Rfl: 3     metroNIDAZOLE (METROGEL) 0.75 % gel, 2 times daily Apply to face, Disp: , Rfl: 10     multivitamin, therapeutic (THERA-VIT) TABS tablet, Take 1 tablet by mouth daily, Disp: , Rfl:      nitroGLYcerin (NITROSTAT) 0.4 MG sublingual tablet, PLACE 1 TABLET (0.4 MG) UNDER THE TONGUE  "EVERY 5 MINUTES AS NEEDED FOR CHEST PAIN FOR CHEST PAIN PLACE 1 TABLET UNDER THE TONGUE EVERY 5 MINUTES FOR 3 DOSES. IF SYMPTOMS PERSIST 5 MINUTES AFTER 1ST DOSE CALL 911., Disp: 90 tablet, Rfl: 3     oxymetazoline (RHOFADE) 1 % Crea, [OXYMETAZOLINE (RHOFADE) 1 % CREA] Apply topically daily., Disp: , Rfl:      pantoprazole (PROTONIX) 40 MG EC tablet, Take 1 tablet (40 mg) by mouth daily, Disp: 90 tablet, Rfl: 3     triamcinolone (KENALOG) 0.1 % external cream, Apply topically 2 times daily (Patient taking differently: Apply topically daily as needed Apply to neck rash), Disp: 80 g, Rfl: 0     Vitamin D3 (CHOLECALCIFEROL) 25 mcg (1000 units) tablet, Take 4 tablets by mouth daily, Disp: , Rfl:     ALLERGIES:    No Known Allergies    ROS:  Gen: No fevers, weight loss/gain  CV: Denies chest pain, peripheral edema  Pulm: Denies SOB  GI/: Voiding without problems, appetite improving.     LABS:  None    IMAGING:  None    PHYSICAL EXAM:   /74 (BP Location: Left arm, Patient Position: Sitting, Cuff Size: Adult Large)   Pulse 63   Resp 16   Ht 1.854 m (6' 1\")   Wt 110.2 kg (243 lb)   BMI 32.06 kg/m    General: Alert and oriented, pleasant, no acute distress.  CV:  No peripheral edema.  Pulm: Easy work of breathing on room air.   GI: Soft, non-tender, and non-distended  Incision: Chest incision is without erythema, swelling or drainage. 2 small openings at the top of the incision.  Neuro: CNs grossly intact.      ASSESSMENT/PLAN:  Kash Tavarez is a 62 year old year old male status post CAB x 3 who returns to clinic for postop visit.     - Surgically doing well. Left arm incision is healing well with no signs of infection. Chest incision with 2 small open areas. Sternum is stable.  - Hemodynamics are stable.   - Keflex 500 mg BID x 5 days.  - Follow up with your cardiologist as scheduled Dr Pereira 2/22/23  - Continue Cardiac Rehab until completed.   - May start driving  (4 weeks post-op) if not using narcotic " pain medications.  - Continue strict sternal precautions. No lifting >10bs; may gradually increase at this point (6 weeks post-op).   - Follow-up with CV surgery 2/21 regarding chest incision  - Feel free to call our office with questions. [phone]       Anais Gray PA-C  Cardiothoracic Surgery  [phone]

## 2023-02-14 NOTE — TELEPHONE ENCOUNTER
Patient stopped by clinic after Cardiac Rehab today requesting to have a nurse inspect his incision. Review of chart shows a post-op CABG on 1/13/23. Pt has post-op appt with CV surgery team on 2/21, and follow-up with MAT 2/22/22. Previously seen by his PCP, and started an antibiotic for his left wrist/left upper arm harvest area on 1/24/23.    For privacy pt was brought into an exam room to discuss concerns. Pt reports that the most top portion of the proximal end of the sternal/chest incision scab fell off, about a week ago. Subsequently, the second site scab fell off within the past 1-2 days. Pt reports that on the weekend he did apply a topical antibiotic ointment to the first most proximal area.    Upon inspection pt shows harvest sites x 2:  left wrist site and proximal AC lap site, both healed and wound edges approximated, C/D/I, without erythema or induration. Pt agrees that these 2 sites have healed nicely.     Inspection of sternal incision. Proximal end, visible with shirt on. With gloves and clean technique measured wound edges of (2) small openings at proximal end of sternal incision.    Most proximal opening is approximately 1cm x 1cm. Then, next opening, is 1cm x 1.5 cm approximately in size. No drainage. Wound edges are pink,clean without extended erythema. No red streak visible.The skin surrounding is not swollen. The remainder of the sternal incision from there to the distal end is clean, approximated, healing with scab covering. Pt removed his shirt, and the (2) chest tub sites on the upper abdomen, are healing, scabbed, clean, no drainage, or erythema.    IM with CV surgery nurse to inform of concern, requests pictures. Pt gave verbal consent to preform, and sent via e-mail to the CV nurse/team. Once verified obtained e-mail and all photos, and e-mail deleted in front of patient. Requested if wound should be covered, left open to air until seen by CV surgery.     Discussion with patient and next  steps. CV surgery would like to have appointment to review the incision and next steps. Pt agreeable to OV today at 10:00-10:30 am at the [facility] location. Given directions. Pt ambulated on own accord, and will head directly to CV surgery appointment for care. Will route to CV surgery team. [email or web address]

## 2023-02-16 ENCOUNTER — HOSPITAL ENCOUNTER (OUTPATIENT)
Dept: CARDIAC REHAB | Facility: CLINIC | Age: 63
Discharge: HOME | End: 2023-02-16
Attending: INTERNAL MEDICINE
Payer: COMMERCIAL

## 2023-02-16 PROCEDURE — 93798 PHYS/QHP OP CAR RHAB W/ECG: CPT

## 2023-02-20 ENCOUNTER — HOSPITAL ENCOUNTER (OUTPATIENT)
Dept: CARDIAC REHAB | Facility: CLINIC | Age: 63
Discharge: HOME | End: 2023-02-20
Attending: INTERNAL MEDICINE
Payer: COMMERCIAL

## 2023-02-20 PROCEDURE — 93798 PHYS/QHP OP CAR RHAB W/ECG: CPT

## 2023-02-21 ENCOUNTER — OFFICE VISIT (OUTPATIENT)
Dept: CARDIOLOGY | Facility: CLINIC | Age: 63
End: 2023-02-21
Payer: COMMERCIAL

## 2023-02-21 ENCOUNTER — HOSPITAL ENCOUNTER (OUTPATIENT)
Dept: CARDIAC REHAB | Facility: CLINIC | Age: 63
Discharge: HOME | End: 2023-02-21
Attending: INTERNAL MEDICINE
Payer: COMMERCIAL

## 2023-02-21 VITALS
HEART RATE: 64 BPM | RESPIRATION RATE: 16 BRPM | SYSTOLIC BLOOD PRESSURE: 128 MMHG | WEIGHT: 242 LBS | DIASTOLIC BLOOD PRESSURE: 84 MMHG | BODY MASS INDEX: 31.93 KG/M2

## 2023-02-21 DIAGNOSIS — Z95.1 S/P CABG (CORONARY ARTERY BYPASS GRAFT): Primary | ICD-10-CM

## 2023-02-21 PROCEDURE — 93798 PHYS/QHP OP CAR RHAB W/ECG: CPT

## 2023-02-21 PROCEDURE — 99024 POSTOP FOLLOW-UP VISIT: CPT | Performed by: PHYSICIAN ASSISTANT

## 2023-02-21 NOTE — LETTER
2/21/2023    Catalino Nuñez MD  [address]  [place] [address]    RE: Kash Tavarez       Dear Colleague,     I had the pleasure of seeing Kash Tavarez in the [facility].  CARDIOTHORACIC SURGERY FOLLOW-UP VISIT     Kash Tavarez   1960   [id number]      Reason for visit: Post operative clinic visit. Patient underwent CABGx3 with Dr. Bear on 01/13/2023.    HPI: Kash Tavarez is a 62 year old year old male seen in clinic for a routine follow-up appointment after surgery. Patient has past medical history as below. Hospital course was unremarkable. Patient was discharged to home.    Patient has been doing overall well since discharge. Patient did follow-up with primary care since leaving the hospital and was prescribed Keflex for a possible LUE cellulitis related to the radial artery harvest. This has healed nicely since. Also was seen by our group last week for concern of 2 open spots on sternal incision; improving with Keflex also. Denies fevers, peripheral edema. Appetite is improving and patient is voiding without difficulty. Weight has been stable. Nothing needed for pain at this point. Patient has been attending cardiac rehab and this is going well. Patient is not on anti-coagulation.     PAST MEDICAL HISTORY:  Past Medical History:   Diagnosis Date     Coronary artery disease of native artery of native heart with stable angina pectoris (H)      Hypertension      Nephrolithiasis      Type 2 diabetes mellitus (H)        PAST SURGICAL HISTORY:  Past Surgical History:   Procedure Laterality Date     BYPASS GRAFT ARTERY CORONARY N/A 1/13/2023    Procedure: CORONARY ARTERY BYPASS GRAFT (CABG) x 3, LEFT INTERNAL MAMMARY ARTERY HARVEST, LEFT RADIAL ARTERY HARVEST, ANESTHESIA TRANSESOPHAGEAL ECHOCARDIOGRAM, EPI-AORTIC ULTRASOUND;  Surgeon: Arturo Bear MD;  Location: [facility] OR     COLONOSCOPY       COMBINED CYSTOSCOPY, INSERT STENT URETER(S) Right 5/12/2022     Procedure: CYSTOURETEROSCOPY, RETROGRADE PYELOGRAM, LASER LITHOTRIPSY WITH CALCULUS REMOVAL AND URETERAL STENT INSERTION;  Surgeon: James Mosley MD;  Location: [facility] OR     CV CORONARY ANGIOGRAM N/A 12/5/2022    Procedure: Coronary Angiogram;  Surgeon: Aristides North MD;  Location: [facility] LAB CV     CV LEFT HEART CATH N/A 12/5/2022    Procedure: Left Heart Catheterization;  Surgeon: Aristides North MD;  Location: [facility] CV     KIDNEY STONE SURGERY       ZZC APPENDECTOMY      Description: Appendectomy;  Recorded: 03/04/2008;       CURRENT MEDICATIONS:     Current Outpatient Medications:      ACCU-CHEK FASTCLIX Misc, [ACCU-CHEK FASTCLIX MISC] TEST TWICE DAILY, Disp: 100 each, Rfl: 0     ACCU-CHEK GUIDE test strip, USE 1 EACH AS DIRECTED 2 (TWO) TIMES A DAY AT 7:30AM AND 4:30PM., Disp: 200 strip, Rfl: 3     acetaminophen (TYLENOL) 325 MG tablet, Take 2 tablets (650 mg) by mouth every 4 hours as needed for other (For optimal non-opioid multimodal pain management to improve pain control.), Disp: , Rfl:      aspirin (ASA) 81 MG chewable tablet, 2 tablets (162 mg) by Oral or NG Tube route daily, Disp: , Rfl:      atorvastatin (LIPITOR) 40 MG tablet, TAKE 1 TABLET BY MOUTH EVERY DAY (Patient taking differently: Take 40 mg by mouth daily), Disp: 90 tablet, Rfl: 3     blood glucose monitoring (ACCU-CHEK FASTCLIX) lancets, USE TWICE A DAY (AT 7:30 AM & 4:30 PM) AS DIRECTED, Disp: 204 each, Rfl: 3     blood-glucose meter Misc, [BLOOD-GLUCOSE METER MISC] Use 1 Device As Directed 2 (two) times a day at 9am and 6pm., Disp: 1 each, Rfl: 0     imiquimod (ALDARA) 5 % cream, Apply topically daily To warts, Disp: , Rfl: 3     loratadine (CLARITIN) 10 MG tablet, Take 10 mg by mouth daily, Disp: , Rfl:      metFORMIN (GLUCOPHAGE) 500 MG tablet, Take 1 tablet (500 mg) by mouth 3 times daily (with meals), Disp: 270 tablet, Rfl: 3     metoprolol tartrate 75 MG TABS, Take 75 mg by mouth 2 times daily,  Disp: 60 tablet, Rfl: 3     metroNIDAZOLE (METROGEL) 0.75 % gel, 2 times daily Apply to face, Disp: , Rfl: 10     multivitamin, therapeutic (THERA-VIT) TABS tablet, Take 1 tablet by mouth daily, Disp: , Rfl:      nitroGLYcerin (NITROSTAT) 0.4 MG sublingual tablet, PLACE 1 TABLET (0.4 MG) UNDER THE TONGUE EVERY 5 MINUTES AS NEEDED FOR CHEST PAIN FOR CHEST PAIN PLACE 1 TABLET UNDER THE TONGUE EVERY 5 MINUTES FOR 3 DOSES. IF SYMPTOMS PERSIST 5 MINUTES AFTER 1ST DOSE CALL 911., Disp: 90 tablet, Rfl: 3     oxymetazoline (RHOFADE) 1 % Crea, [OXYMETAZOLINE (RHOFADE) 1 % CREA] Apply topically daily., Disp: , Rfl:      pantoprazole (PROTONIX) 40 MG EC tablet, Take 1 tablet (40 mg) by mouth daily, Disp: 90 tablet, Rfl: 3     triamcinolone (KENALOG) 0.1 % external cream, Apply topically 2 times daily (Patient taking differently: Apply topically daily as needed Apply to neck rash), Disp: 80 g, Rfl: 0     Vitamin D3 (CHOLECALCIFEROL) 25 mcg (1000 units) tablet, Take 4 tablets by mouth daily, Disp: , Rfl:      cephALEXin (KEFLEX) 500 MG capsule, Take 1 capsule (500 mg) by mouth 2 times daily (Patient not taking: Reported on 2/21/2023), Disp: 10 capsule, Rfl: 0    ALLERGIES:    No Known Allergies    ROS:  Gen: No fevers, weight loss/gain  CV: Denies chest pain, peripheral edema  Pulm: Denies SOB  GI/: Voiding without problems, appetite improving.     LABS:  None    IMAGING:  None    PHYSICAL EXAM:   /84 (BP Location: Right arm, Patient Position: Sitting, Cuff Size: Adult Large)   Pulse 64   Resp 16   Wt 109.8 kg (242 lb)   BMI 31.93 kg/m    General: Alert and oriented, pleasant, no acute distress.  CV:  No peripheral edema.  Pulm: Easy work of breathing on room air.   GI: Soft, non-tender, and non-distended  Incision: Chest and arm incisions clean dry and intact without erythema, swelling or drainage. 2 small openings on superior end of sternal incision, improving.  Neuro: CNs grossly intact.      ASSESSMENT/PLAN:   Kash Tavarez is a 62 year old year old male status post CABG who returns to clinic for postop visit.     - Surgically doing well. Incisions are healing well with no signs of infection.   - Hemodynamics are stable. No medication changes were needed today.  - Wash and inspect incision daily--call if worsening (redness/drainage/pain). We will check in in 2 weeks via phone.  - Continue antibacterial soap for 4-6 more weeks on incision.  - Follow up with your cardiologist as scheduled (Dr. Pereira tomorrow)  - Continue Cardiac Rehab until completed.   - May start driving (4 weeks post-op) if not using narcotic pain medications.  - Continue strict sternal precautions until this Friday, 02/24/2023. No lifting >10bs; may gradually increase at this point (6 weeks post-op).   - No need for further follow-up with CV surgery unless concerns (except our phone check-in). Feel free to call our office with questions. [phone].         _______  Kristy White PA-C  Cardiothoracic Surgery  [phone]    Thank you for allowing me to participate in the care of your patient.      Sincerely,     Kristy White PA-C     [facility]  cc:   No referring provider defined for this encounter.

## 2023-02-21 NOTE — PATIENT INSTRUCTIONS
- Surgically doing well. Incisions are healing well with no signs of infection.   - Hemodynamics are stable. No medication changes were needed today.  - Wash and inspect incision daily--call if worsening (redness/drainage/pain). We will check in in 2 weeks via phone.  - Continue antibacterial soap for 4-6 more weeks on incision.  - Follow up with your cardiologist as scheduled (Dr. Pereira tomorrow)  - Continue Cardiac Rehab until completed.   - May start driving (4 weeks post-op) if not using narcotic pain medications.  - Continue strict sternal precautions until this Friday, 02/24/2023. No lifting >10bs; may gradually increase at this point (6 weeks post-op).   - No need for further follow-up with CV surgery unless concerns (except our phone check-in). Feel free to call our office with questions. [phone].

## 2023-02-21 NOTE — PROGRESS NOTES
CARDIOTHORACIC SURGERY FOLLOW-UP VISIT     Kash Tavarez   1960   [id number]      Reason for visit: Post operative clinic visit. Patient underwent CABGx3 with Dr. Bear on 01/13/2023.    HPI: Kash Tavarez is a 62 year old year old male seen in clinic for a routine follow-up appointment after surgery. Patient has past medical history as below. Hospital course was unremarkable. Patient was discharged to home.    Patient has been doing overall well since discharge. Patient did follow-up with primary care since leaving the hospital and was prescribed Keflex for a possible LUE cellulitis related to the radial artery harvest. This has healed nicely since. Also was seen by our group last week for concern of 2 open spots on sternal incision; improving with Keflex also. Denies fevers, peripheral edema. Appetite is improving and patient is voiding without difficulty. Weight has been stable. Nothing needed for pain at this point. Patient has been attending cardiac rehab and this is going well. Patient is not on anti-coagulation.     PAST MEDICAL HISTORY:  Past Medical History:   Diagnosis Date     Coronary artery disease of native artery of native heart with stable angina pectoris (H)      Hypertension      Nephrolithiasis      Type 2 diabetes mellitus (H)        PAST SURGICAL HISTORY:  Past Surgical History:   Procedure Laterality Date     BYPASS GRAFT ARTERY CORONARY N/A 1/13/2023    Procedure: CORONARY ARTERY BYPASS GRAFT (CABG) x 3, LEFT INTERNAL MAMMARY ARTERY HARVEST, LEFT RADIAL ARTERY HARVEST, ANESTHESIA TRANSESOPHAGEAL ECHOCARDIOGRAM, EPI-AORTIC ULTRASOUND;  Surgeon: Arturo Bear MD;  Location: [facility] OR     COLONOSCOPY       COMBINED CYSTOSCOPY, INSERT STENT URETER(S) Right 5/12/2022    Procedure: CYSTOURETEROSCOPY, RETROGRADE PYELOGRAM, LASER LITHOTRIPSY WITH CALCULUS REMOVAL AND URETERAL STENT INSERTION;  Surgeon: James Mosley MD;  Location: [facility] OR     CV CORONARY ANGIOGRAM N/A  12/5/2022    Procedure: Coronary Angiogram;  Surgeon: Aristides Nroth MD;  Location: [facility] CATH LAB CV     CV LEFT HEART CATH N/A 12/5/2022    Procedure: Left Heart Catheterization;  Surgeon: Aristides North MD;  Location: [facility] LAB CV     KIDNEY STONE SURGERY       ZZC APPENDECTOMY      Description: Appendectomy;  Recorded: 03/04/2008;       CURRENT MEDICATIONS:     Current Outpatient Medications:      ACCU-CHEK FASTCLIX Misc, [ACCU-CHEK FASTCLIX MISC] TEST TWICE DAILY, Disp: 100 each, Rfl: 0     ACCU-CHEK GUIDE test strip, USE 1 EACH AS DIRECTED 2 (TWO) TIMES A DAY AT 7:30AM AND 4:30PM., Disp: 200 strip, Rfl: 3     acetaminophen (TYLENOL) 325 MG tablet, Take 2 tablets (650 mg) by mouth every 4 hours as needed for other (For optimal non-opioid multimodal pain management to improve pain control.), Disp: , Rfl:      aspirin (ASA) 81 MG chewable tablet, 2 tablets (162 mg) by Oral or NG Tube route daily, Disp: , Rfl:      atorvastatin (LIPITOR) 40 MG tablet, TAKE 1 TABLET BY MOUTH EVERY DAY (Patient taking differently: Take 40 mg by mouth daily), Disp: 90 tablet, Rfl: 3     blood glucose monitoring (ACCU-CHEK FASTCLIX) lancets, USE TWICE A DAY (AT 7:30 AM & 4:30 PM) AS DIRECTED, Disp: 204 each, Rfl: 3     blood-glucose meter Misc, [BLOOD-GLUCOSE METER MISC] Use 1 Device As Directed 2 (two) times a day at 9am and 6pm., Disp: 1 each, Rfl: 0     imiquimod (ALDARA) 5 % cream, Apply topically daily To warts, Disp: , Rfl: 3     loratadine (CLARITIN) 10 MG tablet, Take 10 mg by mouth daily, Disp: , Rfl:      metFORMIN (GLUCOPHAGE) 500 MG tablet, Take 1 tablet (500 mg) by mouth 3 times daily (with meals), Disp: 270 tablet, Rfl: 3     metoprolol tartrate 75 MG TABS, Take 75 mg by mouth 2 times daily, Disp: 60 tablet, Rfl: 3     metroNIDAZOLE (METROGEL) 0.75 % gel, 2 times daily Apply to face, Disp: , Rfl: 10     multivitamin, therapeutic (THERA-VIT) TABS tablet, Take 1 tablet by mouth daily, Disp: , Rfl:       nitroGLYcerin (NITROSTAT) 0.4 MG sublingual tablet, PLACE 1 TABLET (0.4 MG) UNDER THE TONGUE EVERY 5 MINUTES AS NEEDED FOR CHEST PAIN FOR CHEST PAIN PLACE 1 TABLET UNDER THE TONGUE EVERY 5 MINUTES FOR 3 DOSES. IF SYMPTOMS PERSIST 5 MINUTES AFTER 1ST DOSE CALL 911., Disp: 90 tablet, Rfl: 3     oxymetazoline (RHOFADE) 1 % Crea, [OXYMETAZOLINE (RHOFADE) 1 % CREA] Apply topically daily., Disp: , Rfl:      pantoprazole (PROTONIX) 40 MG EC tablet, Take 1 tablet (40 mg) by mouth daily, Disp: 90 tablet, Rfl: 3     triamcinolone (KENALOG) 0.1 % external cream, Apply topically 2 times daily (Patient taking differently: Apply topically daily as needed Apply to neck rash), Disp: 80 g, Rfl: 0     Vitamin D3 (CHOLECALCIFEROL) 25 mcg (1000 units) tablet, Take 4 tablets by mouth daily, Disp: , Rfl:      cephALEXin (KEFLEX) 500 MG capsule, Take 1 capsule (500 mg) by mouth 2 times daily (Patient not taking: Reported on 2/21/2023), Disp: 10 capsule, Rfl: 0    ALLERGIES:    No Known Allergies    ROS:  Gen: No fevers, weight loss/gain  CV: Denies chest pain, peripheral edema  Pulm: Denies SOB  GI/: Voiding without problems, appetite improving.     LABS:  None    IMAGING:  None    PHYSICAL EXAM:   /84 (BP Location: Right arm, Patient Position: Sitting, Cuff Size: Adult Large)   Pulse 64   Resp 16   Wt 109.8 kg (242 lb)   BMI 31.93 kg/m    General: Alert and oriented, pleasant, no acute distress.  CV:  No peripheral edema.  Pulm: Easy work of breathing on room air.   GI: Soft, non-tender, and non-distended  Incision: Chest and arm incisions clean dry and intact without erythema, swelling or drainage. 2 small openings on superior end of sternal incision, improving.  Neuro: CNs grossly intact.      ASSESSMENT/PLAN:  Kash Tavarez is a 62 year old year old male status post CABG who returns to clinic for postop visit.     - Surgically doing well. Incisions are healing well with no signs of infection.   - Hemodynamics are  stable. No medication changes were needed today.  - Wash and inspect incision daily--call if worsening (redness/drainage/pain). We will check in in 2 weeks via phone.  - Continue antibacterial soap for 4-6 more weeks on incision.  - Follow up with your cardiologist as scheduled (Dr. Pereira tomorrow)  - Continue Cardiac Rehab until completed.   - May start driving (4 weeks post-op) if not using narcotic pain medications.  - Continue strict sternal precautions until this Friday, 02/24/2023. No lifting >10bs; may gradually increase at this point (6 weeks post-op).   - No need for further follow-up with CV surgery unless concerns (except our phone check-in). Feel free to call our office with questions. [phone].         _______  Kristy White PA-C  Cardiothoracic Surgery  [phone]

## 2023-02-22 ENCOUNTER — VIRTUAL VISIT (OUTPATIENT)
Dept: CARDIOLOGY | Facility: CLINIC | Age: 63
End: 2023-02-22
Payer: COMMERCIAL

## 2023-02-22 ENCOUNTER — HOSPITAL ENCOUNTER (OUTPATIENT)
Dept: CARDIAC REHAB | Facility: CLINIC | Age: 63
Discharge: HOME | End: 2023-02-22
Attending: INTERNAL MEDICINE
Payer: COMMERCIAL

## 2023-02-22 DIAGNOSIS — Z95.1 S/P CABG (CORONARY ARTERY BYPASS GRAFT): ICD-10-CM

## 2023-02-22 DIAGNOSIS — I10 ESSENTIAL HYPERTENSION: ICD-10-CM

## 2023-02-22 DIAGNOSIS — I25.10 CORONARY ARTERY DISEASE INVOLVING NATIVE CORONARY ARTERY OF NATIVE HEART WITHOUT ANGINA PECTORIS: Primary | ICD-10-CM

## 2023-02-22 DIAGNOSIS — I25.5 ISCHEMIC CARDIOMYOPATHY: ICD-10-CM

## 2023-02-22 DIAGNOSIS — E78.2 MIXED HYPERLIPIDEMIA: ICD-10-CM

## 2023-02-22 PROCEDURE — 99214 OFFICE O/P EST MOD 30 MIN: CPT | Mod: VID | Performed by: INTERNAL MEDICINE

## 2023-02-22 PROCEDURE — 93798 PHYS/QHP OP CAR RHAB W/ECG: CPT

## 2023-02-22 NOTE — PATIENT INSTRUCTIONS
Please contact direct nurses line Monday through Friday 8 AM to 5 PM @ [phone]    After-hours contact cardiology office at [phone].

## 2023-02-22 NOTE — LETTER
"2/22/2023    Catalino Nuñez MD  [address]  [place] [address]    RE: Kash Tavarez       Dear Colleague,     I had the pleasure of seeing Kash Tavarez in the e[facility].  The patient has been notified of following:     \"This video visit will be conducted via a call between you and your physician/provider. We have found that certain health care needs can be provided without the need for an in-person physical exam.  This service lets us provide the care you need with a video conversation.  If a prescription is necessary we can send it directly to your pharmacy.  If lab work is needed we can place an order for that and you can then stop by our lab to have the test done at a later time.      Patient has given verbal consent to a Video visit? Yes    HEART CARE VIDEO ENCOUNTER        Assessment/Recommendations   Assessment/Plan:  1.  Coronary artery disease status post coronary bypass surgery on 1/13/2023.   2.  Hyperlipidemia  3.  Ischemic cardiomyopathy with reduced ejection fraction.    Plan:   1.  Continue cardiac rehab  2.  Continue metoprolol current dose  3.  Continue aspirin 81 mg daily  4.  Complete echocardiogram to reassess left ventricular ejection fraction.  If we continuously reduction left ventricular extraction will need to be on guideline directed medical therapy.  5.  Hold on starting ACE inhibitor today as he has some lightheadedness with standing    Follow Up Plan:  5 months  Total time of video between patient and provider was 20 minutes   Start Time:1:20 Pm   Stop Time:1: 40 Pm    Originating Location (pt. Location): Home    Distant Location (provider location):  [facility]    Mode of Communication:  Video Conference via PandaDoc       History of Present Illness/Subjective    Kash Tavarez is a 62 year old male who is being evaluated via a billable video visit and has consented to a video visit.     Kash Tavarez has a with recent " history of coronary artery bypass surgery on January 13, 2023 who presents to cardiology clinic via video visit due to winter weather.    Currently the patient is making an excellent recovery from his coronary artery bypass surgery.  He had no significant complications after bypass surgery with the exception of skin infection which is improving.  He has had some issues with healing of his upper sternal wound site but that is improving.  He is making good progress from cardiac rehab.  He notes some mild lightheadedness when standing rapidly but that is quite minimal.  He is on metoprolol and monitors his heart rate via his iWatch to around 60 bpm.  He has not had a reassessment of his left ventricular ejection fraction postoperatively.    Currently he is compliant with all of his medications.  No side effects to medication as reviewed.      I have reviewed and updated the patient's Past Medical History, Social History, Family History and Medication List.     Physical Examination performed via live video encounter Review of Systems   General Appearance:   no distress, normal body habitus, upright.   ENT/Mouth: membranes moist, no nasal discharge or bleeding gums.  Normal head shape, no evidence of injury or laceration.     EYES:  no scleral icterus, normal conjunctivae   Neck: no evidence of thyromegaly.  Supple   Chest/Lungs:    Sternal wound noted.  Mild opening in the upper part of his sternal chest site.  No erythema.   Cardiovascular:   No evidence of elevated jugular venous pressure.     Abdomen:  no evidence of abdominal distention. No observe juandice.     Extremities: no cyanosis or clubbing noted.    Skin: no xanthelasma, normal skin color. No evidence of facial lacerations.      Neurologic: Normal arm motion bilateral, no tremors.  No evidence of focal defect.       Psychiatric: alert and oriented x3, calm     Review of Systems - History obtained from the patient  General ROS: negative  Psychological ROS:  negative  Ophthalmic ROS: negative  ENT ROS: negative  Allergy and Immunology ROS: negative  Hematological and Lymphatic ROS: negative  Endocrine ROS: negative  Respiratory ROS: negative for - cough or wheezing  Cardiovascular ROS: Mild fatigue.  No chest pain  Gastrointestinal ROS: no abdominal pain, change in bowel habits, or black or bloody stools  Genito-Urinary ROS: no dysuria, trouble voiding, or hematuria  Musculoskeletal ROS: negative  Neurological ROS: no acute TIA or stroke symptoms  Dermatological ROS: negative       Medical History  Surgical History Family History Social History   Past Medical History:   Diagnosis Date     Coronary artery disease of native artery of native heart with stable angina pectoris (H)      Hypertension      Nephrolithiasis      Type 2 diabetes mellitus (H)     Past Surgical History:   Procedure Laterality Date     BYPASS GRAFT ARTERY CORONARY N/A 1/13/2023    Procedure: CORONARY ARTERY BYPASS GRAFT (CABG) x 3, LEFT INTERNAL MAMMARY ARTERY HARVEST, LEFT RADIAL ARTERY HARVEST, ANESTHESIA TRANSESOPHAGEAL ECHOCARDIOGRAM, EPI-AORTIC ULTRASOUND;  Surgeon: Arturo Bear MD;  Location: [facility] OR     COLONOSCOPY       COMBINED CYSTOSCOPY, INSERT STENT URETER(S) Right 5/12/2022    Procedure: CYSTOURETEROSCOPY, RETROGRADE PYELOGRAM, LASER LITHOTRIPSY WITH CALCULUS REMOVAL AND URETERAL STENT INSERTION;  Surgeon: James Mosley MD;  Location: [facility] OR     CV CORONARY ANGIOGRAM N/A 12/5/2022    Procedure: Coronary Angiogram;  Surgeon: Aristides North MD;  Location: [facility] LAB CV     CV LEFT HEART CATH N/A 12/5/2022    Procedure: Left Heart Catheterization;  Surgeon: Aristides North MD;  Location: [facility] LAB      KIDNEY STONE SURGERY       [facility] APPENDECTOMY      Description: Appendectomy;  Recorded: 03/04/2008;    Family History   Problem Relation Age of Onset     Heart Disease Father      Alcoholism Father      Cerebrovascular Disease Father        Social History     Socioeconomic History     Marital status:      Spouse name: Not on file     Number of children: Not on file     Years of education: Not on file     Highest education level: Not on file   Occupational History     Not on file   Tobacco Use     Smoking status: Never     Smokeless tobacco: Never   Vaping Use     Vaping Use: Never used   Substance and Sexual Activity     Alcohol use: No     Drug use: No     Sexual activity: Not on file     Comment: not asked   Other Topics Concern     Parent/sibling w/ CABG, MI or angioplasty before 65F 55M? No   Social History Narrative     Not on file     Social Determinants of Health     Financial Resource Strain: Not on file   Food Insecurity: Not on file   Transportation Needs: Not on file   Physical Activity: Not on file   Stress: Not on file   Social Connections: Not on file   Intimate Partner Violence: Not on file   Housing Stability: Not on file          Medications  Allergies   Current Outpatient Medications   Medication Sig Dispense Refill     ACCU-CHEK FASTCLIX Misc [ACCU-CHEK FASTCLIX MISC] TEST TWICE DAILY 100 each 0     ACCU-CHEK GUIDE test strip USE 1 EACH AS DIRECTED 2 (TWO) TIMES A DAY AT 7:30AM AND 4:30PM. 200 strip 3     acetaminophen (TYLENOL) 325 MG tablet Take 2 tablets (650 mg) by mouth every 4 hours as needed for other (For optimal non-opioid multimodal pain management to improve pain control.)       aspirin (ASA) 81 MG chewable tablet 2 tablets (162 mg) by Oral or NG Tube route daily       atorvastatin (LIPITOR) 40 MG tablet TAKE 1 TABLET BY MOUTH EVERY DAY (Patient taking differently: Take 40 mg by mouth daily) 90 tablet 3     blood glucose monitoring (ACCU-CHEK FASTCLIX) lancets USE TWICE A DAY (AT 7:30 AM & 4:30 PM) AS DIRECTED 204 each 3     blood-glucose meter Misc [BLOOD-GLUCOSE METER MISC] Use 1 Device As Directed 2 (two) times a day at 9am and 6pm. 1 each 0     imiquimod (ALDARA) 5 % cream Apply topically daily To warts  3      loratadine (CLARITIN) 10 MG tablet Take 10 mg by mouth daily       metFORMIN (GLUCOPHAGE) 500 MG tablet Take 1 tablet (500 mg) by mouth 3 times daily (with meals) 270 tablet 3     metoprolol tartrate 75 MG TABS Take 75 mg by mouth 2 times daily 60 tablet 3     metroNIDAZOLE (METROGEL) 0.75 % gel 2 times daily Apply to face  10     multivitamin, therapeutic (THERA-VIT) TABS tablet Take 1 tablet by mouth daily       nitroGLYcerin (NITROSTAT) 0.4 MG sublingual tablet PLACE 1 TABLET (0.4 MG) UNDER THE TONGUE EVERY 5 MINUTES AS NEEDED FOR CHEST PAIN FOR CHEST PAIN PLACE 1 TABLET UNDER THE TONGUE EVERY 5 MINUTES FOR 3 DOSES. IF SYMPTOMS PERSIST 5 MINUTES AFTER 1ST DOSE CALL 911. 90 tablet 3     oxymetazoline (RHOFADE) 1 % Crea [OXYMETAZOLINE (RHOFADE) 1 % CREA] Apply topically daily.       pantoprazole (PROTONIX) 40 MG EC tablet Take 1 tablet (40 mg) by mouth daily 90 tablet 3     triamcinolone (KENALOG) 0.1 % external cream Apply topically 2 times daily (Patient taking differently: Apply topically daily as needed Apply to neck rash) 80 g 0     Vitamin D3 (CHOLECALCIFEROL) 25 mcg (1000 units) tablet Take 4 tablets by mouth daily      No Known Allergies      Lab Results    Chemistry/lipid CBC Cardiac Enzymes/BNP/TSH/INR   Lab Results   Component Value Date    CHOL 176 11/17/2022    HDL 39 (L) 11/17/2022    TRIG 122 11/17/2022    BUN 12.3 01/15/2023     (L) 01/15/2023    CO2 27 01/15/2023    Lab Results   Component Value Date    WBC 13.7 (H) 01/15/2023    HGB 11.7 (L) 01/15/2023    HCT 35.2 (L) 01/15/2023    MCV 87 01/15/2023     01/17/2023    Lab Results   Component Value Date    INR 1.25 (H) 01/13/2023        Aki Pereira DO      Thank you for allowing me to participate in the care of your patient.      Sincerely,     Aki Pereira DO     [facility]  cc:   No referring provider defined for this encounter.

## 2023-02-22 NOTE — PROGRESS NOTES
"The patient has been notified of following:     \"This video visit will be conducted via a call between you and your physician/provider. We have found that certain health care needs can be provided without the need for an in-person physical exam.  This service lets us provide the care you need with a video conversation.  If a prescription is necessary we can send it directly to your pharmacy.  If lab work is needed we can place an order for that and you can then stop by our lab to have the test done at a later time.      Patient has given verbal consent to a Video visit? Yes    HEART CARE VIDEO ENCOUNTER        Assessment/Recommendations   Assessment/Plan:  1.  Coronary artery disease status post coronary bypass surgery on 1/13/2023.   2.  Hyperlipidemia  3.  Ischemic cardiomyopathy with reduced ejection fraction.    Plan:   1.  Continue cardiac rehab  2.  Continue metoprolol current dose  3.  Continue aspirin 81 mg daily  4.  Complete echocardiogram to reassess left ventricular ejection fraction.  If we continuously reduction left ventricular extraction will need to be on guideline directed medical therapy.  5.  Hold on starting ACE inhibitor today as he has some lightheadedness with standing    Follow Up Plan:  5 months  Total time of video between patient and provider was 20 minutes   Start Time:1:20 Pm   Stop Time:1: 40 Pm    Originating Location (pt. Location): Home    Distant Location (provider location):  [facility]    Mode of Communication:  Video Conference via [employer]       History of Present Illness/Subjective    Kash Tavarez is a 62 year old male who is being evaluated via a billable video visit and has consented to a video visit.     Kash Tavarez has a with recent history of coronary artery bypass surgery on January 13, 2023 who presents to cardiology clinic via video visit due to winter weather.    Currently the patient is making an excellent recovery from his " coronary artery bypass surgery.  He had no significant complications after bypass surgery with the exception of skin infection which is improving.  He has had some issues with healing of his upper sternal wound site but that is improving.  He is making good progress from cardiac rehab.  He notes some mild lightheadedness when standing rapidly but that is quite minimal.  He is on metoprolol and monitors his heart rate via his iWatch to around 60 bpm.  He has not had a reassessment of his left ventricular ejection fraction postoperatively.    Currently he is compliant with all of his medications.  No side effects to medication as reviewed.      I have reviewed and updated the patient's Past Medical History, Social History, Family History and Medication List.     Physical Examination performed via live video encounter Review of Systems   General Appearance:   no distress, normal body habitus, upright.   ENT/Mouth: membranes moist, no nasal discharge or bleeding gums.  Normal head shape, no evidence of injury or laceration.     EYES:  no scleral icterus, normal conjunctivae   Neck: no evidence of thyromegaly.  Supple   Chest/Lungs:    Sternal wound noted.  Mild opening in the upper part of his sternal chest site.  No erythema.   Cardiovascular:   No evidence of elevated jugular venous pressure.     Abdomen:  no evidence of abdominal distention. No observe juandice.     Extremities: no cyanosis or clubbing noted.    Skin: no xanthelasma, normal skin color. No evidence of facial lacerations.      Neurologic: Normal arm motion bilateral, no tremors.  No evidence of focal defect.       Psychiatric: alert and oriented x3, calm     Review of Systems - History obtained from the patient  General ROS: negative  Psychological ROS: negative  Ophthalmic ROS: negative  ENT ROS: negative  Allergy and Immunology ROS: negative  Hematological and Lymphatic ROS: negative  Endocrine ROS: negative  Respiratory ROS: negative for - cough or  wheezing  Cardiovascular ROS: Mild fatigue.  No chest pain  Gastrointestinal ROS: no abdominal pain, change in bowel habits, or black or bloody stools  Genito-Urinary ROS: no dysuria, trouble voiding, or hematuria  Musculoskeletal ROS: negative  Neurological ROS: no acute TIA or stroke symptoms  Dermatological ROS: negative       Medical History  Surgical History Family History Social History   Past Medical History:   Diagnosis Date     Coronary artery disease of native artery of native heart with stable angina pectoris (H)      Hypertension      Nephrolithiasis      Type 2 diabetes mellitus (H)     Past Surgical History:   Procedure Laterality Date     BYPASS GRAFT ARTERY CORONARY N/A 1/13/2023    Procedure: CORONARY ARTERY BYPASS GRAFT (CABG) x 3, LEFT INTERNAL MAMMARY ARTERY HARVEST, LEFT RADIAL ARTERY HARVEST, ANESTHESIA TRANSESOPHAGEAL ECHOCARDIOGRAM, EPI-AORTIC ULTRASOUND;  Surgeon: Arturo Bear MD;  Location: [facility] [place] OR     COLONOSCOPY       COMBINED CYSTOSCOPY, INSERT STENT URETER(S) Right 5/12/2022    Procedure: CYSTOURETEROSCOPY, RETROGRADE PYELOGRAM, LASER LITHOTRIPSY WITH CALCULUS REMOVAL AND URETERAL STENT INSERTION;  Surgeon: James Mosley MD;  Location: [facility]     CV CORONARY ANGIOGRAM N/A 12/5/2022    Procedure: Coronary Angiogram;  Surgeon: Aristides North MD;  Location: [facility] CATH LAB CV     CV LEFT HEART CATH N/A 12/5/2022    Procedure: Left Heart Catheterization;  Surgeon: Aristides North MD;  Location: [facility] CATH LAB CV     KIDNEY STONE SURGERY       [facility] APPENDECTOMY      Description: Appendectomy;  Recorded: 03/04/2008;    Family History   Problem Relation Age of Onset     Heart Disease Father      Alcoholism Father      Cerebrovascular Disease Father       Social History     Socioeconomic History     Marital status:      Spouse name: Not on file     Number of children: Not on file     Years of education: Not on file     Highest education level: Not on  file   Occupational History     Not on file   Tobacco Use     Smoking status: Never     Smokeless tobacco: Never   Vaping Use     Vaping Use: Never used   Substance and Sexual Activity     Alcohol use: No     Drug use: No     Sexual activity: Not on file     Comment: not asked   Other Topics Concern     Parent/sibling w/ CABG, MI or angioplasty before 65F 55M? No   Social History Narrative     Not on file     Social Determinants of Health     Financial Resource Strain: Not on file   Food Insecurity: Not on file   Transportation Needs: Not on file   Physical Activity: Not on file   Stress: Not on file   Social Connections: Not on file   Intimate Partner Violence: Not on file   Housing Stability: Not on file          Medications  Allergies   Current Outpatient Medications   Medication Sig Dispense Refill     ACCU-CHEK FASTCLIX Misc [ACCU-CHEK FASTCLIX MISC] TEST TWICE DAILY 100 each 0     ACCU-CHEK GUIDE test strip USE 1 EACH AS DIRECTED 2 (TWO) TIMES A DAY AT 7:30AM AND 4:30PM. 200 strip 3     acetaminophen (TYLENOL) 325 MG tablet Take 2 tablets (650 mg) by mouth every 4 hours as needed for other (For optimal non-opioid multimodal pain management to improve pain control.)       aspirin (ASA) 81 MG chewable tablet 2 tablets (162 mg) by Oral or NG Tube route daily       atorvastatin (LIPITOR) 40 MG tablet TAKE 1 TABLET BY MOUTH EVERY DAY (Patient taking differently: Take 40 mg by mouth daily) 90 tablet 3     blood glucose monitoring (ACCU-CHEK FASTCLIX) lancets USE TWICE A DAY (AT 7:30 AM & 4:30 PM) AS DIRECTED 204 each 3     blood-glucose meter Misc [BLOOD-GLUCOSE METER MISC] Use 1 Device As Directed 2 (two) times a day at 9am and 6pm. 1 each 0     imiquimod (ALDARA) 5 % cream Apply topically daily To warts  3     loratadine (CLARITIN) 10 MG tablet Take 10 mg by mouth daily       metFORMIN (GLUCOPHAGE) 500 MG tablet Take 1 tablet (500 mg) by mouth 3 times daily (with meals) 270 tablet 3     metoprolol tartrate 75 MG  TABS Take 75 mg by mouth 2 times daily 60 tablet 3     metroNIDAZOLE (METROGEL) 0.75 % gel 2 times daily Apply to face  10     multivitamin, therapeutic (THERA-VIT) TABS tablet Take 1 tablet by mouth daily       nitroGLYcerin (NITROSTAT) 0.4 MG sublingual tablet PLACE 1 TABLET (0.4 MG) UNDER THE TONGUE EVERY 5 MINUTES AS NEEDED FOR CHEST PAIN FOR CHEST PAIN PLACE 1 TABLET UNDER THE TONGUE EVERY 5 MINUTES FOR 3 DOSES. IF SYMPTOMS PERSIST 5 MINUTES AFTER 1ST DOSE CALL 911. 90 tablet 3     oxymetazoline (RHOFADE) 1 % Crea [OXYMETAZOLINE (RHOFADE) 1 % CREA] Apply topically daily.       pantoprazole (PROTONIX) 40 MG EC tablet Take 1 tablet (40 mg) by mouth daily 90 tablet 3     triamcinolone (KENALOG) 0.1 % external cream Apply topically 2 times daily (Patient taking differently: Apply topically daily as needed Apply to neck rash) 80 g 0     Vitamin D3 (CHOLECALCIFEROL) 25 mcg (1000 units) tablet Take 4 tablets by mouth daily      No Known Allergies      Lab Results    Chemistry/lipid CBC Cardiac Enzymes/BNP/TSH/INR   Lab Results   Component Value Date    CHOL 176 11/17/2022    HDL 39 (L) 11/17/2022    TRIG 122 11/17/2022    BUN 12.3 01/15/2023     (L) 01/15/2023    CO2 27 01/15/2023    Lab Results   Component Value Date    WBC 13.7 (H) 01/15/2023    HGB 11.7 (L) 01/15/2023    HCT 35.2 (L) 01/15/2023    MCV 87 01/15/2023     01/17/2023    Lab Results   Component Value Date    INR 1.25 (H) 01/13/2023        Aki Pereira DO

## 2023-02-24 ENCOUNTER — TELEPHONE (OUTPATIENT)
Dept: CARDIOLOGY | Facility: CLINIC | Age: 63
End: 2023-02-24
Payer: COMMERCIAL

## 2023-02-24 NOTE — TELEPHONE ENCOUNTER
Patient inquired about chest tube sites and sent a picture to CV RN email. Reviewed chest tube sites only with CV JEANCARLOS and confirmed they are healing well. Patient will send another picture of his sternotomy incision and chest tube sites in two weeks for follow up.    All questions/concerns addressed at this time. Told patient to call with any concerns or changes in the meantime and patient agreed.

## 2023-02-27 ENCOUNTER — TELEPHONE (OUTPATIENT)
Dept: FAMILY MEDICINE | Facility: CLINIC | Age: 63
End: 2023-02-27
Payer: COMMERCIAL

## 2023-02-27 DIAGNOSIS — Z95.1 S/P CABG (CORONARY ARTERY BYPASS GRAFT): ICD-10-CM

## 2023-02-27 NOTE — TELEPHONE ENCOUNTER
Medication Question or Refill    Contacts       Type Contact Phone/Fax    02/27/2023 12:23 PM CST Phone (Incoming) Kash Tavarez J (Self) [phone] (M)          What medication are you calling about (include dose and sig)?: ASA 81 mg take 2 a day    Preferred Pharmacy:   [facility]/pharmacy #[id number]  [facility] [address]  Phone: [phone] Fax: [phone]    Controlled Substance Agreement on file:   CSA -- Patient Level:    CSA: None found at the patient level.       Who prescribed the medication?: hospital provider    Do you need a refill? Yes    When did you use the medication last? This am    Patient offered an appointment? Yes: Going to cardiac rehab - apt with PCP on 4/20/23    Do you have any questions or concerns?  No      Could we send this information to you in [facility] or would you prefer to receive a phone call?:   Patient would prefer a phone call     Okay to leave a detailed message?: Yes at Home number on file [phone] (home)    Call taken on 2/27/23 at 1226 pm by JACINTO Freitas

## 2023-02-28 ENCOUNTER — HOSPITAL ENCOUNTER (OUTPATIENT)
Dept: CARDIAC REHAB | Facility: CLINIC | Age: 63
Discharge: HOME | End: 2023-02-28
Attending: INTERNAL MEDICINE
Payer: COMMERCIAL

## 2023-02-28 PROCEDURE — 93797 PHYS/QHP OP CAR RHAB WO ECG: CPT

## 2023-02-28 PROCEDURE — 93798 PHYS/QHP OP CAR RHAB W/ECG: CPT

## 2023-02-28 RX ORDER — ASPIRIN 81 MG/1
162 TABLET, CHEWABLE ORAL DAILY
Qty: 180 TABLET | Refills: 3 | Status: SHIPPED | OUTPATIENT
Start: 2023-02-28 | End: 2024-02-16

## 2023-03-01 NOTE — TELEPHONE ENCOUNTER
Left detailed message to advise patient that ASA sent to preferred pharmacy.     Okay to leave a detailed message?: Yes at Home number on file [phone] (home)

## 2023-03-02 ENCOUNTER — HOSPITAL ENCOUNTER (OUTPATIENT)
Dept: CARDIAC REHAB | Facility: CLINIC | Age: 63
Discharge: HOME | End: 2023-03-02
Attending: INTERNAL MEDICINE
Payer: COMMERCIAL

## 2023-03-02 PROCEDURE — 93798 PHYS/QHP OP CAR RHAB W/ECG: CPT

## 2023-03-03 ENCOUNTER — HOSPITAL ENCOUNTER (OUTPATIENT)
Dept: CARDIAC REHAB | Facility: CLINIC | Age: 63
Discharge: HOME | End: 2023-03-03
Attending: INTERNAL MEDICINE
Payer: COMMERCIAL

## 2023-03-03 PROCEDURE — 93798 PHYS/QHP OP CAR RHAB W/ECG: CPT

## 2023-03-06 ENCOUNTER — HOSPITAL ENCOUNTER (OUTPATIENT)
Dept: CARDIAC REHAB | Facility: CLINIC | Age: 63
Discharge: HOME | End: 2023-03-06
Attending: INTERNAL MEDICINE
Payer: COMMERCIAL

## 2023-03-06 PROCEDURE — 93798 PHYS/QHP OP CAR RHAB W/ECG: CPT

## 2023-03-07 ENCOUNTER — TELEPHONE (OUTPATIENT)
Dept: CARDIOLOGY | Facility: CLINIC | Age: 63
End: 2023-03-07
Payer: COMMERCIAL

## 2023-03-07 ENCOUNTER — HOSPITAL ENCOUNTER (OUTPATIENT)
Dept: CARDIAC REHAB | Facility: CLINIC | Age: 63
Discharge: HOME | End: 2023-03-07
Attending: INTERNAL MEDICINE
Payer: COMMERCIAL

## 2023-03-07 PROCEDURE — 93798 PHYS/QHP OP CAR RHAB W/ECG: CPT

## 2023-03-07 NOTE — TELEPHONE ENCOUNTER
Patient sent updated pictures to CV RN's email of incision and chest tube sites. All areas were closed, no redness, swelling or drainage noted. Incision looks much improved from two weeks ago.    Pictures sent to be scanned into patient's chart. Patient will call with any concerns or changes.

## 2023-03-09 ENCOUNTER — HOSPITAL ENCOUNTER (OUTPATIENT)
Dept: CARDIAC REHAB | Facility: CLINIC | Age: 63
Discharge: HOME | End: 2023-03-09
Attending: INTERNAL MEDICINE
Payer: COMMERCIAL

## 2023-03-09 DIAGNOSIS — Z95.1 S/P CABG (CORONARY ARTERY BYPASS GRAFT): ICD-10-CM

## 2023-03-09 DIAGNOSIS — Z76.0 ENCOUNTER FOR MEDICATION REFILL: ICD-10-CM

## 2023-03-09 PROCEDURE — 93798 PHYS/QHP OP CAR RHAB W/ECG: CPT

## 2023-03-10 ENCOUNTER — TELEPHONE (OUTPATIENT)
Dept: FAMILY MEDICINE | Facility: CLINIC | Age: 63
End: 2023-03-10
Payer: COMMERCIAL

## 2023-03-10 DIAGNOSIS — Z95.1 S/P CABG (CORONARY ARTERY BYPASS GRAFT): ICD-10-CM

## 2023-03-10 RX ORDER — PANTOPRAZOLE SODIUM 40 MG/1
TABLET, DELAYED RELEASE ORAL
Qty: 90 TABLET | Refills: 3 | OUTPATIENT
Start: 2023-03-10

## 2023-03-10 NOTE — TELEPHONE ENCOUNTER
"Routing refill request to provider for review/approval because:  Refills available    Last Written Prescription Date:  2/10/2023  Last Fill Quantity: 90,  # refills: 3   Last office visit provider:  1/24/2023     Requested Prescriptions   Pending Prescriptions Disp Refills     pantoprazole (PROTONIX) 40 MG EC tablet [Pharmacy Med Name: PANTOPRAZOLE SOD DR 40 MG TAB] 90 tablet 3     Sig: TAKE 1 TABLET BY MOUTH EVERY DAY       PPI Protocol Passed - 3/9/2023  9:31 AM        Passed - Not on Clopidogrel (unless Pantoprazole ordered)        Passed - No diagnosis of osteoporosis on record        Passed - Recent (12 mo) or future (30 days) visit within the authorizing provider's specialty     Patient has had an office visit with the authorizing provider or a provider within the authorizing providers department within the previous 12 mos or has a future within next 30 days. See \"Patient Info\" tab in inbasket, or \"Choose Columns\" in Meds & Orders section of the refill encounter.              Passed - Medication is active on med list        Passed - Patient is age 18 or older             Nguyen Franco RN 03/10/23 1:12 PM      "

## 2023-03-10 NOTE — TELEPHONE ENCOUNTER
Medication Question or Refill    Contacts       Type Contact Phone/Fax    03/10/2023 03:16 PM CST Phone (Incoming) Kash Tavarez (Self) [phone] (M)          What medication are you calling about (include dose and sig)?: metoprolol tartrate 75 MG TABS, 2 TIMES DAILY - 1 Year supply    Preferred Pharmacy:   [facility]/pharmacy #[id number] - [place], [place] - [address] AT [facility]. [facility]. & [address] [address]  Phone: [phone] Fax: [phone]      Controlled Substance Agreement on file:   CSA -- Patient Level:    CSA: None found at the patient level.       Who prescribed the medication?: Anais Gray PA-C, Cardiologist but they want pcp to start managing this medication    Do you need a refill? Yes- have a week supply left    When did you use the medication last? 3/10/2023    Patient offered an appointment? No    Do you have any questions or concerns?  No - ok to wait for PCP on Monday      Could we send this information to you in [facility] or would you prefer to receive a phone call?:   Patient would prefer a phone call   Okay to leave a detailed message?: Yes at Cell number on file:    Telephone Information:   Mobile [phone]

## 2023-03-13 ENCOUNTER — HOSPITAL ENCOUNTER (OUTPATIENT)
Dept: CARDIAC REHAB | Facility: CLINIC | Age: 63
Discharge: HOME | End: 2023-03-13
Attending: INTERNAL MEDICINE
Payer: COMMERCIAL

## 2023-03-13 PROCEDURE — 93798 PHYS/QHP OP CAR RHAB W/ECG: CPT

## 2023-03-13 RX ORDER — METOPROLOL TARTRATE 75 MG/1
75 TABLET, FILM COATED ORAL 2 TIMES DAILY
Qty: 180 TABLET | Refills: 3 | Status: SHIPPED | OUTPATIENT
Start: 2023-03-13 | End: 2024-03-01

## 2023-03-14 ENCOUNTER — HOSPITAL ENCOUNTER (OUTPATIENT)
Dept: CARDIAC REHAB | Facility: CLINIC | Age: 63
Discharge: HOME | End: 2023-03-14
Attending: INTERNAL MEDICINE
Payer: COMMERCIAL

## 2023-03-14 PROCEDURE — 93798 PHYS/QHP OP CAR RHAB W/ECG: CPT

## 2023-03-16 ENCOUNTER — HOSPITAL ENCOUNTER (OUTPATIENT)
Dept: CARDIAC REHAB | Facility: CLINIC | Age: 63
Discharge: HOME | End: 2023-03-16
Attending: INTERNAL MEDICINE
Payer: COMMERCIAL

## 2023-03-16 PROCEDURE — 93798 PHYS/QHP OP CAR RHAB W/ECG: CPT

## 2023-03-20 ENCOUNTER — HOSPITAL ENCOUNTER (OUTPATIENT)
Dept: CARDIAC REHAB | Facility: CLINIC | Age: 63
Discharge: HOME | End: 2023-03-20
Attending: INTERNAL MEDICINE
Payer: COMMERCIAL

## 2023-03-20 PROCEDURE — 93798 PHYS/QHP OP CAR RHAB W/ECG: CPT

## 2023-03-21 ENCOUNTER — HOSPITAL ENCOUNTER (OUTPATIENT)
Dept: CARDIAC REHAB | Facility: CLINIC | Age: 63
Discharge: HOME | End: 2023-03-21
Attending: INTERNAL MEDICINE
Payer: COMMERCIAL

## 2023-03-21 PROCEDURE — 93798 PHYS/QHP OP CAR RHAB W/ECG: CPT

## 2023-03-23 ENCOUNTER — HOSPITAL ENCOUNTER (OUTPATIENT)
Dept: CARDIAC REHAB | Facility: CLINIC | Age: 63
Discharge: HOME | End: 2023-03-23
Attending: INTERNAL MEDICINE
Payer: COMMERCIAL

## 2023-03-23 PROCEDURE — 93798 PHYS/QHP OP CAR RHAB W/ECG: CPT

## 2023-03-27 ENCOUNTER — HOSPITAL ENCOUNTER (OUTPATIENT)
Dept: CARDIAC REHAB | Facility: CLINIC | Age: 63
Discharge: HOME | End: 2023-03-27
Attending: INTERNAL MEDICINE
Payer: COMMERCIAL

## 2023-03-27 PROCEDURE — 93798 PHYS/QHP OP CAR RHAB W/ECG: CPT

## 2023-03-28 ENCOUNTER — HOSPITAL ENCOUNTER (OUTPATIENT)
Dept: CARDIAC REHAB | Facility: CLINIC | Age: 63
Discharge: HOME | End: 2023-03-28
Attending: INTERNAL MEDICINE
Payer: COMMERCIAL

## 2023-03-28 PROCEDURE — 93798 PHYS/QHP OP CAR RHAB W/ECG: CPT

## 2023-03-30 ENCOUNTER — LAB (OUTPATIENT)
Dept: CARDIOLOGY | Facility: CLINIC | Age: 63
End: 2023-03-30
Payer: COMMERCIAL

## 2023-03-30 ENCOUNTER — HOSPITAL ENCOUNTER (OUTPATIENT)
Dept: CARDIAC REHAB | Facility: CLINIC | Age: 63
Discharge: HOME | End: 2023-03-30
Attending: INTERNAL MEDICINE
Payer: COMMERCIAL

## 2023-03-30 DIAGNOSIS — I25.10 CORONARY ARTERY DISEASE INVOLVING NATIVE CORONARY ARTERY OF NATIVE HEART WITHOUT ANGINA PECTORIS: ICD-10-CM

## 2023-03-30 DIAGNOSIS — I10 ESSENTIAL HYPERTENSION: ICD-10-CM

## 2023-03-30 LAB
ANION GAP SERPL CALCULATED.3IONS-SCNC: 12 MMOL/L (ref 5–18)
BUN SERPL-MCNC: 14 MG/DL (ref 8–22)
CALCIUM SERPL-MCNC: 10.3 MG/DL (ref 8.5–10.5)
CHLORIDE BLD-SCNC: 102 MMOL/L (ref 98–107)
CO2 SERPL-SCNC: 26 MMOL/L (ref 22–31)
CREAT SERPL-MCNC: 0.99 MG/DL (ref 0.7–1.3)
GFR SERPL CREATININE-BSD FRML MDRD: 86 ML/MIN/1.73M2
GLUCOSE BLD-MCNC: 135 MG/DL (ref 70–125)
POTASSIUM BLD-SCNC: 4.8 MMOL/L (ref 3.5–5)
SODIUM SERPL-SCNC: 140 MMOL/L (ref 136–145)

## 2023-03-30 PROCEDURE — 80048 BASIC METABOLIC PNL TOTAL CA: CPT

## 2023-03-30 PROCEDURE — 93798 PHYS/QHP OP CAR RHAB W/ECG: CPT

## 2023-03-30 PROCEDURE — 36415 COLL VENOUS BLD VENIPUNCTURE: CPT

## 2023-04-03 ENCOUNTER — HOSPITAL ENCOUNTER (OUTPATIENT)
Dept: CARDIAC REHAB | Facility: CLINIC | Age: 63
Discharge: HOME | End: 2023-04-03
Attending: INTERNAL MEDICINE
Payer: COMMERCIAL

## 2023-04-03 PROCEDURE — 93798 PHYS/QHP OP CAR RHAB W/ECG: CPT

## 2023-04-04 ENCOUNTER — HOSPITAL ENCOUNTER (OUTPATIENT)
Dept: CARDIAC REHAB | Facility: CLINIC | Age: 63
Discharge: HOME | End: 2023-04-04
Attending: INTERNAL MEDICINE
Payer: COMMERCIAL

## 2023-04-04 PROCEDURE — 93798 PHYS/QHP OP CAR RHAB W/ECG: CPT

## 2023-04-06 ENCOUNTER — HOSPITAL ENCOUNTER (OUTPATIENT)
Dept: CARDIAC REHAB | Facility: CLINIC | Age: 63
Discharge: HOME | End: 2023-04-06
Attending: INTERNAL MEDICINE
Payer: COMMERCIAL

## 2023-04-06 PROCEDURE — 93798 PHYS/QHP OP CAR RHAB W/ECG: CPT

## 2023-04-20 ENCOUNTER — HOSPITAL ENCOUNTER (OUTPATIENT)
Dept: CARDIAC REHAB | Facility: CLINIC | Age: 63
Discharge: HOME | End: 2023-04-20
Attending: INTERNAL MEDICINE
Payer: COMMERCIAL

## 2023-04-20 ENCOUNTER — OFFICE VISIT (OUTPATIENT)
Dept: FAMILY MEDICINE | Facility: CLINIC | Age: 63
End: 2023-04-20
Payer: COMMERCIAL

## 2023-04-20 VITALS
WEIGHT: 241.75 LBS | HEART RATE: 71 BPM | HEIGHT: 73 IN | OXYGEN SATURATION: 97 % | DIASTOLIC BLOOD PRESSURE: 102 MMHG | TEMPERATURE: 97.4 F | SYSTOLIC BLOOD PRESSURE: 155 MMHG | BODY MASS INDEX: 32.04 KG/M2 | RESPIRATION RATE: 16 BRPM

## 2023-04-20 DIAGNOSIS — E11.9 TYPE 2 DIABETES MELLITUS WITHOUT COMPLICATION, WITHOUT LONG-TERM CURRENT USE OF INSULIN (H): Primary | ICD-10-CM

## 2023-04-20 DIAGNOSIS — I10 ESSENTIAL HYPERTENSION: ICD-10-CM

## 2023-04-20 DIAGNOSIS — I25.10 CORONARY ARTERY DISEASE INVOLVING NATIVE CORONARY ARTERY OF NATIVE HEART WITHOUT ANGINA PECTORIS: ICD-10-CM

## 2023-04-20 LAB
HBA1C MFR BLD: 6.9 % (ref 0–5.6)
TSH SERPL DL<=0.005 MIU/L-ACNC: 1.09 UIU/ML (ref 0.3–4.2)

## 2023-04-20 PROCEDURE — 93797 PHYS/QHP OP CAR RHAB WO ECG: CPT

## 2023-04-20 PROCEDURE — 36415 COLL VENOUS BLD VENIPUNCTURE: CPT | Performed by: FAMILY MEDICINE

## 2023-04-20 PROCEDURE — 99214 OFFICE O/P EST MOD 30 MIN: CPT | Performed by: FAMILY MEDICINE

## 2023-04-20 PROCEDURE — 84443 ASSAY THYROID STIM HORMONE: CPT | Performed by: FAMILY MEDICINE

## 2023-04-20 PROCEDURE — 93798 PHYS/QHP OP CAR RHAB W/ECG: CPT

## 2023-04-20 PROCEDURE — 83036 HEMOGLOBIN GLYCOSYLATED A1C: CPT | Performed by: FAMILY MEDICINE

## 2023-04-20 RX ORDER — LOSARTAN POTASSIUM 100 MG/1
100 TABLET ORAL DAILY
Qty: 90 TABLET | Refills: 3 | Status: SHIPPED | OUTPATIENT
Start: 2023-04-20 | End: 2024-04-04

## 2023-04-20 NOTE — PROGRESS NOTES
ASSESMENT AND PLAN:  Diagnoses and all orders for this visit:  Type 2 diabetes mellitus without complication, without long-term current use of insulin (H)  -     TSH WITH FREE T4 REFLEX; Future  -     Hemoglobin A1c done today is 6.9.  Good control.  Continue current treatment plan and recheck again in 4 months.  -     PRIMARY CARE FOLLOW-UP SCHEDULING; Future  Coronary artery disease involving native coronary artery of native heart without angina pectoris  Stable.  Status post recent coronary artery bypass grafting.  We will increase medication for better blood pressure control as detailed below.  Continue medical management and follow-up with cardiology as directed.  -     losartan (COZAAR) 100 MG tablet; Take 1 tablet (100 mg) by mouth daily  Essential hypertension  Not under ideal control.Currently just on 25 mg of losartan which will be increased   To 100 mg daily.  -     losartan (COZAAR) 100 MG tablet; Take 1 tablet (100 mg) by mouth daily  -     PRIMARY CARE FOLLOW-UP SCHEDULING; Future      Reviewed the risks and benefits of the treatment plan with the patient and/or caregiver and we discussed indications for routine and emergent follow-up.        SUBJECTIVE:62-year-old male comes in for follow-up.  He completes his cardiac rehab today with his last session, recent history of coronary artery bypass grafting.  He is not getting any exertional chest pain.  No hypoglycemia.  No lightheadedness.  Since I saw the patient last time the cellulitis infection he had on the arm has resolved completely, he did finish his course of antibiotics.  His tachycardia has also resolved.      Past Medical History:   Diagnosis Date     Coronary artery disease of native artery of native heart with stable angina pectoris (H)      Hypertension      Nephrolithiasis      Type 2 diabetes mellitus (H)      Patient Active Problem List   Diagnosis     Nephrolithiasis     Obesity     Allergic Rhinitis     Mixed hyperlipidemia      Essential Hypertension     Obesity (BMI 35.0-39.9) with comorbidity (H)     Type 2 diabetes mellitus without complication, without long-term current use of insulin (H)     Calculus of ureter     Hydronephrosis with urinary obstruction due to ureteral calculus     Coronary artery disease involving native coronary artery of native heart without angina pectoris     Current Outpatient Medications   Medication Sig Dispense Refill     ACCU-CHEK FASTCLIX Misc [ACCU-CHEK FASTCLIX MISC] TEST TWICE DAILY 100 each 0     ACCU-CHEK GUIDE test strip USE 1 EACH AS DIRECTED 2 (TWO) TIMES A DAY AT 7:30AM AND 4:30PM. 200 strip 3     acetaminophen (TYLENOL) 325 MG tablet Take 2 tablets (650 mg) by mouth every 4 hours as needed for other (For optimal non-opioid multimodal pain management to improve pain control.)       aspirin (ASA) 81 MG chewable tablet Take 2 tablets (162 mg) by mouth daily 180 tablet 3     atorvastatin (LIPITOR) 40 MG tablet TAKE 1 TABLET BY MOUTH EVERY DAY (Patient taking differently: Take 40 mg by mouth daily) 90 tablet 3     blood glucose monitoring (ACCU-CHEK FASTCLIX) lancets USE TWICE A DAY (AT 7:30 AM & 4:30 PM) AS DIRECTED 204 each 3     blood-glucose meter Misc [BLOOD-GLUCOSE METER MISC] Use 1 Device As Directed 2 (two) times a day at 9am and 6pm. 1 each 0     imiquimod (ALDARA) 5 % cream Apply topically daily To warts  3     loratadine (CLARITIN) 10 MG tablet Take 10 mg by mouth daily       losartan (COZAAR) 100 MG tablet Take 1 tablet (100 mg) by mouth daily 90 tablet 3     metFORMIN (GLUCOPHAGE) 500 MG tablet Take 1 tablet (500 mg) by mouth 3 times daily (with meals) 270 tablet 3     Metoprolol Tartrate 75 MG TABS Take 75 mg by mouth 2 times daily 180 tablet 3     metroNIDAZOLE (METROGEL) 0.75 % gel 2 times daily Apply to face  10     multivitamin, therapeutic (THERA-VIT) TABS tablet Take 1 tablet by mouth daily       nitroGLYcerin (NITROSTAT) 0.4 MG sublingual tablet PLACE 1 TABLET (0.4 MG) UNDER THE  "TONGUE EVERY 5 MINUTES AS NEEDED FOR CHEST PAIN FOR CHEST PAIN PLACE 1 TABLET UNDER THE TONGUE EVERY 5 MINUTES FOR 3 DOSES. IF SYMPTOMS PERSIST 5 MINUTES AFTER 1ST DOSE CALL 911. 90 tablet 3     oxymetazoline (RHOFADE) 1 % Crea [OXYMETAZOLINE (RHOFADE) 1 % CREA] Apply topically daily.       pantoprazole (PROTONIX) 40 MG EC tablet Take 1 tablet (40 mg) by mouth daily 90 tablet 3     triamcinolone (KENALOG) 0.1 % external cream Apply topically 2 times daily (Patient taking differently: Apply topically daily as needed Apply to neck rash) 80 g 0     Vitamin D3 (CHOLECALCIFEROL) 25 mcg (1000 units) tablet Take 4 tablets by mouth daily       History   Smoking Status     Never   Smokeless Tobacco     Never       OBJECTICE: BP (!) 155/102   Pulse 71   Temp 97.4  F (36.3  C) (Oral)   Resp 16   Ht 1.861 m (6' 1.25\")   Wt 109.7 kg (241 lb 12 oz)   SpO2 97%   BMI 31.68 kg/m       Recent Results (from the past 24 hour(s))   Hemoglobin A1c    Collection Time: 04/20/23  9:19 AM   Result Value Ref Range    Hemoglobin A1C 6.9 (H) 0.0 - 5.6 %      Cardiac-Regular rate and rhythm   RESP-lungs clear   Foot exam- normal.  Faint pedal pulses bilaterally.  No ulcers.     SKIN-well-healed sternal and left extremity scars with no signs of surrounding infection.      Catalino Nuñez MD   "

## 2023-05-10 ENCOUNTER — HOSPITAL ENCOUNTER (OUTPATIENT)
Dept: CARDIOLOGY | Facility: CLINIC | Age: 63
Discharge: HOME | End: 2023-05-10
Attending: INTERNAL MEDICINE | Admitting: INTERNAL MEDICINE
Payer: COMMERCIAL

## 2023-05-10 DIAGNOSIS — I25.5 ISCHEMIC CARDIOMYOPATHY: ICD-10-CM

## 2023-05-10 DIAGNOSIS — I25.10 CORONARY ARTERY DISEASE INVOLVING NATIVE CORONARY ARTERY OF NATIVE HEART WITHOUT ANGINA PECTORIS: ICD-10-CM

## 2023-05-10 DIAGNOSIS — E78.2 MIXED HYPERLIPIDEMIA: ICD-10-CM

## 2023-05-10 DIAGNOSIS — I10 ESSENTIAL HYPERTENSION: ICD-10-CM

## 2023-05-10 LAB — LVEF ECHO: NORMAL

## 2023-05-10 PROCEDURE — 255N000002 HC RX 255 OP 636: Performed by: INTERNAL MEDICINE

## 2023-05-10 PROCEDURE — 93306 TTE W/DOPPLER COMPLETE: CPT | Mod: 26 | Performed by: INTERNAL MEDICINE

## 2023-05-10 RX ADMIN — PERFLUTREN 2 ML: 6.52 INJECTION, SUSPENSION INTRAVENOUS at 15:25

## 2023-05-11 DIAGNOSIS — E11.9 TYPE 2 DIABETES MELLITUS WITHOUT COMPLICATION, WITHOUT LONG-TERM CURRENT USE OF INSULIN (H): ICD-10-CM

## 2023-05-11 NOTE — TELEPHONE ENCOUNTER
"Last Written Prescription Date:  5/5/2022  Last Fill Quantity: 270,  # refills: 3   Last office visit provider:  4/20/2023     Requested Prescriptions   Pending Prescriptions Disp Refills     metFORMIN (GLUCOPHAGE) 500 MG tablet [Pharmacy Med Name: METFORMIN  MG TABLET] 270 tablet 3     Sig: TAKE 1 TABLET (500 MG) BY MOUTH 3 TIMES DAILY (WITH MEALS)       Biguanide Agents Passed - 5/11/2023  2:42 PM        Passed - Patient is age 10 or older        Passed - Patient has documented A1c within the specified period of time.     If HgbA1C is 8 or greater, it needs to be on file within the past 3 months.  If less than 8, must be on file within the past 6 months.     Recent Labs   Lab Test 04/20/23  0919   A1C 6.9*             Passed - Patient's CR is NOT>1.4 OR Patient's EGFR is NOT<45 within past 12 mos.     Recent Labs   Lab Test 03/30/23  0908 11/11/21  0849 11/05/20  0855   GFRESTIMATED 86   < > >60   GFRESTBLACK  --   --  >60    < > = values in this interval not displayed.       Recent Labs   Lab Test 03/30/23  0908   CR 0.99             Passed - Patient does NOT have a diagnosis of CHF.        Passed - Medication is active on med list        Passed - Recent (6 mo) or future (30 days) visit within the authorizing provider's specialty     Patient had office visit in the last 6 months or has a visit in the next 30 days with authorizing provider or within the authorizing provider's specialty.  See \"Patient Info\" tab in inbasket, or \"Choose Columns\" in Meds & Orders section of the refill encounter.                 SEVERIANO ZAYAS RN 05/11/23 2:42 PM  "

## 2023-06-01 ENCOUNTER — TELEPHONE (OUTPATIENT)
Dept: FAMILY MEDICINE | Facility: CLINIC | Age: 63
End: 2023-06-01
Payer: COMMERCIAL

## 2023-06-01 NOTE — TELEPHONE ENCOUNTER
[facility] calling because pt is out of pantoprazole and still needs a prior authorization. They have faxed over the form multiple times but haven't gotten a response back. Please call phone number below to do a verbal PA.    Phone:[phone]

## 2023-06-02 NOTE — TELEPHONE ENCOUNTER
Central Prior Authorization Team   Phone: [phone]    PA Initiation    Medication: Pantoprazole Sodium 40MG dr tablets  Insurance Company: [employer] - Phone [phone] Fax [phone]  [id number] - [place], [place] - [address] AT [facility] [address]  Filling Pharmacy Phone: [phone]  Filling Pharmacy Fax:    Start Date: 6/2/2023

## 2023-06-02 NOTE — TELEPHONE ENCOUNTER
Prior Authorization Approval    Authorization Effective Date: 6/2/2023  Authorization Expiration Date: 6/1/2026  Medication: Pantoprazole Sodium 40MG dr tablets  Approved Dose/Quantity:    Reference #:     Insurance Company: [employer] - Phone [phone] Fax [phone]  Expected CoPay:       CoPay Card Available:      Foundation Assistance Needed:    Which Pharmacy is filling the prescription (Not needed for infusion/clinic administered): [id number] - [place], [place] - [address] AT [facility] [address]  Pharmacy Notified: Yes

## 2023-07-07 DIAGNOSIS — N20.0 CALCULUS OF KIDNEY: Primary | ICD-10-CM

## 2023-07-19 ENCOUNTER — OFFICE VISIT (OUTPATIENT)
Dept: CARDIOLOGY | Facility: CLINIC | Age: 63
End: 2023-07-19
Attending: INTERNAL MEDICINE
Payer: COMMERCIAL

## 2023-07-19 VITALS
WEIGHT: 248.5 LBS | RESPIRATION RATE: 16 BRPM | BODY MASS INDEX: 31.89 KG/M2 | SYSTOLIC BLOOD PRESSURE: 166 MMHG | HEART RATE: 68 BPM | HEIGHT: 74 IN | DIASTOLIC BLOOD PRESSURE: 92 MMHG

## 2023-07-19 DIAGNOSIS — I25.5 ISCHEMIC CARDIOMYOPATHY: ICD-10-CM

## 2023-07-19 DIAGNOSIS — I10 ESSENTIAL HYPERTENSION: ICD-10-CM

## 2023-07-19 DIAGNOSIS — E78.2 MIXED HYPERLIPIDEMIA: ICD-10-CM

## 2023-07-19 DIAGNOSIS — Z95.1 S/P CABG (CORONARY ARTERY BYPASS GRAFT): ICD-10-CM

## 2023-07-19 DIAGNOSIS — E11.9 TYPE 2 DIABETES MELLITUS WITHOUT COMPLICATION, WITHOUT LONG-TERM CURRENT USE OF INSULIN (H): Primary | ICD-10-CM

## 2023-07-19 DIAGNOSIS — I25.10 CORONARY ARTERY DISEASE INVOLVING NATIVE CORONARY ARTERY OF NATIVE HEART WITHOUT ANGINA PECTORIS: ICD-10-CM

## 2023-07-19 LAB
ANION GAP SERPL CALCULATED.3IONS-SCNC: 9 MMOL/L (ref 5–18)
BUN SERPL-MCNC: 15 MG/DL (ref 8–22)
CALCIUM SERPL-MCNC: 9.6 MG/DL (ref 8.5–10.5)
CHLORIDE BLD-SCNC: 104 MMOL/L (ref 98–107)
CHOLEST SERPL-MCNC: 189 MG/DL
CO2 SERPL-SCNC: 27 MMOL/L (ref 22–31)
CREAT SERPL-MCNC: 0.88 MG/DL (ref 0.7–1.3)
FASTING STATUS PATIENT QL REPORTED: YES
GFR SERPL CREATININE-BSD FRML MDRD: >90 ML/MIN/1.73M2
GLUCOSE BLD-MCNC: 139 MG/DL (ref 70–125)
HDLC SERPL-MCNC: 35 MG/DL
LDLC SERPL CALC-MCNC: 125 MG/DL
POTASSIUM BLD-SCNC: 4.4 MMOL/L (ref 3.5–5)
SODIUM SERPL-SCNC: 140 MMOL/L (ref 136–145)
TRIGL SERPL-MCNC: 147 MG/DL

## 2023-07-19 PROCEDURE — 99214 OFFICE O/P EST MOD 30 MIN: CPT | Performed by: INTERNAL MEDICINE

## 2023-07-19 PROCEDURE — 80048 BASIC METABOLIC PNL TOTAL CA: CPT | Performed by: INTERNAL MEDICINE

## 2023-07-19 PROCEDURE — 80061 LIPID PANEL: CPT | Performed by: INTERNAL MEDICINE

## 2023-07-19 PROCEDURE — 36415 COLL VENOUS BLD VENIPUNCTURE: CPT | Performed by: INTERNAL MEDICINE

## 2023-07-19 RX ORDER — HYDROCHLOROTHIAZIDE 25 MG/1
25 TABLET ORAL DAILY
Qty: 90 TABLET | Refills: 3 | Status: SHIPPED | OUTPATIENT
Start: 2023-07-19 | End: 2023-08-04

## 2023-07-19 NOTE — PROGRESS NOTES
HEART CARE ENCOUNTER CONSULTATON NOTE      [facility]  [phone]      Assessment/Recommendations   Assessment:  1.  Coronary artery disease status post coronary bypass surgery on 1/13/2023.   2.  Hyperlipidemia, LDL >70   3.  Ischemic cardiomyopathy with reduced ejection fraction.  4.  Hypertension   5.  Moderate concentric left ventricular hypertrophy on echo with hypertension   6.  Diabetes mellitus type 2  Lab Results   Component Value Date    A1C 6.9 04/20/2023    A1C 7.3 11/17/2022    A1C 7.8 05/05/2022    A1C 7.9 11/11/2021    A1C 8.4 05/03/2021   7.  Mild obesity, BMI 31    Plan:   1.  Start hydrochlorothiazide 25 mg daily   2.  Check lipids, goal LDL <70.   3.  Continue metoprolol 75 mg BID  4.  Continue losartan 100 mg daily   5.  Continue aspirin 81 mg daily  6.  Check BMP       History of Present Illness/Subjective    HPI: Kash Tavarez is a 63 year old male coronary artery disease, hyperlipidemia, hypertension, diabetes mellitus type II who presents to cardiology clinic in follow-up after coronary artery bypass surgery.    Since patient underwent coronary artery bypass surgery he has made a good recovery.  He continues to note gradual improvement in his overall conditioning.  He was able to do his marching band routine this last week.  He noticed that he had a little bit lower level of endurance but he feels this is gradually improving.  Blood pressure remains quite elevated today at 166/92.  We will start the patient on hydrochlorothiazide.  Discussed hydrochlorothiazide in detail with the patient including diuretic effect.  Discussed lab abnormalities and drug allergies are possible with this medication.  We will check a BMP today.    Last lipids were prior to surgery.  We will check fasting and direct LDL.  Goal LDL less than 70.  Remains compliant with current medications.    Currently is on a low-sodium diet.  Does not take NSAIDs.    Echocardiogram Results: Personally  "reviewed.  Significant improvement left ventricular ejection fraction.  There is mild to moderate concentric left ventricular hypertrophy.  The visual ejection fraction is 60-65%.  There is mild septal hypokinesis.  The right ventricle is normal in size and function.  No significant valve disease.  Compared to the prior study dated 12/5/2022, there are changes as noted. There  is mild septal hypokinesis now present.     Physical Examination  Review of Systems   Vitals: BP (!) 166/92 (BP Location: Right arm, Patient Position: Sitting, Cuff Size: Adult Large)   Pulse 68   Resp 16   Ht 1.88 m (6' 2\")   Wt 112.7 kg (248 lb 8 oz)   BMI 31.91 kg/m    BMI= Body mass index is 31.91 kg/m .  Wt Readings from Last 3 Encounters:   07/19/23 112.7 kg (248 lb 8 oz)   04/20/23 109.7 kg (241 lb 12 oz)   02/21/23 109.8 kg (242 lb)        Pleasant mild obesity   ENT/Mouth: membranes moist, no oral lesions or bleeding gums.      EYES:  no scleral icterus, normal conjunctivae       Chest/Lungs:   lungs are clear to auscultation, no rales or wheezing, noted sternal scar, equal chest wall expansion    Cardiovascular:   Regular. Normal first and second heart sounds with no murmurs, rubs, or gallops; the carotid, radial and posterior tibial pulses are intact, Jugular venous pressure normal, no edema bilaterally    Abdomen:  no tenderness; bowel sounds are present   Extremities: no cyanosis or clubbing   Skin: no xanthelasma, warm.    Neurologic: normal  bilateral, no tremors     Psychiatric: alert and oriented x3, calm        Please refer above for cardiac ROS details.        Medical History  Surgical History Family History Social History   Past Medical History:   Diagnosis Date     Coronary artery disease of native artery of native heart with stable angina pectoris (H)      Hypertension      Nephrolithiasis      Type 2 diabetes mellitus (H)      Past Surgical History:   Procedure Laterality Date     BYPASS GRAFT ARTERY CORONARY " N/A 1/13/2023    Procedure: CORONARY ARTERY BYPASS GRAFT (CABG) x 3, LEFT INTERNAL MAMMARY ARTERY HARVEST, LEFT RADIAL ARTERY HARVEST, ANESTHESIA TRANSESOPHAGEAL ECHOCARDIOGRAM, EPI-AORTIC ULTRASOUND;  Surgeon: Arturo Bear MD;  Location: [facility] OR     COLONOSCOPY       COMBINED CYSTOSCOPY, INSERT STENT URETER(S) Right 5/12/2022    Procedure: CYSTOURETEROSCOPY, RETROGRADE PYELOGRAM, LASER LITHOTRIPSY WITH CALCULUS REMOVAL AND URETERAL STENT INSERTION;  Surgeon: James Mosley MD;  Location: [facility] OR     CV CORONARY ANGIOGRAM N/A 12/5/2022    Procedure: Coronary Angiogram;  Surgeon: Aristides North MD;  Location: [facility] CATH LAB CV     CV LEFT HEART CATH N/A 12/5/2022    Procedure: Left Heart Catheterization;  Surgeon: Aristides North MD;  Location: [facility] CATH LAB CV     KIDNEY STONE SURGERY       ZZC APPENDECTOMY      Description: Appendectomy;  Recorded: 03/04/2008;     Family History   Problem Relation Age of Onset     Heart Disease Father      Alcoholism Father      Cerebrovascular Disease Father         Social History     Socioeconomic History     Marital status:      Spouse name: Not on file     Number of children: Not on file     Years of education: Not on file     Highest education level: Not on file   Occupational History     Not on file   Tobacco Use     Smoking status: Never     Passive exposure: Never     Smokeless tobacco: Never   Vaping Use     Vaping Use: Never used   Substance and Sexual Activity     Alcohol use: No     Drug use: No     Sexual activity: Not on file     Comment: not asked   Other Topics Concern     Parent/sibling w/ CABG, MI or angioplasty before 65F 55M? No   Social History Narrative     Not on file     Social Determinants of Health     Financial Resource Strain: Not on file   Food Insecurity: Not on file   Transportation Needs: Not on file   Physical Activity: Not on file   Stress: Not on file   Social Connections: Not on file   Intimate Partner  Violence: Not on file   Housing Stability: Not on file           Medications  Allergies   Current Outpatient Medications   Medication Sig Dispense Refill     ACCU-CHEK FASTCLIX Misc [ACCU-CHEK FASTCLIX MISC] TEST TWICE DAILY 100 each 0     ACCU-CHEK GUIDE test strip USE 1 EACH AS DIRECTED 2 (TWO) TIMES A DAY AT 7:30AM AND 4:30PM. 200 strip 3     acetaminophen (TYLENOL) 325 MG tablet Take 2 tablets (650 mg) by mouth every 4 hours as needed for other (For optimal non-opioid multimodal pain management to improve pain control.)       aspirin (ASA) 81 MG chewable tablet Take 2 tablets (162 mg) by mouth daily 180 tablet 3     atorvastatin (LIPITOR) 40 MG tablet TAKE 1 TABLET BY MOUTH EVERY DAY (Patient taking differently: Take 40 mg by mouth daily) 90 tablet 3     blood glucose monitoring (ACCU-CHEK FASTCLIX) lancets USE TWICE A DAY (AT 7:30 AM & 4:30 PM) AS DIRECTED 204 each 3     blood-glucose meter Misc [BLOOD-GLUCOSE METER MISC] Use 1 Device As Directed 2 (two) times a day at 9am and 6pm. 1 each 0     imiquimod (ALDARA) 5 % cream Apply topically daily To warts  3     loratadine (CLARITIN) 10 MG tablet Take 10 mg by mouth daily       losartan (COZAAR) 100 MG tablet Take 1 tablet (100 mg) by mouth daily 90 tablet 3     metFORMIN (GLUCOPHAGE) 500 MG tablet TAKE 1 TABLET (500 MG) BY MOUTH 3 TIMES DAILY (WITH MEALS) 270 tablet 3     Metoprolol Tartrate 75 MG TABS Take 75 mg by mouth 2 times daily 180 tablet 3     metroNIDAZOLE (METROGEL) 0.75 % gel 2 times daily Apply to face  10     multivitamin, therapeutic (THERA-VIT) TABS tablet Take 1 tablet by mouth daily       nitroGLYcerin (NITROSTAT) 0.4 MG sublingual tablet PLACE 1 TABLET (0.4 MG) UNDER THE TONGUE EVERY 5 MINUTES AS NEEDED FOR CHEST PAIN FOR CHEST PAIN PLACE 1 TABLET UNDER THE TONGUE EVERY 5 MINUTES FOR 3 DOSES. IF SYMPTOMS PERSIST 5 MINUTES AFTER 1ST DOSE CALL 911. 90 tablet 3     oxymetazoline (RHOFADE) 1 % Crea [OXYMETAZOLINE (RHOFADE) 1 % CREA] Apply  topically daily.       pantoprazole (PROTONIX) 40 MG EC tablet Take 1 tablet (40 mg) by mouth daily 90 tablet 3     triamcinolone (KENALOG) 0.1 % external cream Apply topically 2 times daily (Patient taking differently: Apply topically daily as needed Apply to neck rash) 80 g 0     Vitamin D3 (CHOLECALCIFEROL) 25 mcg (1000 units) tablet Take 4 tablets by mouth daily       No Known Allergies       Lab Results    Chemistry/lipid CBC Cardiac Enzymes/BNP/TSH/INR   Recent Labs   Lab Test 11/17/22  0857   CHOL 176   HDL 39*   *   TRIG 122     Recent Labs   Lab Test 11/17/22  0857 11/11/21  0849 11/05/20  0855   * 110 128     Recent Labs   Lab Test 03/30/23  0908      POTASSIUM 4.8   CHLORIDE 102   CO2 26   *   BUN 14   CR 0.99   GFRESTIMATED 86   RIOS 10.3     Recent Labs   Lab Test 03/30/23  0908 01/15/23  0405 01/14/23  0011   CR 0.99 0.88 0.82     Recent Labs   Lab Test 04/20/23  0919 11/17/22  0857 05/05/22  0824   A1C 6.9* 7.3* 7.8*          Recent Labs   Lab Test 01/17/23  0441 01/15/23  0811   WBC  --  13.7*   HGB  --  11.7*   HCT  --  35.2*   MCV  --  87    129*     Recent Labs   Lab Test 01/15/23  0811 01/14/23  0410 01/13/23  1307   HGB 11.7* 10.6* 12.7*    No results for input(s): TROPONINI in the last 23263 hours.  No results for input(s): BNP, NTBNPI, NTBNP in the last 15297 hours.  Recent Labs   Lab Test 04/20/23  0919   TSH 1.09     Recent Labs   Lab Test 01/13/23  1307 01/13/23  1207 12/16/22  0819   INR 1.25* 1.36* 0.99        Aki Pereira DO      Today's clinic visit entailed:  38 minutes spent by me on the date of the encounter doing chart review, history and exam, documentation and further activities per the note.

## 2023-07-19 NOTE — RESULT ENCOUNTER NOTE
LDL remains above goal.  Recommend changing to rosuvastatin 40 mg daily repeat fasting lipids in 3 months.

## 2023-07-19 NOTE — LETTER
7/19/2023    Catalino Nuñez MD  [address]  [place] [address]    RE: Kash Tavarez       Dear Colleague,     I had the pleasure of seeing Kash Tavarez in the [facility].    HEART CARE ENCOUNTER CONSULTATON NOTE      [facility]  [phone]      Assessment/Recommendations   Assessment:  1.  Coronary artery disease status post coronary bypass surgery on 1/13/2023.   2.  Hyperlipidemia, LDL >70   3.  Ischemic cardiomyopathy with reduced ejection fraction.  4.  Hypertension   5.  Moderate concentric left ventricular hypertrophy on echo with hypertension   6.  Diabetes mellitus type 2  Lab Results   Component Value Date    A1C 6.9 04/20/2023    A1C 7.3 11/17/2022    A1C 7.8 05/05/2022    A1C 7.9 11/11/2021    A1C 8.4 05/03/2021   7.  Mild obesity, BMI 31    Plan:   1.  Start hydrochlorothiazide 25 mg daily   2.  Check lipids, goal LDL <70.   3.  Continue metoprolol 75 mg BID  4.  Continue losartan 100 mg daily   5.  Continue aspirin 81 mg daily  6.  Check BMP       History of Present Illness/Subjective    HPI: Kash Taavrez is a 63 year old male coronary artery disease, hyperlipidemia, hypertension, diabetes mellitus type II who presents to cardiology clinic in follow-up after coronary artery bypass surgery.    Since patient underwent coronary artery bypass surgery he has made a good recovery.  He continues to note gradual improvement in his overall conditioning.  He was able to do his marching band routine this last week.  He noticed that he had a little bit lower level of endurance but he feels this is gradually improving.  Blood pressure remains quite elevated today at 166/92.  We will start the patient on hydrochlorothiazide.  Discussed hydrochlorothiazide in detail with the patient including diuretic effect.  Discussed lab abnormalities and drug allergies are possible with this medication.  We will check a BMP today.    Last lipids were prior to surgery.   "We will check fasting and direct LDL.  Goal LDL less than 70.  Remains compliant with current medications.    Currently is on a low-sodium diet.  Does not take NSAIDs.    Echocardiogram Results: Personally reviewed.  Significant improvement left ventricular ejection fraction.  There is mild to moderate concentric left ventricular hypertrophy.  The visual ejection fraction is 60-65%.  There is mild septal hypokinesis.  The right ventricle is normal in size and function.  No significant valve disease.  Compared to the prior study dated 12/5/2022, there are changes as noted. There  is mild septal hypokinesis now present.     Physical Examination  Review of Systems   Vitals: BP (!) 166/92 (BP Location: Right arm, Patient Position: Sitting, Cuff Size: Adult Large)   Pulse 68   Resp 16   Ht 1.88 m (6' 2\")   Wt 112.7 kg (248 lb 8 oz)   BMI 31.91 kg/m    BMI= Body mass index is 31.91 kg/m .  Wt Readings from Last 3 Encounters:   07/19/23 112.7 kg (248 lb 8 oz)   04/20/23 109.7 kg (241 lb 12 oz)   02/21/23 109.8 kg (242 lb)        Pleasant mild obesity   ENT/Mouth: membranes moist, no oral lesions or bleeding gums.      EYES:  no scleral icterus, normal conjunctivae       Chest/Lungs:   lungs are clear to auscultation, no rales or wheezing, noted sternal scar, equal chest wall expansion    Cardiovascular:   Regular. Normal first and second heart sounds with no murmurs, rubs, or gallops; the carotid, radial and posterior tibial pulses are intact, Jugular venous pressure normal, no edema bilaterally    Abdomen:  no tenderness; bowel sounds are present   Extremities: no cyanosis or clubbing   Skin: no xanthelasma, warm.    Neurologic: normal  bilateral, no tremors     Psychiatric: alert and oriented x3, calm        Please refer above for cardiac ROS details.        Medical History  Surgical History Family History Social History   Past Medical History:   Diagnosis Date    Coronary artery disease of native artery of " native heart with stable angina pectoris (H)     Hypertension     Nephrolithiasis     Type 2 diabetes mellitus (H)      Past Surgical History:   Procedure Laterality Date    BYPASS GRAFT ARTERY CORONARY N/A 1/13/2023    Procedure: CORONARY ARTERY BYPASS GRAFT (CABG) x 3, LEFT INTERNAL MAMMARY ARTERY HARVEST, LEFT RADIAL ARTERY HARVEST, ANESTHESIA TRANSESOPHAGEAL ECHOCARDIOGRAM, EPI-AORTIC ULTRASOUND;  Surgeon: Arturo Bear MD;  Location: [facility] OR    COLONOSCOPY      COMBINED CYSTOSCOPY, INSERT STENT URETER(S) Right 5/12/2022    Procedure: CYSTOURETEROSCOPY, RETROGRADE PYELOGRAM, LASER LITHOTRIPSY WITH CALCULUS REMOVAL AND URETERAL STENT INSERTION;  Surgeon: James Mosley MD;  Location: [facility] OR    CV CORONARY ANGIOGRAM N/A 12/5/2022    Procedure: Coronary Angiogram;  Surgeon: Aristides North MD;  Location: [facility] CATH LAB CV    CV LEFT HEART CATH N/A 12/5/2022    Procedure: Left Heart Catheterization;  Surgeon: Arsitides North MD;  Location: [facility] CATH LAB CV    KIDNEY STONE SURGERY      ZZC APPENDECTOMY      Description: Appendectomy;  Recorded: 03/04/2008;     Family History   Problem Relation Age of Onset    Heart Disease Father     Alcoholism Father     Cerebrovascular Disease Father         Social History     Socioeconomic History    Marital status:      Spouse name: Not on file    Number of children: Not on file    Years of education: Not on file    Highest education level: Not on file   Occupational History    Not on file   Tobacco Use    Smoking status: Never     Passive exposure: Never    Smokeless tobacco: Never   Vaping Use    Vaping Use: Never used   Substance and Sexual Activity    Alcohol use: No    Drug use: No    Sexual activity: Not on file     Comment: not asked   Other Topics Concern    Parent/sibling w/ CABG, MI or angioplasty before 65F 55M? No   Social History Narrative    Not on file     Social Determinants of Health     Financial Resource Strain: Not on  file   Food Insecurity: Not on file   Transportation Needs: Not on file   Physical Activity: Not on file   Stress: Not on file   Social Connections: Not on file   Intimate Partner Violence: Not on file   Housing Stability: Not on file           Medications  Allergies   Current Outpatient Medications   Medication Sig Dispense Refill    ACCU-CHEK FASTCLIX Misc [ACCU-CHEK FASTCLIX MISC] TEST TWICE DAILY 100 each 0    ACCU-CHEK GUIDE test strip USE 1 EACH AS DIRECTED 2 (TWO) TIMES A DAY AT 7:30AM AND 4:30PM. 200 strip 3    acetaminophen (TYLENOL) 325 MG tablet Take 2 tablets (650 mg) by mouth every 4 hours as needed for other (For optimal non-opioid multimodal pain management to improve pain control.)      aspirin (ASA) 81 MG chewable tablet Take 2 tablets (162 mg) by mouth daily 180 tablet 3    atorvastatin (LIPITOR) 40 MG tablet TAKE 1 TABLET BY MOUTH EVERY DAY (Patient taking differently: Take 40 mg by mouth daily) 90 tablet 3    blood glucose monitoring (ACCU-CHEK FASTCLIX) lancets USE TWICE A DAY (AT 7:30 AM & 4:30 PM) AS DIRECTED 204 each 3    blood-glucose meter Misc [BLOOD-GLUCOSE METER MISC] Use 1 Device As Directed 2 (two) times a day at 9am and 6pm. 1 each 0    imiquimod (ALDARA) 5 % cream Apply topically daily To warts  3    loratadine (CLARITIN) 10 MG tablet Take 10 mg by mouth daily      losartan (COZAAR) 100 MG tablet Take 1 tablet (100 mg) by mouth daily 90 tablet 3    metFORMIN (GLUCOPHAGE) 500 MG tablet TAKE 1 TABLET (500 MG) BY MOUTH 3 TIMES DAILY (WITH MEALS) 270 tablet 3    Metoprolol Tartrate 75 MG TABS Take 75 mg by mouth 2 times daily 180 tablet 3    metroNIDAZOLE (METROGEL) 0.75 % gel 2 times daily Apply to face  10    multivitamin, therapeutic (THERA-VIT) TABS tablet Take 1 tablet by mouth daily      nitroGLYcerin (NITROSTAT) 0.4 MG sublingual tablet PLACE 1 TABLET (0.4 MG) UNDER THE TONGUE EVERY 5 MINUTES AS NEEDED FOR CHEST PAIN FOR CHEST PAIN PLACE 1 TABLET UNDER THE TONGUE EVERY 5 MINUTES  FOR 3 DOSES. IF SYMPTOMS PERSIST 5 MINUTES AFTER 1ST DOSE CALL 911. 90 tablet 3    oxymetazoline (RHOFADE) 1 % Crea [OXYMETAZOLINE (RHOFADE) 1 % CREA] Apply topically daily.      pantoprazole (PROTONIX) 40 MG EC tablet Take 1 tablet (40 mg) by mouth daily 90 tablet 3    triamcinolone (KENALOG) 0.1 % external cream Apply topically 2 times daily (Patient taking differently: Apply topically daily as needed Apply to neck rash) 80 g 0    Vitamin D3 (CHOLECALCIFEROL) 25 mcg (1000 units) tablet Take 4 tablets by mouth daily       No Known Allergies       Lab Results    Chemistry/lipid CBC Cardiac Enzymes/BNP/TSH/INR   Recent Labs   Lab Test 11/17/22  0857   CHOL 176   HDL 39*   *   TRIG 122     Recent Labs   Lab Test 11/17/22  0857 11/11/21  0849 11/05/20  0855   * 110 128     Recent Labs   Lab Test 03/30/23  0908      POTASSIUM 4.8   CHLORIDE 102   CO2 26   *   BUN 14   CR 0.99   GFRESTIMATED 86   RIOS 10.3     Recent Labs   Lab Test 03/30/23  0908 01/15/23  0405 01/14/23  0011   CR 0.99 0.88 0.82     Recent Labs   Lab Test 04/20/23  0919 11/17/22  0857 05/05/22  0824   A1C 6.9* 7.3* 7.8*          Recent Labs   Lab Test 01/17/23  0441 01/15/23  0811   WBC  --  13.7*   HGB  --  11.7*   HCT  --  35.2*   MCV  --  87    129*     Recent Labs   Lab Test 01/15/23  0811 01/14/23  0410 01/13/23  1307   HGB 11.7* 10.6* 12.7*    No results for input(s): TROPONINI in the last 92227 hours.  No results for input(s): BNP, NTBNPI, NTBNP in the last 43856 hours.  Recent Labs   Lab Test 04/20/23  0919   TSH 1.09     Recent Labs   Lab Test 01/13/23  1307 01/13/23  1207 12/16/22  0819   INR 1.25* 1.36* 0.99        Aki A. Kelli, DO      Today's clinic visit entailed:  38 minutes spent by me on the date of the encounter doing chart review, history and exam, documentation and further activities per the note.       Thank you for allowing me to participate in the care of your patient.      Sincerely,      Aki Pereira DO     [facility]  cc:   Aki Pereira DO  [address] [place] [address]

## 2023-07-21 ENCOUNTER — TELEPHONE (OUTPATIENT)
Dept: CARDIOLOGY | Facility: CLINIC | Age: 63
End: 2023-07-21
Payer: COMMERCIAL

## 2023-07-21 DIAGNOSIS — I25.10 CORONARY ARTERY DISEASE INVOLVING NATIVE CORONARY ARTERY OF NATIVE HEART WITHOUT ANGINA PECTORIS: Primary | ICD-10-CM

## 2023-07-21 RX ORDER — ROSUVASTATIN CALCIUM 40 MG/1
40 TABLET, COATED ORAL DAILY
Qty: 90 TABLET | Refills: 3 | Status: SHIPPED | OUTPATIENT
Start: 2023-07-21 | End: 2024-07-03

## 2023-07-21 NOTE — TELEPHONE ENCOUNTER
----- Message from Aki Pereira DO sent at 7/19/2023  6:37 PM CDT -----  LDL remains above goal.  Recommend changing to rosuvastatin 40 mg daily repeat fasting lipids in 3 months.

## 2023-07-21 NOTE — TELEPHONE ENCOUNTER
Called Kash and updated on results and recommendations from Dr. Pereira. He will change over from Lipitor to Rosuvastatin. Rx sent to preferred pharmacy. Lipid panel ordered for in 3 months, which he will do at PMD office. All questions answered. -[facility]

## 2023-07-27 ENCOUNTER — TRANSFERRED RECORDS (OUTPATIENT)
Dept: HEALTH INFORMATION MANAGEMENT | Facility: CLINIC | Age: 63
End: 2023-07-27
Payer: COMMERCIAL

## 2023-08-02 ENCOUNTER — MYC MEDICAL ADVICE (OUTPATIENT)
Dept: CARDIOLOGY | Facility: CLINIC | Age: 63
End: 2023-08-02
Payer: COMMERCIAL

## 2023-08-02 DIAGNOSIS — I10 ESSENTIAL HYPERTENSION: Primary | ICD-10-CM

## 2023-08-02 DIAGNOSIS — I50.9 NEW ONSET OF CONGESTIVE HEART FAILURE (H): ICD-10-CM

## 2023-08-04 RX ORDER — HYDROCHLOROTHIAZIDE 25 MG/1
12.5 TABLET ORAL DAILY
Qty: 45 TABLET | Refills: 2 | Status: SHIPPED | OUTPATIENT
Start: 2023-08-04 | End: 2023-08-14 | Stop reason: SINTOL

## 2023-08-04 NOTE — TELEPHONE ENCOUNTER
===View-only below this line===  ----- Message -----  From: Aki Pereira DO  Sent: 8/3/2023   1:14 PM CDT  To: Phil Ellis RN  Subject: FW: See below recent BP readings about 10 da*    Recommend we reduce hydrochlorothiazide to 12.5 mg daily.  Check BMP to monitor sodium given starting hydrochlorothiazide.      Medication prescription updated. BMP ordered.  message sent. CMM,Rn

## 2023-08-09 ENCOUNTER — HOSPITAL ENCOUNTER (OUTPATIENT)
Dept: CT IMAGING | Facility: CLINIC | Age: 63
Discharge: HOME | End: 2023-08-09
Attending: UROLOGY | Admitting: UROLOGY
Payer: COMMERCIAL

## 2023-08-09 DIAGNOSIS — N20.0 CALCULUS OF KIDNEY: ICD-10-CM

## 2023-08-09 PROCEDURE — 74176 CT ABD & PELVIS W/O CONTRAST: CPT

## 2023-08-11 ENCOUNTER — LAB (OUTPATIENT)
Dept: LAB | Facility: CLINIC | Age: 63
End: 2023-08-11
Payer: COMMERCIAL

## 2023-08-11 DIAGNOSIS — I10 ESSENTIAL HYPERTENSION: ICD-10-CM

## 2023-08-11 DIAGNOSIS — I50.9 NEW ONSET OF CONGESTIVE HEART FAILURE (H): ICD-10-CM

## 2023-08-11 DIAGNOSIS — I25.10 CORONARY ARTERY DISEASE INVOLVING NATIVE CORONARY ARTERY OF NATIVE HEART WITHOUT ANGINA PECTORIS: ICD-10-CM

## 2023-08-11 LAB
ANION GAP SERPL CALCULATED.3IONS-SCNC: 9 MMOL/L (ref 5–18)
BUN SERPL-MCNC: 15 MG/DL (ref 8–22)
CALCIUM SERPL-MCNC: 10.5 MG/DL (ref 8.5–10.5)
CHLORIDE BLD-SCNC: 100 MMOL/L (ref 98–107)
CHOLEST SERPL-MCNC: 148 MG/DL
CO2 SERPL-SCNC: 28 MMOL/L (ref 22–31)
CREAT SERPL-MCNC: 1.43 MG/DL (ref 0.7–1.3)
FASTING STATUS PATIENT QL REPORTED: YES
GFR SERPL CREATININE-BSD FRML MDRD: 55 ML/MIN/1.73M2
GLUCOSE BLD-MCNC: 149 MG/DL (ref 70–125)
HDLC SERPL-MCNC: 31 MG/DL
LDLC SERPL CALC-MCNC: 83 MG/DL
POTASSIUM BLD-SCNC: 3.5 MMOL/L (ref 3.5–5)
SODIUM SERPL-SCNC: 137 MMOL/L (ref 136–145)
TRIGL SERPL-MCNC: 169 MG/DL

## 2023-08-11 PROCEDURE — 36415 COLL VENOUS BLD VENIPUNCTURE: CPT

## 2023-08-11 PROCEDURE — 82310 ASSAY OF CALCIUM: CPT

## 2023-08-11 PROCEDURE — 80061 LIPID PANEL: CPT

## 2023-08-15 ENCOUNTER — TELEPHONE (OUTPATIENT)
Dept: CARDIOLOGY | Facility: CLINIC | Age: 63
End: 2023-08-15
Payer: COMMERCIAL

## 2023-08-15 DIAGNOSIS — I25.118 CORONARY ARTERY DISEASE OF NATIVE ARTERY OF NATIVE HEART WITH STABLE ANGINA PECTORIS (H): ICD-10-CM

## 2023-08-15 DIAGNOSIS — I10 ESSENTIAL HYPERTENSION: Primary | ICD-10-CM

## 2023-08-15 NOTE — TELEPHONE ENCOUNTER
Called patient to review recent elevated blood pressure reading.  Per [place] Community Measures guidelines, patients blood pressure is out of parameters and recheck blood pressure is recommended.    Last Blood Pressure: 166/92  Last Heart Rate: 68  Date: 7/19/23  Location: [facility]    Today's Blood Pressure: 166/97  Today's Heart Rate: 63  Location: Home BP    Patient reported blood pressure updated in Epic. Blood pressure continues to fall outside of the [place] Community Measures guidelines.  Message sent to primary cardiology team for further review.    Eliza Smith LPN

## 2023-08-16 NOTE — TELEPHONE ENCOUNTER
===View-only below this line===  ----- Message -----  From: Aki Pereira DO  Sent: 8/16/2023   4:37 PM CDT  To: Phil Ellis RN    Start amlodpine 5 mg daily as alternative to hydrochlorothiazide.

## 2023-08-16 NOTE — TELEPHONE ENCOUNTER
Dr Almaguer please review. Recent attempt of hydrochlorothiazide 12.5 mg once daily to help with BP control. Kidney function lab values abnormal. Medication was stopped. Please review BP readings reported. Alternate medication to attempt at this time? CEDRICK,RN

## 2023-08-17 RX ORDER — AMLODIPINE BESYLATE 5 MG/1
5 TABLET ORAL DAILY
Qty: 30 TABLET | Refills: 0 | Status: SHIPPED | OUTPATIENT
Start: 2023-08-17 | End: 2023-09-11

## 2023-08-17 NOTE — TELEPHONE ENCOUNTER
Recommendations discussed with pt.  Pt reported that he was instructed to take BP while on the phone with staff and at that time he was not sitting quietly before checking - BP was  166/97.    Pt rechecked BP after sitting quietly for a period of time, and BP was 138/89, HR 64.    Pt will start amlodipine 5 mg daily, and continue to monitor BP, and for dizziness / lightheadedness.  Pt will call back if not tolerating medication.  jose

## 2023-08-21 ENCOUNTER — VIRTUAL VISIT (OUTPATIENT)
Dept: UROLOGY | Facility: CLINIC | Age: 63
End: 2023-08-21
Payer: COMMERCIAL

## 2023-08-21 ENCOUNTER — TRANSFERRED RECORDS (OUTPATIENT)
Dept: HEALTH INFORMATION MANAGEMENT | Facility: CLINIC | Age: 63
End: 2023-08-21

## 2023-08-21 DIAGNOSIS — N20.0 CALCULUS OF KIDNEY: Primary | ICD-10-CM

## 2023-08-21 LAB — RETINOPATHY: NEGATIVE

## 2023-08-21 PROCEDURE — 99213 OFFICE O/P EST LOW 20 MIN: CPT | Mod: GT | Performed by: UROLOGY

## 2023-08-21 NOTE — PROGRESS NOTES
Virtual Visit Details    Type of service:  Video Visit     Originating Location (pt. Location): Home    Distant Location (provider location):  On-site  Platform used for Video Visit: Josué    Name: Kash Tavarez   MRN: [id number]  YOB: 1960    Assessment and Plan:  63 year old male with history of bilateral nephrolithiasis s/p right ureteroscopy last year.  Made dietary changes and no new stone growth over 1 year.    Calcium oxalate nephrolithaisis, stable  Hyperoxaluria (dietary), likely improved     Congratulated patient on making dietary changes and they have made a difference given no new stone growth since last year.      Because of this, can follow-up as needed or if has concern for stone growth in the future or an acute stone event.     Plan:  -follow-up as needed    Hari Waggoner MD  August 21, 2023         Chief Complaint: Stone follow-up    History of Present Illness:  Kash Tavarez is a 63 year old male seen with a history of right ureteral and renals stones who was found to have.  Patient most recently underwent right ureteroscopy on 5/12/2022 for stone disease.      24 hr urine study last year demonstrated hyperoxaluria.  He reduce nut intake and increased fluid intake since then.  No stone events over the last year.    Had CABG since last visit and has been working to get BP under better control.  Recently had high Cr and had BP regimen changed because of this.     Most recent imaging (CT abd/pel on 8/9/2023): no new stones, bilateral 1-2 mm stone without hydronephrosis    Metabolic:  Stone risk factors: hyperoxaluria  Blood work:  Potassium   Date Value Ref Range Status   08/11/2023 3.5 3.5 - 5.0 mmol/L Final     Creatinine   Date Value Ref Range Status   08/11/2023 1.43 (H) 0.70 - 1.30 mg/dL Final     Calcium   Date Value Ref Range Status   08/11/2023 10.5 8.5 - 10.5 mg/dL Final     Phosphorus   Date Value Ref Range Status   01/17/2023 3.6 2.5 - 4.5 mg/dL Final     Magnesium    Date Value Ref Range Status   01/17/2023 1.9 1.7 - 2.3 mg/dL Final           Allergies:  No Known Allergies         Medications:  Current Outpatient Medications   Medication Sig    ACCU-CHEK FASTCLIX Misc [ACCU-CHEK FASTCLIX MISC] TEST TWICE DAILY    ACCU-CHEK GUIDE test strip USE 1 EACH AS DIRECTED 2 (TWO) TIMES A DAY AT 7:30AM AND 4:30PM.    acetaminophen (TYLENOL) 325 MG tablet Take 2 tablets (650 mg) by mouth every 4 hours as needed for other (For optimal non-opioid multimodal pain management to improve pain control.)    amLODIPine (NORVASC) 5 MG tablet Take 1 tablet (5 mg) by mouth daily    aspirin (ASA) 81 MG chewable tablet Take 2 tablets (162 mg) by mouth daily    blood glucose monitoring (ACCU-CHEK FASTCLIX) lancets USE TWICE A DAY (AT 7:30 AM & 4:30 PM) AS DIRECTED    blood-glucose meter Misc [BLOOD-GLUCOSE METER MISC] Use 1 Device As Directed 2 (two) times a day at 9am and 6pm.    imiquimod (ALDARA) 5 % cream Apply topically daily To warts    loratadine (CLARITIN) 10 MG tablet Take 10 mg by mouth daily    losartan (COZAAR) 100 MG tablet Take 1 tablet (100 mg) by mouth daily    metFORMIN (GLUCOPHAGE) 500 MG tablet TAKE 1 TABLET (500 MG) BY MOUTH 3 TIMES DAILY (WITH MEALS)    Metoprolol Tartrate 75 MG TABS Take 75 mg by mouth 2 times daily    metroNIDAZOLE (METROGEL) 0.75 % gel 2 times daily Apply to face    multivitamin, therapeutic (THERA-VIT) TABS tablet Take 1 tablet by mouth daily    nitroGLYcerin (NITROSTAT) 0.4 MG sublingual tablet PLACE 1 TABLET (0.4 MG) UNDER THE TONGUE EVERY 5 MINUTES AS NEEDED FOR CHEST PAIN FOR CHEST PAIN PLACE 1 TABLET UNDER THE TONGUE EVERY 5 MINUTES FOR 3 DOSES. IF SYMPTOMS PERSIST 5 MINUTES AFTER 1ST DOSE CALL 911.    oxymetazoline (RHOFADE) 1 % Crea [OXYMETAZOLINE (RHOFADE) 1 % CREA] Apply topically daily.    pantoprazole (PROTONIX) 40 MG EC tablet Take 1 tablet (40 mg) by mouth daily    rosuvastatin (CRESTOR) 40 MG tablet Take 1 tablet (40 mg) by mouth daily     "triamcinolone (KENALOG) 0.1 % external cream Apply topically 2 times daily (Patient taking differently: Apply topically daily as needed Apply to neck rash)    Vitamin D3 (CHOLECALCIFEROL) 25 mcg (1000 units) tablet Take 4 tablets by mouth daily     No current facility-administered medications for this visit.       Review of Systems:   ROS: 10 point ROS neg other than the symptoms noted above in the HPI.    Physical Exam:  B/P: Data Unavailable, T: Data Unavailable, P: Data Unavailable, R: Data Unavailable  Estimated body mass index is 31.91 kg/m  as calculated from the following:    Height as of 7/19/23: 1.88 m (6' 2\").    Weight as of 7/19/23: 112.7 kg (248 lb 8 oz).  General: age-appropriate appearing male in NAD.  "

## 2023-08-24 ENCOUNTER — OFFICE VISIT (OUTPATIENT)
Dept: FAMILY MEDICINE | Facility: CLINIC | Age: 63
End: 2023-08-24
Payer: COMMERCIAL

## 2023-08-24 VITALS
BODY MASS INDEX: 32.07 KG/M2 | RESPIRATION RATE: 14 BRPM | WEIGHT: 242 LBS | OXYGEN SATURATION: 96 % | TEMPERATURE: 98 F | SYSTOLIC BLOOD PRESSURE: 125 MMHG | DIASTOLIC BLOOD PRESSURE: 79 MMHG | HEIGHT: 73 IN | HEART RATE: 55 BPM

## 2023-08-24 DIAGNOSIS — E11.9 TYPE 2 DIABETES MELLITUS WITHOUT COMPLICATION, WITHOUT LONG-TERM CURRENT USE OF INSULIN (H): ICD-10-CM

## 2023-08-24 DIAGNOSIS — R79.89 ELEVATED SERUM CREATININE: ICD-10-CM

## 2023-08-24 DIAGNOSIS — Z23 NEED FOR VACCINATION: ICD-10-CM

## 2023-08-24 DIAGNOSIS — H91.93 BILATERAL HEARING LOSS, UNSPECIFIED HEARING LOSS TYPE: ICD-10-CM

## 2023-08-24 DIAGNOSIS — I25.10 CORONARY ARTERY DISEASE INVOLVING NATIVE CORONARY ARTERY OF NATIVE HEART WITHOUT ANGINA PECTORIS: Primary | ICD-10-CM

## 2023-08-24 LAB
CREAT UR-MCNC: 257 MG/DL
MICROALBUMIN UR-MCNC: 504 MG/L
MICROALBUMIN/CREAT UR: 196.11 MG/G CR (ref 0–17)

## 2023-08-24 PROCEDURE — 82043 UR ALBUMIN QUANTITATIVE: CPT | Performed by: FAMILY MEDICINE

## 2023-08-24 PROCEDURE — 82570 ASSAY OF URINE CREATININE: CPT | Performed by: FAMILY MEDICINE

## 2023-08-24 PROCEDURE — 90471 IMMUNIZATION ADMIN: CPT | Performed by: FAMILY MEDICINE

## 2023-08-24 PROCEDURE — 90715 TDAP VACCINE 7 YRS/> IM: CPT | Performed by: FAMILY MEDICINE

## 2023-08-24 PROCEDURE — 99214 OFFICE O/P EST MOD 30 MIN: CPT | Mod: 25 | Performed by: FAMILY MEDICINE

## 2023-08-24 NOTE — PROGRESS NOTES
ASSESMENT AND PLAN:  Diagnoses and all orders for this visit:  Coronary artery disease involving native coronary artery of native heart without angina pectoris  Stable.  Extensive review of the recent cardiology medication changes and recommendations with the patient.  We will continue rosuvastatin which showed a good improvement in his LDL.  Continue his other medical management.  Type 2 diabetes mellitus without complication, without long-term current use of insulin (H)  Last A1c was 6.9, he has good control.  We are going to defer his A1c to his follow-up labs in early October.  Counseled on the importance of continued healthy eating and regular exercise.  -     Albumin Random Urine Quantitative with Creat Ratio; Future  Elevated serum creatinine in the setting of hypertension  Recent significant rise in the creatinine with hydrochlorothiazide addition which has now been discontinued.  He will be due for recheck basic metabolic panel, plan after early October as a lab only visit, we will check his A1c at that time as well.  Mild hearing loss, likely high-frequency   Counseled the patient that there is no cerumen impaction based on his exam today, he does know an audiologist and is considering following up for a detailed evaluation.  need for vaccination  Immunization review and counseling done with the patient.  -     TDAP 10-64Y (ADACEL,BOOSTRIX)      Reviewed the risks and benefits of the treatment plan with the patient and/or caregiver and we discussed indications for routine and emergent follow-up.        SUBJECTIVE: Patient has not been having any chest pain.  He has been slowly increasing his level of physical exertion.  Patient notes that he has some mildly diminished hearing, he is wondering if he might have some wax blockage in the ears.  He has noticed a slow decline in his vision over the last couple of years, it minimally bothers him in his daily life.  More noticeable with background noise.  See  additional history below.    Past Medical History:   Diagnosis Date    Coronary artery disease of native artery of native heart with stable angina pectoris (H)     Hypertension     Nephrolithiasis     Type 2 diabetes mellitus (H)      Patient Active Problem List   Diagnosis    Nephrolithiasis    Obesity    Allergic Rhinitis    Mixed hyperlipidemia    Essential Hypertension    Obesity (BMI 35.0-39.9) with comorbidity (H)    Type 2 diabetes mellitus without complication, without long-term current use of insulin (H)    Calculus of ureter    Hydronephrosis with urinary obstruction due to ureteral calculus    Coronary artery disease involving native coronary artery of native heart without angina pectoris     Current Outpatient Medications   Medication Sig Dispense Refill    ACCU-CHEK FASTCLIX Misc [ACCU-CHEK FASTCLIX MISC] TEST TWICE DAILY 100 each 0    ACCU-CHEK GUIDE test strip USE 1 EACH AS DIRECTED 2 (TWO) TIMES A DAY AT 7:30AM AND 4:30PM. 200 strip 3    acetaminophen (TYLENOL) 325 MG tablet Take 2 tablets (650 mg) by mouth every 4 hours as needed for other (For optimal non-opioid multimodal pain management to improve pain control.)      amLODIPine (NORVASC) 5 MG tablet Take 1 tablet (5 mg) by mouth daily 30 tablet 0    aspirin (ASA) 81 MG chewable tablet Take 2 tablets (162 mg) by mouth daily 180 tablet 3    blood glucose monitoring (ACCU-CHEK FASTCLIX) lancets USE TWICE A DAY (AT 7:30 AM & 4:30 PM) AS DIRECTED 204 each 3    blood-glucose meter Misc [BLOOD-GLUCOSE METER MISC] Use 1 Device As Directed 2 (two) times a day at 9am and 6pm. 1 each 0    imiquimod (ALDARA) 5 % cream Apply topically daily To warts  3    loratadine (CLARITIN) 10 MG tablet Take 10 mg by mouth daily      losartan (COZAAR) 100 MG tablet Take 1 tablet (100 mg) by mouth daily 90 tablet 3    metFORMIN (GLUCOPHAGE) 500 MG tablet TAKE 1 TABLET (500 MG) BY MOUTH 3 TIMES DAILY (WITH MEALS) 270 tablet 3    Metoprolol Tartrate 75 MG TABS Take 75 mg by  "mouth 2 times daily 180 tablet 3    metroNIDAZOLE (METROGEL) 0.75 % gel 2 times daily Apply to face  10    multivitamin, therapeutic (THERA-VIT) TABS tablet Take 1 tablet by mouth daily      nitroGLYcerin (NITROSTAT) 0.4 MG sublingual tablet PLACE 1 TABLET (0.4 MG) UNDER THE TONGUE EVERY 5 MINUTES AS NEEDED FOR CHEST PAIN FOR CHEST PAIN PLACE 1 TABLET UNDER THE TONGUE EVERY 5 MINUTES FOR 3 DOSES. IF SYMPTOMS PERSIST 5 MINUTES AFTER 1ST DOSE CALL 911. 90 tablet 3    oxymetazoline (RHOFADE) 1 % Crea [OXYMETAZOLINE (RHOFADE) 1 % CREA] Apply topically daily.      pantoprazole (PROTONIX) 40 MG EC tablet Take 1 tablet (40 mg) by mouth daily 90 tablet 3    rosuvastatin (CRESTOR) 40 MG tablet Take 1 tablet (40 mg) by mouth daily 90 tablet 3    triamcinolone (KENALOG) 0.1 % external cream Apply topically 2 times daily (Patient taking differently: Apply topically daily as needed Apply to neck rash) 80 g 0    Vitamin D3 (CHOLECALCIFEROL) 25 mcg (1000 units) tablet Take 4 tablets by mouth daily       History   Smoking Status    Never   Smokeless Tobacco    Never       OBJECTICE: /79   Pulse 55   Temp 98  F (36.7  C) (Oral)   Resp 14   Ht 1.86 m (6' 1.23\")   Wt 109.8 kg (242 lb)   SpO2 96%   BMI 31.73 kg/m       No results found for this or any previous visit (from the past 24 hour(s)).     HEENT-there is minimal cerumen in both ear canals without any obstruction or impaction, visualized portions of the tympanic membranes appear normal   CV-regular rate and rhythm with no murmur   RESP-lungs clear to auscultation   EXTREM-no pitting edema of the ankles   Foot exam-normal.  No ulcers.  Palpable pedal pulses bilaterally.              8/24/2023     8:00 AM   Additional Questions   Roomed by shahzad zaldivar       History of Present Illness       Diabetes:   He presents for follow up of diabetes.  He is checking home blood glucose two times daily.   He checks blood glucose before and after meals.  Blood glucose is never over 200 " and never under 70. He is aware of hypoglycemia symptoms including dizziness.    He has no concerns regarding his diabetes at this time.   He is not experiencing numbness or burning in feet, excessive thirst, blurry vision, weight changes or redness, sores or blisters on feet. The patient has had a diabetic eye exam in the last 12 months. Eye exam performed on 10/21/2023. Location of last eye exam [facility].        Hypertension: He presents for follow up of hypertension.  He does check blood pressure  regularly outside of the clinic. Outside blood pressures have been over 140/90. He follows a low salt diet.     Reason for visit:  Cardiac Surgery Update    He eats 4 or more servings of fruits and vegetables daily.He consumes 0 sweetened beverage(s) daily.He exercises with enough effort to increase his heart rate 20 to 29 minutes per day.  He exercises with enough effort to increase his heart rate 4 days per week.   He is taking medications regularly.     Prior to immunization administration, verified patients identity using patient s name and date of birth. Please see Immunization Activity for additional information.     Screening Questionnaire for Adult Immunization    Are you sick today?   No   Do you have allergies to medications, food, a vaccine component or latex?   No   Have you ever had a serious reaction after receiving a vaccination?   No   Do you have a long-term health problem with heart, lung, kidney, or metabolic disease (e.g., diabetes), asthma, a blood disorder, no spleen, complement component deficiency, a cochlear implant, or a spinal fluid leak?  Are you on long-term aspirin therapy?   Yes   Do you have cancer, leukemia, HIV/AIDS, or any other immune system problem?   No   Do you have a parent, brother, or sister with an immune system problem?   No   In the past 3 months, have you taken medications that affect  your immune system, such as prednisone, other steroids, or  anticancer drugs; drugs for the treatment of rheumatoid arthritis, Crohn s disease, or psoriasis; or have you had radiation treatments?   No   Have you had a seizure, or a brain or other nervous system problem?   No   During the past year, have you received a transfusion of blood or blood    products, or been given immune (gamma) globulin or antiviral drug?   No   For women: Are you pregnant or is there a chance you could become       pregnant during the next month?   No   Have you received any vaccinations in the past 4 weeks?   No     Immunization questionnaire was positive for at least one answer.  Notified Dr Nuñez.      Patient instructed to remain in clinic for 15 minutes afterwards, and to report any adverse reactions.     Screening performed by Tammy Prater on 8/24/2023 at 8:57 AM.

## 2023-09-11 ENCOUNTER — TELEPHONE (OUTPATIENT)
Dept: FAMILY MEDICINE | Facility: CLINIC | Age: 63
End: 2023-09-11
Payer: COMMERCIAL

## 2023-09-11 DIAGNOSIS — I10 ESSENTIAL HYPERTENSION: ICD-10-CM

## 2023-09-11 DIAGNOSIS — I25.118 CORONARY ARTERY DISEASE OF NATIVE ARTERY OF NATIVE HEART WITH STABLE ANGINA PECTORIS (H): ICD-10-CM

## 2023-09-11 RX ORDER — AMLODIPINE BESYLATE 5 MG/1
5 TABLET ORAL DAILY
Qty: 30 TABLET | Refills: 0 | Status: SHIPPED | OUTPATIENT
Start: 2023-09-11 | End: 2023-10-04

## 2023-09-11 NOTE — TELEPHONE ENCOUNTER
Medication Question or Refill    Contacts         Type Contact Phone/Fax    09/11/2023 01:26 PM CDT Phone (Incoming) Kash Tavarez (Self) [phone] (M)          What medication are you calling about (include dose and sig)?:     amLODIPine (NORVASC) 5 MG tablet     [id number] - [place] - [address] AT [facility]. [facility]. & [address]  [address] [facility] [address]  Phone: [phone] Fax: [phone]  Who prescribed the medication?:       Aki Pereira, DO   Prescribed by Cardiology and now wants PCP, Dr Nuñez to refill    Do you need a refill? Yes. Patient only wants # 30 as he will have labs drawn in 10/2023    When did you use the medication last? Today, 9/11/23    Patient will have labs drawn in 10/2023      Could we send this information to you in [facility] or would you prefer to receive a phone call?:   Patient would prefer a phone call   Okay to leave a detailed message?: Yes at Cell number on file:    Telephone Information:   Mobile [phone]     Message routed to PCP, Dr Nuñez for refview / plan    Leanna Rangel RN  [facility]

## 2023-09-11 NOTE — TELEPHONE ENCOUNTER
Let patient know that Dr Nuñez refilled Rx.  No further action needed.    Leanna Rangel RN  [facility]

## 2023-09-28 DIAGNOSIS — Z76.0 ENCOUNTER FOR MEDICATION REFILL: ICD-10-CM

## 2023-09-28 DIAGNOSIS — Z95.1 S/P CABG (CORONARY ARTERY BYPASS GRAFT): ICD-10-CM

## 2023-09-28 RX ORDER — PANTOPRAZOLE SODIUM 40 MG/1
40 TABLET, DELAYED RELEASE ORAL DAILY
Qty: 90 TABLET | Refills: 2 | Status: SHIPPED | OUTPATIENT
Start: 2023-09-28 | End: 2024-02-22

## 2023-10-04 DIAGNOSIS — I10 ESSENTIAL HYPERTENSION: ICD-10-CM

## 2023-10-04 DIAGNOSIS — I25.118 CORONARY ARTERY DISEASE OF NATIVE ARTERY OF NATIVE HEART WITH STABLE ANGINA PECTORIS (H): ICD-10-CM

## 2023-10-04 RX ORDER — AMLODIPINE BESYLATE 5 MG/1
5 TABLET ORAL DAILY
Qty: 90 TABLET | Refills: 3 | Status: SHIPPED | OUTPATIENT
Start: 2023-10-04 | End: 2024-09-29

## 2023-10-05 ENCOUNTER — LAB (OUTPATIENT)
Dept: LAB | Facility: CLINIC | Age: 63
End: 2023-10-05
Payer: COMMERCIAL

## 2023-10-05 DIAGNOSIS — E11.9 TYPE 2 DIABETES MELLITUS WITHOUT COMPLICATION, WITHOUT LONG-TERM CURRENT USE OF INSULIN (H): ICD-10-CM

## 2023-10-05 DIAGNOSIS — R79.89 ELEVATED SERUM CREATININE: ICD-10-CM

## 2023-10-05 LAB
ANION GAP SERPL CALCULATED.3IONS-SCNC: 11 MMOL/L (ref 7–15)
BUN SERPL-MCNC: 19.5 MG/DL (ref 8–23)
CALCIUM SERPL-MCNC: 9.8 MG/DL (ref 8.8–10.2)
CHLORIDE SERPL-SCNC: 103 MMOL/L (ref 98–107)
CREAT SERPL-MCNC: 1 MG/DL (ref 0.67–1.17)
DEPRECATED HCO3 PLAS-SCNC: 27 MMOL/L (ref 22–29)
EGFRCR SERPLBLD CKD-EPI 2021: 85 ML/MIN/1.73M2
GLUCOSE SERPL-MCNC: 182 MG/DL (ref 70–99)
HBA1C MFR BLD: 7.5 %
POTASSIUM SERPL-SCNC: 4.5 MMOL/L (ref 3.4–5.3)
SODIUM SERPL-SCNC: 141 MMOL/L (ref 135–145)

## 2023-10-05 PROCEDURE — 80048 BASIC METABOLIC PNL TOTAL CA: CPT

## 2023-10-05 PROCEDURE — 36415 COLL VENOUS BLD VENIPUNCTURE: CPT

## 2023-10-05 PROCEDURE — 83036 HEMOGLOBIN GLYCOSYLATED A1C: CPT

## 2023-12-07 ASSESSMENT — ENCOUNTER SYMPTOMS
CONSTIPATION: 0
PALPITATIONS: 0
JOINT SWELLING: 0
PARESTHESIAS: 0
NERVOUS/ANXIOUS: 0
FEVER: 0
FREQUENCY: 0
SORE THROAT: 0
HEARTBURN: 0
DYSURIA: 0
COUGH: 0
HEMATOCHEZIA: 0
EYE PAIN: 0
ARTHRALGIAS: 0
DIARRHEA: 0
MYALGIAS: 0
HEMATURIA: 0
HEADACHES: 0
ABDOMINAL PAIN: 0
SHORTNESS OF BREATH: 0
DIZZINESS: 0
WEAKNESS: 0
NAUSEA: 0
CHILLS: 0

## 2023-12-14 ENCOUNTER — OFFICE VISIT (OUTPATIENT)
Dept: FAMILY MEDICINE | Facility: CLINIC | Age: 63
End: 2023-12-14
Payer: COMMERCIAL

## 2023-12-14 VITALS
BODY MASS INDEX: 32.22 KG/M2 | SYSTOLIC BLOOD PRESSURE: 138 MMHG | HEIGHT: 73 IN | RESPIRATION RATE: 16 BRPM | TEMPERATURE: 97 F | OXYGEN SATURATION: 98 % | WEIGHT: 243.08 LBS | HEART RATE: 57 BPM | DIASTOLIC BLOOD PRESSURE: 84 MMHG

## 2023-12-14 DIAGNOSIS — E78.2 MIXED HYPERLIPIDEMIA: ICD-10-CM

## 2023-12-14 DIAGNOSIS — R80.9 PROTEINURIA, UNSPECIFIED TYPE: ICD-10-CM

## 2023-12-14 DIAGNOSIS — Z00.00 ROUTINE GENERAL MEDICAL EXAMINATION AT HEALTH CARE FACILITY: Primary | ICD-10-CM

## 2023-12-14 DIAGNOSIS — M25.561 RIGHT KNEE PAIN, UNSPECIFIED CHRONICITY: ICD-10-CM

## 2023-12-14 DIAGNOSIS — I11.0 HYPERTENSIVE HEART DISEASE WITH HEART FAILURE (H): ICD-10-CM

## 2023-12-14 LAB
ALBUMIN UR-MCNC: >=300 MG/DL
ANION GAP SERPL CALCULATED.3IONS-SCNC: 13 MMOL/L (ref 7–15)
APPEARANCE UR: ABNORMAL
BACTERIA #/AREA URNS HPF: ABNORMAL /HPF
BILIRUB UR QL STRIP: NEGATIVE
BUN SERPL-MCNC: 16.1 MG/DL (ref 8–23)
CALCIUM SERPL-MCNC: 10.3 MG/DL (ref 8.8–10.2)
CHLORIDE SERPL-SCNC: 98 MMOL/L (ref 98–107)
COLOR UR AUTO: YELLOW
CREAT SERPL-MCNC: 1.13 MG/DL (ref 0.67–1.17)
DEPRECATED HCO3 PLAS-SCNC: 27 MMOL/L (ref 22–29)
EGFRCR SERPLBLD CKD-EPI 2021: 73 ML/MIN/1.73M2
GLUCOSE SERPL-MCNC: 170 MG/DL (ref 70–99)
GLUCOSE UR STRIP-MCNC: NEGATIVE MG/DL
GRAN CASTS #/AREA URNS LPF: ABNORMAL /LPF
HGB UR QL STRIP: NEGATIVE
HYALINE CASTS #/AREA URNS LPF: ABNORMAL /LPF
KETONES UR STRIP-MCNC: NEGATIVE MG/DL
LEUKOCYTE ESTERASE UR QL STRIP: NEGATIVE
MUCOUS THREADS #/AREA URNS LPF: PRESENT /LPF
NITRATE UR QL: NEGATIVE
PH UR STRIP: 6 [PH] (ref 5–7)
POTASSIUM SERPL-SCNC: 5 MMOL/L (ref 3.4–5.3)
PSA SERPL DL<=0.01 NG/ML-MCNC: 1.37 NG/ML (ref 0–4.5)
RBC #/AREA URNS AUTO: ABNORMAL /HPF
SODIUM SERPL-SCNC: 138 MMOL/L (ref 135–145)
SP GR UR STRIP: 1.02 (ref 1–1.03)
SQUAMOUS #/AREA URNS AUTO: ABNORMAL /LPF
UROBILINOGEN UR STRIP-ACNC: 1 E.U./DL
WBC #/AREA URNS AUTO: ABNORMAL /HPF
WBC CASTS #/AREA URNS LPF: ABNORMAL /LPF

## 2023-12-14 PROCEDURE — 80048 BASIC METABOLIC PNL TOTAL CA: CPT | Performed by: FAMILY MEDICINE

## 2023-12-14 PROCEDURE — 36415 COLL VENOUS BLD VENIPUNCTURE: CPT | Performed by: FAMILY MEDICINE

## 2023-12-14 PROCEDURE — 81001 URINALYSIS AUTO W/SCOPE: CPT | Performed by: FAMILY MEDICINE

## 2023-12-14 PROCEDURE — 99213 OFFICE O/P EST LOW 20 MIN: CPT | Mod: 25 | Performed by: FAMILY MEDICINE

## 2023-12-14 PROCEDURE — 99396 PREV VISIT EST AGE 40-64: CPT | Mod: 25 | Performed by: FAMILY MEDICINE

## 2023-12-14 PROCEDURE — 90677 PCV20 VACCINE IM: CPT | Performed by: FAMILY MEDICINE

## 2023-12-14 PROCEDURE — G0103 PSA SCREENING: HCPCS | Performed by: FAMILY MEDICINE

## 2023-12-14 PROCEDURE — 90471 IMMUNIZATION ADMIN: CPT | Performed by: FAMILY MEDICINE

## 2023-12-14 ASSESSMENT — ENCOUNTER SYMPTOMS
FREQUENCY: 0
WEAKNESS: 0
EYE PAIN: 0
SORE THROAT: 0
MYALGIAS: 0
NAUSEA: 0
SHORTNESS OF BREATH: 0
DIARRHEA: 0
CHILLS: 0
COUGH: 0
JOINT SWELLING: 0
CONSTIPATION: 0
HEMATOCHEZIA: 0
PALPITATIONS: 0
ABDOMINAL PAIN: 0
HEMATURIA: 0
NERVOUS/ANXIOUS: 0
DYSURIA: 0
DIZZINESS: 0
ARTHRALGIAS: 0
HEARTBURN: 0
PARESTHESIAS: 0
HEADACHES: 0
FEVER: 0

## 2023-12-14 NOTE — PROGRESS NOTES
ASSESMENT AND PLAN:    Physical, Health Maitenence -   Reviewed healthy lifestyle, diet, exercise, vitamins, and follow-up plan today with patient.  Reviewed age appropriate cancer and other screening recommendations.  Immunization review and update done.  Reviewed indicated lab tests, see lab orders.   -     PSA, screen; Future  -     Basic metabolic panel  (Ca, Cl, CO2, Creat, Gluc, K, Na, BUN); Future  -     UA Macroscopic with reflex to Microscopic and Culture - Lab Collect; Future  -     PNEUMOCOCCAL 20 VALENT CONJUGATE (PREVNAR 20)  -     UA Microscopic with Reflex to Culture  Proteinuria   Patient having foamy urine symptoms over the last several months as detailed below.  His UA today shows significant proteinuria.  I think it is at the point where he does need to see a nephrologist.  Patient agreeable with the plan and I did put in a nephrology referral.  For now he will continue his current medical management of his diabetes and hypertension.  He is on losartan.  mixed hyperlipidemia  Continue rosuvastatin.  Healthy eating and regular exercise.  Hypertensive heart disease with heart failure (H)  Stable.  Continue current medical management and follow-up with cardiology as directed.  -     Basic metabolic panel  (Ca, Cl, CO2, Creat, Gluc, K, Na, BUN); Future  -     UA Macroscopic with reflex to Microscopic and Culture - Lab Collect; Future  -     UA Microscopic with Reflex to Culture  Right knee pain, unspecified chronicity  I do not see any things on history or exam consistent with any significant ligamentous pathology.  More likely some mild osteoarthritis or other mild musculoskeletal etiology.  I instructed the patient on quadricep strengthening.  Follow-up if not improving.      HPI: 63-year-old male here for his physical.  Patient also reports has been having some mild right knee pain intermittently.  It feels like a mild stabbing discomfort on the lateral aspect of the right knee and occurs only  occasionally.  No injury that he can think of.  He does have a significant cardiac history and has not been having any chest pain or out of the ordinary shortness of breath.  His exercise tolerance has improved.  He has noticed some bubbling of the urine over the last few months that definitely seems out of the ordinary to him.      Past Medical History:   Diagnosis Date    Coronary artery disease of native artery of native heart with stable angina pectoris (H24)     Hypertension     Nephrolithiasis     Type 2 diabetes mellitus (H)        Current Outpatient Medications   Medication Sig Dispense Refill    ACCU-CHEK FASTCLIX Misc [ACCU-CHEK FASTCLIX MISC] TEST TWICE DAILY 100 each 0    ACCU-CHEK GUIDE test strip USE 1 EACH AS DIRECTED 2 (TWO) TIMES A DAY AT 7:30AM AND 4:30PM. 200 strip 3    acetaminophen (TYLENOL) 325 MG tablet Take 2 tablets (650 mg) by mouth every 4 hours as needed for other (For optimal non-opioid multimodal pain management to improve pain control.)      amLODIPine (NORVASC) 5 MG tablet TAKE 1 TABLET BY MOUTH EVERY DAY 90 tablet 3    aspirin (ASA) 81 MG chewable tablet Take 2 tablets (162 mg) by mouth daily 180 tablet 3    blood glucose monitoring (ACCU-CHEK FASTCLIX) lancets USE TWICE A DAY (AT 7:30 AM & 4:30 PM) AS DIRECTED 204 each 3    blood-glucose meter Misc [BLOOD-GLUCOSE METER MISC] Use 1 Device As Directed 2 (two) times a day at 9am and 6pm. 1 each 0    imiquimod (ALDARA) 5 % cream Apply topically daily To warts  3    loratadine (CLARITIN) 10 MG tablet Take 10 mg by mouth daily      losartan (COZAAR) 100 MG tablet Take 1 tablet (100 mg) by mouth daily 90 tablet 3    metFORMIN (GLUCOPHAGE) 500 MG tablet TAKE 1 TABLET (500 MG) BY MOUTH 3 TIMES DAILY (WITH MEALS) 270 tablet 3    Metoprolol Tartrate 75 MG TABS Take 75 mg by mouth 2 times daily 180 tablet 3    metroNIDAZOLE (METROGEL) 0.75 % gel 2 times daily Apply to face  10    multivitamin, therapeutic (THERA-VIT) TABS tablet Take 1 tablet  by mouth daily      nitroGLYcerin (NITROSTAT) 0.4 MG sublingual tablet PLACE 1 TABLET (0.4 MG) UNDER THE TONGUE EVERY 5 MINUTES AS NEEDED FOR CHEST PAIN FOR CHEST PAIN PLACE 1 TABLET UNDER THE TONGUE EVERY 5 MINUTES FOR 3 DOSES. IF SYMPTOMS PERSIST 5 MINUTES AFTER 1ST DOSE CALL 911. 90 tablet 3    oxymetazoline (RHOFADE) 1 % Crea [OXYMETAZOLINE (RHOFADE) 1 % CREA] Apply topically daily.      pantoprazole (PROTONIX) 40 MG EC tablet TAKE 1 TABLET BY MOUTH EVERY DAY 90 tablet 2    rosuvastatin (CRESTOR) 40 MG tablet Take 1 tablet (40 mg) by mouth daily 90 tablet 3    triamcinolone (KENALOG) 0.1 % external cream Apply topically 2 times daily (Patient taking differently: Apply topically daily as needed Apply to neck rash) 80 g 0    Vitamin D3 (CHOLECALCIFEROL) 25 mcg (1000 units) tablet Take 4 tablets by mouth daily         Patient Active Problem List   Diagnosis    Nephrolithiasis    Obesity    Allergic Rhinitis    Mixed hyperlipidemia    Essential Hypertension    Obesity (BMI 35.0-39.9) with comorbidity (H)    Type 2 diabetes mellitus without complication, without long-term current use of insulin (H)    Calculus of ureter    Hydronephrosis with urinary obstruction due to ureteral calculus    Coronary artery disease involving native coronary artery of native heart without angina pectoris    Hypertensive heart disease with heart failure (H)       Social History     Socioeconomic History    Marital status:      Spouse name: None    Number of children: None    Years of education: None    Highest education level: None   Tobacco Use    Smoking status: Never     Passive exposure: Never    Smokeless tobacco: Never   Vaping Use    Vaping Use: Never used   Substance and Sexual Activity    Alcohol use: No    Drug use: No    Sexual activity: Yes     Partners: Female     Birth control/protection: None     Comment: not asked   Other Topics Concern    Parent/sibling w/ CABG, MI or angioplasty before 65F 55M? No     Social  "Determinants of Health     Financial Resource Strain: Low Risk  (12/7/2023)    Financial Resource Strain     Within the past 12 months, have you or your family members you live with been unable to get utilities (heat, electricity) when it was really needed?: No   Food Insecurity: Low Risk  (12/7/2023)    Food Insecurity     Within the past 12 months, did you worry that your food would run out before you got money to buy more?: No     Within the past 12 months, did the food you bought just not last and you didn t have money to get more?: No   Transportation Needs: Low Risk  (12/7/2023)    Transportation Needs     Within the past 12 months, has lack of transportation kept you from medical appointments, getting your medicines, non-medical meetings or appointments, work, or from getting things that you need?: No   Interpersonal Safety: Low Risk  (12/14/2023)    Interpersonal Safety     Do you feel physically and emotionally safe where you currently live?: Yes     Within the past 12 months, have you been hit, slapped, kicked or otherwise physically hurt by someone?: No     Within the past 12 months, have you been humiliated or emotionally abused in other ways by your partner or ex-partner?: No   Housing Stability: Low Risk  (12/7/2023)    Housing Stability     Do you have housing? : Yes     Are you worried about losing your housing?: No       History   Smoking Status    Never   Smokeless Tobacco    Never       OBJECTICE: /84   Pulse 57   Temp 97  F (36.1  C) (Temporal)   Resp 16   Ht 1.86 m (6' 1.23\")   Wt 110.3 kg (243 lb 1.3 oz)   SpO2 98%   BMI 31.87 kg/m        Gen - alert, orientated, NAD  Eyes - fundascopic exam limited by the undialated pupil but looks symmetric  ENT - oropharynx clear, TMs clear  Neck - supple, no palpable mass or lymphadenopathy  CV - RRR, no murmur  Resp - lungs CTA  Ab - soft, nontender, no palpable mass or organomegaly   - normal appearance to the external genetalia, normal " testicular exam bilaterally, no hernia  Extrem - warm, no edema  Neuro - CN II-XII intact, strength, sensation, reflexes intact and symmetric  Skin - no rash, no atypical appearing lesions seen.   Musculoskeletal-normal.  Rita's exam is normal on the right knee.  No point tenderness.  No effusion.             Kash is a 63 year old, presenting for the following:  Physical        12/14/2023     8:12 AM   Additional Questions   Roomed by Gabrielle LORENZO MA   Accompanied by SELF       Healthy Habits:     Getting at least 3 servings of Calcium per day:  Yes    Bi-annual eye exam:  Yes    Dental care twice a year:  Yes    Sleep apnea or symptoms of sleep apnea:  None    Diet:  Low salt, Low fat/cholesterol, Diabetic and Carbohydrate counting    Frequency of exercise:  2-3 days/week    Duration of exercise:  15-30 minutes    Taking medications regularly:  Yes    Medication side effects:  None    Additional concerns today:  No        Social History     Tobacco Use    Smoking status: Never     Passive exposure: Never    Smokeless tobacco: Never   Substance Use Topics    Alcohol use: No             12/7/2023     9:38 AM   Alcohol Use   Prescreen: >3 drinks/day or >7 drinks/week? No       Last PSA:   Prostate Specific Antigen Screen   Date Value Ref Range Status   11/17/2022 1.18 0.00 - 4.50 ng/mL Final   11/11/2021 3.11 0.00 - 4.50 ug/L Final       Review of Systems   Constitutional:  Negative for chills and fever.   HENT:  Negative for congestion, ear pain, hearing loss and sore throat.    Eyes:  Negative for pain and visual disturbance.   Respiratory:  Negative for cough and shortness of breath.    Cardiovascular:  Negative for chest pain, palpitations and peripheral edema.   Gastrointestinal:  Negative for abdominal pain, constipation, diarrhea, heartburn, hematochezia and nausea.   Genitourinary:  Negative for dysuria, frequency, genital sores, hematuria, impotence, penile discharge and urgency.   Musculoskeletal:  Negative  for arthralgias, joint swelling and myalgias.   Skin:  Negative for rash.   Neurological:  Negative for dizziness, weakness, headaches and paresthesias.   Psychiatric/Behavioral:  Negative for mood changes. The patient is not nervous/anxious.

## 2023-12-15 NOTE — CONFIDENTIAL NOTE
DIAGNOSIS:  Proteinuria, unspecified type    DATE RECEIVED:  02.01.2024   NOTES STATUS DETAILS   OFFICE NOTE from referring provider Internal 12.14.2023 -Catalino Nuñez MD    OFFICE NOTE from other specialist      *Only VASCULITIS or LUPUS gather office notes for the following     *PULMONARY       *ENT     *DERMATOLOGY     *RHEUMATOLOGY     DISCHARGE SUMMARY from hospital     DISCHARGE REPORT from the ER     MEDICATION LIST Internal    IMAGING  (NEED IMAGES AND REPORTS)     KIDNEY CT SCAN     KIDNEY ULTRASOUND     MR ABDOMEN     NUCLEAR MEDICINE RENAL     LABS     CBC Internal 01.15.2023   CMP Internal 01.15.2023   BMP Internal 12.04.2023   UA Internal 12.04.2023   URINE PROTEIN Internal 12.04.2023   RENAL PANEL     BIOPSY     KIDNEY BIOPSY

## 2023-12-18 ENCOUNTER — TELEPHONE (OUTPATIENT)
Dept: NEPHROLOGY | Facility: CLINIC | Age: 63
End: 2023-12-18
Payer: COMMERCIAL

## 2023-12-18 DIAGNOSIS — I10 ESSENTIAL HYPERTENSION: Primary | ICD-10-CM

## 2023-12-18 NOTE — TELEPHONE ENCOUNTER
New pt appt scheduled from wait list previously on 2/1 to now 12/21 at 2:00 pm in person with labs at 1:00 pm. Pt in agreement, lab orders needed.  Ina Mcclellan,  Nephrology Clinic Navigator  [facility]  Direct: [phone].

## 2023-12-21 ENCOUNTER — LAB (OUTPATIENT)
Dept: LAB | Facility: CLINIC | Age: 63
End: 2023-12-21
Payer: COMMERCIAL

## 2023-12-21 ENCOUNTER — OFFICE VISIT (OUTPATIENT)
Dept: NEPHROLOGY | Facility: CLINIC | Age: 63
End: 2023-12-21
Attending: INTERNAL MEDICINE
Payer: COMMERCIAL

## 2023-12-21 VITALS
OXYGEN SATURATION: 97 % | SYSTOLIC BLOOD PRESSURE: 138 MMHG | BODY MASS INDEX: 32.51 KG/M2 | DIASTOLIC BLOOD PRESSURE: 84 MMHG | HEART RATE: 65 BPM | WEIGHT: 248 LBS | TEMPERATURE: 97.9 F

## 2023-12-21 DIAGNOSIS — R80.9 PROTEINURIA, UNSPECIFIED TYPE: ICD-10-CM

## 2023-12-21 DIAGNOSIS — I10 ESSENTIAL HYPERTENSION: ICD-10-CM

## 2023-12-21 DIAGNOSIS — E11.22 CONTROLLED TYPE 2 DIABETES MELLITUS WITH STAGE 2 CHRONIC KIDNEY DISEASE, WITHOUT LONG-TERM CURRENT USE OF INSULIN (H): Primary | ICD-10-CM

## 2023-12-21 DIAGNOSIS — E83.52 HYPERCALCEMIA: ICD-10-CM

## 2023-12-21 DIAGNOSIS — N18.2 CONTROLLED TYPE 2 DIABETES MELLITUS WITH STAGE 2 CHRONIC KIDNEY DISEASE, WITHOUT LONG-TERM CURRENT USE OF INSULIN (H): Primary | ICD-10-CM

## 2023-12-21 LAB
ALBUMIN MFR UR ELPH: 187 MG/DL
ALBUMIN SERPL BCG-MCNC: 4.5 G/DL (ref 3.5–5.2)
ALBUMIN UR-MCNC: 100 MG/DL
ANION GAP SERPL CALCULATED.3IONS-SCNC: 10 MMOL/L (ref 7–15)
APPEARANCE UR: CLEAR
BILIRUB UR QL STRIP: NEGATIVE
BUN SERPL-MCNC: 17.2 MG/DL (ref 8–23)
CALCIUM SERPL-MCNC: 10.3 MG/DL (ref 8.8–10.2)
CHLORIDE SERPL-SCNC: 101 MMOL/L (ref 98–107)
COLOR UR AUTO: YELLOW
CREAT SERPL-MCNC: 1.17 MG/DL (ref 0.67–1.17)
CREAT UR-MCNC: 189 MG/DL
DEPRECATED HCO3 PLAS-SCNC: 29 MMOL/L (ref 22–29)
EGFRCR SERPLBLD CKD-EPI 2021: 70 ML/MIN/1.73M2
ERYTHROCYTE [DISTWIDTH] IN BLOOD BY AUTOMATED COUNT: 12.2 % (ref 10–15)
GLUCOSE SERPL-MCNC: 162 MG/DL (ref 70–99)
GLUCOSE UR STRIP-MCNC: NEGATIVE MG/DL
HCT VFR BLD AUTO: 44.2 % (ref 40–53)
HGB BLD-MCNC: 14.9 G/DL (ref 13.3–17.7)
HGB UR QL STRIP: ABNORMAL
KETONES UR STRIP-MCNC: NEGATIVE MG/DL
LEUKOCYTE ESTERASE UR QL STRIP: NEGATIVE
MCH RBC QN AUTO: 29.2 PG (ref 26.5–33)
MCHC RBC AUTO-ENTMCNC: 33.7 G/DL (ref 31.5–36.5)
MCV RBC AUTO: 87 FL (ref 78–100)
MUCOUS THREADS #/AREA URNS LPF: PRESENT /LPF
NITRATE UR QL: NEGATIVE
PH UR STRIP: 5.5 [PH] (ref 5–7)
PHOSPHATE SERPL-MCNC: 3.8 MG/DL (ref 2.5–4.5)
PLATELET # BLD AUTO: 239 10E3/UL (ref 150–450)
POTASSIUM SERPL-SCNC: 4.9 MMOL/L (ref 3.4–5.3)
PROT/CREAT 24H UR: 0.99 MG/MG CR (ref 0–0.2)
PTH-INTACT SERPL-MCNC: 20 PG/ML (ref 15–65)
RBC # BLD AUTO: 5.11 10E6/UL (ref 4.4–5.9)
RBC URINE: 1 /HPF
SODIUM SERPL-SCNC: 140 MMOL/L (ref 135–145)
SP GR UR STRIP: 1.02 (ref 1–1.03)
UROBILINOGEN UR STRIP-MCNC: NORMAL MG/DL
VIT D+METAB SERPL-MCNC: 63 NG/ML (ref 20–50)
WBC # BLD AUTO: 8.7 10E3/UL (ref 4–11)
WBC URINE: 2 /HPF

## 2023-12-21 PROCEDURE — 99213 OFFICE O/P EST LOW 20 MIN: CPT | Performed by: INTERNAL MEDICINE

## 2023-12-21 PROCEDURE — 82570 ASSAY OF URINE CREATININE: CPT | Performed by: INTERNAL MEDICINE

## 2023-12-21 PROCEDURE — 99204 OFFICE O/P NEW MOD 45 MIN: CPT | Mod: GC | Performed by: INTERNAL MEDICINE

## 2023-12-21 PROCEDURE — 99000 SPECIMEN HANDLING OFFICE-LAB: CPT | Performed by: PATHOLOGY

## 2023-12-21 PROCEDURE — 81001 URINALYSIS AUTO W/SCOPE: CPT | Performed by: PATHOLOGY

## 2023-12-21 PROCEDURE — 83970 ASSAY OF PARATHORMONE: CPT | Performed by: PATHOLOGY

## 2023-12-21 PROCEDURE — 80069 RENAL FUNCTION PANEL: CPT | Performed by: PATHOLOGY

## 2023-12-21 PROCEDURE — 82306 VITAMIN D 25 HYDROXY: CPT | Performed by: INTERNAL MEDICINE

## 2023-12-21 PROCEDURE — 85027 COMPLETE CBC AUTOMATED: CPT | Performed by: PATHOLOGY

## 2023-12-21 PROCEDURE — 36415 COLL VENOUS BLD VENIPUNCTURE: CPT | Performed by: PATHOLOGY

## 2023-12-21 PROCEDURE — 84156 ASSAY OF PROTEIN URINE: CPT | Performed by: PATHOLOGY

## 2023-12-21 RX ORDER — CHOLECALCIFEROL (VITAMIN D3) 50 MCG
1 TABLET ORAL DAILY
Qty: 90 TABLET | Refills: 3 | Status: SHIPPED | OUTPATIENT
Start: 2023-12-21

## 2023-12-21 RX ORDER — DAPAGLIFLOZIN 10 MG/1
10 TABLET, FILM COATED ORAL DAILY
Qty: 90 TABLET | Refills: 1 | Status: SHIPPED | OUTPATIENT
Start: 2023-12-21 | End: 2024-04-02

## 2023-12-21 NOTE — PATIENT INSTRUCTIONS
--start taking Farxiga 10 mg daily   --continue to check blood pressures at home. Your goal blood pressures are less than 120/90 mm of Hg   --please call if you feel dizzy or lightheaded

## 2023-12-21 NOTE — PROGRESS NOTES
Nephrology Clinic  Initial Consult 12/21/2023    Kash Tavarez MRN:[id number] YOB: 1960  Date of Service: 12/21/2023  Primary care provider: Catalino Nuñez  Requesting physician: Catalino Nuñez     ASSESSMENT AND RECOMMENDATIONS:   Mr. Tavarez is a 62 yo M with PMHx of T2DM on metformin, HTN, CAD s/p CABG 01/2023, calcium oxalate nephrolithiasis who presents for evaluation of non-nephrotic range proteinuria in setting of CKD2. This is most likely secondary to his underlying diabetes and HTN, especially in setting of known vascular disease with multivessel CAD. Reassuringly, no hematuria to suggest nephritic process. Given concomitant borderline hypercalcemia, consider paraproteinemia although no bone pain, anemia. Hypercalcemia could also be related to possible over ingestion of vitamin D as patient has been taking over 4000 international unit(s) daily for the past 4-5 years.     - Add SPEP, UPEP, light chains to labs from today   - Continue losartan 100 mg daily for HTN and diabetic kidney disease with proteinuria   - START Farxiga 10 mg daily for HTN and diabetic kidney disease with proteinuria   - Decrease vitamin D to 2000 international unit(s) daily; will follow up vitamin D level (pending from today)  - Continue checking blood pressures at home; goal <120/80. If increasing lightheadedness/orthostatic symptoms after initiation of Farxiga, patient to call and let us know   - Could consider discontinuation of pantoprazole if ok with CV team given patient with no hx of GERD symptoms, started after CABG hospitalization    Follow up in nephrology clinic with labs in 3 months.    Patient seen and evaluated with nephrology attending, Dr. Gan.      Mireya Templeton MD   Internal Medicine PGY3      REASON FOR CONSULT: Proteinuria     HISTORY OF PRESENT ILLNESS:   Kash Tavarez is a 63 year old male who presents for evaluation of proteinuria. Patient notes that over the past 1-2 months, he started  to notice bubbles in his urine. He mentioned this to his PCP who checked urine protein and found it to be elevated. Urine protein had not been checked in our system previously. Patient denies any lower extremity edema or shortness of breath. Had a CABG 01/2023 and has been slowly improving in terms of exercise tolerance since that time. Denies any hematuria, dysuria. Does note he urinates frequently, for years, which he attributes to having a large prostate and to drinking a lot of fluids because of his history of kidney stones. Gets up to urinate 1-2 times per night, stable.   Had a lot of medication changes after his CABG in 01/2023. Confirms his current medication list as below. Only routine OTC is claritin. Unsure why he is on the pantoprazole, no significant GERD/heartburn history that he recalls - thinks this was started after his CABG.   No recent illnesses, no recent kidney stones (last about 1.5 years ago that did require lithotripsy with ureteral stent) - notes he has cut down on intake of tree nuts as given his hyperoxaluria, urology recommended this and hasn't had concerns with stones since.   Was diagnosed with diabetes about 10 years ago. Has only ever been on metformin, slightly varied doses over the years, with good control (A1c mostly 6s and 7s, isolated reading of 8.4%). No retinopathy, has routine eye exams. Denies numbness/tingling in extremities.   Was started on losartan after his CABG and on amlodipine this summer for HTN. Trialed on hydrochlorothiazide prior to amlodipine which caused an increase in his creatinine and was stopped. Home blood pressures mostly 130s/80s. Does note since starting the amlodipine he occasionally will have a little lightheadedness upon standing that goes away within seconds. No loss of consciousness.     PAST MEDICAL HISTORY:  Past Medical History:   Diagnosis Date    Coronary artery disease of native artery of native heart with stable angina pectoris (H24)      Hypertension     Nephrolithiasis     Type 2 diabetes mellitus (H)      PAST SURGICAL HISTORY:  Past Surgical History:   Procedure Laterality Date    BYPASS GRAFT ARTERY CORONARY N/A 01/13/2023    Procedure: CORONARY ARTERY BYPASS GRAFT (CABG) x 3, LEFT INTERNAL MAMMARY ARTERY HARVEST, LEFT RADIAL ARTERY HARVEST, ANESTHESIA TRANSESOPHAGEAL ECHOCARDIOGRAM, EPI-AORTIC ULTRASOUND;  Surgeon: Arturo Bear MD;  Location: [facility] OR    COLONOSCOPY      COMBINED CYSTOSCOPY, INSERT STENT URETER(S) Right 05/12/2022    Procedure: CYSTOURETEROSCOPY, RETROGRADE PYELOGRAM, LASER LITHOTRIPSY WITH CALCULUS REMOVAL AND URETERAL STENT INSERTION;  Surgeon: James Mosley MD;  Location: [facility] OR    CV CORONARY ANGIOGRAM N/A 12/05/2022    Procedure: Coronary Angiogram;  Surgeon: Aristides North MD;  Location: [facility] CATH LAB CV    CV LEFT HEART CATH N/A 12/05/2022    Procedure: Left Heart Catheterization;  Surgeon: Aristides North MD;  Location: [facility] CATH LAB CV    EYE SURGERY  Laser surgery to ensure retina didn't detach many years ago.    KIDNEY STONE SURGERY      [facility] APPENDECTOMY      Description: Appendectomy;  Recorded: 03/04/2008;     MEDICATIONS:  Prescription Medications as of 12/21/2023         Rx Number Disp Refills Start End Last Dispensed Date Next Fill Date Owning Pharmacy    ACCU-CHEK FASTCLIX Misc  100 each 0 12/15/2014 --       Sig: [ACCU-CHEK FASTCLIX MISC] TEST TWICE DAILY    ACCU-CHEK GUIDE test strip  200 strip 3 11/9/2022 --   [facility]/pharmacy #[id number] [place], [place] - [address] [facility] [address]    Sig: USE 1 EACH AS DIRECTED 2 (TWO) TIMES A DAY AT 7:30AM AND 4:30PM.    Class: E-Prescribe    acetaminophen (TYLENOL) 325 MG tablet  -- -- 1/23/2023 --       Sig: Take 2 tablets (650 mg) by mouth every 4 hours as needed for other (For optimal non-opioid multimodal pain management to improve pain control.)    Class: OTC    Route: Oral    amLODIPine (NORVASC) 5 MG  tablet  90 tablet 3 10/4/2023 --   [facility]/pharmacy #[address] [place] [address] LN AT [facility] & [address]    Sig: TAKE 1 TABLET BY MOUTH EVERY DAY    Class: E-Prescribe    Route: Oral    aspirin (ASA) 81 MG chewable tablet  180 tablet 3 2/28/2023 --   [facility]/pharmacy #[address] [place] [address] LN AT [facility] [address]    Sig: Take 2 tablets (162 mg) by mouth daily    Class: E-Prescribe    Route: Oral    blood glucose monitoring (ACCU-CHEK FASTCLIX) lancets  204 each 3 10/31/2021 --   [facility]/pharmacy #[address] [place] [address] AT [facility] & [address]    Sig: USE TWICE A DAY (AT 7:30 AM & 4:30 PM) AS DIRECTED    Class: E-Prescribe    blood-glucose meter Misc  1 each 0 2/11/2019 --       Sig: [BLOOD-GLUCOSE METER MISC] Use 1 Device As Directed 2 (two) times a day at 9am and 6pm.    Notes to Pharmacy: Accu check glucometer. Diabetes mellitus E11.9    Route: Does not apply    dapagliflozin (FARXIGA) 10 MG TABS tablet  90 tablet 1 12/21/2023 --   [facility]/pharmacy #[address] [place] [address] AT [facility] & [address]    Sig: Take 1 tablet (10 mg) by mouth daily    Class: E-Prescribe    Route: Oral    imiquimod (ALDARA) 5 % cream  -- 3 10/28/2016 --       Sig: Apply topically daily To warts    Class: Historical    Route: Topical    loratadine (CLARITIN) 10 MG tablet  -- --  --       Sig: Take 10 mg by mouth daily    Class: Historical    Route: Oral    losartan (COZAAR) 100 MG tablet  90 tablet 3 4/20/2023 --   [facility]/pharmacy #[address] [place] [address] [address] AT [facility] & [address]    Sig: Take 1 tablet (100 mg) by mouth daily    Class: E-Prescribe    Route: Oral    metFORMIN (GLUCOPHAGE) 500 MG tablet  270 tablet 3 5/11/2023 --   [facility]/pharmacy #[address] [place], [place] [address] [address] [facility] [address]    Sig: TAKE 1 TABLET (500 MG) BY MOUTH 3 TIMES DAILY (WITH MEALS)    Class:  E-Prescribe    Route: Oral    Metoprolol Tartrate 75 MG TABS  180 tablet 3 3/13/2023 --   [facility]/pharmacy #[address] [place] [address] - [facility] AT [facility] [address]    Sig: Take 75 mg by mouth 2 times daily    Class: E-Prescribe    Route: Oral    metroNIDAZOLE (METROGEL) 0.75 % gel  -- 10 10/28/2016 --       Si times daily Apply to face    Class: Historical    multivitamin, therapeutic (THERA-VIT) TABS tablet  -- --  --       Sig: Take 1 tablet by mouth daily    Class: Historical    Route: Oral    nitroGLYcerin (NITROSTAT) 0.4 MG sublingual tablet  90 tablet 3 2022 --   [facility]/pharmacy #[address] [place] [address] - [facility] AT [facility] & [address]    Sig: PLACE 1 TABLET (0.4 MG) UNDER THE TONGUE EVERY 5 MINUTES AS NEEDED FOR CHEST PAIN FOR CHEST PAIN PLACE 1 TABLET UNDER THE TONGUE EVERY 5 MINUTES FOR 3 DOSES. IF SYMPTOMS PERSIST 5 MINUTES AFTER 1ST DOSE CALL 911.    Class: E-Prescribe    Route: Sublingual    oxymetazoline (RHOFADE) 1 % Crea  -- -- 2020 --       Sig: [OXYMETAZOLINE (RHOFADE) 1 % CREA] Apply topically daily.    Class: Historical    Route: Topical    pantoprazole (PROTONIX) 40 MG EC tablet  90 tablet 2 2023 --   [facility]/pharmacy #[address] [place] [address] AT [facility] & [address]    Sig: TAKE 1 TABLET BY MOUTH EVERY DAY    Class: E-Prescribe    Route: Oral    Renewals       Renewal provider: Rick Silveira MD            rosuvastatin (CRESTOR) 40 MG tablet  90 tablet 3 2023 --   [facility]/pharmacy #[address] [place] [address] AT [facility] [address]    Sig: Take 1 tablet (40 mg) by mouth daily    Class: E-Prescribe    Route: Oral    triamcinolone (KENALOG) 0.1 % external cream  80 g 0 2021 --   [facility]/pharmacy #[address] [place] [address] AT [facility] & [place]    Sig: Apply topically 2 times daily    Class: No Print Out    Route: Topical    vitamin D3  (CHOLECALCIFEROL) 50 mcg (2000 units) tablet  90 tablet 3 12/21/2023 --   [facility]/pharmacy #[id number] - [place], [place] - [address] AT [facility] [address]    Sig: Take 1 tablet (50 mcg) by mouth daily    Class: E-Prescribe    Route: Oral           ALLERGIES:    Allergies   Allergen Reactions    Seasonal Allergies Cough     Hayfever     REVIEW OF SYSTEMS:  A comprehensive review of systems was performed and found to be negative except as described here or above.   SOCIAL HISTORY:   No smoking history  No alcohol use (notes father had alcohol use disorder so he has never drank)  No drug use  Plays drums in the [employer]  Has a 14 year old adopted child, actively involved in their sports and other activities       FAMILY MEDICAL HISTORY:   Family History   Problem Relation Age of Onset    Heart Disease Father     Alcoholism Father     Cerebrovascular Disease Father         Dies after multiple strokes.    Diabetes Brother         Diagnosed a few years before me.     PHYSICAL EXAM:   /84 (BP Location: Right arm, Patient Position: Sitting, Cuff Size: Adult Large)   Pulse 65   Temp 97.9  F (36.6  C) (Oral)   Wt 112.5 kg (248 lb)   SpO2 97%   BMI 32.51 kg/m    GENERAL APPEARANCE: alert and no distress  EYES: nonicteric  HENT: mouth without ulcers or lesions  RESP: lungs clear to auscultation, breathing comfortably on room air   CV: regular rhythm, normal rate, no rub  ABDOMEN: soft, nontender, nondistended, no rebound or guarding   Extremities: no clubbing, cyanosis, or edema  MS: no evidence of inflammation in joints  SKIN: no rash  NEURO: mentation intact and speech normal  PSYCH: affect normal/bright   LABS:   CMP  Recent Labs   Lab Test 12/21/23  1321 12/14/23  0909 10/05/23  0943 08/11/23  1011 01/17/23  0713 01/17/23  0441 01/16/23  0728 01/16/23  0402 01/15/23  0811 01/15/23  0405 01/14/23  0512 01/14/23  0410 01/14/23  0013 01/14/23  0011 01/13/23  1311 01/13/23  1307  12/02/22  1011 11/17/22  0857 05/09/22  0900 05/09/22  0900 11/11/21  0849 11/05/20  0855 11/04/19  0901 10/09/18  0914    138 141 137   < >  --   --   --   --  131*  --   --   --  138  --  137   < > 138  --  136   < > 140  --  142   POTASSIUM 4.9 5.0 4.5 3.5   < > 3.6  --  3.6  --  4.0  4.0  --  4.2  --  4.2  --  4.2  4.2   < > 5.4*   < > 4.6   < > 5.1*  --  4.9   CHLORIDE 101 98 103 100   < >  --   --   --   --  96*  --   --   --  107  --  104   < > 101   < > 104   < > 104  --  104   CO2 29 27 27 28   < >  --   --   --   --  27  --   --   --  23  --  21*   < > 24   < > 23   < > 25  --  26   ANIONGAP 10 13 11 9   < >  --   --   --   --  8  --   --   --  8  --  12   < > 13   < > 9   < > 11  --  12   * 170* 182* 149*   < >  --    < >  --    < > 163*   < >  --    < > 151*   < > 165*   < > 154*   < > 241*   < > 217*  --  159*   BUN 17.2 16.1 19.5 15   < >  --   --   --   --  12.3  --   --   --  11.9  --  12.9   < > 16.2   < > 19   < > 17  --  15   CR 1.17 1.13 1.00 1.43*   < >  --   --   --   --  0.88  --   --   --  0.82  --  0.91   < > 0.87  --  1.17   < > 1.12  --  0.94   GFRESTIMATED 70 73 85 55*   < >  --   --   --   --  >90  --   --   --  >90  --  >90   < > >90  --  71   < > >60  --  >60   GFRESTBLACK  --   --   --   --   --   --   --   --   --   --   --   --   --   --   --   --   --   --   --   --   --  >60  --  >60   RIOS 10.3* 10.3* 9.8 10.5   < >  --   --   --   --  8.5*  --   --   --  8.4*  --  8.6*   < > 9.8  --  9.0   < > 9.9  --  10.1   MAG  --   --   --   --   --  1.9  --  2.0  --  1.8  --  2.1  --   --   --  2.9*   < >  --   --   --   --   --   --   --    PHOS 3.8  --   --   --   --  3.6  --  2.8  --  2.8  --  4.0  --   --   --  2.7   < >  --   --   --   --   --   --   --    PROTTOTAL  --   --   --   --   --   --   --   --   --   --   --   --   --  5.4*  --  5.6*  --  7.4  --  6.6  --   --   --  7.2   ALBUMIN 4.5  --   --   --   --   --   --   --   --   --   --   --   --  3.6  --  3.6  --   4.5  --  3.8  --   --   --  4.3   BILITOTAL  --   --   --   --   --   --   --   --   --   --   --   --   --  0.2  --  0.2  --  0.5  --  0.6  --   --   --  0.5   ALKPHOS  --   --   --   --   --   --   --   --   --   --   --   --   --  52  --  56  --  74  --  61  --   --   --  74   AST  --   --   --   --   --   --   --   --   --   --   --   --   --  21  --  24  --  18  --  17  --   --   --  17   ALT  --   --   --   --   --   --   --   --   --   --   --   --   --  13  --  17  --  20  --  28  --   --    < > 26    < > = values in this interval not displayed.     CBC  Recent Labs   Lab Test 12/21/23  1321 01/17/23  0441 01/15/23  0811 01/14/23  0410 01/13/23  1307   HGB 14.9  --  11.7* 10.6* 12.7*   WBC 8.7  --  13.7* 17.6* 32.3*   RBC 5.11  --  4.03* 3.75* 4.41   HCT 44.2  --  35.2* 32.1* 37.8*   MCV 87  --  87 86 86   MCH 29.2  --  29.0 28.3 28.8   MCHC 33.7  --  33.2 33.0 33.6   RDW 12.2  --  12.9 12.8 12.5    160 129* 171 241     INR  Recent Labs   Lab Test 01/13/23  1307 01/13/23  1207 12/16/22  0819   INR 1.25* 1.36* 0.99   PTT 30 26 23     ABG  Recent Labs   Lab Test 01/17/23  0441 01/16/23  1417 01/16/23  0402 01/15/23  0405 01/13/23  1900 01/13/23  1344 01/13/23  1313 01/13/23  1206 01/13/23  1149 01/13/23  1100   PH 7.39 7.33* 7.39 7.34*   < > 7.36*   < > 7.31* 7.36* 7.36*   PCO2  --   --   --   --   --  38  --  48* 47* 43   PO2  --   --   --   --   --  144*  --  322* 307* 357*   HCO3  --   --   --   --   --  22*  --  23 25 23    < > = values in this interval not displayed.      URINE STUDIES  Recent Labs   Lab Test 12/21/23  1326 12/14/23  0915 12/16/22  0812 05/09/22  0906   COLOR Yellow Yellow Yellow Yellow   APPEARANCE Clear Slightly Cloudy* Clear Cloudy*   URINEGLC Negative Negative >1000* 500*   URINEBILI Negative Negative Negative Negative   URINEKETONE Negative Negative Negative 40*   SG 1.024 1.020 1.019 1.029   UBLD Trace* Negative Negative >1.0 mg/dL*   URINEPH 5.5 6.0 5.5 5.5   PROTEIN 100*  >=300* Negative 50*   UROBILINOGEN  --  1.0  --   --    NITRITE Negative Negative Negative Negative   LEUKEST Negative Negative Negative Negative   RBCU 1 0-2 0 0   WBCU 2 0-5 0 0     No lab results found.  PTH  Recent Labs   Lab Test 12/21/23  1321 07/19/22  1043   PTHI 20 26     IRON STUDIES  No lab results found.  IMAGING:  All imaging studies reviewed. CT A/P 08/2023 with 4 nonobstructing stones in R kidney, 2 in left; large prostate without other noted kidney abnormalities

## 2023-12-21 NOTE — LETTER
12/21/2023       RE: Kash Tavarez  [address]  [facility] [address]     Dear Colleague,    Thank you for referring your patient, Kash Tavarez, to the [facility] [place] [facility]. Please see a copy of my visit note below.    Nephrology Clinic  Initial Consult 12/21/2023    Kash Tavarez MRN:[id number] YOB: 1960  Date of Service: 12/21/2023  Primary care provider: Catalino Nuñez  Requesting physician: Catalino Nuñez     ASSESSMENT AND RECOMMENDATIONS:   Mr. Tavarez is a 62 yo M with PMHx of T2DM on metformin, HTN, CAD s/p CABG 01/2023, calcium oxalate nephrolithiasis who presents for evaluation of non-nephrotic range proteinuria in setting of CKD2. This is most likely secondary to his underlying diabetes and HTN, especially in setting of known vascular disease with multivessel CAD. Reassuringly, no hematuria to suggest nephritic process. Given concomitant borderline hypercalcemia, consider paraproteinemia although no bone pain, anemia. Hypercalcemia could also be related to possible over ingestion of vitamin D as patient has been taking over 4000 international unit(s) daily for the past 4-5 years.     - Add SPEP, UPEP, light chains to labs from today   - Continue losartan 100 mg daily for HTN and diabetic kidney disease with proteinuria   - START Farxiga 10 mg daily for HTN and diabetic kidney disease with proteinuria   - Decrease vitamin D to 2000 international unit(s) daily; will follow up vitamin D level (pending from today)  - Continue checking blood pressures at home; goal <120/80. If increasing lightheadedness/orthostatic symptoms after initiation of Farxiga, patient to call and let us know   - Could consider discontinuation of pantoprazole if ok with CV team given patient with no hx of GERD symptoms, started after CABG hospitalization    Follow up in nephrology clinic with labs in 3 months.    Patient  seen and evaluated with nephrology attending, Dr. Gan.      Mireya Templeton MD   Internal Medicine PGY3      REASON FOR CONSULT: Proteinuria     HISTORY OF PRESENT ILLNESS:   Kash Tavarez is a 63 year old male who presents for evaluation of proteinuria. Patient notes that over the past 1-2 months, he started to notice bubbles in his urine. He mentioned this to his PCP who checked urine protein and found it to be elevated. Urine protein had not been checked in our system previously. Patient denies any lower extremity edema or shortness of breath. Had a CABG 01/2023 and has been slowly improving in terms of exercise tolerance since that time. Denies any hematuria, dysuria. Does note he urinates frequently, for years, which he attributes to having a large prostate and to drinking a lot of fluids because of his history of kidney stones. Gets up to urinate 1-2 times per night, stable.   Had a lot of medication changes after his CABG in 01/2023. Confirms his current medication list as below. Only routine OTC is claritin. Unsure why he is on the pantoprazole, no significant GERD/heartburn history that he recalls - thinks this was started after his CABG.   No recent illnesses, no recent kidney stones (last about 1.5 years ago that did require lithotripsy with ureteral stent) - notes he has cut down on intake of tree nuts as given his hyperoxaluria, urology recommended this and hasn't had concerns with stones since.   Was diagnosed with diabetes about 10 years ago. Has only ever been on metformin, slightly varied doses over the years, with good control (A1c mostly 6s and 7s, isolated reading of 8.4%). No retinopathy, has routine eye exams. Denies numbness/tingling in extremities.   Was started on losartan after his CABG and on amlodipine this summer for HTN. Trialed on hydrochlorothiazide prior to amlodipine which caused an increase in his creatinine and was stopped. Home blood pressures mostly 130s/80s. Does note  since starting the amlodipine he occasionally will have a little lightheadedness upon standing that goes away within seconds. No loss of consciousness.     PAST MEDICAL HISTORY:  Past Medical History:   Diagnosis Date     Coronary artery disease of native artery of native heart with stable angina pectoris (H24)      Hypertension      Nephrolithiasis      Type 2 diabetes mellitus (H)      PAST SURGICAL HISTORY:  Past Surgical History:   Procedure Laterality Date     BYPASS GRAFT ARTERY CORONARY N/A 01/13/2023    Procedure: CORONARY ARTERY BYPASS GRAFT (CABG) x 3, LEFT INTERNAL MAMMARY ARTERY HARVEST, LEFT RADIAL ARTERY HARVEST, ANESTHESIA TRANSESOPHAGEAL ECHOCARDIOGRAM, EPI-AORTIC ULTRASOUND;  Surgeon: Arturo Bear MD;  Location: [facility] OR     COLONOSCOPY       COMBINED CYSTOSCOPY, INSERT STENT URETER(S) Right 05/12/2022    Procedure: CYSTOURETEROSCOPY, RETROGRADE PYELOGRAM, LASER LITHOTRIPSY WITH CALCULUS REMOVAL AND URETERAL STENT INSERTION;  Surgeon: James Mosley MD;  Location: [facility] OR     CV CORONARY ANGIOGRAM N/A 12/05/2022    Procedure: Coronary Angiogram;  Surgeon: Aristides North MD;  Location: [facility] CATH LAB CV     CV LEFT HEART CATH N/A 12/05/2022    Procedure: Left Heart Catheterization;  Surgeon: Aristides North MD;  Location: [facility] CV     EYE SURGERY  Laser surgery to ensure retina didn't detach many years ago.     KIDNEY STONE SURGERY       [facility] APPENDECTOMY      Description: Appendectomy;  Recorded: 03/04/2008;     MEDICATIONS:  Prescription Medications as of 12/21/2023         Rx Number Disp Refills Start End Last Dispensed Date Next Fill Date Owning Pharmacy    ACCU-CHEK FASTCLIX Misc  100 each 0 12/15/2014 --       Sig: [ACCU-CHEK FASTCLIX MISC] TEST TWICE DAILY    ACCU-CHEK GUIDE test strip  200 strip 3 11/9/2022 --   [id number] - [place], [place] - [address] AT [facility] [address]    Sig: USE 1 EACH AS DIRECTED 2 (TWO) TIMES A  DAY AT 7:30AM AND 4:30PM.    Class: E-Prescribe    acetaminophen (TYLENOL) 325 MG tablet  -- -- 1/23/2023 --       Sig: Take 2 tablets (650 mg) by mouth every 4 hours as needed for other (For optimal non-opioid multimodal pain management to improve pain control.)    Class: OTC    Route: Oral    amLODIPine (NORVASC) 5 MG tablet  90 tablet 3 10/4/2023 --   [facility]/pharmacy #[address] [place], [place] [address] AT [facility] & [address]    Sig: TAKE 1 TABLET BY MOUTH EVERY DAY    Class: E-Prescribe    Route: Oral    aspirin (ASA) 81 MG chewable tablet  180 tablet 3 2/28/2023 --   [facility]/pharmacy #[address] [place] [address] AT [facility] & [address]    Sig: Take 2 tablets (162 mg) by mouth daily    Class: E-Prescribe    Route: Oral    blood glucose monitoring (ACCU-CHEK FASTCLIX) lancets  204 each 3 10/31/2021 --   [facility]pharm[facility] [place], [place] [address] [facility] & [address]    Sig: USE TWICE A DAY (AT 7:30 AM & 4:30 PM) AS DIRECTED    Class: E-Prescribe    blood-glucose meter Misc  1 each 0 2/11/2019 --       Sig: [BLOOD-GLUCOSE METER MISC] Use 1 Device As Directed 2 (two) times a day at 9am and 6pm.    Notes to Pharmacy: Accu check glucometer. Diabetes mellitus E11.9    Route: Does not apply    dapagliflozin (FARXIGA) 10 MG TABS tablet  90 tablet 1 12/21/2023 --   [facility]/pharmacy #[address] [place] [address] - [facility] AT [facility] & [address]    Sig: Take 1 tablet (10 mg) by mouth daily    Class: E-Prescribe    Route: Oral    imiquimod (ALDARA) 5 % cream  -- 3 10/28/2016 --       Sig: Apply topically daily To warts    Class: Historical    Route: Topical    loratadine (CLARITIN) 10 MG tablet  -- --  --       Sig: Take 10 mg by mouth daily    Class: Historical    Route: Oral    losartan (COZAAR) 100 MG tablet  90 tablet 3 4/20/2023 --   [id number] - [place], [place] - [address] [facility] [address]    Sig:  Take 1 tablet (100 mg) by mouth daily    Class: E-Prescribe    Route: Oral    metFORMIN (GLUCOPHAGE) 500 MG tablet  270 tablet 3 2023 --   [facility]/pharmacy #[address] [place] [address] AT [facility] & [address]    Sig: TAKE 1 TABLET (500 MG) BY MOUTH 3 TIMES DAILY (WITH MEALS)    Class: E-Prescribe    Route: Oral    Metoprolol Tartrate 75 MG TABS  180 tablet 3 3/13/2023 --   [facility]/pharmacy #[address] [place] [address] AT [facility] & [address]    Sig: Take 75 mg by mouth 2 times daily    Class: E-Prescribe    Route: Oral    metroNIDAZOLE (METROGEL) 0.75 % gel  -- 10 10/28/2016 --       Si times daily Apply to face    Class: Historical    multivitamin, therapeutic (THERA-VIT) TABS tablet  -- --  --       Sig: Take 1 tablet by mouth daily    Class: Historical    Route: Oral    nitroGLYcerin (NITROSTAT) 0.4 MG sublingual tablet  90 tablet 3 2022 --   [facility]/pharmacy #[address] [place] [address] AT [facility] & [address]    Sig: PLACE 1 TABLET (0.4 MG) UNDER THE TONGUE EVERY 5 MINUTES AS NEEDED FOR CHEST PAIN FOR CHEST PAIN PLACE 1 TABLET UNDER THE TONGUE EVERY 5 MINUTES FOR 3 DOSES. IF SYMPTOMS PERSIST 5 MINUTES AFTER 1ST DOSE CALL 911.    Class: E-Prescribe    Route: Sublingual    oxymetazoline (RHOFADE) 1 % Crea  -- -- 2020 --       Sig: [OXYMETAZOLINE (RHOFADE) 1 % CREA] Apply topically daily.    Class: Historical    Route: Topical    pantoprazole (PROTONIX) 40 MG EC tablet  90 tablet 2 2023 --   [facility]/pharmacy #[address] [place] [address] AT [facility] & [address]    Sig: TAKE 1 TABLET BY MOUTH EVERY DAY    Class: E-Prescribe    Route: Oral    Renewals       Renewal provider: Rick Silveira MD            rosuvastatin (CRESTOR) 40 MG tablet  90 tablet 3 2023 --   [facility]/pharmacy #[address] [place] [address] AT [facility] [address] & [address]    Sig: Take 1 tablet (40 mg) by mouth  daily    Class: E-Prescribe    Route: Oral    triamcinolone (KENALOG) 0.1 % external cream  80 g 0 11/11/2021 --   [facility]/pharmacy #[address] [place] [address] - [address] AT [facility] & [address]    Sig: Apply topically 2 times daily    Class: No Print Out    Route: Topical    vitamin D3 (CHOLECALCIFEROL) 50 mcg (2000 units) tablet  90 tablet 3 12/21/2023 --   [facility]/pharmacy #[address] [place] [address] - [address] AT [facility] & [address]    Sig: Take 1 tablet (50 mcg) by mouth daily    Class: E-Prescribe    Route: Oral           ALLERGIES:    Allergies   Allergen Reactions     Seasonal Allergies Cough     Hayfever     REVIEW OF SYSTEMS:  A comprehensive review of systems was performed and found to be negative except as described here or above.   SOCIAL HISTORY:   No smoking history  No alcohol use (notes father had alcohol use disorder so he has never drank)  No drug use  Plays drums in [employer]  Has a 14 year old adopted child, actively involved in their sports and other activities       FAMILY MEDICAL HISTORY:   Family History   Problem Relation Age of Onset     Heart Disease Father      Alcoholism Father      Cerebrovascular Disease Father         Dies after multiple strokes.     Diabetes Brother         Diagnosed a few years before me.     PHYSICAL EXAM:   /84 (BP Location: Right arm, Patient Position: Sitting, Cuff Size: Adult Large)   Pulse 65   Temp 97.9  F (36.6  C) (Oral)   Wt 112.5 kg (248 lb)   SpO2 97%   BMI 32.51 kg/m    GENERAL APPEARANCE: alert and no distress  EYES: nonicteric  HENT: mouth without ulcers or lesions  RESP: lungs clear to auscultation, breathing comfortably on room air   CV: regular rhythm, normal rate, no rub  ABDOMEN: soft, nontender, nondistended, no rebound or guarding   Extremities: no clubbing, cyanosis, or edema  MS: no evidence of inflammation in joints  SKIN: no rash  NEURO: mentation intact and speech  normal  PSYCH: affect normal/bright   LABS:   CMP  Recent Labs   Lab Test 12/21/23  1321 12/14/23  0909 10/05/23  0943 08/11/23  1011 01/17/23  0713 01/17/23  0441 01/16/23  0728 01/16/23  0402 01/15/23  0811 01/15/23  0405 01/14/23  0512 01/14/23  0410 01/14/23  0013 01/14/23  0011 01/13/23  1311 01/13/23  1307 12/02/22  1011 11/17/22  0857 05/09/22  0900 05/09/22  0900 11/11/21  0849 11/05/20  0855 11/04/19  0901 10/09/18  0914    138 141 137   < >  --   --   --   --  131*  --   --   --  138  --  137   < > 138  --  136   < > 140  --  142   POTASSIUM 4.9 5.0 4.5 3.5   < > 3.6  --  3.6  --  4.0  4.0  --  4.2  --  4.2  --  4.2  4.2   < > 5.4*   < > 4.6   < > 5.1*  --  4.9   CHLORIDE 101 98 103 100   < >  --   --   --   --  96*  --   --   --  107  --  104   < > 101   < > 104   < > 104  --  104   CO2 29 27 27 28   < >  --   --   --   --  27  --   --   --  23  --  21*   < > 24   < > 23   < > 25  --  26   ANIONGAP 10 13 11 9   < >  --   --   --   --  8  --   --   --  8  --  12   < > 13   < > 9   < > 11  --  12   * 170* 182* 149*   < >  --    < >  --    < > 163*   < >  --    < > 151*   < > 165*   < > 154*   < > 241*   < > 217*  --  159*   BUN 17.2 16.1 19.5 15   < >  --   --   --   --  12.3  --   --   --  11.9  --  12.9   < > 16.2   < > 19   < > 17  --  15   CR 1.17 1.13 1.00 1.43*   < >  --   --   --   --  0.88  --   --   --  0.82  --  0.91   < > 0.87  --  1.17   < > 1.12  --  0.94   GFRESTIMATED 70 73 85 55*   < >  --   --   --   --  >90  --   --   --  >90  --  >90   < > >90  --  71   < > >60  --  >60   GFRESTBLACK  --   --   --   --   --   --   --   --   --   --   --   --   --   --   --   --   --   --   --   --   --  >60  --  >60   RIOS 10.3* 10.3* 9.8 10.5   < >  --   --   --   --  8.5*  --   --   --  8.4*  --  8.6*   < > 9.8  --  9.0   < > 9.9  --  10.1   MAG  --   --   --   --   --  1.9  --  2.0  --  1.8  --  2.1  --   --   --  2.9*   < >  --   --   --   --   --   --   --    PHOS 3.8  --   --   --   --   3.6  --  2.8  --  2.8  --  4.0  --   --   --  2.7   < >  --   --   --   --   --   --   --    PROTTOTAL  --   --   --   --   --   --   --   --   --   --   --   --   --  5.4*  --  5.6*  --  7.4  --  6.6  --   --   --  7.2   ALBUMIN 4.5  --   --   --   --   --   --   --   --   --   --   --   --  3.6  --  3.6  --  4.5  --  3.8  --   --   --  4.3   BILITOTAL  --   --   --   --   --   --   --   --   --   --   --   --   --  0.2  --  0.2  --  0.5  --  0.6  --   --   --  0.5   ALKPHOS  --   --   --   --   --   --   --   --   --   --   --   --   --  52  --  56  --  74  --  61  --   --   --  74   AST  --   --   --   --   --   --   --   --   --   --   --   --   --  21  --  24  --  18  --  17  --   --   --  17   ALT  --   --   --   --   --   --   --   --   --   --   --   --   --  13  --  17  --  20  --  28  --   --    < > 26    < > = values in this interval not displayed.     CBC  Recent Labs   Lab Test 12/21/23  1321 01/17/23  0441 01/15/23  0811 01/14/23  0410 01/13/23  1307   HGB 14.9  --  11.7* 10.6* 12.7*   WBC 8.7  --  13.7* 17.6* 32.3*   RBC 5.11  --  4.03* 3.75* 4.41   HCT 44.2  --  35.2* 32.1* 37.8*   MCV 87  --  87 86 86   MCH 29.2  --  29.0 28.3 28.8   MCHC 33.7  --  33.2 33.0 33.6   RDW 12.2  --  12.9 12.8 12.5    160 129* 171 241     INR  Recent Labs   Lab Test 01/13/23  1307 01/13/23  1207 12/16/22  0819   INR 1.25* 1.36* 0.99   PTT 30 26 23     ABG  Recent Labs   Lab Test 01/17/23  0441 01/16/23  1417 01/16/23  0402 01/15/23  0405 01/13/23  1900 01/13/23  1344 01/13/23  1313 01/13/23  1206 01/13/23  1149 01/13/23  1100   PH 7.39 7.33* 7.39 7.34*   < > 7.36*   < > 7.31* 7.36* 7.36*   PCO2  --   --   --   --   --  38  --  48* 47* 43   PO2  --   --   --   --   --  144*  --  322* 307* 357*   HCO3  --   --   --   --   --  22*  --  23 25 23    < > = values in this interval not displayed.      URINE STUDIES  Recent Labs   Lab Test 12/21/23  1326 12/14/23  0915 12/16/22  0812 05/09/22  0906   COLOR Yellow Yellow  Yellow Yellow   APPEARANCE Clear Slightly Cloudy* Clear Cloudy*   URINEGLC Negative Negative >1000* 500*   URINEBILI Negative Negative Negative Negative   URINEKETONE Negative Negative Negative 40*   SG 1.024 1.020 1.019 1.029   UBLD Trace* Negative Negative >1.0 mg/dL*   URINEPH 5.5 6.0 5.5 5.5   PROTEIN 100* >=300* Negative 50*   UROBILINOGEN  --  1.0  --   --    NITRITE Negative Negative Negative Negative   LEUKEST Negative Negative Negative Negative   RBCU 1 0-2 0 0   WBCU 2 0-5 0 0     No lab results found.  PTH  Recent Labs   Lab Test 12/21/23  1321 07/19/22  1043   PTHI 20 26     IRON STUDIES  No lab results found.  IMAGING:  All imaging studies reviewed. CT A/P 08/2023 with 4 nonobstructing stones in R kidney, 2 in left; large prostate without other noted kidney abnormalities        Attestation signed by Michelle Gan MD at 1/4/2024  3:12 PM:  I have seen and evaluated the patient. Discussed with the resident and agree with resident's findings and plan as documented in the resident's note.  I have reviewed today's vital signs, notes, medications, labs and imaging.   Michelle Gan MD, MD        Again, thank you for allowing me to participate in the care of your patient.      Sincerely,    Michelle Gan MD

## 2023-12-21 NOTE — NURSING NOTE
Chief Complaint   Patient presents with    Consult     Initial visit NEW GLOMERULAR     /84 (BP Location: Right arm, Patient Position: Sitting, Cuff Size: Adult Large)   Pulse 65   Temp 97.9  F (36.6  C) (Oral)   Wt 112.5 kg (248 lb)   SpO2 97%   BMI 32.51 kg/m    Romel Larose CMA on 12/21/2023 at 1:45 PM

## 2023-12-22 LAB
CREAT UR-MCNC: 197 MG/DL
MICROALBUMIN UR-MCNC: 630 MG/L
MICROALBUMIN/CREAT UR: 319.8 MG/G CR (ref 0–17)

## 2024-01-05 DIAGNOSIS — R80.9 PROTEINURIA, UNSPECIFIED TYPE: Primary | ICD-10-CM

## 2024-01-05 NOTE — PROGRESS NOTES
Lab orders placed per Dr Gan  Patient aware and scheduled for labs on Monday      Bernadette Villasenor LPN  Nephrology  [phone]

## 2024-01-08 ENCOUNTER — LAB (OUTPATIENT)
Dept: LAB | Facility: CLINIC | Age: 64
End: 2024-01-08
Payer: COMMERCIAL

## 2024-01-08 DIAGNOSIS — R80.9 PROTEINURIA, UNSPECIFIED TYPE: ICD-10-CM

## 2024-01-08 LAB
CALCIUM, IONIZED MEASURED: 1.24 MMOL/L (ref 1.11–1.3)
CREAT UR-MCNC: 130 MG/DL
ION CA PH 7.4: 1.2 MMOL/L (ref 1.11–1.3)
KAPPA LC FREE SER-MCNC: 2.84 MG/DL (ref 0.33–1.94)
KAPPA LC FREE/LAMBDA FREE SER NEPH: 1.3 {RATIO} (ref 0.26–1.65)
LAMBDA LC FREE SERPL-MCNC: 2.19 MG/DL (ref 0.57–2.63)
MICROALBUMIN UR-MCNC: 453 MG/L
MICROALBUMIN/CREAT UR: 348.46 MG/G CR (ref 0–17)
PH: 7.35 (ref 7.35–7.45)
TOTAL PROTEIN SERUM FOR ELP: 7 G/DL (ref 6.4–8.3)

## 2024-01-08 PROCEDURE — 84166 PROTEIN E-PHORESIS/URINE/CSF: CPT | Mod: 26 | Performed by: PATHOLOGY

## 2024-01-08 PROCEDURE — 86334 IMMUNOFIX E-PHORESIS SERUM: CPT | Mod: 26 | Performed by: PATHOLOGY

## 2024-01-08 PROCEDURE — 82570 ASSAY OF URINE CREATININE: CPT

## 2024-01-08 PROCEDURE — 82330 ASSAY OF CALCIUM: CPT

## 2024-01-08 PROCEDURE — 83521 IG LIGHT CHAINS FREE EACH: CPT | Mod: 59

## 2024-01-08 PROCEDURE — 86334 IMMUNOFIX E-PHORESIS SERUM: CPT | Performed by: PATHOLOGY

## 2024-01-08 PROCEDURE — 84166 PROTEIN E-PHORESIS/URINE/CSF: CPT | Performed by: PATHOLOGY

## 2024-01-08 PROCEDURE — 86335 IMMUNFIX E-PHORSIS/URINE/CSF: CPT | Performed by: PATHOLOGY

## 2024-01-08 PROCEDURE — 36415 COLL VENOUS BLD VENIPUNCTURE: CPT

## 2024-01-08 PROCEDURE — 84155 ASSAY OF PROTEIN SERUM: CPT

## 2024-01-08 PROCEDURE — 86335 IMMUNFIX E-PHORSIS/URINE/CSF: CPT | Mod: 26 | Performed by: PATHOLOGY

## 2024-01-08 PROCEDURE — 84165 PROTEIN E-PHORESIS SERUM: CPT | Mod: TC | Performed by: PATHOLOGY

## 2024-01-08 PROCEDURE — 84165 PROTEIN E-PHORESIS SERUM: CPT | Mod: 26 | Performed by: PATHOLOGY

## 2024-01-09 LAB
ALBUMIN MFR UR ELPH: 35 %
ALBUMIN SERPL ELPH-MCNC: 4.3 G/DL (ref 3.7–5.1)
ALPHA1 GLOB MFR UR ELPH: 6.3 %
ALPHA1 GLOB SERPL ELPH-MCNC: 0.3 G/DL (ref 0.2–0.4)
ALPHA2 GLOB MFR UR ELPH: 12.9 %
ALPHA2 GLOB SERPL ELPH-MCNC: 0.7 G/DL (ref 0.5–0.9)
B-GLOBULIN MFR UR ELPH: 26.7 %
B-GLOBULIN SERPL ELPH-MCNC: 0.9 G/DL (ref 0.6–1)
GAMMA GLOB MFR UR ELPH: 19.1 %
GAMMA GLOB SERPL ELPH-MCNC: 0.8 G/DL (ref 0.7–1.6)
M PROTEIN MFR UR ELPH: 0 %
M PROTEIN SERPL ELPH-MCNC: 0 G/DL
PROT ELPH PNL UR ELPH: NORMAL
PROT PATTERN SERPL ELPH-IMP: NORMAL
PROT PATTERN SERPL IFE-IMP: NORMAL
PROT PATTERN UR ELPH-IMP: ABNORMAL

## 2024-02-01 ENCOUNTER — PRE VISIT (OUTPATIENT)
Dept: NEPHROLOGY | Facility: CLINIC | Age: 64
End: 2024-02-01

## 2024-02-16 DIAGNOSIS — Z95.1 S/P CABG (CORONARY ARTERY BYPASS GRAFT): ICD-10-CM

## 2024-02-16 RX ORDER — LORATADINE 10 MG
162 TABLET ORAL DAILY
Qty: 180 TABLET | Refills: 2 | Status: SHIPPED | OUTPATIENT
Start: 2024-02-16

## 2024-02-22 ENCOUNTER — OFFICE VISIT (OUTPATIENT)
Dept: FAMILY MEDICINE | Facility: CLINIC | Age: 64
End: 2024-02-22
Payer: COMMERCIAL

## 2024-02-22 VITALS
SYSTOLIC BLOOD PRESSURE: 134 MMHG | DIASTOLIC BLOOD PRESSURE: 81 MMHG | BODY MASS INDEX: 30.68 KG/M2 | HEART RATE: 60 BPM | TEMPERATURE: 97.7 F | OXYGEN SATURATION: 97 % | RESPIRATION RATE: 17 BRPM | HEIGHT: 74 IN | WEIGHT: 239.04 LBS

## 2024-02-22 DIAGNOSIS — E66.01 MORBID OBESITY (H): ICD-10-CM

## 2024-02-22 DIAGNOSIS — I11.0 HYPERTENSIVE HEART DISEASE WITH HEART FAILURE (H): ICD-10-CM

## 2024-02-22 DIAGNOSIS — N18.2 CONTROLLED TYPE 2 DIABETES MELLITUS WITH STAGE 2 CHRONIC KIDNEY DISEASE, WITHOUT LONG-TERM CURRENT USE OF INSULIN (H): Primary | ICD-10-CM

## 2024-02-22 DIAGNOSIS — I25.118 CORONARY ARTERY DISEASE OF NATIVE ARTERY OF NATIVE HEART WITH STABLE ANGINA PECTORIS (H): ICD-10-CM

## 2024-02-22 DIAGNOSIS — Z23 NEED FOR VACCINATION: ICD-10-CM

## 2024-02-22 DIAGNOSIS — E78.2 MIXED HYPERLIPIDEMIA: ICD-10-CM

## 2024-02-22 DIAGNOSIS — E11.22 CONTROLLED TYPE 2 DIABETES MELLITUS WITH STAGE 2 CHRONIC KIDNEY DISEASE, WITHOUT LONG-TERM CURRENT USE OF INSULIN (H): Primary | ICD-10-CM

## 2024-02-22 PROCEDURE — 90678 RSV VACC PREF BIVALENT IM: CPT | Performed by: FAMILY MEDICINE

## 2024-02-22 PROCEDURE — 99213 OFFICE O/P EST LOW 20 MIN: CPT | Mod: 25 | Performed by: FAMILY MEDICINE

## 2024-02-22 PROCEDURE — 90471 IMMUNIZATION ADMIN: CPT | Performed by: FAMILY MEDICINE

## 2024-02-22 NOTE — PROGRESS NOTES
ASSESMENT AND PLAN:  Diagnoses and all orders for this visit:  Controlled type 2 diabetes mellitus with stage 2 chronic kidney disease, without long-term current use of insulin (H)  -     Hemoglobin A1c; Future  Patient prefers to consolidate his blood draw so I have ordered his A1c and he will get it done with his upcoming lab draw with nephrology clinic.  Mixed hyperlipidemia  Stable.  Continue rosuvastatin along with healthy eating and regular exercise.  Coronary artery disease of native artery of native heart with stable angina pectoris (H24)  Follow-up with cardiology as directed.  Continue current medical management.  Given his higher risk status with his coronary history I did recommend he get the RSV vaccine:  -     RSV VACCINE (ABRYSVO)  Hypertensive heart disease with heart failure (H)  Blood pressure under fair control.  Continue medications currently and follow-up with cardiology as directed.  Morbid obesity (H)  Healthy eating, regular exercise.    Need for vaccination  -     RSV VACCINE (ABRYSVO)      Reviewed the risks and benefits of the treatment plan with the patient and/or caregiver and we discussed indications for routine and emergent follow-up.        SUBJECTIVE:Patient has been doing well.  He was started on Farxiga by his nephrologist, I reviewed that consultation note with him today.  He is tolerating the medication well.  He has not been having any problems with chest pain.  No out of the ordinary shortness of breath.  No palpitations.  Patient is wondering about the RSV vaccine.  He has upcoming travel to [place] with his family.  His other immunizations are up-to-date.    Past Medical History:   Diagnosis Date    Coronary artery disease of native artery of native heart with stable angina pectoris (H24)     Hypertension     Nephrolithiasis     Type 2 diabetes mellitus (H)      Patient Active Problem List   Diagnosis    Nephrolithiasis    Obesity    Allergic Rhinitis    Mixed hyperlipidemia     Essential Hypertension    Obesity (BMI 35.0-39.9) with comorbidity (H)    Type 2 diabetes mellitus without complication, without long-term current use of insulin (H)    Calculus of ureter    Hydronephrosis with urinary obstruction due to ureteral calculus    Coronary artery disease involving native coronary artery of native heart without angina pectoris    Hypertensive heart disease with heart failure (H)    Controlled type 2 diabetes mellitus with stage 2 chronic kidney disease, without long-term current use of insulin (H)    Coronary artery disease of native artery of native heart with stable angina pectoris (H24)     Current Outpatient Medications   Medication Sig Dispense Refill    ACCU-CHEK FASTCLIX Misc [ACCU-CHEK FASTCLIX MISC] TEST TWICE DAILY 100 each 0    ACCU-CHEK GUIDE test strip USE 1 EACH AS DIRECTED 2 (TWO) TIMES A DAY AT 7:30AM AND 4:30PM. 200 strip 3    acetaminophen (TYLENOL) 325 MG tablet Take 2 tablets (650 mg) by mouth every 4 hours as needed for other (For optimal non-opioid multimodal pain management to improve pain control.)      amLODIPine (NORVASC) 5 MG tablet TAKE 1 TABLET BY MOUTH EVERY DAY 90 tablet 3    aspirin ([facility] ASPIRIN ADULT LOW DOSE) 81 MG chewable tablet TAKE 2 TABLETS BY MOUTH DAILY 180 tablet 2    blood glucose monitoring (ACCU-CHEK FASTCLIX) lancets USE TWICE A DAY (AT 7:30 AM & 4:30 PM) AS DIRECTED 204 each 3    blood-glucose meter Misc [BLOOD-GLUCOSE METER MISC] Use 1 Device As Directed 2 (two) times a day at 9am and 6pm. 1 each 0    dapagliflozin (FARXIGA) 10 MG TABS tablet Take 1 tablet (10 mg) by mouth daily 90 tablet 1    imiquimod (ALDARA) 5 % cream Apply topically daily To warts  3    loratadine (CLARITIN) 10 MG tablet Take 10 mg by mouth daily      losartan (COZAAR) 100 MG tablet Take 1 tablet (100 mg) by mouth daily 90 tablet 3    metFORMIN (GLUCOPHAGE) 500 MG tablet TAKE 1 TABLET (500 MG) BY MOUTH 3 TIMES DAILY (WITH MEALS) 270 tablet 3    Metoprolol Tartrate 75  "MG TABS Take 75 mg by mouth 2 times daily 180 tablet 3    metroNIDAZOLE (METROGEL) 0.75 % gel 2 times daily Apply to face  10    multivitamin, therapeutic (THERA-VIT) TABS tablet Take 1 tablet by mouth daily      nitroGLYcerin (NITROSTAT) 0.4 MG sublingual tablet PLACE 1 TABLET (0.4 MG) UNDER THE TONGUE EVERY 5 MINUTES AS NEEDED FOR CHEST PAIN FOR CHEST PAIN PLACE 1 TABLET UNDER THE TONGUE EVERY 5 MINUTES FOR 3 DOSES. IF SYMPTOMS PERSIST 5 MINUTES AFTER 1ST DOSE CALL 911. 90 tablet 3    oxymetazoline (RHOFADE) 1 % Crea [OXYMETAZOLINE (RHOFADE) 1 % CREA] Apply topically daily.      rosuvastatin (CRESTOR) 40 MG tablet Take 1 tablet (40 mg) by mouth daily 90 tablet 3    triamcinolone (KENALOG) 0.1 % external cream Apply topically 2 times daily (Patient taking differently: Apply topically daily as needed Apply to neck rash) 80 g 0    vitamin D3 (CHOLECALCIFEROL) 50 mcg (2000 units) tablet Take 1 tablet (50 mcg) by mouth daily 90 tablet 3     History   Smoking Status    Never   Smokeless Tobacco    Never       OBJECTICE: /81 (BP Location: Left arm, Patient Position: Sitting, Cuff Size: Adult Regular)   Pulse 60   Temp 97.7  F (36.5  C) (Oral)   Resp 17   Ht 1.867 m (6' 1.5\")   Wt 108.4 kg (239 lb 0.6 oz)   SpO2 97%   BMI 31.11 kg/m       No results found for this or any previous visit (from the past 24 hour(s)).     GEN-alert, appropriate, in no apparent distress   CV-regular rate and rhythm   RESP-lungs clear to auscultation   EXTREM-no pitting edema of the ankles       Catalino Nuñez MD   " spontaneous and intermittent (24 hrs old)

## 2024-03-01 DIAGNOSIS — Z95.1 S/P CABG (CORONARY ARTERY BYPASS GRAFT): ICD-10-CM

## 2024-03-01 RX ORDER — METOPROLOL TARTRATE 75 MG/1
1 TABLET, FILM COATED ORAL 2 TIMES DAILY
Qty: 180 TABLET | Refills: 3 | Status: SHIPPED | OUTPATIENT
Start: 2024-03-01

## 2024-03-07 DIAGNOSIS — R80.9 PROTEINURIA, UNSPECIFIED TYPE: Primary | ICD-10-CM

## 2024-03-11 ENCOUNTER — OFFICE VISIT (OUTPATIENT)
Dept: NEPHROLOGY | Facility: CLINIC | Age: 64
End: 2024-03-11
Attending: INTERNAL MEDICINE
Payer: COMMERCIAL

## 2024-03-11 ENCOUNTER — LAB (OUTPATIENT)
Dept: LAB | Facility: CLINIC | Age: 64
End: 2024-03-11
Payer: COMMERCIAL

## 2024-03-11 VITALS
TEMPERATURE: 97.7 F | SYSTOLIC BLOOD PRESSURE: 125 MMHG | DIASTOLIC BLOOD PRESSURE: 82 MMHG | BODY MASS INDEX: 30.58 KG/M2 | OXYGEN SATURATION: 96 % | HEART RATE: 62 BPM | WEIGHT: 235 LBS

## 2024-03-11 DIAGNOSIS — N18.2 CONTROLLED TYPE 2 DIABETES MELLITUS WITH STAGE 2 CHRONIC KIDNEY DISEASE, WITHOUT LONG-TERM CURRENT USE OF INSULIN (H): ICD-10-CM

## 2024-03-11 DIAGNOSIS — E11.22 CONTROLLED TYPE 2 DIABETES MELLITUS WITH STAGE 2 CHRONIC KIDNEY DISEASE, WITHOUT LONG-TERM CURRENT USE OF INSULIN (H): ICD-10-CM

## 2024-03-11 DIAGNOSIS — R80.9 PROTEINURIA, UNSPECIFIED TYPE: ICD-10-CM

## 2024-03-11 DIAGNOSIS — K21.9 GASTROESOPHAGEAL REFLUX DISEASE WITHOUT ESOPHAGITIS: Primary | ICD-10-CM

## 2024-03-11 LAB
ALBUMIN MFR UR ELPH: 70.6 MG/DL
ALBUMIN SERPL BCG-MCNC: 4.3 G/DL (ref 3.5–5.2)
ALBUMIN UR-MCNC: 10 MG/DL
ANION GAP SERPL CALCULATED.3IONS-SCNC: 12 MMOL/L (ref 7–15)
APPEARANCE UR: CLEAR
BILIRUB UR QL STRIP: NEGATIVE
BUN SERPL-MCNC: 14 MG/DL (ref 8–23)
CALCIUM SERPL-MCNC: 9.3 MG/DL (ref 8.8–10.2)
CHLORIDE SERPL-SCNC: 102 MMOL/L (ref 98–107)
COLOR UR AUTO: YELLOW
CREAT SERPL-MCNC: 1.06 MG/DL (ref 0.67–1.17)
CREAT UR-MCNC: 129 MG/DL
CREAT UR-MCNC: 130 MG/DL
DEPRECATED HCO3 PLAS-SCNC: 25 MMOL/L (ref 22–29)
EGFRCR SERPLBLD CKD-EPI 2021: 79 ML/MIN/1.73M2
ERYTHROCYTE [DISTWIDTH] IN BLOOD BY AUTOMATED COUNT: 12.5 % (ref 10–15)
GLUCOSE SERPL-MCNC: 155 MG/DL (ref 70–99)
GLUCOSE UR STRIP-MCNC: 1000 MG/DL
HBA1C MFR BLD: 7.4 %
HCT VFR BLD AUTO: 43.4 % (ref 40–53)
HGB BLD-MCNC: 14.3 G/DL (ref 13.3–17.7)
HGB UR QL STRIP: NEGATIVE
KETONES UR STRIP-MCNC: ABNORMAL MG/DL
LEUKOCYTE ESTERASE UR QL STRIP: NEGATIVE
MCH RBC QN AUTO: 28.3 PG (ref 26.5–33)
MCHC RBC AUTO-ENTMCNC: 32.9 G/DL (ref 31.5–36.5)
MCV RBC AUTO: 86 FL (ref 78–100)
MICROALBUMIN UR-MCNC: 225 MG/L
MICROALBUMIN/CREAT UR: 173.08 MG/G CR (ref 0–17)
MUCOUS THREADS #/AREA URNS LPF: PRESENT /LPF
NITRATE UR QL: NEGATIVE
PH UR STRIP: 5.5 [PH] (ref 5–7)
PHOSPHATE SERPL-MCNC: 2.8 MG/DL (ref 2.5–4.5)
PLATELET # BLD AUTO: 199 10E3/UL (ref 150–450)
POTASSIUM SERPL-SCNC: 4.6 MMOL/L (ref 3.4–5.3)
PROT/CREAT 24H UR: 0.55 MG/MG CR (ref 0–0.2)
RBC # BLD AUTO: 5.05 10E6/UL (ref 4.4–5.9)
RBC URINE: 2 /HPF
SODIUM SERPL-SCNC: 139 MMOL/L (ref 135–145)
SP GR UR STRIP: 1.03 (ref 1–1.03)
UROBILINOGEN UR STRIP-MCNC: NORMAL MG/DL
WBC # BLD AUTO: 4.8 10E3/UL (ref 4–11)
WBC URINE: 2 /HPF

## 2024-03-11 PROCEDURE — 99000 SPECIMEN HANDLING OFFICE-LAB: CPT | Performed by: PATHOLOGY

## 2024-03-11 PROCEDURE — 99213 OFFICE O/P EST LOW 20 MIN: CPT | Performed by: INTERNAL MEDICINE

## 2024-03-11 PROCEDURE — 81001 URINALYSIS AUTO W/SCOPE: CPT | Performed by: PATHOLOGY

## 2024-03-11 PROCEDURE — 84156 ASSAY OF PROTEIN URINE: CPT | Performed by: PATHOLOGY

## 2024-03-11 PROCEDURE — 36415 COLL VENOUS BLD VENIPUNCTURE: CPT | Performed by: PATHOLOGY

## 2024-03-11 PROCEDURE — 83036 HEMOGLOBIN GLYCOSYLATED A1C: CPT | Performed by: FAMILY MEDICINE

## 2024-03-11 PROCEDURE — 85027 COMPLETE CBC AUTOMATED: CPT | Performed by: PATHOLOGY

## 2024-03-11 PROCEDURE — 99214 OFFICE O/P EST MOD 30 MIN: CPT | Performed by: INTERNAL MEDICINE

## 2024-03-11 PROCEDURE — 82043 UR ALBUMIN QUANTITATIVE: CPT | Performed by: INTERNAL MEDICINE

## 2024-03-11 PROCEDURE — 80069 RENAL FUNCTION PANEL: CPT | Performed by: PATHOLOGY

## 2024-03-11 RX ORDER — FAMOTIDINE 40 MG/1
40 TABLET, FILM COATED ORAL DAILY
Qty: 30 TABLET | Refills: 3 | Status: SHIPPED | OUTPATIENT
Start: 2024-03-11 | End: 2024-07-02

## 2024-03-11 ASSESSMENT — PAIN SCALES - GENERAL: PAINLEVEL: NO PAIN (0)

## 2024-03-11 NOTE — NURSING NOTE
Chief Complaint   Patient presents with    RECHECK     RETURN NEPHROLOGY     /82 (BP Location: Left arm, Patient Position: Sitting, Cuff Size: Adult Large)   Pulse 62   Temp 97.7  F (36.5  C) (Oral)   Wt 106.6 kg (235 lb)   SpO2 96%   BMI 30.58 kg/m      Romel Larose CMA on 3/11/2024 at 9:47 AM

## 2024-03-11 NOTE — PATIENT INSTRUCTIONS
--start taking famotidine 40 mg daily   --if you continue to have persistent symptoms for acid reflux, please contact our office, it is okay to go back to Prilosec

## 2024-03-11 NOTE — LETTER
3/11/2024       RE: Kash Tavarez  [address]  [facility] [address]     Dear Colleague,    Thank you for referring your patient, Kash Tavarez, to the [facility]PHR[facility] [place] at [facility]. Please see a copy of my visit note below.    Nephrology Clinic Progress Note   3/11/24     Kash Tavarez MRN:[id number] YOB: 1960  Date of Service: 03/11/2024  Primary care provider: Catalino Nuñez  Requesting physician: Catalino Nuñez       3/11/24: Kash is doing well overall. He has stopped taking the pantoprazole and was at [place] for vacation where he felt his GERD symptoms really got worse and he had to take a lot of tums. He has been taking the farxiga and is tolerating it well. No other complaints today. He has decreased his Vit D to 2000 international unit(s) daily.     ASSESSMENT AND RECOMMENDATIONS:     Mr. Tavarez is a 62 yo M with PMHx of T2DM on metformin, HTN, CAD s/p CABG 01/2023, calcium oxalate nephrolithiasis who presents for evaluation of non-nephrotic range proteinuria in setting of CKD2. This is most likely secondary to his underlying diabetes and HTN, especially in setting of known vascular disease with multivessel CAD. Reassuringly, no hematuria to suggest nephritic process.   He did have mild hypercalcemia which could also be related to possible over ingestion of vitamin D as patient has been taking over 4000 international unit(s) daily for the past 4-5 years. Now resolved, decreased ergocalciferol to 2000 international unit(s) daily. W/up for paraproteinemia is negative.     His creatinine remains stable at baseline wnl and UPCR is better today, 0.55 g/g from 0.99 g/g. His blood pressures are at goal and he is euvolemic on exam. He does not have other metabolic complications.    - Continue losartan 100 mg daily for HTN and diabetic kidney disease with proteinuria   - Continue Farxiga 10 mg daily   -start  taking pepcid 40 mg daily     F/up in clinic in 6 months       REASON FOR CONSULT: Proteinuria     HISTORY OF PRESENT ILLNESS:   Kash Tavarez is a 63 year old male who presents for evaluation of proteinuria. Patient notes that over the past 1-2 months, he started to notice bubbles in his urine. He mentioned this to his PCP who checked urine protein and found it to be elevated. Urine protein had not been checked in our system previously. Patient denies any lower extremity edema or shortness of breath. Had a CABG 01/2023 and has been slowly improving in terms of exercise tolerance since that time. Denies any hematuria, dysuria. Does note he urinates frequently, for years, which he attributes to having a large prostate and to drinking a lot of fluids because of his history of kidney stones. Gets up to urinate 1-2 times per night, stable.   Had a lot of medication changes after his CABG in 01/2023. Confirms his current medication list as below. Only routine OTC is claritin. Unsure why he is on the pantoprazole, no significant GERD/heartburn history that he recalls - thinks this was started after his CABG.   No recent illnesses, no recent kidney stones (last about 1.5 years ago that did require lithotripsy with ureteral stent) - notes he has cut down on intake of tree nuts as given his hyperoxaluria, urology recommended this and hasn't had concerns with stones since.   Was diagnosed with diabetes about 10 years ago. Has only ever been on metformin, slightly varied doses over the years, with good control (A1c mostly 6s and 7s, isolated reading of 8.4%). No retinopathy, has routine eye exams. Denies numbness/tingling in extremities.   Was started on losartan after his CABG and on amlodipine this summer for HTN. Trialed on hydrochlorothiazide prior to amlodipine which caused an increase in his creatinine and was stopped. Home blood pressures mostly 130s/80s. Does note since starting the amlodipine he occasionally will  have a little lightheadedness upon standing that goes away within seconds. No loss of consciousness.     PAST MEDICAL HISTORY:  Past Medical History:   Diagnosis Date    Coronary artery disease of native artery of native heart with stable angina pectoris (H24)     Hypertension     Nephrolithiasis     Type 2 diabetes mellitus (H)      PAST SURGICAL HISTORY:  Past Surgical History:   Procedure Laterality Date    BYPASS GRAFT ARTERY CORONARY N/A 01/13/2023    Procedure: CORONARY ARTERY BYPASS GRAFT (CABG) x 3, LEFT INTERNAL MAMMARY ARTERY HARVEST, LEFT RADIAL ARTERY HARVEST, ANESTHESIA TRANSESOPHAGEAL ECHOCARDIOGRAM, EPI-AORTIC ULTRASOUND;  Surgeon: Arturo Bear MD;  Location: [facility] OR    COLONOSCOPY      COMBINED CYSTOSCOPY, INSERT STENT URETER(S) Right 05/12/2022    Procedure: CYSTOURETEROSCOPY, RETROGRADE PYELOGRAM, LASER LITHOTRIPSY WITH CALCULUS REMOVAL AND URETERAL STENT INSERTION;  Surgeon: James Mosley MD;  Location: [facility] OR    CV CORONARY ANGIOGRAM N/A 12/05/2022    Procedure: Coronary Angiogram;  Surgeon: Aristides North MD;  Location: [facility] CATH LAB CV    CV LEFT HEART CATH N/A 12/05/2022    Procedure: Left Heart Catheterization;  Surgeon: Aristides North MD;  Location: [facility] CV    EYE SURGERY  Laser surgery to ensure retina didn't detach many years ago.    KIDNEY STONE SURGERY      [facility] APPENDECTOMY      Description: Appendectomy;  Recorded: 03/04/2008;     MEDICATIONS:  Prescription Medications as of 3/11/2024         Rx Number Disp Refills Start End Last Dispensed Date Next Fill Date Owning Pharmacy    ACCU-CHEK FASTCLIX Misc  100 each 0 12/15/2014 --       Sig: [ACCU-CHEK FASTCLIX MISC] TEST TWICE DAILY    ACCU-CHEK GUIDE test strip  200 strip 3 11/9/2022 --   [id number] - [place], [place] - [address] AT [facility] [address]    Sig: USE 1 EACH AS DIRECTED 2 (TWO) TIMES A DAY AT 7:30AM AND 4:30PM.    Class: E-Prescribe     acetaminophen (TYLENOL) 325 MG tablet  -- -- 1/23/2023 --       Sig: Take 2 tablets (650 mg) by mouth every 4 hours as needed for other (For optimal non-opioid multimodal pain management to improve pain control.)    Class: OTC    Route: Oral    amLODIPine (NORVASC) 5 MG tablet  90 tablet 3 10/4/2023 --   [facility]pharmacy #[address] [place], [place] [address] AT [facility] & [address]    Sig: TAKE 1 TABLET BY MOUTH EVERY DAY    Class: E-Prescribe    Route: Oral    aspirin ([facility] ASPIRIN ADULT LOW DOSE) 81 MG chewable tablet  180 tablet 2 2/16/2024 --   [facility]/pharmacy #[address] [place] [address] AT [facility] [address]    Sig: TAKE 2 TABLETS BY MOUTH DAILY    Class: E-Prescribe    Route: Oral    blood glucose monitoring (ACCU-CHEK FASTCLIX) lancets  204 each 3 10/31/2021 --   [facility]/pharmacy #[address] [place] [address] AT [facility] [address]    Sig: USE TWICE A DAY (AT 7:30 AM & 4:30 PM) AS DIRECTED    Class: E-Prescribe    blood-glucose meter Misc  1 each 0 2/11/2019 --       Sig: [BLOOD-GLUCOSE METER MISC] Use 1 Device As Directed 2 (two) times a day at 9am and 6pm.    Notes to Pharmacy: Accu check glucometer. Diabetes mellitus E11.9    Route: Does not apply    dapagliflozin (FARXIGA) 10 MG TABS tablet  90 tablet 1 12/21/2023 --   [facility]pharmacy #[address] [place] [address] AT [facility] [address]    Sig: Take 1 tablet (10 mg) by mouth daily    Class: E-Prescribe    Route: Oral    imiquimod (ALDARA) 5 % cream  -- 3 10/28/2016 --       Sig: Apply topically daily To warts    Class: Historical    Route: Topical    loratadine (CLARITIN) 10 MG tablet  -- --  --       Sig: Take 10 mg by mouth daily    Class: Historical    Route: Oral    losartan (COZAAR) 100 MG tablet  90 tablet 3 4/20/2023 --   [facility]/pharmacy #[address] [place] [address] - [address] AT [facility] [address]    Sig: Take 1 tablet (100 mg) by mouth daily     Class: E-Prescribe    Route: Oral    metFORMIN (GLUCOPHAGE) 500 MG tablet  270 tablet 3 2023 --   [facility]/pharmacy #[address] [place] [address] AT [facility] & [place]    Sig: TAKE 1 TABLET (500 MG) BY MOUTH 3 TIMES DAILY (WITH MEALS)    Class: E-Prescribe    Route: Oral    Metoprolol Tartrate 75 MG TABS  180 tablet 3 3/1/2024 --   [facility]pharmacy #[address] [place] [address] AT [facility] & [place]    Sig: TAKE 1 TABLET BY MOUTH TWICE A DAY    Class: E-Prescribe    Route: Oral    Renewals       Renewal provider: Catalino Nuñez MD            metroNIDAZOLE (METROGEL) 0.75 % gel  -- 10 10/28/2016 --       Si times daily Apply to face    Class: Historical    multivitamin, therapeutic (THERA-VIT) TABS tablet  -- --  --       Sig: Take 1 tablet by mouth daily    Class: Historical    Route: Oral    nitroGLYcerin (NITROSTAT) 0.4 MG sublingual tablet  90 tablet 3 2022 --   [facility] [place] [address] AT [facility] & [place]    Sig: PLACE 1 TABLET (0.4 MG) UNDER THE TONGUE EVERY 5 MINUTES AS NEEDED FOR CHEST PAIN FOR CHEST PAIN PLACE 1 TABLET UNDER THE TONGUE EVERY 5 MINUTES FOR 3 DOSES. IF SYMPTOMS PERSIST 5 MINUTES AFTER 1ST DOSE CALL 911.    Class: E-Prescribe    Route: Sublingual    oxymetazoline (RHOFADE) 1 % Crea  -- -- 2020 --       Sig: [OXYMETAZOLINE (RHOFADE) 1 % CREA] Apply topically daily.    Class: Historical    Route: Topical    rosuvastatin (CRESTOR) 40 MG tablet  90 tablet 3 2023 --   [facility] [place] [address] AT [facility] & [place]    Sig: Take 1 tablet (40 mg) by mouth daily    Class: E-Prescribe    Route: Oral    triamcinolone (KENALOG) 0.1 % external cream  80 g 0 2021 --   [facility] [place] [address] [facility] [address] [facility] [place]    Sig: Apply topically 2 times daily    Class: No Print Out    Route:  Topical    vitamin D3 (CHOLECALCIFEROL) 50 mcg (2000 units) tablet  90 tablet 3 12/21/2023 --   [id number] - [place], [place] - [address] AT [facility] [address]    Sig: Take 1 tablet (50 mcg) by mouth daily    Class: E-Prescribe    Route: Oral           ALLERGIES:    Allergies   Allergen Reactions    Seasonal Allergies Cough     Hayfever     REVIEW OF SYSTEMS:  A comprehensive review of systems was performed and found to be negative except as described here or above.   SOCIAL HISTORY:   No smoking history  No alcohol use (notes father had alcohol use disorder so he has never drank)  No drug use  Plays drums in the [employer]  Has a 14 year old adopted child, actively involved in their sports and other activities       FAMILY MEDICAL HISTORY:   Family History   Problem Relation Age of Onset    Heart Disease Father     Alcoholism Father     Cerebrovascular Disease Father         Dies after multiple strokes.    Diabetes Brother         Diagnosed a few years before me.     PHYSICAL EXAM:   /82 (BP Location: Left arm, Patient Position: Sitting, Cuff Size: Adult Large)   Pulse 62   Temp 97.7  F (36.5  C) (Oral)   Wt 106.6 kg (235 lb)   SpO2 96%   BMI 30.58 kg/m    GENERAL APPEARANCE: alert and no distress  EYES: nonicteric  HENT: mouth without ulcers or lesions  RESP: lungs clear to auscultation, breathing comfortably on room air   CV: regular rhythm, normal rate, no rub  ABDOMEN: soft, nontender, nondistended, no rebound or guarding   Extremities: no clubbing, cyanosis, or edema  MS: no evidence of inflammation in joints  SKIN: no rash  NEURO: mentation intact and speech normal  PSYCH: affect normal/bright   LABS:   CMP  Recent Labs   Lab Test 12/21/23  1321 12/14/23  0909 10/05/23  0943 08/11/23  1011 01/17/23  0713 01/17/23  0441 01/16/23  0728 01/16/23  0402 01/15/23  0811 01/15/23  0405 01/14/23  0512 01/14/23  0410 01/14/23  0013 01/14/23  0011 01/13/23  1311  01/13/23  1307 12/02/22  1011 11/17/22  0857 05/09/22  0900 05/09/22  0900 11/11/21  0849 11/05/20  0855 11/04/19  0901 10/09/18  0914    138 141 137   < >  --   --   --   --  131*  --   --   --  138  --  137   < > 138  --  136   < > 140  --  142   POTASSIUM 4.9 5.0 4.5 3.5   < > 3.6  --  3.6  --  4.0  4.0  --  4.2  --  4.2  --  4.2  4.2   < > 5.4*   < > 4.6   < > 5.1*  --  4.9   CHLORIDE 101 98 103 100   < >  --   --   --   --  96*  --   --   --  107  --  104   < > 101   < > 104   < > 104  --  104   CO2 29 27 27 28   < >  --   --   --   --  27  --   --   --  23  --  21*   < > 24   < > 23   < > 25  --  26   ANIONGAP 10 13 11 9   < >  --   --   --   --  8  --   --   --  8  --  12   < > 13   < > 9   < > 11  --  12   * 170* 182* 149*   < >  --    < >  --    < > 163*   < >  --    < > 151*   < > 165*   < > 154*   < > 241*   < > 217*  --  159*   BUN 17.2 16.1 19.5 15   < >  --   --   --   --  12.3  --   --   --  11.9  --  12.9   < > 16.2   < > 19   < > 17  --  15   CR 1.17 1.13 1.00 1.43*   < >  --   --   --   --  0.88  --   --   --  0.82  --  0.91   < > 0.87  --  1.17   < > 1.12  --  0.94   GFRESTIMATED 70 73 85 55*   < >  --   --   --   --  >90  --   --   --  >90  --  >90   < > >90  --  71   < > >60  --  >60   GFRESTBLACK  --   --   --   --   --   --   --   --   --   --   --   --   --   --   --   --   --   --   --   --   --  >60  --  >60   RIOS 10.3* 10.3* 9.8 10.5   < >  --   --   --   --  8.5*  --   --   --  8.4*  --  8.6*   < > 9.8  --  9.0   < > 9.9  --  10.1   MAG  --   --   --   --   --  1.9  --  2.0  --  1.8  --  2.1  --   --   --  2.9*   < >  --   --   --   --   --   --   --    PHOS 3.8  --   --   --   --  3.6  --  2.8  --  2.8  --  4.0  --   --   --  2.7   < >  --   --   --   --   --   --   --    PROTTOTAL  --   --   --   --   --   --   --   --   --   --   --   --   --  5.4*  --  5.6*  --  7.4  --  6.6  --   --   --  7.2   ALBUMIN 4.5  --   --   --   --   --   --   --   --   --   --   --   --  3.6   --  3.6  --  4.5  --  3.8  --   --   --  4.3   BILITOTAL  --   --   --   --   --   --   --   --   --   --   --   --   --  0.2  --  0.2  --  0.5  --  0.6  --   --   --  0.5   ALKPHOS  --   --   --   --   --   --   --   --   --   --   --   --   --  52  --  56  --  74  --  61  --   --   --  74   AST  --   --   --   --   --   --   --   --   --   --   --   --   --  21  --  24  --  18  --  17  --   --   --  17   ALT  --   --   --   --   --   --   --   --   --   --   --   --   --  13  --  17  --  20  --  28  --   --    < > 26    < > = values in this interval not displayed.     CBC  Recent Labs   Lab Test 03/11/24  0924 12/21/23  1321 01/17/23  0441 01/15/23  0811 01/14/23  0410   HGB 14.3 14.9  --  11.7* 10.6*   WBC 4.8 8.7  --  13.7* 17.6*   RBC 5.05 5.11  --  4.03* 3.75*   HCT 43.4 44.2  --  35.2* 32.1*   MCV 86 87  --  87 86   MCH 28.3 29.2  --  29.0 28.3   MCHC 32.9 33.7  --  33.2 33.0   RDW 12.5 12.2  --  12.9 12.8    239 160 129* 171     INR  Recent Labs   Lab Test 01/13/23  1307 01/13/23  1207 12/16/22  0819   INR 1.25* 1.36* 0.99   PTT 30 26 23     ABG  Recent Labs   Lab Test 01/08/24  0833 01/17/23  0441 01/16/23  1417 01/16/23  0402 01/13/23  1900 01/13/23  1344 01/13/23  1313 01/13/23  1206 01/13/23  1149 01/13/23  1100   PH 7.35 7.39 7.33* 7.39   < > 7.36*   < > 7.31* 7.36* 7.36*   PCO2  --   --   --   --   --  38  --  48* 47* 43   PO2  --   --   --   --   --  144*  --  322* 307* 357*   HCO3  --   --   --   --   --  22*  --  23 25 23    < > = values in this interval not displayed.      URINE STUDIES  Recent Labs   Lab Test 03/11/24  0939 12/21/23  1326 12/14/23  0915 12/16/22  0812   COLOR Yellow Yellow Yellow Yellow   APPEARANCE Clear Clear Slightly Cloudy* Clear   URINEGLC 1000* Negative Negative >1000*   URINEBILI Negative Negative Negative Negative   URINEKETONE Trace* Negative Negative Negative   SG 1.034 1.024 1.020 1.019   UBLD Negative Trace* Negative Negative   URINEPH 5.5 5.5 6.0 5.5   PROTEIN  10* 100* >=300* Negative   UROBILINOGEN  --   --  1.0  --    NITRITE Negative Negative Negative Negative   LEUKEST Negative Negative Negative Negative   RBCU 2 1 0-2 0   WBCU 2 2 0-5 0     No lab results found.  PTH  Recent Labs   Lab Test 12/21/23  1321 07/19/22  1043   PTHI 20 26     IRON STUDIES  No lab results found.  IMAGING:  All imaging studies reviewed. CT A/P 08/2023 with 4 nonobstructing stones in R kidney, 2 in left; large prostate without other noted kidney abnormalities        Again, thank you for allowing me to participate in the care of your patient.      Sincerely,    Michelle Gan MD

## 2024-03-11 NOTE — PROGRESS NOTES
Nephrology Clinic Progress Note   3/11/24     Kash Tavarez MRN:[id number] YOB: 1960  Date of Service: 03/11/2024  Primary care provider: Catalino Nuñez  Requesting physician: Catalino Nuñez       3/11/24: Kash is doing well overall. He has stopped taking the pantoprazole and was at [place] for vacation where he felt his GERD symptoms really got worse and he had to take a lot of tums. He has been taking the farxiga and is tolerating it well. No other complaints today. He has decreased his Vit D to 2000 international unit(s) daily.     ASSESSMENT AND RECOMMENDATIONS:     Mr. Tavarez is a 64 yo M with PMHx of T2DM on metformin, HTN, CAD s/p CABG 01/2023, calcium oxalate nephrolithiasis who presents for evaluation of non-nephrotic range proteinuria in setting of CKD2. This is most likely secondary to his underlying diabetes and HTN, especially in setting of known vascular disease with multivessel CAD. Reassuringly, no hematuria to suggest nephritic process.   He did have mild hypercalcemia which could also be related to possible over ingestion of vitamin D as patient has been taking over 4000 international unit(s) daily for the past 4-5 years. Now resolved, decreased ergocalciferol to 2000 international unit(s) daily. W/up for paraproteinemia is negative.     His creatinine remains stable at baseline wnl and UPCR is better today, 0.55 g/g from 0.99 g/g. His blood pressures are at goal and he is euvolemic on exam. He does not have other metabolic complications.    - Continue losartan 100 mg daily for HTN and diabetic kidney disease with proteinuria   - Continue Farxiga 10 mg daily   -start taking pepcid 40 mg daily     F/up in clinic in 6 months       REASON FOR CONSULT: Proteinuria     HISTORY OF PRESENT ILLNESS:   Kash Tavaerz is a 63 year old male who presents for evaluation of proteinuria. Patient notes that over the past 1-2 months, he started to notice bubbles in his urine. He mentioned this to  his PCP who checked urine protein and found it to be elevated. Urine protein had not been checked in our system previously. Patient denies any lower extremity edema or shortness of breath. Had a CABG 01/2023 and has been slowly improving in terms of exercise tolerance since that time. Denies any hematuria, dysuria. Does note he urinates frequently, for years, which he attributes to having a large prostate and to drinking a lot of fluids because of his history of kidney stones. Gets up to urinate 1-2 times per night, stable.   Had a lot of medication changes after his CABG in 01/2023. Confirms his current medication list as below. Only routine OTC is claritin. Unsure why he is on the pantoprazole, no significant GERD/heartburn history that he recalls - thinks this was started after his CABG.   No recent illnesses, no recent kidney stones (last about 1.5 years ago that did require lithotripsy with ureteral stent) - notes he has cut down on intake of tree nuts as given his hyperoxaluria, urology recommended this and hasn't had concerns with stones since.   Was diagnosed with diabetes about 10 years ago. Has only ever been on metformin, slightly varied doses over the years, with good control (A1c mostly 6s and 7s, isolated reading of 8.4%). No retinopathy, has routine eye exams. Denies numbness/tingling in extremities.   Was started on losartan after his CABG and on amlodipine this summer for HTN. Trialed on hydrochlorothiazide prior to amlodipine which caused an increase in his creatinine and was stopped. Home blood pressures mostly 130s/80s. Does note since starting the amlodipine he occasionally will have a little lightheadedness upon standing that goes away within seconds. No loss of consciousness.     PAST MEDICAL HISTORY:  Past Medical History:   Diagnosis Date    Coronary artery disease of native artery of native heart with stable angina pectoris (H24)     Hypertension     Nephrolithiasis     Type 2 diabetes  mellitus (H)      PAST SURGICAL HISTORY:  Past Surgical History:   Procedure Laterality Date    BYPASS GRAFT ARTERY CORONARY N/A 01/13/2023    Procedure: CORONARY ARTERY BYPASS GRAFT (CABG) x 3, LEFT INTERNAL MAMMARY ARTERY HARVEST, LEFT RADIAL ARTERY HARVEST, ANESTHESIA TRANSESOPHAGEAL ECHOCARDIOGRAM, EPI-AORTIC ULTRASOUND;  Surgeon: Arturo Bear MD;  Location: [facility] [place] OR    COLONOSCOPY      COMBINED CYSTOSCOPY, INSERT STENT URETER(S) Right 05/12/2022    Procedure: CYSTOURETEROSCOPY, RETROGRADE PYELOGRAM, LASER LITHOTRIPSY WITH CALCULUS REMOVAL AND URETERAL STENT INSERTION;  Surgeon: James Mosley MD;  Location: [place] [facility] OR    CV CORONARY ANGIOGRAM N/A 12/05/2022    Procedure: Coronary Angiogram;  Surgeon: Aristides North MD;  Location: [facility] CATH LAB CV    CV LEFT HEART CATH N/A 12/05/2022    Procedure: Left Heart Catheterization;  Surgeon: Aristides North MD;  Location: [facility] CATH LAB CV    EYE SURGERY  Laser surgery to ensure retina didn't detach many years ago.    KIDNEY STONE SURGERY      [facility] APPENDECTOMY      Description: Appendectomy;  Recorded: 03/04/2008;     MEDICATIONS:  Prescription Medications as of 3/11/2024         Rx Number Disp Refills Start End Last Dispensed Date Next Fill Date Owning Pharmacy    ACCU-CHEK FASTCLIX Misc  100 each 0 12/15/2014 --       Sig: [ACCU-CHEK FASTCLIX MISC] TEST TWICE DAILY    ACCU-CHEK GUIDE test strip  200 strip 3 11/9/2022 --   [facility]/pharmacy #[id number] [place], [place] - [facility] AT [facility] & [address]    Sig: USE 1 EACH AS DIRECTED 2 (TWO) TIMES A DAY AT 7:30AM AND 4:30PM.    Class: E-Prescribe    acetaminophen (TYLENOL) 325 MG tablet  -- -- 1/23/2023 --       Sig: Take 2 tablets (650 mg) by mouth every 4 hours as needed for other (For optimal non-opioid multimodal pain management to improve pain control.)    Class: OTC    Route: Oral    amLODIPine (NORVASC) 5 MG tablet  90 tablet 3 10/4/2023 --   [facility]/pharmacy #[id number] -  [address] AT [facility] & [address]    Sig: TAKE 1 TABLET BY MOUTH EVERY DAY    Class: E-Prescribe    Route: Oral    aspirin ([facility] ASPIRIN ADULT LOW DOSE) 81 MG chewable tablet  180 tablet 2 2/16/2024 --   [facility]/pharmacy #[address] [place] [address] AT [facility] & [address]    Sig: TAKE 2 TABLETS BY MOUTH DAILY    Class: E-Prescribe    Route: Oral    blood glucose monitoring (ACCU-CHEK FASTCLIX) lancets  204 each 3 10/31/2021 --   [facility]/pharmacy #[address] [place] [address] - [address] [place] [address] AT [facility] & [place]    Sig: USE TWICE A DAY (AT 7:30 AM & 4:30 PM) AS DIRECTED    Class: E-Prescribe    blood-glucose meter Misc  1 each 0 2/11/2019 --       Sig: [BLOOD-GLUCOSE METER MISC] Use 1 Device As Directed 2 (two) times a day at 9am and 6pm.    Notes to Pharmacy: Accu check glucometer. Diabetes mellitus E11.9    Route: Does not apply    dapagliflozin (FARXIGA) 10 MG TABS tablet  90 tablet 1 12/21/2023 --   [facility]/pharmacy #[address] [place] [address] - [facility] AT [facility] & [place]    Sig: Take 1 tablet (10 mg) by mouth daily    Class: E-Prescribe    Route: Oral    imiquimod (ALDARA) 5 % cream  -- 3 10/28/2016 --       Sig: Apply topically daily To warts    Class: Historical    Route: Topical    loratadine (CLARITIN) 10 MG tablet  -- --  --       Sig: Take 10 mg by mouth daily    Class: Historical    Route: Oral    losartan (COZAAR) 100 MG tablet  90 tablet 3 4/20/2023 --   [facility]/pharmacy #[address] [place] [address] - [address] [place] [address] AT [facility] & [place]    Sig: Take 1 tablet (100 mg) by mouth daily    Class: E-Prescribe    Route: Oral    metFORMIN (GLUCOPHAGE) 500 MG tablet  270 tablet 3 5/11/2023 --   [facility]/pharmacy #[address] [place] [address] - [facility] AT [facility] & [place]    Sig: TAKE 1 TABLET (500 MG) BY MOUTH 3 TIMES DAILY (WITH MEALS)    Class: E-Prescribe    Route: Oral    Metoprolol Tartrate  75 MG TABS  180 tablet 3 3/1/2024 --   [facility]/pharmacy #[address] [place] [address] - [facility] [facility] [address] AT [facility] & [place]    Sig: TAKE 1 TABLET BY MOUTH TWICE A DAY    Class: E-Prescribe    Route: Oral    Renewals       Renewal provider: Catalino Nuñez MD            metroNIDAZOLE (METROGEL) 0.75 % gel  -- 10 10/28/2016 --       Si times daily Apply to face    Class: Historical    multivitamin, therapeutic (THERA-VIT) TABS tablet  -- --  --       Sig: Take 1 tablet by mouth daily    Class: Historical    Route: Oral    nitroGLYcerin (NITROSTAT) 0.4 MG sublingual tablet  90 tablet 3 2022 --   [facility]/pharmacy #[address] [place], [place] [address] [address] AT [facility] & [place]    Sig: PLACE 1 TABLET (0.4 MG) UNDER THE TONGUE EVERY 5 MINUTES AS NEEDED FOR CHEST PAIN FOR CHEST PAIN PLACE 1 TABLET UNDER THE TONGUE EVERY 5 MINUTES FOR 3 DOSES. IF SYMPTOMS PERSIST 5 MINUTES AFTER 1ST DOSE CALL 911.    Class: E-Prescribe    Route: Sublingual    oxymetazoline (RHOFADE) 1 % Crea  -- -- 2020 --       Sig: [OXYMETAZOLINE (RHOFADE) 1 % CREA] Apply topically daily.    Class: Historical    Route: Topical    rosuvastatin (CRESTOR) 40 MG tablet  90 tablet 3 2023 --   [facility]/pharmacy #[address] [place] [address] [address] AT [facility] & [place]    Sig: Take 1 tablet (40 mg) by mouth daily    Class: E-Prescribe    Route: Oral    triamcinolone (KENALOG) 0.1 % external cream  80 g 0 2021 --   [facility]/pharmacy #[address] [place] [address] [address] AT [facility] & [place]    Sig: Apply topically 2 times daily    Class: No Print Out    Route: Topical    vitamin D3 (CHOLECALCIFEROL) 50 mcg (2000 units) tablet  90 tablet 3 2023 --   [facility]/pharmacy #[address] [place] [address] - [address] [facility] & [place]    Sig: Take 1 tablet (50 mcg) by mouth daily    Class: E-Prescribe    Route: Oral           ALLERGIES:    Allergies   Allergen Reactions     Seasonal Allergies Cough     Hayfever     REVIEW OF SYSTEMS:  A comprehensive review of systems was performed and found to be negative except as described here or above.   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    Diabetes Brother         Diagnosed a few years before me.     PHYSICAL EXAM:   /82 (BP Location: Left arm, Patient Position: Sitting, Cuff Size: Adult Large)   Pulse 62   Temp 97.7  F (36.5  C) (Oral)   Wt 106.6 kg (235 lb)   SpO2 96%   BMI 30.58 kg/m    GENERAL APPEARANCE: alert and no distress  EYES: nonicteric  HENT: mouth without ulcers or lesions  RESP: lungs clear to auscultation, breathing comfortably on room air   CV: regular rhythm, normal rate, no rub  ABDOMEN: soft, nontender, nondistended, no rebound or guarding   Extremities: no clubbing, cyanosis, or edema  MS: no evidence of inflammation in joints  SKIN: no rash  NEURO: mentation intact and speech normal  PSYCH: affect normal/bright   LABS:   CMP  Recent Labs   Lab Test 12/21/23  1321 12/14/23  0909 10/05/23  0943 08/11/23  1011 01/17/23  0713 01/17/23  0441 01/16/23  0728 01/16/23  0402 01/15/23  0811 01/15/23  0405 01/14/23  0512 01/14/23  0410 01/14/23  0013 01/14/23  0011 01/13/23  1311 01/13/23  1307 12/02/22  1011 11/17/22  0857 05/09/22  0900 05/09/22  0900 11/11/21  0849 11/05/20  0855 11/04/19  0901 10/09/18  0914    138 141 137   < >  --   --   --   --  131*  --   --   --  138  --  137   < > 138  --  136   < > 140  --  142   POTASSIUM 4.9 5.0 4.5 3.5   < > 3.6  --  3.6  --  4.0  4.0  --  4.2  --   4.2  --  4.2  4.2   < > 5.4*   < > 4.6   < > 5.1*  --  4.9   CHLORIDE 101 98 103 100   < >  --   --   --   --  96*  --   --   --  107  --  104   < > 101   < > 104   < > 104  --  104   CO2 29 27 27 28   < >  --   --   --   --  27  --   --   --  23  --  21*   < > 24   < > 23   < > 25  --  26   ANIONGAP 10 13 11 9   < >  --   --   --   --  8  --   --   --  8  --  12   < > 13   < > 9   < > 11  --  12   * 170* 182* 149*   < >  --    < >  --    < > 163*   < >  --    < > 151*   < > 165*   < > 154*   < > 241*   < > 217*  --  159*   BUN 17.2 16.1 19.5 15   < >  --   --   --   --  12.3  --   --   --  11.9  --  12.9   < > 16.2   < > 19   < > 17  --  15   CR 1.17 1.13 1.00 1.43*   < >  --   --   --   --  0.88  --   --   --  0.82  --  0.91   < > 0.87  --  1.17   < > 1.12  --  0.94   GFRESTIMATED 70 73 85 55*   < >  --   --   --   --  >90  --   --   --  >90  --  >90   < > >90  --  71   < > >60  --  >60   GFRESTBLACK  --   --   --   --   --   --   --   --   --   --   --   --   --   --   --   --   --   --   --   --   --  >60  --  >60   RIOS 10.3* 10.3* 9.8 10.5   < >  --   --   --   --  8.5*  --   --   --  8.4*  --  8.6*   < > 9.8  --  9.0   < > 9.9  --  10.1   MAG  --   --   --   --   --  1.9  --  2.0  --  1.8  --  2.1  --   --   --  2.9*   < >  --   --   --   --   --   --   --    PHOS 3.8  --   --   --   --  3.6  --  2.8  --  2.8  --  4.0  --   --   --  2.7   < >  --   --   --   --   --   --   --    PROTTOTAL  --   --   --   --   --   --   --   --   --   --   --   --   --  5.4*  --  5.6*  --  7.4  --  6.6  --   --   --  7.2   ALBUMIN 4.5  --   --   --   --   --   --   --   --   --   --   --   --  3.6  --  3.6  --  4.5  --  3.8  --   --   --  4.3   BILITOTAL  --   --   --   --   --   --   --   --   --   --   --   --   --  0.2  --  0.2  --  0.5  --  0.6  --   --   --  0.5   ALKPHOS  --   --   --   --   --   --   --   --   --   --   --   --   --  52  --  56  --  74  --  61  --   --   --  74   AST  --   --   --   --   --   --    --   --   --   --   --   --   --  21  --  24  --  18  --  17  --   --   --  17   ALT  --   --   --   --   --   --   --   --   --   --   --   --   --  13  --  17  --  20  --  28  --   --    < > 26    < > = values in this interval not displayed.     CBC  Recent Labs   Lab Test 03/11/24  0924 12/21/23  1321 01/17/23  0441 01/15/23  0811 01/14/23  0410   HGB 14.3 14.9  --  11.7* 10.6*   WBC 4.8 8.7  --  13.7* 17.6*   RBC 5.05 5.11  --  4.03* 3.75*   HCT 43.4 44.2  --  35.2* 32.1*   MCV 86 87  --  87 86   MCH 28.3 29.2  --  29.0 28.3   MCHC 32.9 33.7  --  33.2 33.0   RDW 12.5 12.2  --  12.9 12.8    239 160 129* 171     INR  Recent Labs   Lab Test 01/13/23  1307 01/13/23  1207 12/16/22  0819   INR 1.25* 1.36* 0.99   PTT 30 26 23     ABG  Recent Labs   Lab Test 01/08/24  0833 01/17/23  0441 01/16/23  1417 01/16/23  0402 01/13/23  1900 01/13/23  1344 01/13/23  1313 01/13/23  1206 01/13/23  1149 01/13/23  1100   PH 7.35 7.39 7.33* 7.39   < > 7.36*   < > 7.31* 7.36* 7.36*   PCO2  --   --   --   --   --  38  --  48* 47* 43   PO2  --   --   --   --   --  144*  --  322* 307* 357*   HCO3  --   --   --   --   --  22*  --  23 25 23    < > = values in this interval not displayed.      URINE STUDIES  Recent Labs   Lab Test 03/11/24  0939 12/21/23  1326 12/14/23  0915 12/16/22  0812   COLOR Yellow Yellow Yellow Yellow   APPEARANCE Clear Clear Slightly Cloudy* Clear   URINEGLC 1000* Negative Negative >1000*   URINEBILI Negative Negative Negative Negative   URINEKETONE Trace* Negative Negative Negative   SG 1.034 1.024 1.020 1.019   UBLD Negative Trace* Negative Negative   URINEPH 5.5 5.5 6.0 5.5   PROTEIN 10* 100* >=300* Negative   UROBILINOGEN  --   --  1.0  --    NITRITE Negative Negative Negative Negative   LEUKEST Negative Negative Negative Negative   RBCU 2 1 0-2 0   WBCU 2 2 0-5 0     No lab results found.  PTH  Recent Labs   Lab Test 12/21/23  1321 07/19/22  1043   PTHI 20 26     IRON STUDIES  No lab results  found.  IMAGING:  All imaging studies reviewed. CT A/P 08/2023 with 4 nonobstructing stones in R kidney, 2 in left; large prostate without other noted kidney abnormalities

## 2024-03-13 ENCOUNTER — TELEPHONE (OUTPATIENT)
Dept: NEPHROLOGY | Facility: CLINIC | Age: 64
End: 2024-03-13
Payer: COMMERCIAL

## 2024-03-13 NOTE — TELEPHONE ENCOUNTER
Patient scheduled for:     Appointment type: Return nephrology  Provider: Dr. Gan  Return date: 9/16 at 9:30 with labs at 8:30  Specialty phone number: [phone]

## 2024-03-29 DIAGNOSIS — N18.2 CONTROLLED TYPE 2 DIABETES MELLITUS WITH STAGE 2 CHRONIC KIDNEY DISEASE, WITHOUT LONG-TERM CURRENT USE OF INSULIN (H): ICD-10-CM

## 2024-03-29 DIAGNOSIS — E11.22 CONTROLLED TYPE 2 DIABETES MELLITUS WITH STAGE 2 CHRONIC KIDNEY DISEASE, WITHOUT LONG-TERM CURRENT USE OF INSULIN (H): ICD-10-CM

## 2024-03-29 DIAGNOSIS — R80.9 PROTEINURIA, UNSPECIFIED TYPE: ICD-10-CM

## 2024-04-01 RX ORDER — DAPAGLIFLOZIN 10 MG/1
10 TABLET, FILM COATED ORAL DAILY
Qty: 90 TABLET | Refills: 1 | OUTPATIENT
Start: 2024-04-01

## 2024-04-02 ENCOUNTER — TELEPHONE (OUTPATIENT)
Dept: NEPHROLOGY | Facility: CLINIC | Age: 64
End: 2024-04-02
Payer: COMMERCIAL

## 2024-04-02 DIAGNOSIS — R80.9 PROTEINURIA, UNSPECIFIED TYPE: ICD-10-CM

## 2024-04-02 DIAGNOSIS — N18.2 CONTROLLED TYPE 2 DIABETES MELLITUS WITH STAGE 2 CHRONIC KIDNEY DISEASE, WITHOUT LONG-TERM CURRENT USE OF INSULIN (H): ICD-10-CM

## 2024-04-02 DIAGNOSIS — E11.22 CONTROLLED TYPE 2 DIABETES MELLITUS WITH STAGE 2 CHRONIC KIDNEY DISEASE, WITHOUT LONG-TERM CURRENT USE OF INSULIN (H): ICD-10-CM

## 2024-04-02 RX ORDER — DAPAGLIFLOZIN 10 MG/1
10 TABLET, FILM COATED ORAL DAILY
Qty: 90 TABLET | Refills: 1 | Status: SHIPPED | OUTPATIENT
Start: 2024-04-02 | End: 2024-04-02

## 2024-04-02 NOTE — TELEPHONE ENCOUNTER
Prior Authorization Retail Medication Request    Medication/Dose: Farxiga 10 mg daily   Diagnosis and ICD code (if different than what is on RX):    Controlled type 2 diabetes mellitus with stage 2 chronic kidney disease, without long-term current use of insulin (H) [E11.22, N18.2]  - Primary      Proteinuria, unspecified type [R80.9]        New/renewal/insurance change PA/secondary ins. PA: Insurance states no longer covers when trying to refill. Last refill was in January 2023  Previously Tried and Failed:  NA  Rationale:  for HTN and diabetic kidney disease with proteinuria     Insurance   [place]  Insurance ID: [id number]     Secondary (if applicable):NA  Insurance ID:  NA    Pharmacy Information (if different than what is on RX)  Name:  [id number]  Phone:  [phone]  Fax:[phone]

## 2024-04-02 NOTE — TELEPHONE ENCOUNTER
Nephrology Note: Medication Refill Request    Medication Refill Request:     Medication/Dose/Frequency: Farxiga 10 mg daily   Preferred Pharmacy: [facility]   Provider:  Dr. Gan                          SITUATION/BACKROUND:                 Last office visit: 3/11/24       Future office visit: 9/16/24     ASSESSMENT:     Neph Assessments:    Recent Labs:  CBC Results:  Recent Labs   Lab Test 03/11/24  0924   WBC 4.8   RBC 5.05   HGB 14.3   HCT 43.4   MCV 86   MCH 28.3   MCHC 32.9   RDW 12.5        Last Renal Panel:  Sodium   Date Value Ref Range Status   03/11/2024 139 135 - 145 mmol/L Final     Comment:     Reference intervals for this test were updated on 09/26/2023 to more accurately reflect our healthy population. There may be differences in the flagging of prior results with similar values performed with this method. Interpretation of those prior results can be made in the context of the updated reference intervals.      Potassium   Date Value Ref Range Status   03/11/2024 4.6 3.4 - 5.3 mmol/L Final   08/11/2023 3.5 3.5 - 5.0 mmol/L Final     Chloride   Date Value Ref Range Status   03/11/2024 102 98 - 107 mmol/L Final   08/11/2023 100 98 - 107 mmol/L Final     Carbon Dioxide (CO2)   Date Value Ref Range Status   03/11/2024 25 22 - 29 mmol/L Final   08/11/2023 28 22 - 31 mmol/L Final     Anion Gap   Date Value Ref Range Status   03/11/2024 12 7 - 15 mmol/L Final   08/11/2023 9 5 - 18 mmol/L Final     Glucose   Date Value Ref Range Status   03/11/2024 155 (H) 70 - 99 mg/dL Final   08/11/2023 149 (H) 70 - 125 mg/dL Final     Urea Nitrogen   Date Value Ref Range Status   03/11/2024 14.0 8.0 - 23.0 mg/dL Final   08/11/2023 15 8 - 22 mg/dL Final     Creatinine   Date Value Ref Range Status   03/11/2024 1.06 0.67 - 1.17 mg/dL Final     GFR Estimate   Date Value Ref Range Status   03/11/2024 79 >60 mL/min/1.73m2 Final   11/05/2020 >60 >60 mL/min/1.73m2 Final     Calcium   Date Value Ref Range Status    03/11/2024 9.3 8.8 - 10.2 mg/dL Final     Phosphorus   Date Value Ref Range Status   03/11/2024 2.8 2.5 - 4.5 mg/dL Final     Albumin   Date Value Ref Range Status   03/11/2024 4.3 3.5 - 5.2 g/dL Final   05/09/2022 3.8 3.5 - 5.0 g/dL Final       Current Medication List:   Current Outpatient Medications (Antihypertensive, Cardiovascular, Diuretics, Beta blockers, Calcium blockers, Anticoagulants)   Medication Sig    amLODIPine (NORVASC) 5 MG tablet TAKE 1 TABLET BY MOUTH EVERY DAY    losartan (COZAAR) 100 MG tablet Take 1 tablet (100 mg) by mouth daily    Metoprolol Tartrate 75 MG TABS TAKE 1 TABLET BY MOUTH TWICE A DAY     Current Outpatient Medications (Other)   Medication Sig    ACCU-CHEK FASTCLIX Misc [ACCU-CHEK FASTCLIX MISC] TEST TWICE DAILY    ACCU-CHEK GUIDE test strip USE 1 EACH AS DIRECTED 2 (TWO) TIMES A DAY AT 7:30AM AND 4:30PM.    acetaminophen (TYLENOL) 325 MG tablet Take 2 tablets (650 mg) by mouth every 4 hours as needed for other (For optimal non-opioid multimodal pain management to improve pain control.)    aspirin ([facility] ASPIRIN ADULT LOW DOSE) 81 MG chewable tablet TAKE 2 TABLETS BY MOUTH DAILY    blood glucose monitoring (ACCU-CHEK FASTCLIX) lancets USE TWICE A DAY (AT 7:30 AM & 4:30 PM) AS DIRECTED    blood-glucose meter Misc [BLOOD-GLUCOSE METER MISC] Use 1 Device As Directed 2 (two) times a day at 9am and 6pm.    dapagliflozin (FARXIGA) 10 MG TABS tablet Take 1 tablet (10 mg) by mouth daily    famotidine (PEPCID) 40 MG tablet Take 1 tablet (40 mg) by mouth daily    imiquimod (ALDARA) 5 % cream Apply topically daily To warts    loratadine (CLARITIN) 10 MG tablet Take 10 mg by mouth daily    metFORMIN (GLUCOPHAGE) 500 MG tablet TAKE 1 TABLET (500 MG) BY MOUTH 3 TIMES DAILY (WITH MEALS)    metroNIDAZOLE (METROGEL) 0.75 % gel 2 times daily Apply to face    multivitamin, therapeutic (THERA-VIT) TABS tablet Take 1 tablet by mouth daily    nitroGLYcerin (NITROSTAT) 0.4 MG sublingual tablet PLACE 1  TABLET (0.4 MG) UNDER THE TONGUE EVERY 5 MINUTES AS NEEDED FOR CHEST PAIN FOR CHEST PAIN PLACE 1 TABLET UNDER THE TONGUE EVERY 5 MINUTES FOR 3 DOSES. IF SYMPTOMS PERSIST 5 MINUTES AFTER 1ST DOSE CALL 911.    oxymetazoline (RHOFADE) 1 % Crea [OXYMETAZOLINE (RHOFADE) 1 % CREA] Apply topically daily.    rosuvastatin (CRESTOR) 40 MG tablet Take 1 tablet (40 mg) by mouth daily    triamcinolone (KENALOG) 0.1 % external cream Apply topically 2 times daily (Patient taking differently: Apply topically daily as needed Apply to neck rash)    vitamin D3 (CHOLECALCIFEROL) 50 mcg (2000 units) tablet Take 1 tablet (50 mcg) by mouth daily       Has patient had kidney transplant in the prior 1 year: no.   Has patient been seen the last 12 months: Yes.  Associated labs reviewed for medication: N/A    PLAN:     Medication refilled per protocol: Yes    Rafaela Spencer RN

## 2024-04-04 DIAGNOSIS — I25.10 CORONARY ARTERY DISEASE INVOLVING NATIVE CORONARY ARTERY OF NATIVE HEART WITHOUT ANGINA PECTORIS: ICD-10-CM

## 2024-04-04 DIAGNOSIS — I10 ESSENTIAL HYPERTENSION: ICD-10-CM

## 2024-04-04 RX ORDER — LOSARTAN POTASSIUM 100 MG/1
100 TABLET ORAL DAILY
Qty: 90 TABLET | Refills: 2 | Status: SHIPPED | OUTPATIENT
Start: 2024-04-04

## 2024-04-09 DIAGNOSIS — E11.22 CONTROLLED TYPE 2 DIABETES MELLITUS WITH STAGE 2 CHRONIC KIDNEY DISEASE, WITHOUT LONG-TERM CURRENT USE OF INSULIN (H): ICD-10-CM

## 2024-04-09 DIAGNOSIS — N18.2 CONTROLLED TYPE 2 DIABETES MELLITUS WITH STAGE 2 CHRONIC KIDNEY DISEASE, WITHOUT LONG-TERM CURRENT USE OF INSULIN (H): ICD-10-CM

## 2024-04-09 DIAGNOSIS — R80.9 PROTEINURIA, UNSPECIFIED TYPE: Primary | ICD-10-CM

## 2024-04-09 RX ORDER — DAPAGLIFLOZIN 10 MG/1
10 TABLET, FILM COATED ORAL DAILY
Qty: 90 TABLET | Refills: 3 | Status: SHIPPED | OUTPATIENT
Start: 2024-04-09

## 2024-04-09 NOTE — TELEPHONE ENCOUNTER
Called over to the [facility] pharmacy at [place], they confirmed insurance would cover Farxiga 10 mg if filled in 90 day supply.     Rafaela Spencer RN, BSN   Nephrology RN Care Coordinator   [facility]

## 2024-04-09 NOTE — TELEPHONE ENCOUNTER
Called over to pharmacy, patient can fill 90 days which will be covered by insurance. Closing encounter. No further assistance needed by prior auth team     Rafaela Spencer RN, BSN   Nephrology RN Care Coordinator   [facility]

## 2024-05-17 ENCOUNTER — TELEPHONE (OUTPATIENT)
Dept: NEPHROLOGY | Facility: CLINIC | Age: 64
End: 2024-05-17
Payer: COMMERCIAL

## 2024-05-17 NOTE — TELEPHONE ENCOUNTER
[facility] Call Center    Phone Message    May a detailed message be left on voicemail: yes     Reason for Call: Other: Patient called in regarding a reschedule for appointment. Writer looked at previous appointment notes and visits. Patient reports the diagnosis being PROTEINURIA; with this Dx the visit type would be RETURN GLOMERULAR not RETURN NEPH. Sending to clinic for review prior to reschedule. Please review for Dx and visit type so patient can get scheduled with appropriate visit type and provider. Please reach out to patient after review to schedule.     Action Taken: Other: NEPH    Travel Screening: Not Applicable

## 2024-05-17 NOTE — TELEPHONE ENCOUNTER
Called to reschedule appt on 9.16.24 with Dr. Gan// pt's wife answered and took information but didn't want to schedule for him// sent mychart// 5.17.24 KET

## 2024-06-14 DIAGNOSIS — Z76.0 ENCOUNTER FOR MEDICATION REFILL: ICD-10-CM

## 2024-06-14 DIAGNOSIS — R07.9 CHEST PAIN, UNSPECIFIED TYPE: ICD-10-CM

## 2024-06-14 DIAGNOSIS — R94.39 ABNORMAL CARDIOVASCULAR STRESS TEST: ICD-10-CM

## 2024-06-14 RX ORDER — BLOOD SUGAR DIAGNOSTIC
STRIP MISCELLANEOUS
Qty: 200 STRIP | Refills: 3 | Status: SHIPPED | OUTPATIENT
Start: 2024-06-14

## 2024-06-14 RX ORDER — NITROGLYCERIN 0.4 MG/1
0.4 TABLET SUBLINGUAL EVERY 5 MIN PRN
Qty: 25 TABLET | Refills: 15 | Status: SHIPPED | OUTPATIENT
Start: 2024-06-14

## 2024-07-01 DIAGNOSIS — I25.10 CORONARY ARTERY DISEASE INVOLVING NATIVE CORONARY ARTERY OF NATIVE HEART WITHOUT ANGINA PECTORIS: ICD-10-CM

## 2024-07-02 DIAGNOSIS — K21.9 GASTROESOPHAGEAL REFLUX DISEASE WITHOUT ESOPHAGITIS: ICD-10-CM

## 2024-07-02 DIAGNOSIS — E11.9 TYPE 2 DIABETES MELLITUS WITHOUT COMPLICATION, WITHOUT LONG-TERM CURRENT USE OF INSULIN (H): ICD-10-CM

## 2024-07-02 RX ORDER — FAMOTIDINE 40 MG/1
40 TABLET, FILM COATED ORAL DAILY
Qty: 30 TABLET | Refills: 3 | Status: SHIPPED | OUTPATIENT
Start: 2024-07-02 | End: 2024-09-30

## 2024-07-02 NOTE — TELEPHONE ENCOUNTER
Received refill request for Famotidine 40 mg tablets from [facility], [place], [place]  Ina Mcclellan,  Nephrology Clinic Navigator  [facility]  Direct: [phone].

## 2024-07-03 RX ORDER — ROSUVASTATIN CALCIUM 40 MG/1
40 TABLET, COATED ORAL DAILY
Qty: 90 TABLET | Refills: 0 | Status: SHIPPED | OUTPATIENT
Start: 2024-07-03 | End: 2024-09-27

## 2024-07-03 NOTE — TELEPHONE ENCOUNTER
Health Call Center    Phone Message    May a detailed message be left on voicemail: yes     Reason for Call: Medication Refill Request    Has the patient contacted the pharmacy for the refill? Yes   Name of medication being requested: rosuvastatin (CRESTOR) 40 MG tablet [[id number]] (Order [id number]   Provider who prescribed the medication: [facility]   Pharmacy: [facility] #[id number] - [place], [place] - [address] AT [facility] & [address]    Date medication is needed: pt has about a week left (next week). Pt just is wondering if this is one of the medications that his PCP is supposed to fill or if it was one [facility] was going to fill. Please call pt back to discuss.        Action Taken: Other: cardiology     Travel Screening: Not Applicable      Thank you!  [facility]

## 2024-07-11 ENCOUNTER — TRANSFERRED RECORDS (OUTPATIENT)
Dept: HEALTH INFORMATION MANAGEMENT | Facility: CLINIC | Age: 64
End: 2024-07-11
Payer: COMMERCIAL

## 2024-08-28 ENCOUNTER — TRANSFERRED RECORDS (OUTPATIENT)
Dept: HEALTH INFORMATION MANAGEMENT | Facility: CLINIC | Age: 64
End: 2024-08-28
Payer: COMMERCIAL

## 2024-08-28 LAB — RETINOPATHY: NEGATIVE

## 2024-09-10 ENCOUNTER — OFFICE VISIT (OUTPATIENT)
Dept: FAMILY MEDICINE | Facility: CLINIC | Age: 64
End: 2024-09-10
Payer: COMMERCIAL

## 2024-09-10 VITALS
BODY MASS INDEX: 31.01 KG/M2 | SYSTOLIC BLOOD PRESSURE: 143 MMHG | DIASTOLIC BLOOD PRESSURE: 82 MMHG | HEART RATE: 52 BPM | HEIGHT: 73 IN | OXYGEN SATURATION: 97 % | WEIGHT: 234 LBS | RESPIRATION RATE: 20 BRPM | TEMPERATURE: 97.2 F

## 2024-09-10 DIAGNOSIS — N18.2 CONTROLLED TYPE 2 DIABETES MELLITUS WITH STAGE 2 CHRONIC KIDNEY DISEASE, WITHOUT LONG-TERM CURRENT USE OF INSULIN (H): ICD-10-CM

## 2024-09-10 DIAGNOSIS — E11.22 CONTROLLED TYPE 2 DIABETES MELLITUS WITH STAGE 2 CHRONIC KIDNEY DISEASE, WITHOUT LONG-TERM CURRENT USE OF INSULIN (H): ICD-10-CM

## 2024-09-10 DIAGNOSIS — Z23 NEED FOR VACCINATION: ICD-10-CM

## 2024-09-10 DIAGNOSIS — I25.118 CORONARY ARTERY DISEASE OF NATIVE ARTERY OF NATIVE HEART WITH STABLE ANGINA PECTORIS (H): Primary | ICD-10-CM

## 2024-09-10 DIAGNOSIS — E78.2 MIXED HYPERLIPIDEMIA: ICD-10-CM

## 2024-09-10 DIAGNOSIS — L81.4 LENTIGO: ICD-10-CM

## 2024-09-10 LAB
ALT SERPL W P-5'-P-CCNC: 13 U/L (ref 0–70)
ANION GAP SERPL CALCULATED.3IONS-SCNC: 9 MMOL/L (ref 7–15)
BUN SERPL-MCNC: 17 MG/DL (ref 8–23)
CALCIUM SERPL-MCNC: 9.9 MG/DL (ref 8.8–10.4)
CHLORIDE SERPL-SCNC: 102 MMOL/L (ref 98–107)
CHOLEST SERPL-MCNC: 141 MG/DL
CREAT SERPL-MCNC: 1.07 MG/DL (ref 0.67–1.17)
EGFRCR SERPLBLD CKD-EPI 2021: 77 ML/MIN/1.73M2
GLUCOSE SERPL-MCNC: 129 MG/DL (ref 70–99)
HBA1C MFR BLD: 6.9 % (ref 0–5.6)
HCO3 SERPL-SCNC: 27 MMOL/L (ref 22–29)
HDLC SERPL-MCNC: 31 MG/DL
HOLD SPECIMEN: NORMAL
HOLD SPECIMEN: NORMAL
LDLC SERPL CALC-MCNC: 80 MG/DL
NONHDLC SERPL-MCNC: 110 MG/DL
POTASSIUM SERPL-SCNC: 4.7 MMOL/L (ref 3.4–5.3)
SODIUM SERPL-SCNC: 138 MMOL/L (ref 135–145)
TRIGL SERPL-MCNC: 151 MG/DL

## 2024-09-10 PROCEDURE — 83036 HEMOGLOBIN GLYCOSYLATED A1C: CPT | Performed by: FAMILY MEDICINE

## 2024-09-10 PROCEDURE — 84460 ALANINE AMINO (ALT) (SGPT): CPT | Performed by: FAMILY MEDICINE

## 2024-09-10 PROCEDURE — 99214 OFFICE O/P EST MOD 30 MIN: CPT | Mod: 25 | Performed by: FAMILY MEDICINE

## 2024-09-10 PROCEDURE — 80061 LIPID PANEL: CPT | Performed by: FAMILY MEDICINE

## 2024-09-10 PROCEDURE — 80048 BASIC METABOLIC PNL TOTAL CA: CPT | Performed by: FAMILY MEDICINE

## 2024-09-10 PROCEDURE — 36415 COLL VENOUS BLD VENIPUNCTURE: CPT | Performed by: FAMILY MEDICINE

## 2024-09-10 PROCEDURE — 90471 IMMUNIZATION ADMIN: CPT | Performed by: FAMILY MEDICINE

## 2024-09-10 PROCEDURE — 90656 IIV3 VACC NO PRSV 0.5 ML IM: CPT | Performed by: FAMILY MEDICINE

## 2024-09-10 NOTE — PROGRESS NOTES
ASSESMENT AND PLAN:  Diagnoses and all orders for this visit:  Coronary artery disease of native artery of native heart with stable angina pectoris (H24)  Stable.  Continue medical management.  Due for labs.  He has follow-up with cardiology scheduled for next month.  -     Lipid panel reflex to direct LDL Non-fasting; Future  Mixed hyperlipidemia  Stable.  Continue rosuvastatin.  -     ALT; Future  Controlled type 2 diabetes mellitus with stage 2 chronic kidney disease, without long-term current use of insulin (H)  -     HEMOGLOBIN A1C done today shows improvement trend to 6.9.  Congratulated on his improved diabetes control, continue current plan and recheck again in 6 months.  -     BASIC METABOLIC PANEL; Future  Lentigo versus other skin lesion of the left lower lip  As detailed below it measures 3 mm in diameter and does not have atypical features.  We discussed close observation versus dermatology evaluation, patient is going to consider this, he does have an established dermatologist and may schedule an appointment otherwise we will watch it closely and schedule an appointment if it starts to change size/shape/color.  Need for vaccination  -     INFLUENZA VACCINE, SPLIT VIRUS, TRIVALENT,PF (FLUZONE\FLUARIX)        Reviewed the risks and benefits of the treatment plan with the patient and/or caregiver and we discussed indications for routine and emergent follow-up.        SUBJECTIVE: 64-year-old male in for follow-up on his chronic conditions.  He does get a lot of sun exposure watching his son play baseball over the summer outdoors.  He uses sunscreen.  Several weeks ago he noticed a light brown spots appear on his lower lip.  It is not painful.  He has known coronary disease.  He is taking his medications as prescribed.  No chest pain.  His endurance and exercise tolerance has slowly been improving.  Known history of type 2 diabetes, blood sugars have been under good control with most readings between  100-130.  No hypoglycemia.    Past Medical History:   Diagnosis Date    Coronary artery disease of native artery of native heart with stable angina pectoris (H24)     Hypertension     Nephrolithiasis     Type 2 diabetes mellitus (H)      Patient Active Problem List   Diagnosis    Nephrolithiasis    Obesity    Allergic Rhinitis    Mixed hyperlipidemia    Essential Hypertension    Obesity (BMI 35.0-39.9) with comorbidity (H)    Type 2 diabetes mellitus without complication, without long-term current use of insulin (H)    Calculus of ureter    Hydronephrosis with urinary obstruction due to ureteral calculus    Coronary artery disease involving native coronary artery of native heart without angina pectoris    Hypertensive heart disease with heart failure (H)    Controlled type 2 diabetes mellitus with stage 2 chronic kidney disease, without long-term current use of insulin (H)    Coronary artery disease of native artery of native heart with stable angina pectoris (H24)     Current Outpatient Medications   Medication Sig Dispense Refill    ACCU-CHEK FASTCLIX Misc [ACCU-CHEK FASTCLIX MISC] TEST TWICE DAILY 100 each 0    ACCU-CHEK GUIDE test strip USE 1 EACH AS DIRECTED 2 (TWO) TIMES A DAY AT 7:30AM AND 4:30PM. 200 strip 3    acetaminophen (TYLENOL) 325 MG tablet Take 2 tablets (650 mg) by mouth every 4 hours as needed for other (For optimal non-opioid multimodal pain management to improve pain control.)      amLODIPine (NORVASC) 5 MG tablet TAKE 1 TABLET BY MOUTH EVERY DAY 90 tablet 3    aspirin ([employer]S ASPIRIN ADULT LOW DOSE) 81 MG chewable tablet TAKE 2 TABLETS BY MOUTH DAILY 180 tablet 2    blood glucose monitoring (ACCU-CHEK FASTCLIX) lancets USE TWICE A DAY (AT 7:30 AM & 4:30 PM) AS DIRECTED 204 each 3    blood-glucose meter Misc [BLOOD-GLUCOSE METER MISC] Use 1 Device As Directed 2 (two) times a day at 9am and 6pm. 1 each 0    dapagliflozin (FARXIGA) 10 MG TABS tablet Take 1 tablet (10 mg) by mouth daily 90 tablet 3     "famotidine (PEPCID) 40 MG tablet Take 1 tablet (40 mg) by mouth daily 30 tablet 3    imiquimod (ALDARA) 5 % cream Apply topically daily To warts  3    loratadine (CLARITIN) 10 MG tablet Take 10 mg by mouth daily      losartan (COZAAR) 100 MG tablet TAKE 1 TABLET BY MOUTH EVERY DAY 90 tablet 2    metFORMIN (GLUCOPHAGE) 500 MG tablet TAKE 1 TABLET (500 MG) BY MOUTH 3 TIMES DAILY (WITH MEALS) 270 tablet 0    Metoprolol Tartrate 75 MG TABS TAKE 1 TABLET BY MOUTH TWICE A  tablet 3    metroNIDAZOLE (METROGEL) 0.75 % gel 2 times daily Apply to face  10    multivitamin, therapeutic (THERA-VIT) TABS tablet Take 1 tablet by mouth daily      nitroGLYcerin (NITROSTAT) 0.4 MG sublingual tablet PLACE 1 TABLET (0.4 MG) UNDER THE TONGUE EVERY 5 MINUTES AS NEEDED FOR CHEST PAIN FOR CHEST PAIN PLACE 1 TABLET UNDER THE TONGUE EVERY 5 MINUTES FOR 3 DOSES. IF SYMPTOMS PERSIST 5 MINUTES AFTER 1ST DOSE CALL 911. 25 tablet 15    oxymetazoline (RHOFADE) 1 % Crea [OXYMETAZOLINE (RHOFADE) 1 % CREA] Apply topically daily.      rosuvastatin (CRESTOR) 40 MG tablet TAKE 1 TABLET BY MOUTH EVERY DAY 90 tablet 0    triamcinolone (KENALOG) 0.1 % external cream Apply topically 2 times daily (Patient taking differently: Apply topically daily as needed. Apply to neck rash) 80 g 0    vitamin D3 (CHOLECALCIFEROL) 50 mcg (2000 units) tablet Take 1 tablet (50 mcg) by mouth daily 90 tablet 3     History   Smoking Status    Never   Smokeless Tobacco    Never       OBJECTICE: BP (!) 143/82 (BP Location: Left arm, Patient Position: Sitting, Cuff Size: Adult Large)   Pulse 52   Temp 97.2  F (36.2  C) (Oral)   Resp 20   Ht 1.848 m (6' 0.75\")   Wt 106.1 kg (234 lb)   SpO2 97%   BMI 31.09 kg/m       Recent Results (from the past 24 hour(s))   HEMOGLOBIN A1C    Collection Time: 09/10/24  8:54 AM   Result Value Ref Range    Hemoglobin A1C 6.9 (H) 0.0 - 5.6 %        Skin-on his left lower lip there is a 3 mm light brown macule with irregular border and " no internal color variation   CV-regular bradycardia with no murmur   RESP-lungs clear to auscultation   Foot exam-normal.  Palpable pedal pulses bilaterally.  No ulcers.        Signed Electronically by: Catalino Nuñez MD

## 2024-09-17 ENCOUNTER — TELEPHONE (OUTPATIENT)
Dept: FAMILY MEDICINE | Facility: CLINIC | Age: 64
End: 2024-09-17
Payer: COMMERCIAL

## 2024-09-17 NOTE — TELEPHONE ENCOUNTER
Patient Quality Outreach    Patient is due for the following:   Diabetes -  Foot Exam      Topic Date Due    COVID-19 Vaccine (7 - 2024-25 season) 09/01/2024       Next Steps:   Patient has upcoming appointment, these items will be addressed at that time.    Type of outreach:    Chart review performed, no outreach needed.      Questions for provider review:    None           Omar Bucio MA  Chart routed to Care Team.

## 2024-09-27 DIAGNOSIS — I25.10 CORONARY ARTERY DISEASE INVOLVING NATIVE CORONARY ARTERY OF NATIVE HEART WITHOUT ANGINA PECTORIS: ICD-10-CM

## 2024-09-27 DIAGNOSIS — I10 ESSENTIAL HYPERTENSION: ICD-10-CM

## 2024-09-27 DIAGNOSIS — E11.9 TYPE 2 DIABETES MELLITUS WITHOUT COMPLICATION, WITHOUT LONG-TERM CURRENT USE OF INSULIN (H): ICD-10-CM

## 2024-09-27 DIAGNOSIS — I25.118 CORONARY ARTERY DISEASE OF NATIVE ARTERY OF NATIVE HEART WITH STABLE ANGINA PECTORIS (H): ICD-10-CM

## 2024-09-27 RX ORDER — ROSUVASTATIN CALCIUM 40 MG/1
40 TABLET, COATED ORAL DAILY
Qty: 90 TABLET | Refills: 0 | Status: SHIPPED | OUTPATIENT
Start: 2024-09-27

## 2024-09-28 DIAGNOSIS — K21.9 GASTROESOPHAGEAL REFLUX DISEASE WITHOUT ESOPHAGITIS: ICD-10-CM

## 2024-09-29 RX ORDER — AMLODIPINE BESYLATE 5 MG/1
5 TABLET ORAL DAILY
Qty: 90 TABLET | Refills: 3 | Status: SHIPPED | OUTPATIENT
Start: 2024-09-29

## 2024-09-30 RX ORDER — FAMOTIDINE 40 MG/1
40 TABLET, FILM COATED ORAL DAILY
Qty: 90 TABLET | Refills: 1 | Status: SHIPPED | OUTPATIENT
Start: 2024-09-30

## 2024-10-30 ENCOUNTER — DOCUMENTATION ONLY (OUTPATIENT)
Dept: MULTI SPECIALTY CLINIC | Facility: CLINIC | Age: 64
End: 2024-10-30

## 2024-10-31 ENCOUNTER — OFFICE VISIT (OUTPATIENT)
Dept: CARDIOLOGY | Facility: CLINIC | Age: 64
End: 2024-10-31
Payer: COMMERCIAL

## 2024-10-31 VITALS
SYSTOLIC BLOOD PRESSURE: 126 MMHG | HEART RATE: 70 BPM | DIASTOLIC BLOOD PRESSURE: 72 MMHG | BODY MASS INDEX: 31.48 KG/M2 | WEIGHT: 237 LBS | RESPIRATION RATE: 20 BRPM

## 2024-10-31 DIAGNOSIS — E11.9 TYPE 2 DIABETES MELLITUS WITHOUT COMPLICATION, WITHOUT LONG-TERM CURRENT USE OF INSULIN (H): ICD-10-CM

## 2024-10-31 DIAGNOSIS — I25.10 CORONARY ARTERY DISEASE INVOLVING NATIVE CORONARY ARTERY OF NATIVE HEART WITHOUT ANGINA PECTORIS: Primary | ICD-10-CM

## 2024-10-31 DIAGNOSIS — E78.2 MIXED HYPERLIPIDEMIA: ICD-10-CM

## 2024-10-31 DIAGNOSIS — I10 ESSENTIAL HYPERTENSION: ICD-10-CM

## 2024-10-31 PROBLEM — I25.118 CORONARY ARTERY DISEASE OF NATIVE ARTERY OF NATIVE HEART WITH STABLE ANGINA PECTORIS (H): Status: RESOLVED | Noted: 2024-02-22 | Resolved: 2024-10-31

## 2024-10-31 PROBLEM — I11.0 HYPERTENSIVE HEART DISEASE WITH HEART FAILURE (H): Status: RESOLVED | Noted: 2023-12-14 | Resolved: 2024-10-31

## 2024-10-31 PROCEDURE — G2211 COMPLEX E/M VISIT ADD ON: HCPCS | Performed by: INTERNAL MEDICINE

## 2024-10-31 PROCEDURE — 99214 OFFICE O/P EST MOD 30 MIN: CPT | Performed by: INTERNAL MEDICINE

## 2024-10-31 RX ORDER — EZETIMIBE 10 MG/1
10 TABLET ORAL DAILY
Qty: 90 TABLET | Refills: 3 | Status: SHIPPED | OUTPATIENT
Start: 2024-10-31

## 2024-10-31 NOTE — PROGRESS NOTES
HEART CARE ENCOUNTER CONSULTATON NOTE      [facility]  [phone]      Assessment/Recommendations   Assessment:  1.  Coronary artery disease status post coronary bypass surgery on 1/13/2023.   2.  Hyperlipidemia, LDL GOAL <70, IDEALLY <55.    Recent Labs   Lab Test 09/10/24  0854 08/11/23  1011   CHOL 141 148   HDL 31* 31*   LDL 80 83   TRIG 151* 169*     3.  Ischemic cardiomyopathy with improved ejection fraction. NYHA I  4.  Hypertension, controlled.   5.  Moderate concentric left ventricular hypertrophy on echo with hypertension   6.  Diabetes mellitus type 2  Lab Results   Component Value Date    A1C 6.9 09/10/2024    A1C 7.4 03/11/2024    A1C 7.5 10/05/2023    A1C 6.9 04/20/2023    A1C 7.3 11/17/2022   7.  Mild obesity, BMI 31    Plan:   1.  Start zetia 10 mg daily, goal LDL <70.   2.  Continue metoprolol 75 mg BID  3.  Continue losartan 100 mg daily   4.  Continue aspirin 81 mg daily  5.  Continue Farxiga        History of Present Illness/Subjective    HPI: Kash Tavarez is a 64 year old male with coronary artery disease, hyperlipidemia, hypertension, diabetes mellitus type II who presents to cardiology clinic in follow-up after coronary artery bypass surgery.    Overall patient is doing well from a cardiovascular standpoint.  He continues to be active in playing instruments.  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  Denies any chest pain, dyspnea, lower extremity orthopnea or PND symptoms.  Travel to [place] this year and had no issues.  LDL remains above 70 will start Zetia.  Discussed medication in detail.  Discussed GI upset.  He will monitor for symptoms.  He has plan for repeat lipids in a few months with his primary care provider.  He will forward results to myself    Echocardiogram Results: Reviewed  There is mild to moderate concentric left ventricular hypertrophy.  The visual ejection fraction is 60-65%.  There is mild septal hypokinesis.  The  right ventricle is normal in size and function.  No significant valve disease.  Compared to the prior study dated 12/5/2022, there are changes as noted. There  is mild septal hypokinesis now present.     Physical Examination  Review of Systems   Vitals: /72 (BP Location: Right arm, Patient Position: Sitting, Cuff Size: Adult Large)   Pulse 70   Resp 20   Wt 107.5 kg (237 lb)   BMI 31.48 kg/m    BMI= Body mass index is 31.48 kg/m .  Wt Readings from Last 3 Encounters:   10/31/24 107.5 kg (237 lb)   09/10/24 106.1 kg (234 lb)   03/11/24 106.6 kg (235 lb)        Mild obesity,   ENT/Mouth: membranes moist, no oral lesions or bleeding gums.      EYES:  no scleral icterus, normal conjunctivae       Chest/Lungs:   lungs are clear to auscultation,   Cardiovascular:   Regular. Jugular venous pressure normal, no edema bilaterally    Abdomen:  no tenderness; bowel sounds are present   Extremities: no cyanosis or clubbing   Skin: no xanthelasma, warm.    Neurologic: normal  bilateral, no tremors     Psychiatric: alert and oriented x3, calm        Please refer above for cardiac ROS details.        Medical History  Surgical History Family History Social History   Past Medical History:   Diagnosis Date    Coronary artery disease of native artery of native heart with stable angina pectoris (H)     Hypertension     Nephrolithiasis     Type 2 diabetes mellitus (H)      Past Surgical History:   Procedure Laterality Date    BYPASS GRAFT ARTERY CORONARY N/A 01/13/2023    Procedure: CORONARY ARTERY BYPASS GRAFT (CABG) x 3, LEFT INTERNAL MAMMARY ARTERY HARVEST, LEFT RADIAL ARTERY HARVEST, ANESTHESIA TRANSESOPHAGEAL ECHOCARDIOGRAM, EPI-AORTIC ULTRASOUND;  Surgeon: Arturo Bear MD;  Location: [facility] OR    COLONOSCOPY      COMBINED CYSTOSCOPY, INSERT STENT URETER(S) Right 05/12/2022    Procedure: CYSTOURETEROSCOPY, RETROGRADE PYELOGRAM, LASER LITHOTRIPSY WITH CALCULUS REMOVAL AND URETERAL STENT INSERTION;  Surgeon:  James Mosley MD;  Location: [facility] OR    CV CORONARY ANGIOGRAM N/A 12/05/2022    Procedure: Coronary Angiogram;  Surgeon: Aristides North MD;  Location: [facility] CATH LAB CV    CV LEFT HEART CATH N/A 12/05/2022    Procedure: Left Heart Catheterization;  Surgeon: Aristides North MD;  Location: [facility] CATH LAB CV    EYE SURGERY  Laser surgery to ensure retina didn't detach many years ago.    KIDNEY STONE SURGERY      ZZC APPENDECTOMY      Description: Appendectomy;  Recorded: 03/04/2008;     Family History   Problem Relation Age of Onset    Heart Disease Father     Alcoholism Father     Cerebrovascular Disease Father         Dies after multiple strokes.    Diabetes Brother         Diagnosed a few years before me.        Social History     Socioeconomic History    Marital status:      Spouse name: Not on file    Number of children: Not on file    Years of education: Not on file    Highest education level: Not on file   Occupational History    Not on file   Tobacco Use    Smoking status: Never     Passive exposure: Never    Smokeless tobacco: Never   Vaping Use    Vaping status: Never Used   Substance and Sexual Activity    Alcohol use: No    Drug use: No    Sexual activity: Yes     Partners: Female     Birth control/protection: None     Comment: not asked   Other Topics Concern    Parent/sibling w/ CABG, MI or angioplasty before 65F 55M? No   Social History Narrative    Not on file     Social Drivers of Health     Financial Resource Strain: Low Risk  (12/7/2023)    Financial Resource Strain     Within the past 12 months, have you or your family members you live with been unable to get utilities (heat, electricity) when it was really needed?: No   Food Insecurity: Low Risk  (12/7/2023)    Food Insecurity     Within the past 12 months, did you worry that your food would run out before you got money to buy more?: No     Within the past 12 months, did the food you bought just not last and you  didn t have money to get more?: No   Transportation Needs: Low Risk  (12/7/2023)    Transportation Needs     Within the past 12 months, has lack of transportation kept you from medical appointments, getting your medicines, non-medical meetings or appointments, work, or from getting things that you need?: No   Physical Activity: Not on file   Stress: Not on file   Social Connections: Not on file   Interpersonal Safety: Low Risk  (9/10/2024)    Interpersonal Safety     Do you feel physically and emotionally safe where you currently live?: Yes     Within the past 12 months, have you been hit, slapped, kicked or otherwise physically hurt by someone?: No     Within the past 12 months, have you been humiliated or emotionally abused in other ways by your partner or ex-partner?: No   Housing Stability: Low Risk  (12/7/2023)    Housing Stability     Do you have housing? : Yes     Are you worried about losing your housing?: No           Medications  Allergies   Current Outpatient Medications   Medication Sig Dispense Refill    ACCU-CHEK FASTCLIX Misc [ACCU-CHEK FASTCLIX MISC] TEST TWICE DAILY 100 each 0    ACCU-CHEK GUIDE test strip USE 1 EACH AS DIRECTED 2 (TWO) TIMES A DAY AT 7:30AM AND 4:30PM. 200 strip 3    acetaminophen (TYLENOL) 325 MG tablet Take 2 tablets (650 mg) by mouth every 4 hours as needed for other (For optimal non-opioid multimodal pain management to improve pain control.)      amLODIPine (NORVASC) 5 MG tablet TAKE 1 TABLET BY MOUTH EVERY DAY 90 tablet 3    aspirin ([employer]S ASPIRIN ADULT LOW DOSE) 81 MG chewable tablet TAKE 2 TABLETS BY MOUTH DAILY 180 tablet 2    blood glucose monitoring (ACCU-CHEK FASTCLIX) lancets USE TWICE A DAY (AT 7:30 AM & 4:30 PM) AS DIRECTED 204 each 3    blood-glucose meter Misc [BLOOD-GLUCOSE METER MISC] Use 1 Device As Directed 2 (two) times a day at 9am and 6pm. 1 each 0    dapagliflozin (FARXIGA) 10 MG TABS tablet Take 1 tablet (10 mg) by mouth daily 90 tablet 3    famotidine  (PEPCID) 40 MG tablet TAKE 1 TABLET BY MOUTH EVERY DAY 90 tablet 1    imiquimod (ALDARA) 5 % cream Apply topically daily To warts  3    loratadine (CLARITIN) 10 MG tablet Take 10 mg by mouth daily      losartan (COZAAR) 100 MG tablet TAKE 1 TABLET BY MOUTH EVERY DAY 90 tablet 2    metFORMIN (GLUCOPHAGE) 500 MG tablet TAKE 1 TABLET (500 MG) BY MOUTH 3 TIMES DAILY (WITH MEALS) 270 tablet 0    Metoprolol Tartrate 75 MG TABS TAKE 1 TABLET BY MOUTH TWICE A  tablet 3    metroNIDAZOLE (METROGEL) 0.75 % gel 2 times daily Apply to face  10    multivitamin, therapeutic (THERA-VIT) TABS tablet Take 1 tablet by mouth daily      nitroGLYcerin (NITROSTAT) 0.4 MG sublingual tablet PLACE 1 TABLET (0.4 MG) UNDER THE TONGUE EVERY 5 MINUTES AS NEEDED FOR CHEST PAIN FOR CHEST PAIN PLACE 1 TABLET UNDER THE TONGUE EVERY 5 MINUTES FOR 3 DOSES. IF SYMPTOMS PERSIST 5 MINUTES AFTER 1ST DOSE CALL 911. 25 tablet 15    oxymetazoline (RHOFADE) 1 % Crea [OXYMETAZOLINE (RHOFADE) 1 % CREA] Apply topically daily.      rosuvastatin (CRESTOR) 40 MG tablet TAKE 1 TABLET BY MOUTH EVERY DAY 90 tablet 0    triamcinolone (KENALOG) 0.1 % external cream Apply topically 2 times daily (Patient taking differently: Apply topically daily as needed. Apply to neck rash) 80 g 0    vitamin D3 (CHOLECALCIFEROL) 50 mcg (2000 units) tablet Take 1 tablet (50 mcg) by mouth daily 90 tablet 3       Allergies   Allergen Reactions    Seasonal Allergies Cough     Hayfever          Lab Results    Chemistry/lipid CBC Cardiac Enzymes/BNP/TSH/INR   Recent Labs   Lab Test 11/17/22  0857   CHOL 176   HDL 39*   *   TRIG 122     Recent Labs   Lab Test 11/17/22  0857 11/11/21  0849 11/05/20  0855   * 110 128     Recent Labs   Lab Test 03/30/23  0908      POTASSIUM 4.8   CHLORIDE 102   CO2 26   *   BUN 14   CR 0.99   GFRESTIMATED 86   RIOS 10.3     Recent Labs   Lab Test 03/30/23  0908 01/15/23  0405 01/14/23  0011   CR 0.99 0.88 0.82     Recent Labs    Lab Test 04/20/23  0919 11/17/22  0857 05/05/22  0824   A1C 6.9* 7.3* 7.8*          Recent Labs   Lab Test 01/17/23  0441 01/15/23  0811   WBC  --  13.7*   HGB  --  11.7*   HCT  --  35.2*   MCV  --  87    129*     Recent Labs   Lab Test 01/15/23  0811 01/14/23  0410 01/13/23  1307   HGB 11.7* 10.6* 12.7*    No results for input(s): TROPONINI in the last 96881 hours.  No results for input(s): BNP, NTBNPI, NTBNP in the last 95419 hours.  Recent Labs   Lab Test 04/20/23  0919   TSH 1.09     Recent Labs   Lab Test 01/13/23  1307 01/13/23  1207 12/16/22  0819   INR 1.25* 1.36* 0.99        Aki Pereira, DO      The longitudinal plan of care for the diagnosis(es)/condition(s) as documented were addressed during this visit. Due to the added complexity in care, I will continue to support Kash in the subsequent management and with ongoing continuity of care.  Will need to follow-up on lipid levels.  Further titration medication may be necessary.  Follow-up appointment ordered

## 2024-10-31 NOTE — LETTER
10/31/2024    Catalino Nuñez MD  [address]  [place] [address]    RE: Kash Tavarez       Dear Colleague,     I had the pleasure of seeing Kash Tavarez in the [facility].    HEART CARE ENCOUNTER CONSULTATON NOTE      [facility]  [phone]      Assessment/Recommendations   Assessment:  1.  Coronary artery disease status post coronary bypass surgery on 1/13/2023.   2.  Hyperlipidemia, LDL GOAL <70, IDEALLY <55.    Recent Labs   Lab Test 09/10/24  0854 08/11/23  1011   CHOL 141 148   HDL 31* 31*   LDL 80 83   TRIG 151* 169*     3.  Ischemic cardiomyopathy with improved ejection fraction. NYHA I  4.  Hypertension, controlled.   5.  Moderate concentric left ventricular hypertrophy on echo with hypertension   6.  Diabetes mellitus type 2  Lab Results   Component Value Date    A1C 6.9 09/10/2024    A1C 7.4 03/11/2024    A1C 7.5 10/05/2023    A1C 6.9 04/20/2023    A1C 7.3 11/17/2022   7.  Mild obesity, BMI 31    Plan:   1.  Start zetia 10 mg daily, goal LDL <70.   2.  Continue metoprolol 75 mg BID  3.  Continue losartan 100 mg daily   4.  Continue aspirin 81 mg daily  5.  Continue Farxiga        History of Present Illness/Subjective    HPI: Kash Tavarez is a 64 year old male with coronary artery disease, hyperlipidemia, hypertension, diabetes mellitus type II who presents to cardiology clinic in follow-up after coronary artery bypass surgery.    Overall patient is doing well from a cardiovascular standpoint.  He continues to be active in playing instruments.  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  Denies any chest pain, dyspnea, lower extremity orthopnea or PND symptoms.  Travel to [place] this year and had no issues.  LDL remains above 70 will start Zetia.  Discussed medication in detail.  Discussed GI upset.  He will monitor for symptoms.  He has plan for repeat lipids in a few months with his primary care provider.  He will  forward results to myself    Echocardiogram Results: Reviewed  There is mild to moderate concentric left ventricular hypertrophy.  The visual ejection fraction is 60-65%.  There is mild septal hypokinesis.  The right ventricle is normal in size and function.  No significant valve disease.  Compared to the prior study dated 12/5/2022, there are changes as noted. There  is mild septal hypokinesis now present.     Physical Examination  Review of Systems   Vitals: /72 (BP Location: Right arm, Patient Position: Sitting, Cuff Size: Adult Large)   Pulse 70   Resp 20   Wt 107.5 kg (237 lb)   BMI 31.48 kg/m    BMI= Body mass index is 31.48 kg/m .  Wt Readings from Last 3 Encounters:   10/31/24 107.5 kg (237 lb)   09/10/24 106.1 kg (234 lb)   03/11/24 106.6 kg (235 lb)        Mild obesity,   ENT/Mouth: membranes moist, no oral lesions or bleeding gums.      EYES:  no scleral icterus, normal conjunctivae       Chest/Lungs:   lungs are clear to auscultation,   Cardiovascular:   Regular. Jugular venous pressure normal, no edema bilaterally    Abdomen:  no tenderness; bowel sounds are present   Extremities: no cyanosis or clubbing   Skin: no xanthelasma, warm.    Neurologic: normal  bilateral, no tremors     Psychiatric: alert and oriented x3, calm        Please refer above for cardiac ROS details.        Medical History  Surgical History Family History Social History   Past Medical History:   Diagnosis Date     Coronary artery disease of native artery of native heart with stable angina pectoris (H)      Hypertension      Nephrolithiasis      Type 2 diabetes mellitus (H)      Past Surgical History:   Procedure Laterality Date     BYPASS GRAFT ARTERY CORONARY N/A 01/13/2023    Procedure: CORONARY ARTERY BYPASS GRAFT (CABG) x 3, LEFT INTERNAL MAMMARY ARTERY HARVEST, LEFT RADIAL ARTERY HARVEST, ANESTHESIA TRANSESOPHAGEAL ECHOCARDIOGRAM, EPI-AORTIC ULTRASOUND;  Surgeon: Arturo Bear MD;  Location: [facility]      COLONOSCOPY       COMBINED CYSTOSCOPY, INSERT STENT URETER(S) Right 05/12/2022    Procedure: CYSTOURETEROSCOPY, RETROGRADE PYELOGRAM, LASER LITHOTRIPSY WITH CALCULUS REMOVAL AND URETERAL STENT INSERTION;  Surgeon: James Mosley MD;  Location: [facility]     CV CORONARY ANGIOGRAM N/A 12/05/2022    Procedure: Coronary Angiogram;  Surgeon: Aristides North MD;  Location: [facility] CV     CV LEFT HEART CATH N/A 12/05/2022    Procedure: Left Heart Catheterization;  Surgeon: Aristides North MD;  Location: [facility]     EYE SURGERY  Laser surgery to ensure retina didn't detach many years ago.     KIDNEY STONE SURGERY       ZZC APPENDECTOMY      Description: Appendectomy;  Recorded: 03/04/2008;     Family History   Problem Relation Age of Onset     Heart Disease Father      Alcoholism Father      Cerebrovascular Disease Father         Dies after multiple strokes.     Diabetes Brother         Diagnosed a few years before me.        Social History     Socioeconomic History     Marital status:      Spouse name: Not on file     Number of children: Not on file     Years of education: Not on file     Highest education level: Not on file   Occupational History     Not on file   Tobacco Use     Smoking status: Never     Passive exposure: Never     Smokeless tobacco: Never   Vaping Use     Vaping status: Never Used   Substance and Sexual Activity     Alcohol use: No     Drug use: No     Sexual activity: Yes     Partners: Female     Birth control/protection: None     Comment: not asked   Other Topics Concern     Parent/sibling w/ CABG, MI or angioplasty before 65F 55M? No   Social History Narrative     Not on file     Social Drivers of Health     Financial Resource Strain: Low Risk  (12/7/2023)    Financial Resource Strain      Within the past 12 months, have you or your family members you live with been unable to get utilities (heat, electricity) when it was really needed?: No   Food  Insecurity: Low Risk  (12/7/2023)    Food Insecurity      Within the past 12 months, did you worry that your food would run out before you got money to buy more?: No      Within the past 12 months, did the food you bought just not last and you didn t have money to get more?: No   Transportation Needs: Low Risk  (12/7/2023)    Transportation Needs      Within the past 12 months, has lack of transportation kept you from medical appointments, getting your medicines, non-medical meetings or appointments, work, or from getting things that you need?: No   Physical Activity: Not on file   Stress: Not on file   Social Connections: Not on file   Interpersonal Safety: Low Risk  (9/10/2024)    Interpersonal Safety      Do you feel physically and emotionally safe where you currently live?: Yes      Within the past 12 months, have you been hit, slapped, kicked or otherwise physically hurt by someone?: No      Within the past 12 months, have you been humiliated or emotionally abused in other ways by your partner or ex-partner?: No   Housing Stability: Low Risk  (12/7/2023)    Housing Stability      Do you have housing? : Yes      Are you worried about losing your housing?: No           Medications  Allergies   Current Outpatient Medications   Medication Sig Dispense Refill     ACCU-CHEK FASTCLIX Misc [ACCU-CHEK FASTCLIX MISC] TEST TWICE DAILY 100 each 0     ACCU-CHEK GUIDE test strip USE 1 EACH AS DIRECTED 2 (TWO) TIMES A DAY AT 7:30AM AND 4:30PM. 200 strip 3     acetaminophen (TYLENOL) 325 MG tablet Take 2 tablets (650 mg) by mouth every 4 hours as needed for other (For optimal non-opioid multimodal pain management to improve pain control.)       amLODIPine (NORVASC) 5 MG tablet TAKE 1 TABLET BY MOUTH EVERY DAY 90 tablet 3     aspirin ([employer] ASPIRIN ADULT LOW DOSE) 81 MG chewable tablet TAKE 2 TABLETS BY MOUTH DAILY 180 tablet 2     blood glucose monitoring (ACCU-CHEK FASTCLIX) lancets USE TWICE A DAY (AT 7:30 AM & 4:30 PM) AS  DIRECTED 204 each 3     blood-glucose meter Misc [BLOOD-GLUCOSE METER MISC] Use 1 Device As Directed 2 (two) times a day at 9am and 6pm. 1 each 0     dapagliflozin (FARXIGA) 10 MG TABS tablet Take 1 tablet (10 mg) by mouth daily 90 tablet 3     famotidine (PEPCID) 40 MG tablet TAKE 1 TABLET BY MOUTH EVERY DAY 90 tablet 1     imiquimod (ALDARA) 5 % cream Apply topically daily To warts  3     loratadine (CLARITIN) 10 MG tablet Take 10 mg by mouth daily       losartan (COZAAR) 100 MG tablet TAKE 1 TABLET BY MOUTH EVERY DAY 90 tablet 2     metFORMIN (GLUCOPHAGE) 500 MG tablet TAKE 1 TABLET (500 MG) BY MOUTH 3 TIMES DAILY (WITH MEALS) 270 tablet 0     Metoprolol Tartrate 75 MG TABS TAKE 1 TABLET BY MOUTH TWICE A  tablet 3     metroNIDAZOLE (METROGEL) 0.75 % gel 2 times daily Apply to face  10     multivitamin, therapeutic (THERA-VIT) TABS tablet Take 1 tablet by mouth daily       nitroGLYcerin (NITROSTAT) 0.4 MG sublingual tablet PLACE 1 TABLET (0.4 MG) UNDER THE TONGUE EVERY 5 MINUTES AS NEEDED FOR CHEST PAIN FOR CHEST PAIN PLACE 1 TABLET UNDER THE TONGUE EVERY 5 MINUTES FOR 3 DOSES. IF SYMPTOMS PERSIST 5 MINUTES AFTER 1ST DOSE CALL 911. 25 tablet 15     oxymetazoline (RHOFADE) 1 % Crea [OXYMETAZOLINE (RHOFADE) 1 % CREA] Apply topically daily.       rosuvastatin (CRESTOR) 40 MG tablet TAKE 1 TABLET BY MOUTH EVERY DAY 90 tablet 0     triamcinolone (KENALOG) 0.1 % external cream Apply topically 2 times daily (Patient taking differently: Apply topically daily as needed. Apply to neck rash) 80 g 0     vitamin D3 (CHOLECALCIFEROL) 50 mcg (2000 units) tablet Take 1 tablet (50 mcg) by mouth daily 90 tablet 3       Allergies   Allergen Reactions     Seasonal Allergies Cough     Hayfever          Lab Results    Chemistry/lipid CBC Cardiac Enzymes/BNP/TSH/INR   Recent Labs   Lab Test 11/17/22  0857   CHOL 176   HDL 39*   *   TRIG 122     Recent Labs   Lab Test 11/17/22  0857 11/11/21  0849 11/05/20  0855   *  110 128     Recent Labs   Lab Test 03/30/23  0908      POTASSIUM 4.8   CHLORIDE 102   CO2 26   *   BUN 14   CR 0.99   GFRESTIMATED 86   RIOS 10.3     Recent Labs   Lab Test 03/30/23  0908 01/15/23  0405 01/14/23  0011   CR 0.99 0.88 0.82     Recent Labs   Lab Test 04/20/23  0919 11/17/22  0857 05/05/22  0824   A1C 6.9* 7.3* 7.8*          Recent Labs   Lab Test 01/17/23  0441 01/15/23  0811   WBC  --  13.7*   HGB  --  11.7*   HCT  --  35.2*   MCV  --  87    129*     Recent Labs   Lab Test 01/15/23  0811 01/14/23  0410 01/13/23  1307   HGB 11.7* 10.6* 12.7*    No results for input(s): TROPONINI in the last 94210 hours.  No results for input(s): BNP, NTBNPI, NTBNP in the last 67322 hours.  Recent Labs   Lab Test 04/20/23  0919   TSH 1.09     Recent Labs   Lab Test 01/13/23  1307 01/13/23  1207 12/16/22  0819   INR 1.25* 1.36* 0.99        Aki Pereira DO      The longitudinal plan of care for the diagnosis(es)/condition(s) as documented were addressed during this visit. Due to the added complexity in care, I will continue to support Kash in the subsequent management and with ongoing continuity of care.  Will need to follow-up on lipid levels.  Further titration medication may be necessary.  Follow-up appointment ordered                              Thank you for allowing me to participate in the care of your patient.      Sincerely,     Aki Pereira DO     [facility]  cc:   Aki Pereira DO  [address] [place] [address]

## 2024-10-31 NOTE — PATIENT INSTRUCTIONS
Please contact direct nurses line Monday through Friday 8 AM to 5 PM @ [phone]    After-hours contact cardiology office at [phone].    Plan:    Will start zetia 10 mg daily.  Goal LDL <70.

## 2024-11-01 DIAGNOSIS — R80.9 PROTEINURIA, UNSPECIFIED TYPE: Primary | ICD-10-CM

## 2024-11-03 NOTE — PROGRESS NOTES
Nephrology Progress Note  11/4/2024  Chief complaint: Follow-up CKD  History of Present Illness:     Kash Tavarez is a 64 year old male who presents for evaluation of proteinuria. Patient notes that over the past 1-2 months, he started to notice bubbles in his urine. He mentioned this to his PCP who checked urine protein and found it to be elevated. Urine protein had not been checked in our system previously. Patient denies any lower extremity edema or shortness of breath. Had a CABG 01/2023 and has been slowly improving in terms of exercise tolerance since that time. Denies any hematuria, dysuria. Does note he urinates frequently, for years, which he attributes to having a large prostate and to drinking a lot of fluids because of his history of kidney stones. Gets up to urinate 1-2 times per night, stable.   Had a lot of medication changes after his CABG in 01/2023. Confirms his current medication list as below. Only routine OTC is claritin. Unsure why he is on the pantoprazole, no significant GERD/heartburn history that he recalls - thinks this was started after his CABG.   No recent illnesses, no recent kidney stones (last about 1.5 years ago that did require lithotripsy with ureteral stent) - notes he has cut down on intake of tree nuts as given his hyperoxaluria, urology recommended this and hasn't had concerns with stones since.   Was diagnosed with diabetes about 10 years ago. Has only ever been on metformin, slightly varied doses over the years, with good control (A1c mostly 6s and 7s, isolated reading of 8.4%). No retinopathy, has routine eye exams. Denies numbness/tingling in extremities.   Was started on losartan after his CABG and on amlodipine this summer for HTN. Trialed on hydrochlorothiazide prior to amlodipine which caused an increase in his creatinine and was stopped. Home blood pressures mostly 130s/80s. Does note since starting the amlodipine he occasionally will have a little  lightheadedness upon standing that goes away within seconds. No loss of consciousness.      3/11/24: Kash is doing well overall. He has stopped taking the pantoprazole and was at [place] for vacation where he felt his GERD symptoms really got worse and he had to take a lot of tums. He has been taking the farxiga and is tolerating it well. No other complaints today. He has decreased his Vit D to 2000 international unit(s) daily.     11/4/24: Doing well overall. Had issues historically with reflux while on vacation, is now taking famotidine instead of pantoprazole. Was started on Zetia 10mg by cardiology on 10/31 for LDL of 80; already on Rosuvastatin 40mg. BPs at home are normally 110-120/70-80s. Working on increasing exercise at home and trying to eat well. Would like to lose some weight.   Still taking Vit D 2000u daily, not in the sun often. In terms of symptoms, no SOB, chest pain. Wears athletic socks, no significant LE swelling. No significant exertional SOB but does get more tired at the end of the day with increased walking. Urination is frequent, notes known enlarged prostate and drinking lots of water throughout the day. No change in the bubbles in his urine.     Labs:   Sodium 139, K 4.5, CO2 26, Glucose 178, BUN 17.3, Cr 0.92, eGFR >90, Calcium 9.7, Albumin 4.3, Phos 3.1  CBC wnl, hgb 14.1  UPCR 0.63 (0.55)  UA with glucosuria >1000, trace blood, proteinuria 30  Albumin urine with creat ratio pending    Past medical history  Past Medical History:   Diagnosis Date    Coronary artery disease of native artery of native heart with stable angina pectoris (H)     Hypertension     Nephrolithiasis     Type 2 diabetes mellitus (H)      Past surgical history  Past Surgical History:   Procedure Laterality Date    BYPASS GRAFT ARTERY CORONARY N/A 01/13/2023    Procedure: CORONARY ARTERY BYPASS GRAFT (CABG) x 3, LEFT INTERNAL MAMMARY ARTERY HARVEST, LEFT RADIAL ARTERY HARVEST, ANESTHESIA TRANSESOPHAGEAL ECHOCARDIOGRAM,  EPI-AORTIC ULTRASOUND;  Surgeon: Arturo Bear MD;  Location: [facility] [place] [facility]    COLONOSCOPY      COMBINED CYSTOSCOPY, INSERT STENT URETER(S) Right 2022    Procedure: CYSTOURETEROSCOPY, RETROGRADE PYELOGRAM, LASER LITHOTRIPSY WITH CALCULUS REMOVAL AND URETERAL STENT INSERTION;  Surgeon: James Mosley MD;  Location: [facility]    CV CORONARY ANGIOGRAM N/A 2022    Procedure: Coronary Angiogram;  Surgeon: Aristides North MD;  Location: [facility] CATH LAB CV    CV LEFT HEART CATH N/A 2022    Procedure: Left Heart Catheterization;  Surgeon: Aristides North MD;  Location: [facility] LAB CV    EYE SURGERY  Laser surgery to ensure retina didn't detach many years ago.    KIDNEY STONE SURGERY      [facility] APPENDECTOMY      Description: Appendectomy;  Recorded: 2008;     Family hx -   Mother - nephrolithiasis, pre-diabetic, SDH s/p fall ( 90)  Father - smoker, stroke ( 60s)  Brother - nephrolithiasis, T2DM - insulin dependent, CAD + MI ( 60)    Review of Systems:   14 systems were reviewed and all negative except as mentioned above.   Current Medications:  Current Outpatient Medications   Medication Sig Dispense Refill    ACCU-CHEK FASTCLIX Misc [ACCU-CHEK FASTCLIX MISC] TEST TWICE DAILY 100 each 0    ACCU-CHEK GUIDE test strip USE 1 EACH AS DIRECTED 2 (TWO) TIMES A DAY AT 7:30AM AND 4:30PM. 200 strip 3    acetaminophen (TYLENOL) 325 MG tablet Take 2 tablets (650 mg) by mouth every 4 hours as needed for other (For optimal non-opioid multimodal pain management to improve pain control.)      amLODIPine (NORVASC) 5 MG tablet TAKE 1 TABLET BY MOUTH EVERY DAY 90 tablet 3    aspirin ([employer] ASPIRIN ADULT LOW DOSE) 81 MG chewable tablet TAKE 2 TABLETS BY MOUTH DAILY 180 tablet 2    blood glucose monitoring (ACCU-CHEK FASTCLIX) lancets USE TWICE A DAY (AT 7:30 AM & 4:30 PM) AS DIRECTED 204 each 3    blood-glucose meter Misc [BLOOD-GLUCOSE METER MISC] Use 1 Device As Directed  2 (two) times a day at 9am and 6pm. 1 each 0    dapagliflozin (FARXIGA) 10 MG TABS tablet Take 1 tablet (10 mg) by mouth daily 90 tablet 3    ezetimibe (ZETIA) 10 MG tablet Take 1 tablet (10 mg) by mouth daily. 90 tablet 3    famotidine (PEPCID) 40 MG tablet TAKE 1 TABLET BY MOUTH EVERY DAY 90 tablet 1    imiquimod (ALDARA) 5 % cream Apply topically daily To warts  3    loratadine (CLARITIN) 10 MG tablet Take 10 mg by mouth daily      losartan (COZAAR) 100 MG tablet TAKE 1 TABLET BY MOUTH EVERY DAY 90 tablet 2    metFORMIN (GLUCOPHAGE) 500 MG tablet TAKE 1 TABLET (500 MG) BY MOUTH 3 TIMES DAILY (WITH MEALS) 270 tablet 0    Metoprolol Tartrate 75 MG TABS TAKE 1 TABLET BY MOUTH TWICE A  tablet 3    metroNIDAZOLE (METROGEL) 0.75 % gel 2 times daily Apply to face  10    multivitamin, therapeutic (THERA-VIT) TABS tablet Take 1 tablet by mouth daily      nitroGLYcerin (NITROSTAT) 0.4 MG sublingual tablet PLACE 1 TABLET (0.4 MG) UNDER THE TONGUE EVERY 5 MINUTES AS NEEDED FOR CHEST PAIN FOR CHEST PAIN PLACE 1 TABLET UNDER THE TONGUE EVERY 5 MINUTES FOR 3 DOSES. IF SYMPTOMS PERSIST 5 MINUTES AFTER 1ST DOSE CALL 911. 25 tablet 15    oxymetazoline (RHOFADE) 1 % Crea [OXYMETAZOLINE (RHOFADE) 1 % CREA] Apply topically daily.      rosuvastatin (CRESTOR) 40 MG tablet TAKE 1 TABLET BY MOUTH EVERY DAY 90 tablet 0    triamcinolone (KENALOG) 0.1 % external cream Apply topically 2 times daily (Patient taking differently: Apply topically daily as needed. Apply to neck rash) 80 g 0    vitamin D3 (CHOLECALCIFEROL) 50 mcg (2000 units) tablet Take 1 tablet (50 mcg) by mouth daily 90 tablet 3     No current facility-administered medications for this visit.       Physical Exam:   /81   Pulse 54   Wt 108.3 kg (238 lb 12.8 oz)   SpO2 95%   BMI 31.72 kg/m     Body mass index is 31.72 kg/m .    GENERAL APPEARANCE: Alert, not in acute distress  EYES:  Not pale conjunctiva, pupils equal  HENT: Mouth without ulcers or lesions  PULM:  lungs clear to auscultation bilaterally, equal air movement, no clubbing  CV: regular rhythm, normal rate, no rub     -JVD no distended.      -edema trace bilateral   GI: soft,  - tender, no distended, bowel sounds are present  INTEGUMENT: No rash  NEURO:  Non focal. No asterixis.     Labs:   All labs reviewed by me  Last Renal Panel:  Sodium   Date Value Ref Range Status   11/04/2024 139 135 - 145 mmol/L Final     Potassium   Date Value Ref Range Status   11/04/2024 4.5 3.4 - 5.3 mmol/L Final   08/11/2023 3.5 3.5 - 5.0 mmol/L Final     Chloride   Date Value Ref Range Status   11/04/2024 104 98 - 107 mmol/L Final   08/11/2023 100 98 - 107 mmol/L Final     Carbon Dioxide (CO2)   Date Value Ref Range Status   11/04/2024 26 22 - 29 mmol/L Final   08/11/2023 28 22 - 31 mmol/L Final     Anion Gap   Date Value Ref Range Status   11/04/2024 9 7 - 15 mmol/L Final   08/11/2023 9 5 - 18 mmol/L Final     Glucose   Date Value Ref Range Status   11/04/2024 178 (H) 70 - 99 mg/dL Final   08/11/2023 149 (H) 70 - 125 mg/dL Final     Urea Nitrogen   Date Value Ref Range Status   11/04/2024 17.3 8.0 - 23.0 mg/dL Final   08/11/2023 15 8 - 22 mg/dL Final     Creatinine   Date Value Ref Range Status   11/04/2024 0.92 0.67 - 1.17 mg/dL Final     GFR Estimate   Date Value Ref Range Status   11/04/2024 >90 >60 mL/min/1.73m2 Final     Comment:     eGFR calculated using 2021 CKD-EPI equation.   11/05/2020 >60 >60 mL/min/1.73m2 Final     Calcium   Date Value Ref Range Status   11/04/2024 9.7 8.8 - 10.4 mg/dL Final     Comment:     Reference intervals for this test were updated on 7/16/2024 to reflect our healthy population more accurately. There may be differences in the flagging of prior results with similar values performed with this method. Those prior results can be interpreted in the context of the updated reference intervals.     Phosphorus   Date Value Ref Range Status   11/04/2024 3.1 2.5 - 4.5 mg/dL Final     Albumin   Date Value Ref  Range Status   11/04/2024 4.3 3.5 - 5.2 g/dL Final   05/09/2022 3.8 3.5 - 5.0 g/dL Final       Imaging:  I reviewed imaging studies.     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 Tavarez is a 64 year old M  with PMHx of T2DM on metformin, HTN, CAD s/p CABG 01/2023, calcium oxalate nephrolithiasis who presents for evaluation of non-nephrotic range proteinuria in setting of CKD2. This is most likely secondary to his underlying diabetes and HTN, especially in setting of known vascular disease with multivessel CAD. Reassuringly, no hematuria to suggest nephritic process.  W/up for paraproteinemia was negative.     His creatinine remains stable at baseline wnl though UPCR slightly elevated today at 0.63 from 0.55. His blood pressures are at goal and he is euvolemic on exam. He does not have other metabolic complications. A1c with good control, last 6.9%. Currently on Farxiga 10mg + Metformin 1500mg daily. On Losartan 100mg daily for HTN and kidney disease with proteinuria.     Do note that his BMI is above goal at 31.72% and with his slight increase in UPCR today with underlying T2DM, discussed with him the benefits of GLP-1s. I think that this would be a good medication for him to be on in a cardiorenal protective sense in addition to the added benefits this would have for his underlying diabetes. Given that his PCP, Dr. Nuñez is managing this, will defer to Dr. Nuñez on prescribing this and managing it. He is amenable to this, scheduled to see Dr. Nuñez 3/2025. Otherwise, will continue to work on his diet and increasing exercise.     -- recommend starting Ozempic or Wegovy, Dr. Nuñez to start   -- Continue Farxiga 10mg, Losartan 100mg  -- If proteinuria continues to worsen, would start MRA   -- Order some more serologies and he can get it done locally (the initial work-up was incomplete)  -- If proteinuria worsens despite that addition or  +hematuria, would order kidney bx     # MBD  Historically, has had mild hypercalcemia which could also be related to possible over ingestion of vitamin D as was taking over 4000 international unit(s) daily until about 2023 when he first saw Dr. Gan. Now resolved, March 2023 decreased ergocalciferol to 2000 international unit(s) daily.  - pending added on Vit D today I elevated, will reduce vit D to 1000 U daily    Follow-up in 6 months    The longitudinal plan of care for CKD stage 2 with proteinuria, HTN, T2DM was addressed during this visit. Due to the added complexity in care, I will continue to support Kash in the subsequent management of this condition(s) and with the ongoing continuity of care of this condition(s).    I personally spent 40 minutes on the date of the encounter doing chart review, history and exam, documentation and further activities as noted above. 25 minutes of this visit is dedicated to direct patient interaction via face-to-face.    Julieta Harding MD on 11/4/2024

## 2024-11-04 ENCOUNTER — LAB (OUTPATIENT)
Dept: LAB | Facility: CLINIC | Age: 64
End: 2024-11-04
Attending: INTERNAL MEDICINE
Payer: COMMERCIAL

## 2024-11-04 ENCOUNTER — OFFICE VISIT (OUTPATIENT)
Dept: NEPHROLOGY | Facility: CLINIC | Age: 64
End: 2024-11-04
Attending: INTERNAL MEDICINE
Payer: COMMERCIAL

## 2024-11-04 VITALS
DIASTOLIC BLOOD PRESSURE: 81 MMHG | WEIGHT: 238.8 LBS | BODY MASS INDEX: 31.72 KG/M2 | SYSTOLIC BLOOD PRESSURE: 128 MMHG | OXYGEN SATURATION: 95 % | HEART RATE: 54 BPM

## 2024-11-04 DIAGNOSIS — R80.9 PROTEINURIA, UNSPECIFIED TYPE: ICD-10-CM

## 2024-11-04 DIAGNOSIS — I12.9 BENIGN HYPERTENSION WITH CKD (CHRONIC KIDNEY DISEASE), STAGE II: Primary | ICD-10-CM

## 2024-11-04 DIAGNOSIS — R80.1 PERSISTENT PROTEINURIA: ICD-10-CM

## 2024-11-04 DIAGNOSIS — N18.2 BENIGN HYPERTENSION WITH CKD (CHRONIC KIDNEY DISEASE), STAGE II: Primary | ICD-10-CM

## 2024-11-04 DIAGNOSIS — E55.9 VITAMIN D DEFICIENCY: ICD-10-CM

## 2024-11-04 LAB
ALBUMIN MFR UR ELPH: 45.3 MG/DL
ALBUMIN SERPL BCG-MCNC: 4.3 G/DL (ref 3.5–5.2)
ALBUMIN UR-MCNC: 30 MG/DL
ANION GAP SERPL CALCULATED.3IONS-SCNC: 9 MMOL/L (ref 7–15)
APPEARANCE UR: CLEAR
BILIRUB UR QL STRIP: NEGATIVE
BUN SERPL-MCNC: 17.3 MG/DL (ref 8–23)
CALCIUM SERPL-MCNC: 9.7 MG/DL (ref 8.8–10.4)
CHLORIDE SERPL-SCNC: 104 MMOL/L (ref 98–107)
COLOR UR AUTO: ABNORMAL
CREAT SERPL-MCNC: 0.92 MG/DL (ref 0.67–1.17)
CREAT UR-MCNC: 71.5 MG/DL
CREAT UR-MCNC: 72.4 MG/DL
EGFRCR SERPLBLD CKD-EPI 2021: >90 ML/MIN/1.73M2
ERYTHROCYTE [DISTWIDTH] IN BLOOD BY AUTOMATED COUNT: 12.8 % (ref 10–15)
GLUCOSE SERPL-MCNC: 178 MG/DL (ref 70–99)
GLUCOSE UR STRIP-MCNC: >=1000 MG/DL
HBV SURFACE AG SERPL QL IA: NONREACTIVE
HCO3 SERPL-SCNC: 26 MMOL/L (ref 22–29)
HCT VFR BLD AUTO: 42.8 % (ref 40–53)
HCV AB SERPL QL IA: NONREACTIVE
HGB BLD-MCNC: 14.1 G/DL (ref 13.3–17.7)
HGB UR QL STRIP: ABNORMAL
KETONES UR STRIP-MCNC: NEGATIVE MG/DL
LEUKOCYTE ESTERASE UR QL STRIP: NEGATIVE
MCH RBC QN AUTO: 27.9 PG (ref 26.5–33)
MCHC RBC AUTO-ENTMCNC: 32.9 G/DL (ref 31.5–36.5)
MCV RBC AUTO: 85 FL (ref 78–100)
MICROALBUMIN UR-MCNC: 156 MG/L
MICROALBUMIN/CREAT UR: 218.18 MG/G CR (ref 0–17)
NITRATE UR QL: NEGATIVE
PH UR STRIP: 5.5 [PH] (ref 5–7)
PHOSPHATE SERPL-MCNC: 3.1 MG/DL (ref 2.5–4.5)
PLATELET # BLD AUTO: 198 10E3/UL (ref 150–450)
POTASSIUM SERPL-SCNC: 4.5 MMOL/L (ref 3.4–5.3)
PROT/CREAT 24H UR: 0.63 MG/MG CR (ref 0–0.2)
RBC # BLD AUTO: 5.06 10E6/UL (ref 4.4–5.9)
RBC URINE: 1 /HPF
SODIUM SERPL-SCNC: 139 MMOL/L (ref 135–145)
SP GR UR STRIP: 1.02 (ref 1–1.03)
UROBILINOGEN UR STRIP-MCNC: NORMAL MG/DL
VIT D+METAB SERPL-MCNC: 42 NG/ML (ref 20–50)
WBC # BLD AUTO: 8.2 10E3/UL (ref 4–11)
WBC URINE: <1 /HPF

## 2024-11-04 PROCEDURE — 99213 OFFICE O/P EST LOW 20 MIN: CPT | Performed by: INTERNAL MEDICINE

## 2024-11-04 PROCEDURE — 82306 VITAMIN D 25 HYDROXY: CPT

## 2024-11-04 PROCEDURE — 81001 URINALYSIS AUTO W/SCOPE: CPT | Performed by: PATHOLOGY

## 2024-11-04 PROCEDURE — 99000 SPECIMEN HANDLING OFFICE-LAB: CPT | Performed by: PATHOLOGY

## 2024-11-04 PROCEDURE — 86803 HEPATITIS C AB TEST: CPT

## 2024-11-04 PROCEDURE — 80069 RENAL FUNCTION PANEL: CPT | Performed by: PATHOLOGY

## 2024-11-04 PROCEDURE — 85027 COMPLETE CBC AUTOMATED: CPT | Performed by: PATHOLOGY

## 2024-11-04 PROCEDURE — 87340 HEPATITIS B SURFACE AG IA: CPT

## 2024-11-04 PROCEDURE — 84156 ASSAY OF PROTEIN URINE: CPT | Performed by: PATHOLOGY

## 2024-11-04 PROCEDURE — 99215 OFFICE O/P EST HI 40 MIN: CPT | Performed by: INTERNAL MEDICINE

## 2024-11-04 PROCEDURE — 82043 UR ALBUMIN QUANTITATIVE: CPT | Performed by: INTERNAL MEDICINE

## 2024-11-04 PROCEDURE — 36415 COLL VENOUS BLD VENIPUNCTURE: CPT | Performed by: PATHOLOGY

## 2024-11-04 PROCEDURE — G2211 COMPLEX E/M VISIT ADD ON: HCPCS | Performed by: INTERNAL MEDICINE

## 2024-11-04 PROCEDURE — 86225 DNA ANTIBODY NATIVE: CPT | Performed by: INTERNAL MEDICINE

## 2024-11-04 ASSESSMENT — PAIN SCALES - GENERAL: PAINLEVEL_OUTOF10: NO PAIN (0)

## 2024-11-04 NOTE — LETTER
11/4/2024       RE: Kash Tavarez  [address]  [facility] [address]     Dear Colleague,    Thank you for referring your patient, Kash Taavrez, to the [facility] [place] at [facility]. Please see a copy of my visit note below.      Nephrology Progress Note  11/03/2024   Chief complaint: Follow-up CKD  History of Present Illness:     Kash Tavarez is a 64 year old male who presents for evaluation of proteinuria. Patient notes that over the past 1-2 months, he started to notice bubbles in his urine. He mentioned this to his PCP who checked urine protein and found it to be elevated. Urine protein had not been checked in our system previously. Patient denies any lower extremity edema or shortness of breath. Had a CABG 01/2023 and has been slowly improving in terms of exercise tolerance since that time. Denies any hematuria, dysuria. Does note he urinates frequently, for years, which he attributes to having a large prostate and to drinking a lot of fluids because of his history of kidney stones. Gets up to urinate 1-2 times per night, stable.   Had a lot of medication changes after his CABG in 01/2023. Confirms his current medication list as below. Only routine OTC is claritin. Unsure why he is on the pantoprazole, no significant GERD/heartburn history that he recalls - thinks this was started after his CABG.   No recent illnesses, no recent kidney stones (last about 1.5 years ago that did require lithotripsy with ureteral stent) - notes he has cut down on intake of tree nuts as given his hyperoxaluria, urology recommended this and hasn't had concerns with stones since.   Was diagnosed with diabetes about 10 years ago. Has only ever been on metformin, slightly varied doses over the years, with good control (A1c mostly 6s and 7s, isolated reading of 8.4%). No retinopathy, has routine eye exams. Denies numbness/tingling in  extremities.   Was started on losartan after his CABG and on amlodipine this summer for HTN. Trialed on hydrochlorothiazide prior to amlodipine which caused an increase in his creatinine and was stopped. Home blood pressures mostly 130s/80s. Does note since starting the amlodipine he occasionally will have a little lightheadedness upon standing that goes away within seconds. No loss of consciousness.      3/11/24: Kash is doing well overall. He has stopped taking the pantoprazole and was at [place] for vacation where he felt his GERD symptoms really got worse and he had to take a lot of tums. He has been taking the farxiga and is tolerating it well. No other complaints today. He has decreased his Vit D to 2000 international unit(s) daily.     11/4/24: Doing well overall. Had issues historically with reflux while on vacation, is now taking famotidine instead of pantoprazole. Was started on Zetia 10mg by cardiology on 10/31 for LDL of 80; already on Rosuvastatin 40mg. BPs at home are normally 110-120/70-80s. Working on increasing exercise at home and trying to eat well. Would like to lose some weight.   Still taking Vit D 2000u daily, not in the sun often. In terms of symptoms, no SOB, chest pain. Wears athletic socks, no significant LE swelling. No significant exertional SOB but does get more tired at the end of the day with increased walking. Urination is frequent, notes known enlarged prostate and drinking lots of water throughout the day. No change in the bubbles in his urine.     Labs:   Sodium 139, K 4.5, CO2 26, Glucose 178, BUN 17.3, Cr 0.92, eGFR >90, Calcium 9.7, Albumin 4.3, Phos 3.1  CBC wnl, hgb 14.1  UPCR 0.63 (0.55)  UA with glucosuria >1000, trace blood, proteinuria 30  Albumin urine with creat ratio pending    Past medical history  Past Medical History:   Diagnosis Date     Coronary artery disease of native artery of native heart with stable angina pectoris (H)      Hypertension      Nephrolithiasis       Type 2 diabetes mellitus (H)      Past surgical history  Past Surgical History:   Procedure Laterality Date     BYPASS GRAFT ARTERY CORONARY N/A 2023    Procedure: CORONARY ARTERY BYPASS GRAFT (CABG) x 3, LEFT INTERNAL MAMMARY ARTERY HARVEST, LEFT RADIAL ARTERY HARVEST, ANESTHESIA TRANSESOPHAGEAL ECHOCARDIOGRAM, EPI-AORTIC ULTRASOUND;  Surgeon: Arturo Bear MD;  Location: [facility] OR     COLONOSCOPY       COMBINED CYSTOSCOPY, INSERT STENT URETER(S) Right 2022    Procedure: CYSTOURETEROSCOPY, RETROGRADE PYELOGRAM, LASER LITHOTRIPSY WITH CALCULUS REMOVAL AND URETERAL STENT INSERTION;  Surgeon: James Mosley MD;  Location: [facility] OR     CV CORONARY ANGIOGRAM N/A 2022    Procedure: Coronary Angiogram;  Surgeon: Aristides North MD;  Location: [facility] CATH LAB CV     CV LEFT HEART CATH N/A 2022    Procedure: Left Heart Catheterization;  Surgeon: Aristides North MD;  Location: [facility] CATH LAB CV     EYE SURGERY  Laser surgery to ensure retina didn't detach many years ago.     KIDNEY STONE SURGERY       [facility] APPENDECTOMY      Description: Appendectomy;  Recorded: 2008;     Family hx -   Mother - nephrolithiasis, pre-diabetic, SDH s/p fall ( 90)  Father - smoker, stroke ( 60s)  Brother - nephrolithiasis, T2DM - insulin dependent, CAD + MI ( 60)    Review of Systems:   14 systems were reviewed and all negative except as mentioned above.   Current Medications:  Current Outpatient Medications   Medication Sig Dispense Refill     ACCU-CHEK FASTCLIX Misc [ACCU-CHEK FASTCLIX MISC] TEST TWICE DAILY 100 each 0     ACCU-CHEK GUIDE test strip USE 1 EACH AS DIRECTED 2 (TWO) TIMES A DAY AT 7:30AM AND 4:30PM. 200 strip 3     acetaminophen (TYLENOL) 325 MG tablet Take 2 tablets (650 mg) by mouth every 4 hours as needed for other (For optimal non-opioid multimodal pain management to improve pain control.)       amLODIPine (NORVASC) 5 MG tablet TAKE 1 TABLET  BY MOUTH EVERY DAY 90 tablet 3     aspirin ([employer] ASPIRIN ADULT LOW DOSE) 81 MG chewable tablet TAKE 2 TABLETS BY MOUTH DAILY 180 tablet 2     blood glucose monitoring (ACCU-CHEK FASTCLIX) lancets USE TWICE A DAY (AT 7:30 AM & 4:30 PM) AS DIRECTED 204 each 3     blood-glucose meter Misc [BLOOD-GLUCOSE METER MISC] Use 1 Device As Directed 2 (two) times a day at 9am and 6pm. 1 each 0     dapagliflozin (FARXIGA) 10 MG TABS tablet Take 1 tablet (10 mg) by mouth daily 90 tablet 3     ezetimibe (ZETIA) 10 MG tablet Take 1 tablet (10 mg) by mouth daily. 90 tablet 3     famotidine (PEPCID) 40 MG tablet TAKE 1 TABLET BY MOUTH EVERY DAY 90 tablet 1     imiquimod (ALDARA) 5 % cream Apply topically daily To warts  3     loratadine (CLARITIN) 10 MG tablet Take 10 mg by mouth daily       losartan (COZAAR) 100 MG tablet TAKE 1 TABLET BY MOUTH EVERY DAY 90 tablet 2     metFORMIN (GLUCOPHAGE) 500 MG tablet TAKE 1 TABLET (500 MG) BY MOUTH 3 TIMES DAILY (WITH MEALS) 270 tablet 0     Metoprolol Tartrate 75 MG TABS TAKE 1 TABLET BY MOUTH TWICE A  tablet 3     metroNIDAZOLE (METROGEL) 0.75 % gel 2 times daily Apply to face  10     multivitamin, therapeutic (THERA-VIT) TABS tablet Take 1 tablet by mouth daily       nitroGLYcerin (NITROSTAT) 0.4 MG sublingual tablet PLACE 1 TABLET (0.4 MG) UNDER THE TONGUE EVERY 5 MINUTES AS NEEDED FOR CHEST PAIN FOR CHEST PAIN PLACE 1 TABLET UNDER THE TONGUE EVERY 5 MINUTES FOR 3 DOSES. IF SYMPTOMS PERSIST 5 MINUTES AFTER 1ST DOSE CALL 911. 25 tablet 15     oxymetazoline (RHOFADE) 1 % Crea [OXYMETAZOLINE (RHOFADE) 1 % CREA] Apply topically daily.       rosuvastatin (CRESTOR) 40 MG tablet TAKE 1 TABLET BY MOUTH EVERY DAY 90 tablet 0     triamcinolone (KENALOG) 0.1 % external cream Apply topically 2 times daily (Patient taking differently: Apply topically daily as needed. Apply to neck rash) 80 g 0     vitamin D3 (CHOLECALCIFEROL) 50 mcg (2000 units) tablet Take 1 tablet (50 mcg) by mouth daily 90  tablet 3     No current facility-administered medications for this visit.       Physical Exam:   /81   Pulse 54   Wt 108.3 kg (238 lb 12.8 oz)   SpO2 95%   BMI 31.72 kg/m     Body mass index is 31.72 kg/m .    GENERAL APPEARANCE: Alert, not in acute distress  EYES:  Not pale conjunctiva, pupils equal  HENT: Mouth without ulcers or lesions  PULM: lungs clear to auscultation bilaterally, equal air movement, no clubbing  CV: regular rhythm, normal rate, no rub     -JVD no distended.      -edema trace bilateral   GI: soft,  - tender, no distended, bowel sounds are present  INTEGUMENT: No rash  NEURO:  Non focal. No asterixis.     Labs:   All labs reviewed by me  Last Renal Panel:  Sodium   Date Value Ref Range Status   09/10/2024 138 135 - 145 mmol/L Final     Potassium   Date Value Ref Range Status   09/10/2024 4.7 3.4 - 5.3 mmol/L Final   08/11/2023 3.5 3.5 - 5.0 mmol/L Final     Chloride   Date Value Ref Range Status   09/10/2024 102 98 - 107 mmol/L Final   08/11/2023 100 98 - 107 mmol/L Final     Carbon Dioxide (CO2)   Date Value Ref Range Status   09/10/2024 27 22 - 29 mmol/L Final   08/11/2023 28 22 - 31 mmol/L Final     Anion Gap   Date Value Ref Range Status   09/10/2024 9 7 - 15 mmol/L Final   08/11/2023 9 5 - 18 mmol/L Final     Glucose   Date Value Ref Range Status   09/10/2024 129 (H) 70 - 99 mg/dL Final   08/11/2023 149 (H) 70 - 125 mg/dL Final     Urea Nitrogen   Date Value Ref Range Status   09/10/2024 17.0 8.0 - 23.0 mg/dL Final   08/11/2023 15 8 - 22 mg/dL Final     Creatinine   Date Value Ref Range Status   09/10/2024 1.07 0.67 - 1.17 mg/dL Final     GFR Estimate   Date Value Ref Range Status   09/10/2024 77 >60 mL/min/1.73m2 Final     Comment:     eGFR calculated using 2021 CKD-EPI equation.   11/05/2020 >60 >60 mL/min/1.73m2 Final     Calcium   Date Value Ref Range Status   09/10/2024 9.9 8.8 - 10.4 mg/dL Final     Comment:     Reference intervals for this test were updated on 7/16/2024 to  reflect our healthy population more accurately. There may be differences in the flagging of prior results with similar values performed with this method. Those prior results can be interpreted in the context of the updated reference intervals.     Phosphorus   Date Value Ref Range Status   03/11/2024 2.8 2.5 - 4.5 mg/dL Final     Albumin   Date Value Ref Range Status   03/11/2024 4.3 3.5 - 5.2 g/dL Final   05/09/2022 3.8 3.5 - 5.0 g/dL Final       Imaging:  I reviewed imaging studies.     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 Tavarez is a 64 year old M  with PMHx of T2DM on metformin, HTN, CAD s/p CABG 01/2023, calcium oxalate nephrolithiasis who presents for evaluation of non-nephrotic range proteinuria in setting of CKD2. This is most likely secondary to his underlying diabetes and HTN, especially in setting of known vascular disease with multivessel CAD. Reassuringly, no hematuria to suggest nephritic process.  W/up for paraproteinemia was negative.     His creatinine remains stable at baseline wnl though UPCR slightly elevated today at 0.63 from 0.55. His blood pressures are at goal and he is euvolemic on exam. He does not have other metabolic complications. A1c with good control, last 6.9%. Currently on Farxiga 10mg + Metformin 1500mg daily. On Losartan 100mg daily for HTN and kidney disease with proteinuria.     Do note that his BMI is above goal at 31.72% and with his slight increase in UPCR today with underlying T2DM, discussed with him the benefits of GLP-1s. I think that this would be a good medication for him to be on in a cardiorenal protective sense in addition to the added benefits this would have for his underlying diabetes. Given that his PCP, Dr. Nuñez is managing this, will defer to Dr. Nuñez on prescribing this and managing it. He is amenable to this, scheduled to see Dr. Nuñez 3/2025. Otherwise, will continue to work on  his diet and increasing exercise.     -- recommend starting Ozempic or Wegovy, Dr. Nuñez to start   -- Continue Farxiga 10mg, Losartan 100mg  -- If proteinuria continues to worsen, would start MRA   -- Order some more serologies and he can get it done locally (the initial work-up was incomplete)  -- If proteinuria worsens despite that addition or +hematuria, would order kidney bx     # MBD  Historically, has had mild hypercalcemia which could also be related to possible over ingestion of vitamin D as was taking over 4000 international unit(s) daily until about 2023 when he first saw Dr. Gan. Now resolved, March 2023 decreased ergocalciferol to 2000 international unit(s) daily.  - pending added on Vit D today I elevated, will reduce vit D to 1000 U daily    Follow-up in 6 months    The longitudinal plan of care for CKD stage 2 with proteinuria, HTN, T2DM was addressed during this visit. Due to the added complexity in care, I will continue to support Kash in the subsequent management of this condition(s) and with the ongoing continuity of care of this condition(s).    I personally spent 40 minutes on the date of the encounter doing chart review, history and exam, documentation and further activities as noted above. 25 minutes of this visit is dedicated to direct patient interaction via face-to-face.    Fabiola Lal MD on 11/03/2024            Again, thank you for allowing me to participate in the care of your patient.      Sincerely,    uJlieta Harding MD

## 2024-11-04 NOTE — PATIENT INSTRUCTIONS
Stay well hydrated  Will get some labs done  If vitamin D is high then will lower vitamin D to 1000 U per day  Discuss with your primary care doctor regarding GLP1  See you in 6 months with labs

## 2024-11-06 LAB — DSDNA AB SER-ACNC: <0.6 IU/ML

## 2024-11-09 DIAGNOSIS — Z95.1 S/P CABG (CORONARY ARTERY BYPASS GRAFT): ICD-10-CM

## 2024-11-10 RX ORDER — ASPIRIN 81 MG
162 TABLET,CHEWABLE ORAL DAILY
Qty: 180 TABLET | Refills: 2 | Status: SHIPPED | OUTPATIENT
Start: 2024-11-10

## 2024-11-14 ENCOUNTER — PATIENT OUTREACH (OUTPATIENT)
Dept: CARE COORDINATION | Facility: CLINIC | Age: 64
End: 2024-11-14
Payer: COMMERCIAL

## 2024-11-28 ENCOUNTER — PATIENT OUTREACH (OUTPATIENT)
Dept: CARE COORDINATION | Facility: CLINIC | Age: 64
End: 2024-11-28
Payer: COMMERCIAL

## 2024-12-23 DIAGNOSIS — I10 ESSENTIAL HYPERTENSION: ICD-10-CM

## 2024-12-23 DIAGNOSIS — I25.10 CORONARY ARTERY DISEASE INVOLVING NATIVE CORONARY ARTERY OF NATIVE HEART WITHOUT ANGINA PECTORIS: ICD-10-CM

## 2024-12-23 RX ORDER — LOSARTAN POTASSIUM 100 MG/1
100 TABLET ORAL DAILY
Qty: 90 TABLET | Refills: 1 | Status: SHIPPED | OUTPATIENT
Start: 2024-12-23

## 2024-12-24 DIAGNOSIS — E11.9 TYPE 2 DIABETES MELLITUS WITHOUT COMPLICATION, WITHOUT LONG-TERM CURRENT USE OF INSULIN (H): ICD-10-CM

## 2024-12-31 DIAGNOSIS — I25.10 CORONARY ARTERY DISEASE INVOLVING NATIVE CORONARY ARTERY OF NATIVE HEART WITHOUT ANGINA PECTORIS: ICD-10-CM

## 2024-12-31 RX ORDER — ROSUVASTATIN CALCIUM 40 MG/1
40 TABLET, COATED ORAL DAILY
Qty: 90 TABLET | Refills: 3 | Status: SHIPPED | OUTPATIENT
Start: 2024-12-31

## 2024-12-31 NOTE — TELEPHONE ENCOUNTER
Medication Question or Refill    Contacts       Contact Date/Time Type Contact Phone/Fax    12/31/2024 11:37 AM CST Phone (Incoming) Kash Tavarez (Self) [phone] (M)            What medication are you calling about (include dose and sig)?: Rosuvastatin     Preferred Pharmacy:  [facility]/pharmacy #[id number] - [place], [place] - [address] AT [facility]. [facility] & [place]  [address] [facility] [address]  Phone: [phone] Fax: [phone]      Controlled Substance Agreement on file:   CSA -- Patient Level:    CSA: None found at the patient level.       Who prescribed the medication?: Card clinician     Do you need a refill? Yes    When did you use the medication last? N/A    Patient offered an appointment? No    Do you have any questions or concerns?  Yes: States his card clinician adjusted dosage, but advised that PCP needs to manage. Patient is hoping this can be completed asap-      Could we send this information to you in [facility] or would you prefer to receive a phone call?:   Patient would prefer a phone call   Okay to leave a detailed message?: Yes at Home number on file [phone] (home)

## 2025-01-18 ENCOUNTER — HEALTH MAINTENANCE LETTER (OUTPATIENT)
Age: 65
End: 2025-01-18

## 2025-02-18 DIAGNOSIS — Z95.1 S/P CABG (CORONARY ARTERY BYPASS GRAFT): ICD-10-CM

## 2025-02-18 RX ORDER — METOPROLOL TARTRATE 75 MG/1
1 TABLET ORAL 2 TIMES DAILY
Qty: 180 TABLET | Refills: 3 | Status: SHIPPED | OUTPATIENT
Start: 2025-02-18

## 2025-02-18 NOTE — TELEPHONE ENCOUNTER
Medication Question or Refill    Contacts       Contact Date/Time Type Contact Phone/Fax    02/18/2025 11:26 AM CST Phone (Incoming) Kash Tavarez (Self) [phone] (M)            What medication are you calling about (include dose and sig)?: Metoprolol    Preferred Pharmacy:   [facility]/pharmacy #[id number] - [place], [place] - [address] AT [facility] & [address]  [address] [facility] [address]  Phone: [phone] Fax: [phone]      Controlled Substance Agreement on file:   CSA -- Patient Level:    CSA: None found at the patient level.       Who prescribed the medication?: [place]    Do you need a refill? Yes    When did you use the medication last? 02/18/25    Patient offered an appointment? Yes: 03/11/25    Do you have any questions or concerns?  Yes: Pharmacy has been reaching out to get refill approved      Could we send this information to you in [facility] or would you prefer to receive a phone call?:   Patient would prefer a phone call   Okay to leave a detailed message?: Yes at Cell number on file:    Telephone Information:   Mobile [phone]

## 2025-03-11 ENCOUNTER — OFFICE VISIT (OUTPATIENT)
Dept: FAMILY MEDICINE | Facility: CLINIC | Age: 65
End: 2025-03-11
Payer: COMMERCIAL

## 2025-03-11 VITALS
WEIGHT: 236 LBS | RESPIRATION RATE: 16 BRPM | HEIGHT: 73 IN | TEMPERATURE: 98.3 F | OXYGEN SATURATION: 98 % | SYSTOLIC BLOOD PRESSURE: 131 MMHG | DIASTOLIC BLOOD PRESSURE: 79 MMHG | HEART RATE: 53 BPM | BODY MASS INDEX: 31.28 KG/M2

## 2025-03-11 DIAGNOSIS — Z12.5 SCREENING FOR PROSTATE CANCER: ICD-10-CM

## 2025-03-11 DIAGNOSIS — N18.2 CONTROLLED TYPE 2 DIABETES MELLITUS WITH STAGE 2 CHRONIC KIDNEY DISEASE, WITHOUT LONG-TERM CURRENT USE OF INSULIN (H): Primary | ICD-10-CM

## 2025-03-11 DIAGNOSIS — E11.22 CONTROLLED TYPE 2 DIABETES MELLITUS WITH STAGE 2 CHRONIC KIDNEY DISEASE, WITHOUT LONG-TERM CURRENT USE OF INSULIN (H): Primary | ICD-10-CM

## 2025-03-11 LAB
EST. AVERAGE GLUCOSE BLD GHB EST-MCNC: 154 MG/DL
HBA1C MFR BLD: 7 % (ref 0–5.6)

## 2025-03-11 PROCEDURE — 83036 HEMOGLOBIN GLYCOSYLATED A1C: CPT | Performed by: FAMILY MEDICINE

## 2025-03-11 PROCEDURE — 99213 OFFICE O/P EST LOW 20 MIN: CPT | Performed by: FAMILY MEDICINE

## 2025-03-11 PROCEDURE — 3075F SYST BP GE 130 - 139MM HG: CPT | Performed by: FAMILY MEDICINE

## 2025-03-11 PROCEDURE — 3078F DIAST BP <80 MM HG: CPT | Performed by: FAMILY MEDICINE

## 2025-03-11 PROCEDURE — 36415 COLL VENOUS BLD VENIPUNCTURE: CPT | Performed by: FAMILY MEDICINE

## 2025-03-11 NOTE — PROGRESS NOTES
ASSESMENT AND PLAN:  Diagnoses and all orders for this visit:  Controlled type 2 diabetes mellitus with stage 2 chronic kidney disease, without long-term current use of insulin (H)  -     HEMOGLOBIN A1C done today is 7.0.  Continue current plan and recheck again in 6 months.  -     blood glucose (ACCU-CHEK GUIDE) test strip; Use to test blood sugar 2 times daily or as directed.  Screening for prostate cancer  -     PSA, screen; Future      Reviewed the risks and benefits of the treatment plan with the patient and/or caregiver and we discussed indications for routine and emergent follow-up.        SUBJECTIVE: Patient in for follow-up on his diabetes which has been under good control.  He also has a past history of coronary artery disease.  He has not been having any chest pain.  He has been doing regular walking and is planning on doing a marching band.  Coming up here later this month.  Blood sugars have been under good control.  No new issues or concerns.    Past Medical History:   Diagnosis Date    Coronary artery disease of native artery of native heart with stable angina pectoris     Hypertension     Nephrolithiasis     Type 2 diabetes mellitus (H)      Patient Active Problem List   Diagnosis    Nephrolithiasis    Obesity    Allergic Rhinitis    Mixed hyperlipidemia    Essential Hypertension    Obesity (BMI 35.0-39.9) with comorbidity (H)    Type 2 diabetes mellitus without complication, without long-term current use of insulin (H)    Calculus of ureter    Hydronephrosis with urinary obstruction due to ureteral calculus    Coronary artery disease involving native coronary artery of native heart without angina pectoris    Controlled type 2 diabetes mellitus with stage 2 chronic kidney disease, without long-term current use of insulin (H)     Current Outpatient Medications   Medication Sig Dispense Refill    ACCU-CHEK FASTCLIX Misc [ACCU-CHEK FASTCLIX MISC] TEST TWICE DAILY 100 each 0    acetaminophen (TYLENOL)  325 MG tablet Take 2 tablets (650 mg) by mouth every 4 hours as needed for other (For optimal non-opioid multimodal pain management to improve pain control.)      amLODIPine (NORVASC) 5 MG tablet TAKE 1 TABLET BY MOUTH EVERY DAY 90 tablet 3    LELIA LOW DOSE 81 MG chewable tablet TAKE 2 TABLETS BY MOUTH DAILY 180 tablet 2    blood glucose (ACCU-CHEK GUIDE) test strip Use to test blood sugar 2 times daily or as directed. 200 strip 3    blood glucose monitoring (ACCU-CHEK FASTCLIX) lancets USE TWICE A DAY (AT 7:30 AM & 4:30 PM) AS DIRECTED 204 each 3    blood-glucose meter Misc [BLOOD-GLUCOSE METER MISC] Use 1 Device As Directed 2 (two) times a day at 9am and 6pm. 1 each 0    dapagliflozin (FARXIGA) 10 MG TABS tablet Take 1 tablet (10 mg) by mouth daily 90 tablet 3    ezetimibe (ZETIA) 10 MG tablet Take 1 tablet (10 mg) by mouth daily. 90 tablet 3    famotidine (PEPCID) 40 MG tablet TAKE 1 TABLET BY MOUTH EVERY DAY 90 tablet 1    imiquimod (ALDARA) 5 % cream Apply topically daily To warts  3    loratadine (CLARITIN) 10 MG tablet Take 10 mg by mouth daily      losartan (COZAAR) 100 MG tablet TAKE 1 TABLET BY MOUTH EVERY DAY 90 tablet 1    metFORMIN (GLUCOPHAGE) 500 MG tablet TAKE 1 TABLET (500 MG) BY MOUTH 3 TIMES DAILY (WITH MEALS) 270 tablet 0    Metoprolol Tartrate 75 MG TABS Take 1 tablet by mouth 2 times daily. 180 tablet 3    metroNIDAZOLE (METROGEL) 0.75 % gel 2 times daily Apply to face  10    multivitamin, therapeutic (THERA-VIT) TABS tablet Take 1 tablet by mouth daily      nitroGLYcerin (NITROSTAT) 0.4 MG sublingual tablet PLACE 1 TABLET (0.4 MG) UNDER THE TONGUE EVERY 5 MINUTES AS NEEDED FOR CHEST PAIN FOR CHEST PAIN PLACE 1 TABLET UNDER THE TONGUE EVERY 5 MINUTES FOR 3 DOSES. IF SYMPTOMS PERSIST 5 MINUTES AFTER 1ST DOSE CALL 911. 25 tablet 15    oxymetazoline (RHOFADE) 1 % Crea [OXYMETAZOLINE (RHOFADE) 1 % CREA] Apply topically daily.      rosuvastatin (CRESTOR) 40 MG tablet Take 1 tablet (40 mg) by mouth  "daily. 90 tablet 3    triamcinolone (KENALOG) 0.1 % external cream Apply topically 2 times daily (Patient taking differently: Apply topically daily as needed. Apply to neck rash) 80 g 0    vitamin D3 (CHOLECALCIFEROL) 50 mcg (2000 units) tablet Take 1 tablet (50 mcg) by mouth daily 90 tablet 3     History   Smoking Status    Never   Smokeless Tobacco    Never       OBJECTICE: /79 (BP Location: Left arm)   Pulse 53   Temp 98.3  F (36.8  C) (Oral)   Resp 16   Ht 1.848 m (6' 0.75\")   Wt 107 kg (236 lb)   SpO2 98%   BMI 31.35 kg/m       Recent Results (from the past 24 hours)   HEMOGLOBIN A1C    Collection Time: 03/11/25  8:04 AM   Result Value Ref Range    Estimated Average Glucose 154 (H) <117 mg/dL    Hemoglobin A1C 7.0 (H) 0.0 - 5.6 %        CV-regular rate and rhythm with distant heart tones   RESP-lungs clear to auscultation   Foot exam-normal.  Palpable pedal pulses bilaterally.  No ulcers.  Monofilament sensation is intact at all points tested.        Signed Electronically by: Catalino Nuñez MD    "

## 2025-03-26 DIAGNOSIS — E11.9 TYPE 2 DIABETES MELLITUS WITHOUT COMPLICATION, WITHOUT LONG-TERM CURRENT USE OF INSULIN (H): ICD-10-CM

## 2025-03-31 ENCOUNTER — TELEPHONE (OUTPATIENT)
Dept: MULTI SPECIALTY CLINIC | Facility: CLINIC | Age: 65
End: 2025-03-31
Payer: COMMERCIAL

## 2025-03-31 DIAGNOSIS — K21.9 GASTROESOPHAGEAL REFLUX DISEASE WITHOUT ESOPHAGITIS: ICD-10-CM

## 2025-03-31 DIAGNOSIS — N18.2 CONTROLLED TYPE 2 DIABETES MELLITUS WITH STAGE 2 CHRONIC KIDNEY DISEASE, WITHOUT LONG-TERM CURRENT USE OF INSULIN (H): ICD-10-CM

## 2025-03-31 DIAGNOSIS — R80.9 PROTEINURIA, UNSPECIFIED TYPE: ICD-10-CM

## 2025-03-31 DIAGNOSIS — E11.22 CONTROLLED TYPE 2 DIABETES MELLITUS WITH STAGE 2 CHRONIC KIDNEY DISEASE, WITHOUT LONG-TERM CURRENT USE OF INSULIN (H): ICD-10-CM

## 2025-03-31 RX ORDER — FAMOTIDINE 40 MG/1
40 TABLET, FILM COATED ORAL DAILY
Qty: 90 TABLET | Refills: 3 | Status: SHIPPED | OUTPATIENT
Start: 2025-03-31

## 2025-03-31 RX ORDER — DAPAGLIFLOZIN 10 MG/1
10 TABLET, FILM COATED ORAL DAILY
Qty: 90 TABLET | Refills: 3 | Status: SHIPPED | OUTPATIENT
Start: 2025-03-31

## 2025-03-31 NOTE — TELEPHONE ENCOUNTER
Nephrology Note: Medication Refill Request    Medication Refill Request:     Medication/Dose/Frequency: Farxiga 10, Pepcid 40mg   Preferred Pharmacy: [facility]   Provider:                    Dr. Harding        SITUATION/BACKROUND:                 Last office visit: 11/04/2024        Future office visit: 08/04/2025     ASSESSMENT:     Neph Assessments:    Recent Labs:  CBC Results:  Recent Labs   Lab Test 11/04/24  1026   WBC 8.2   RBC 5.06   HGB 14.1   HCT 42.8   MCV 85   MCH 27.9   MCHC 32.9   RDW 12.8        Last Renal Panel:  Sodium   Date Value Ref Range Status   11/04/2024 139 135 - 145 mmol/L Final     Potassium   Date Value Ref Range Status   11/04/2024 4.5 3.4 - 5.3 mmol/L Final   08/11/2023 3.5 3.5 - 5.0 mmol/L Final     Chloride   Date Value Ref Range Status   11/04/2024 104 98 - 107 mmol/L Final   08/11/2023 100 98 - 107 mmol/L Final     Carbon Dioxide (CO2)   Date Value Ref Range Status   11/04/2024 26 22 - 29 mmol/L Final   08/11/2023 28 22 - 31 mmol/L Final     Anion Gap   Date Value Ref Range Status   11/04/2024 9 7 - 15 mmol/L Final   08/11/2023 9 5 - 18 mmol/L Final     Glucose   Date Value Ref Range Status   11/04/2024 178 (H) 70 - 99 mg/dL Final   08/11/2023 149 (H) 70 - 125 mg/dL Final     Urea Nitrogen   Date Value Ref Range Status   11/04/2024 17.3 8.0 - 23.0 mg/dL Final   08/11/2023 15 8 - 22 mg/dL Final     Creatinine   Date Value Ref Range Status   11/04/2024 0.92 0.67 - 1.17 mg/dL Final     GFR Estimate   Date Value Ref Range Status   11/04/2024 >90 >60 mL/min/1.73m2 Final     Comment:     eGFR calculated using 2021 CKD-EPI equation.   11/05/2020 >60 >60 mL/min/1.73m2 Final     Calcium   Date Value Ref Range Status   11/04/2024 9.7 8.8 - 10.4 mg/dL Final     Comment:     Reference intervals for this test were updated on 7/16/2024 to reflect our healthy population more accurately. There may be differences in the flagging of prior results with similar values performed with this method. Those  prior results can be interpreted in the context of the updated reference intervals.     Phosphorus   Date Value Ref Range Status   11/04/2024 3.1 2.5 - 4.5 mg/dL Final     Albumin   Date Value Ref Range Status   11/04/2024 4.3 3.5 - 5.2 g/dL Final   05/09/2022 3.8 3.5 - 5.0 g/dL Final       Current Medication List:   Current Outpatient Medications (Antihypertensive, Cardiovascular, Diuretics, Beta blockers, Calcium blockers, Anticoagulants)   Medication Sig    amLODIPine (NORVASC) 5 MG tablet TAKE 1 TABLET BY MOUTH EVERY DAY    losartan (COZAAR) 100 MG tablet TAKE 1 TABLET BY MOUTH EVERY DAY    Metoprolol Tartrate 75 MG TABS Take 1 tablet by mouth 2 times daily.     Current Outpatient Medications (Other)   Medication Sig    ACCU-CHEK FASTCLIX Misc [ACCU-CHEK FASTCLIX MISC] TEST TWICE DAILY    acetaminophen (TYLENOL) 325 MG tablet Take 2 tablets (650 mg) by mouth every 4 hours as needed for other (For optimal non-opioid multimodal pain management to improve pain control.)    LEILA LOW DOSE 81 MG chewable tablet TAKE 2 TABLETS BY MOUTH DAILY    blood glucose (ACCU-CHEK GUIDE) test strip Use to test blood sugar 2 times daily or as directed.    blood glucose monitoring (ACCU-CHEK FASTCLIX) lancets USE TWICE A DAY (AT 7:30 AM & 4:30 PM) AS DIRECTED    blood-glucose meter Misc [BLOOD-GLUCOSE METER MISC] Use 1 Device As Directed 2 (two) times a day at 9am and 6pm.    dapagliflozin (FARXIGA) 10 MG TABS tablet Take 1 tablet (10 mg) by mouth daily    ezetimibe (ZETIA) 10 MG tablet Take 1 tablet (10 mg) by mouth daily.    famotidine (PEPCID) 40 MG tablet TAKE 1 TABLET BY MOUTH EVERY DAY    imiquimod (ALDARA) 5 % cream Apply topically daily To warts    loratadine (CLARITIN) 10 MG tablet Take 10 mg by mouth daily    metFORMIN (GLUCOPHAGE) 500 MG tablet TAKE 1 TABLET (500 MG) BY MOUTH 3 TIMES DAILY (WITH MEALS)    metroNIDAZOLE (METROGEL) 0.75 % gel 2 times daily Apply to face    multivitamin, therapeutic (THERA-VIT) TABS tablet  Take 1 tablet by mouth daily    nitroGLYcerin (NITROSTAT) 0.4 MG sublingual tablet PLACE 1 TABLET (0.4 MG) UNDER THE TONGUE EVERY 5 MINUTES AS NEEDED FOR CHEST PAIN FOR CHEST PAIN PLACE 1 TABLET UNDER THE TONGUE EVERY 5 MINUTES FOR 3 DOSES. IF SYMPTOMS PERSIST 5 MINUTES AFTER 1ST DOSE CALL 911.    oxymetazoline (RHOFADE) 1 % Crea [OXYMETAZOLINE (RHOFADE) 1 % CREA] Apply topically daily.    rosuvastatin (CRESTOR) 40 MG tablet Take 1 tablet (40 mg) by mouth daily.    triamcinolone (KENALOG) 0.1 % external cream Apply topically 2 times daily (Patient taking differently: Apply topically daily as needed. Apply to neck rash)    vitamin D3 (CHOLECALCIFEROL) 50 mcg (2000 units) tablet Take 1 tablet (50 mcg) by mouth daily       Has patient had kidney transplant in the prior 1 year: no.   Has patient been seen the last 12 months: Yes.  Associated labs reviewed for medication: N/A    PLAN:     Medication refilled per protocol: Yes    Rafaela Spencer RN

## 2025-03-31 NOTE — TELEPHONE ENCOUNTER
[facility] Call Center    Phone Message    May a detailed message be left on voicemail: yes     Reason for Call: Medication Refill Request    Has the patient contacted the pharmacy for the refill? Yes   Name of medication being requested:   dapagliflozin (FARXIGA) 10 MG TABS tablet [[id number]] (Order [id number])     famotidine (PEPCID) 40 MG tablet [[id number]] (Order [id number]   Provider who prescribed the medication: cyndi  Pharmacy: [facility]  Date medication is needed: asap   Action Taken: Other: neph    Travel Screening: Not Applicable     Date of Service:

## 2025-04-29 ENCOUNTER — OFFICE VISIT (OUTPATIENT)
Dept: INTERNAL MEDICINE | Facility: CLINIC | Age: 65
End: 2025-04-29
Payer: COMMERCIAL

## 2025-04-29 ENCOUNTER — ANCILLARY PROCEDURE (OUTPATIENT)
Dept: GENERAL RADIOLOGY | Facility: CLINIC | Age: 65
End: 2025-04-29
Attending: NURSE PRACTITIONER
Payer: COMMERCIAL

## 2025-04-29 VITALS
HEIGHT: 73 IN | BODY MASS INDEX: 30.88 KG/M2 | RESPIRATION RATE: 16 BRPM | HEART RATE: 71 BPM | TEMPERATURE: 97.4 F | SYSTOLIC BLOOD PRESSURE: 130 MMHG | OXYGEN SATURATION: 97 % | DIASTOLIC BLOOD PRESSURE: 88 MMHG | WEIGHT: 233 LBS

## 2025-04-29 DIAGNOSIS — J20.9 ACUTE BRONCHITIS, UNSPECIFIED ORGANISM: ICD-10-CM

## 2025-04-29 DIAGNOSIS — R05.2 SUBACUTE COUGH: ICD-10-CM

## 2025-04-29 DIAGNOSIS — J30.1 SEASONAL ALLERGIC RHINITIS DUE TO POLLEN: ICD-10-CM

## 2025-04-29 DIAGNOSIS — R05.2 SUBACUTE COUGH: Primary | ICD-10-CM

## 2025-04-29 PROCEDURE — 71046 X-RAY EXAM CHEST 2 VIEWS: CPT | Mod: TC | Performed by: STUDENT IN AN ORGANIZED HEALTH CARE EDUCATION/TRAINING PROGRAM

## 2025-04-29 PROCEDURE — 3075F SYST BP GE 130 - 139MM HG: CPT | Performed by: NURSE PRACTITIONER

## 2025-04-29 PROCEDURE — 99214 OFFICE O/P EST MOD 30 MIN: CPT | Performed by: NURSE PRACTITIONER

## 2025-04-29 PROCEDURE — 3079F DIAST BP 80-89 MM HG: CPT | Performed by: NURSE PRACTITIONER

## 2025-04-29 RX ORDER — BENZONATATE 100 MG/1
100 CAPSULE ORAL 3 TIMES DAILY PRN
Qty: 30 CAPSULE | Refills: 0 | Status: SHIPPED | OUTPATIENT
Start: 2025-04-29

## 2025-04-29 RX ORDER — PREDNISONE 20 MG/1
20 TABLET ORAL DAILY
Qty: 5 TABLET | Refills: 0 | Status: SHIPPED | OUTPATIENT
Start: 2025-04-29 | End: 2025-05-04

## 2025-04-29 ASSESSMENT — ENCOUNTER SYMPTOMS: COUGH: 1

## 2025-04-29 NOTE — PROGRESS NOTES
Assessment & Plan     Acute bronchitis, unspecified organism  Patient is most concerned that his cough sounds wheezy at time.  Lung sounds overall are clear and we discussed cough may be related to ongoing postnasal drip from his allergies or from a viral process.  At this time patient will benefit from a steroid burst to help treat suspected acute bronchitis.  No findings on physical exam to indicate need for antibiotics at this time.  Reviewed use of the prednisone and discussed that this will temporarily increase his blood sugars but will not leave them elevated long-term.  Recommend he take this once daily in the morning with food to avoid upset stomach.  Close follow-up if symptoms or not improving as anticipated.  - predniSONE (DELTASONE) 20 MG tablet; Take 1 tablet (20 mg) by mouth daily for 5 days.    Subacute cough  Chest x-ray was obtained to rule out possible pneumonia due to patient's concern with ongoing cough.  Upon my independent interpretation of the x-ray results I do not see any obvious infiltrate or effusion.  Awaiting official radiologist report.  If anything official radiology wrist report suggestive of pneumonia we will treat with appropriate medications at that time.  For now we reviewed symptomatic management for the cough and I did prescribe benzonatate that he can use 3 times a day as needed for cough.  Also recommend use of honey on a spoon, humidifier in room and can continue to use cough drops  - XR Chest 2 Views; Future  - benzonatate (TESSALON) 100 MG capsule; Take 1 capsule (100 mg) by mouth 3 times daily as needed for cough.    Seasonal allergic rhinitis due to pollen  Patient reports significant increase in runny nose now that he started to get exposure to tree pollen.  We discussed possibly switching from Claritin to Allegra or Zyrtec to see if he gets better relief.  We also discussed possibility of adding on Flonase, Nasacort or Nasonex to help treat his sinus symptoms.  We  "discussed that ongoing postnasal drip and runny nose can lead to ongoing cough as well.          BMI  Estimated body mass index is 30.95 kg/m  as calculated from the following:    Height as of this encounter: 1.848 m (6' 0.76\").    Weight as of this encounter: 105.7 kg (233 lb).     Melida Hewitt is a 64 year old, presenting for the following health issues:  Cough (Wheezing, chills, yellow phlegm, headaches since Friday- no covid testing)      4/29/2025    11:17 AM   Additional Questions   Roomed by Mela Lara    History of Present Illness       Reason for visit:  Wheezing cough.  Felling hot and cold.  Generally rundown feeling.  Not sure if it is just a cold or something more.  Symptom onset:  3-7 days ago  Symptoms include:  Wheezing cough.  Yellow junk is spit up when coughing.  Feeling hot and cold.  Symptom intensity:  Moderate  Symptom progression:  Staying the same  Had these symptoms before:  No  What makes it worse:  Reclining backwards on chair or bed.  More coughing in thise situations.  What makes it better:  Drinking lots of liquids.  Cough drops.   He is taking medications regularly.        Reports cough since this weekend. No fevers, chest pain or shortness of breath. Reports cough is occasionally productive of yellow sputum. Chronic rhinitis with hx of allergies. No sore throat, vomiting or diarrhea. Has been using cough drops without relief. Has been taking Claritin for \"decades.\"         ROS  Comprehensive 12-point review of systems was completed and negative except as noted in HPI.        Objective    /88 (BP Location: Right arm, Patient Position: Sitting)   Pulse 71   Temp 97.4  F (36.3  C)   Resp 16   Ht 1.848 m (6' 0.76\")   Wt 105.7 kg (233 lb)   SpO2 97%   BMI 30.95 kg/m    Body mass index is 30.95 kg/m .  Physical Exam   Constitutional: In no acute distress.  Clean appearance.  Does not appear acutely ill.  Ears: TMs without erythema or effusions.  Grossly normal " hearing.   Nose: Mild nasal congestion.  No rhinorrhea.  No frontal or maxillary sinus tenderness.  Oropharynx: Mild erythema.  Posterior oropharyngeal cobblestoning noted.  Neck: Supple.  No thyromegaly or lymphadenopathy noted.  Cardiovascular: Regular rate and rhythm.   Respiratory: Normal respiratory effort.  Lung sounds clear throughout on auscultation.  Skin: Skin is pink, warm and dry.  No rashes.   Musculoskeletal: Gait normal.  Able to mount exam table without difficulties.  Psychiatric: Appropriate affect and demeanor.               Signed Electronically by: Chanell Yu NP

## 2025-04-29 NOTE — PATIENT INSTRUCTIONS
Chest x-ray obtained today.  I will let you know this result once available.  Depending on results I will let you know next steps in the treatment plan.    Currently for symptoms I do want you to try using sugar-free honey or regular honey on a spoon to help with your cough.  I also recommend use of humidifier in your home.  You can increase the humidification on your furnace or add additional small cool-mist humidifier throughout your home.    Saline nasal rinses can help clear out some sinus congestion.  You can do these once daily as needed for any increase in sinus symptoms.    As you do have significant seasonal allergies and symptoms are likely going to worsen in the spring I would recommend starting an allergy nasal spray such as Nasacort or Flonase.  Generic brands are okay as well.  Use these once daily and they can be used during your seasons that you typically have more symptoms and stop when you are usually doing better.    You may also want to consider switching from Claritin to either Allegra or Zyrtec daily.  These all work very similar but often with long-term use we noticed less effectiveness from these so they need to be switched from time to time.

## 2025-05-01 ENCOUNTER — DOCUMENTATION ONLY (OUTPATIENT)
Dept: OTHER | Facility: CLINIC | Age: 65
End: 2025-05-01
Payer: COMMERCIAL

## 2025-05-26 ENCOUNTER — PATIENT OUTREACH (OUTPATIENT)
Dept: CARE COORDINATION | Facility: CLINIC | Age: 65
End: 2025-05-26
Payer: COMMERCIAL

## 2025-06-11 DIAGNOSIS — E11.22 CONTROLLED TYPE 2 DIABETES MELLITUS WITH STAGE 2 CHRONIC KIDNEY DISEASE, WITHOUT LONG-TERM CURRENT USE OF INSULIN (H): ICD-10-CM

## 2025-06-11 DIAGNOSIS — N18.2 CONTROLLED TYPE 2 DIABETES MELLITUS WITH STAGE 2 CHRONIC KIDNEY DISEASE, WITHOUT LONG-TERM CURRENT USE OF INSULIN (H): ICD-10-CM

## 2025-06-11 RX ORDER — BLOOD SUGAR DIAGNOSTIC
STRIP MISCELLANEOUS
Qty: 200 STRIP | Refills: 3 | Status: SHIPPED | OUTPATIENT
Start: 2025-06-11

## 2025-06-14 DIAGNOSIS — I10 ESSENTIAL HYPERTENSION: ICD-10-CM

## 2025-06-14 DIAGNOSIS — I25.10 CORONARY ARTERY DISEASE INVOLVING NATIVE CORONARY ARTERY OF NATIVE HEART WITHOUT ANGINA PECTORIS: ICD-10-CM

## 2025-06-15 RX ORDER — LOSARTAN POTASSIUM 100 MG/1
100 TABLET ORAL DAILY
Qty: 90 TABLET | Refills: 0 | Status: SHIPPED | OUTPATIENT
Start: 2025-06-15

## 2025-06-16 DIAGNOSIS — E11.22 CONTROLLED TYPE 2 DIABETES MELLITUS WITH STAGE 2 CHRONIC KIDNEY DISEASE, WITHOUT LONG-TERM CURRENT USE OF INSULIN (H): ICD-10-CM

## 2025-06-16 DIAGNOSIS — N18.2 CONTROLLED TYPE 2 DIABETES MELLITUS WITH STAGE 2 CHRONIC KIDNEY DISEASE, WITHOUT LONG-TERM CURRENT USE OF INSULIN (H): ICD-10-CM

## 2025-06-17 RX ORDER — BLOOD SUGAR DIAGNOSTIC
STRIP MISCELLANEOUS
Qty: 200 STRIP | Refills: 3 | OUTPATIENT
Start: 2025-06-17

## 2025-08-06 DIAGNOSIS — Z95.1 S/P CABG (CORONARY ARTERY BYPASS GRAFT): ICD-10-CM

## 2025-08-06 RX ORDER — ASPIRIN 81 MG
162 TABLET,CHEWABLE ORAL DAILY
Qty: 180 TABLET | Refills: 2 | Status: SHIPPED | OUTPATIENT
Start: 2025-08-06

## 2025-08-21 ENCOUNTER — TRANSFERRED RECORDS (OUTPATIENT)
Dept: HEALTH INFORMATION MANAGEMENT | Facility: CLINIC | Age: 65
End: 2025-08-21
Payer: COMMERCIAL

## (undated) DEVICE — KIT ENDO VASOVIEW HEMOPRO 2 VH-4000

## (undated) DEVICE — BONE WAX 2.5GM W31G

## (undated) DEVICE — GUIDEWIRE STARTER 260CM X 0.035

## (undated) DEVICE — KIT HAND CONTROL ACIST 014644 AR-P54

## (undated) DEVICE — CATH THORACIC STRAIGHT CLOTSTOP 28FR

## (undated) DEVICE — SU ETHIBOND 3-0 BBDA 36" X588H

## (undated) DEVICE — GLOVE BIOGEL PI ULTRATOUCH G SZ 7.5 42175

## (undated) DEVICE — BANDAGE ELASTIC VELCRO 4IN REB3014

## (undated) DEVICE — CATH THORACIC RT ANGLE CLOTSTOP 28FR

## (undated) DEVICE — SU PROLENE 7-0 BV-1DA 4X30" M8703

## (undated) DEVICE — CATH DIAG 4FR JR 5.0 538423

## (undated) DEVICE — PITCHER STERILE 1000ML  SSK9004A

## (undated) DEVICE — CLIP SPRING FOGARTY SOFTJAW CSOFT6

## (undated) DEVICE — CUSTOM PACK CAB SCV5BCBHEA

## (undated) DEVICE — GOWN IMPERVIOUS BREATHABLE SMART LG 89015

## (undated) DEVICE — LIGACLIP LARGE AESCULAP O4120-1

## (undated) DEVICE — ESU ELEC BLADE E-SEP INSULATED NEPTUNE 70MM 0703-070-002

## (undated) DEVICE — SUCTION SLEEVE NEPTUNE 2 125MM 0703-005-125

## (undated) DEVICE — CATH THORACIC STRAIGHT CLOTSTOP 32FR

## (undated) DEVICE — SHTH INTRO 0.021IN ID 6FR DIA

## (undated) DEVICE — CLIP HORIZON MULTI SM YELLOW 001204

## (undated) DEVICE — SU PROLENE 8-0 BV130-5DA 24" 8732H

## (undated) DEVICE — RX SURGIFLO HEMOSTATIC MATRIX W/THROMBIN 8ML 2994

## (undated) DEVICE — ESU PENCIL SMOKE EVAC W/ROCKER SWITCH 0703-047-000

## (undated) DEVICE — CATH SUCTION 14FR W/O CTRL DYND41962

## (undated) DEVICE — CABLE MYO/LEAD PACING BLUE DISP 019-535

## (undated) DEVICE — BLANKET BAIR ADLT PLYMR 60X36IN 63000

## (undated) DEVICE — SU STRATAFIX PDS PLUS 2-0 SPIRAL CT-1 30CM SXPP1B410

## (undated) DEVICE — SU PROLENE 4-0 RB-1DA 36" 8557H

## (undated) DEVICE — CABLE MYO/LEAD PACING WHITE DISP 019-530

## (undated) DEVICE — ELECTRODE ADULT PACING MULTI P-211-M1

## (undated) DEVICE — SU PROLENE 7-0 BV-1DA 30" 8703H

## (undated) DEVICE — SLEEVE TR BAND RADIAL COMPRESSION DEVICE 24CM TRB24-REG

## (undated) DEVICE — SU STRATAFIX MONOCRYL 4-0 SPIRAL PS-2 SXMP1B118

## (undated) DEVICE — PREP CHLORAPREP 26ML TINTED HI-LITE ORANGE 930815

## (undated) DEVICE — DEVICE TISSUE STABILIZATION OCTOBASE 28707

## (undated) DEVICE — MANIFOLD KIT ANGIO AUTOMATED 014613

## (undated) DEVICE — SUCTION DRY CHEST DRAIN OASIS 3600-100

## (undated) DEVICE — SUCTION CANISTER MEDIVAC LINER 3000ML W/LID 65651-530

## (undated) DEVICE — TAPE ADH MEDIPORE 6IN SOFT CLOTH 2866

## (undated) DEVICE — SOL WATER IRRIG 1000ML BOTTLE 2F7114

## (undated) DEVICE — LEAD PACER MYOCARDIAL BIPOLAR TEMPORARY 53CM 6495F

## (undated) DEVICE — DRSG KERLIX 4 1/2"X4YDS ROLL 6715

## (undated) DEVICE — CUSTOM PACK CORONARY SAN5BCRHEA

## (undated) DEVICE — Device

## (undated) DEVICE — PLATE GROUNDING ADULT W/CORD 9165L

## (undated) DEVICE — SU PLEDGET SOFT TFE 3/8"X3/26"X1/16" PCP40

## (undated) DEVICE — CATH ANGIO INFINITI JL3.5 4FRX100CM 538418

## (undated) DEVICE — SYR ANGIOGRAPHY MULTIUSE KIT ACIST 014612

## (undated) DEVICE — CUSTOM PACK CV ST JOES SCV5BCVHEA

## (undated) DEVICE — PACK MINOR SINGLE BASIN SSK3001

## (undated) DEVICE — SOL NACL 0.9% IRRIG 1000ML BOTTLE 2F7124

## (undated) DEVICE — ADH SKIN CLOSURE PREMIERPRO EXOFIN 1.0ML 3470

## (undated) DEVICE — CLIP HORIZON MULTI MED BLUE 002204

## (undated) DEVICE — DRSG DRAIN 4X4" 7086

## (undated) DEVICE — CONNECTOR Y 3/8 X 1/4 X 1/4 STRL C430S

## (undated) DEVICE — CELL SAVER

## (undated) DEVICE — ESU ELEC BLADE 2.75" COATED/INSULATED E1455

## (undated) DEVICE — RX VISTASEAL FIBRIN SEALANT W/THROMBIN 10ML VST10

## (undated) DEVICE — TUBING INSUFFLATION W/FILTER 28-0207

## (undated) DEVICE — SU STRATAFIX PDS PLUS 0 CT 45CM SXPP1A406

## (undated) RX ORDER — KETAMINE HYDROCHLORIDE 10 MG/ML
INJECTION INTRAMUSCULAR; INTRAVENOUS
Status: DISPENSED
Start: 2023-01-13

## (undated) RX ORDER — PROPOFOL 10 MG/ML
INJECTION, EMULSION INTRAVENOUS
Status: DISPENSED
Start: 2023-01-13

## (undated) RX ORDER — FENTANYL CITRATE-0.9 % NACL/PF 10 MCG/ML
PLASTIC BAG, INJECTION (ML) INTRAVENOUS
Status: DISPENSED
Start: 2023-01-13

## (undated) RX ORDER — CEFAZOLIN SODIUM 1 G/3ML
INJECTION, POWDER, FOR SOLUTION INTRAMUSCULAR; INTRAVENOUS
Status: DISPENSED
Start: 2023-01-13

## (undated) RX ORDER — FENTANYL CITRATE 50 UG/ML
INJECTION, SOLUTION INTRAMUSCULAR; INTRAVENOUS
Status: DISPENSED
Start: 2023-01-13

## (undated) RX ORDER — VANCOMYCIN HYDROCHLORIDE 1 G/20ML
INJECTION, POWDER, LYOPHILIZED, FOR SOLUTION INTRAVENOUS
Status: DISPENSED
Start: 2023-01-13

## (undated) RX ORDER — PHENYLEPHRINE HYDROCHLORIDE 10 MG/ML
INJECTION INTRAVENOUS
Status: DISPENSED
Start: 2023-01-13

## (undated) RX ORDER — ONDANSETRON 2 MG/ML
INJECTION INTRAMUSCULAR; INTRAVENOUS
Status: DISPENSED
Start: 2023-01-13

## (undated) RX ORDER — DIAZEPAM 10 MG
TABLET ORAL
Status: DISPENSED
Start: 2022-12-05

## (undated) RX ORDER — FENTANYL CITRATE 50 UG/ML
INJECTION, SOLUTION INTRAMUSCULAR; INTRAVENOUS
Status: DISPENSED
Start: 2022-12-05